# Patient Record
Sex: MALE | Race: WHITE | Employment: OTHER | URBAN - METROPOLITAN AREA
[De-identification: names, ages, dates, MRNs, and addresses within clinical notes are randomized per-mention and may not be internally consistent; named-entity substitution may affect disease eponyms.]

---

## 2017-04-03 ENCOUNTER — OFFICE VISIT (OUTPATIENT)
Dept: DERMATOLOGY | Age: 77
End: 2017-04-03

## 2017-04-03 DIAGNOSIS — D48.5 NEOPLASM OF UNCERTAIN BEHAVIOR OF SKIN: Primary | ICD-10-CM

## 2017-04-03 DIAGNOSIS — L57.0 ACTINIC KERATOSES: ICD-10-CM

## 2017-04-03 DIAGNOSIS — Z85.828 HISTORY OF NONMELANOMA SKIN CANCER: ICD-10-CM

## 2017-04-03 DIAGNOSIS — Z12.83 SCREENING EXAM FOR SKIN CANCER: ICD-10-CM

## 2017-04-03 PROCEDURE — 11100 PR BIOPSY OF SKIN LESION: CPT | Performed by: DERMATOLOGY

## 2017-04-03 PROCEDURE — 17000 DESTRUCT PREMALG LESION: CPT | Performed by: DERMATOLOGY

## 2017-04-03 PROCEDURE — 99214 OFFICE O/P EST MOD 30 MIN: CPT | Performed by: DERMATOLOGY

## 2017-04-03 PROCEDURE — 17003 DESTRUCT PREMALG LES 2-14: CPT | Performed by: DERMATOLOGY

## 2017-04-03 RX ORDER — CLOBETASOL PROPIONATE 0.5 MG/G
AEROSOL, FOAM TOPICAL
Qty: 1 CAN | Refills: 3 | Status: SHIPPED | OUTPATIENT
Start: 2017-04-03 | End: 2017-04-13

## 2017-04-07 ENCOUNTER — TELEPHONE (OUTPATIENT)
Dept: DERMATOLOGY | Age: 77
End: 2017-04-07

## 2017-04-13 ENCOUNTER — OFFICE VISIT (OUTPATIENT)
Dept: FAMILY MEDICINE CLINIC | Age: 77
End: 2017-04-13

## 2017-04-13 VITALS
HEART RATE: 72 BPM | BODY MASS INDEX: 36.34 KG/M2 | WEIGHT: 239 LBS | SYSTOLIC BLOOD PRESSURE: 126 MMHG | DIASTOLIC BLOOD PRESSURE: 72 MMHG

## 2017-04-13 DIAGNOSIS — E66.09 NON MORBID OBESITY DUE TO EXCESS CALORIES: ICD-10-CM

## 2017-04-13 DIAGNOSIS — E11.9 CONTROLLED TYPE 2 DIABETES MELLITUS WITHOUT COMPLICATION, WITHOUT LONG-TERM CURRENT USE OF INSULIN (HCC): Primary | ICD-10-CM

## 2017-04-13 DIAGNOSIS — E78.2 MIXED HYPERLIPIDEMIA: ICD-10-CM

## 2017-04-13 DIAGNOSIS — K21.9 GASTROESOPHAGEAL REFLUX DISEASE WITHOUT ESOPHAGITIS: ICD-10-CM

## 2017-04-13 DIAGNOSIS — I10 ESSENTIAL HYPERTENSION, BENIGN: ICD-10-CM

## 2017-04-13 LAB
ANION GAP SERPL CALCULATED.3IONS-SCNC: 14 MMOL/L (ref 3–16)
BUN BLDV-MCNC: 16 MG/DL (ref 7–20)
CALCIUM SERPL-MCNC: 8.8 MG/DL (ref 8.3–10.6)
CHLORIDE BLD-SCNC: 101 MMOL/L (ref 99–110)
CHOLESTEROL, TOTAL: 148 MG/DL (ref 0–199)
CO2: 28 MMOL/L (ref 21–32)
CREAT SERPL-MCNC: 1 MG/DL (ref 0.8–1.3)
GFR AFRICAN AMERICAN: >60
GFR NON-AFRICAN AMERICAN: >60
GLUCOSE BLD-MCNC: 119 MG/DL (ref 70–99)
HBA1C MFR BLD: 6.8 %
HDLC SERPL-MCNC: 40 MG/DL (ref 40–60)
LDL CHOLESTEROL CALCULATED: 82 MG/DL
POTASSIUM SERPL-SCNC: 4.4 MMOL/L (ref 3.5–5.1)
SODIUM BLD-SCNC: 143 MMOL/L (ref 136–145)
TRIGL SERPL-MCNC: 130 MG/DL (ref 0–150)
VLDLC SERPL CALC-MCNC: 26 MG/DL

## 2017-04-13 PROCEDURE — 99214 OFFICE O/P EST MOD 30 MIN: CPT | Performed by: FAMILY MEDICINE

## 2017-04-13 PROCEDURE — 36415 COLL VENOUS BLD VENIPUNCTURE: CPT | Performed by: FAMILY MEDICINE

## 2017-04-13 PROCEDURE — 83036 HEMOGLOBIN GLYCOSYLATED A1C: CPT | Performed by: FAMILY MEDICINE

## 2017-04-13 RX ORDER — METOPROLOL SUCCINATE 50 MG/1
50 TABLET, EXTENDED RELEASE ORAL DAILY
Qty: 90 TABLET | Refills: 1 | Status: SHIPPED | OUTPATIENT
Start: 2017-04-13 | End: 2017-10-13 | Stop reason: SDUPTHER

## 2017-04-13 RX ORDER — ATORVASTATIN CALCIUM 10 MG/1
10 TABLET, FILM COATED ORAL DAILY
COMMUNITY
End: 2017-04-13 | Stop reason: SDUPTHER

## 2017-04-13 RX ORDER — ATORVASTATIN CALCIUM 10 MG/1
10 TABLET, FILM COATED ORAL EVERY EVENING
Qty: 90 TABLET | Refills: 1 | Status: SHIPPED | OUTPATIENT
Start: 2017-04-13 | End: 2017-10-10 | Stop reason: SDUPTHER

## 2017-04-13 RX ORDER — LOSARTAN POTASSIUM AND HYDROCHLOROTHIAZIDE 25; 100 MG/1; MG/1
1 TABLET ORAL DAILY
Qty: 90 TABLET | Refills: 1 | Status: SHIPPED | OUTPATIENT
Start: 2017-04-13 | End: 2017-10-13 | Stop reason: SDUPTHER

## 2017-04-13 RX ORDER — PANTOPRAZOLE SODIUM 40 MG/1
40 TABLET, DELAYED RELEASE ORAL DAILY
Qty: 90 TABLET | Refills: 1 | Status: SHIPPED | OUTPATIENT
Start: 2017-04-13 | End: 2017-08-23

## 2017-04-13 RX ORDER — METFORMIN HYDROCHLORIDE 500 MG/1
TABLET, EXTENDED RELEASE ORAL
Qty: 360 TABLET | Refills: 1 | Status: SHIPPED | OUTPATIENT
Start: 2017-04-13 | End: 2017-10-13 | Stop reason: SDUPTHER

## 2017-04-15 PROBLEM — E66.09 NON MORBID OBESITY DUE TO EXCESS CALORIES: Status: ACTIVE | Noted: 2017-04-15

## 2017-04-15 ASSESSMENT — ENCOUNTER SYMPTOMS
COUGH: 0
EYE PAIN: 0
CHEST TIGHTNESS: 0
CONSTIPATION: 0
DIARRHEA: 0
SHORTNESS OF BREATH: 0
ABDOMINAL PAIN: 0

## 2017-05-15 ENCOUNTER — OFFICE VISIT (OUTPATIENT)
Dept: DERMATOLOGY | Age: 77
End: 2017-05-15

## 2017-05-15 DIAGNOSIS — C44.41 BASAL CELL CARCINOMA OF NECK: Primary | ICD-10-CM

## 2017-05-15 DIAGNOSIS — D48.5 NEOPLASM OF UNCERTAIN BEHAVIOR OF SKIN: ICD-10-CM

## 2017-05-15 DIAGNOSIS — L57.0 ACTINIC KERATOSES: ICD-10-CM

## 2017-05-15 PROCEDURE — 17003 DESTRUCT PREMALG LES 2-14: CPT | Performed by: DERMATOLOGY

## 2017-05-15 PROCEDURE — 11100 PR BIOPSY OF SKIN LESION: CPT | Performed by: DERMATOLOGY

## 2017-05-15 PROCEDURE — 17273 DSTR MAL LES S/N/H/F/G 2.1-3: CPT | Performed by: DERMATOLOGY

## 2017-05-15 PROCEDURE — 17000 DESTRUCT PREMALG LESION: CPT | Performed by: DERMATOLOGY

## 2017-05-18 ENCOUNTER — TELEPHONE (OUTPATIENT)
Dept: DERMATOLOGY | Age: 77
End: 2017-05-18

## 2017-07-21 ENCOUNTER — HOSPITAL ENCOUNTER (OUTPATIENT)
Dept: SURGERY | Age: 77
Discharge: HOME | End: 2017-07-21
Attending: INTERNAL MEDICINE | Admitting: INTERNAL MEDICINE

## 2017-07-21 VITALS
BODY MASS INDEX: 34.86 KG/M2 | SYSTOLIC BLOOD PRESSURE: 135 MMHG | HEART RATE: 67 BPM | DIASTOLIC BLOOD PRESSURE: 84 MMHG | RESPIRATION RATE: 16 BRPM | HEIGHT: 68 IN | TEMPERATURE: 98 F | OXYGEN SATURATION: 95 % | WEIGHT: 230 LBS

## 2017-07-21 RX ORDER — GLUCOSAMINE/METHYLSULFONYLMETH 500-400 MG
CAPSULE ORAL
COMMUNITY
End: 2019-09-03

## 2017-07-21 RX ORDER — SODIUM CHLORIDE, SODIUM LACTATE, POTASSIUM CHLORIDE, CALCIUM CHLORIDE 600; 310; 30; 20 MG/100ML; MG/100ML; MG/100ML; MG/100ML
1000 INJECTION, SOLUTION INTRAVENOUS ONCE
Status: COMPLETED | OUTPATIENT
Start: 2017-07-21 | End: 2017-07-21

## 2017-07-21 RX ORDER — LIDOCAINE HYDROCHLORIDE 10 MG/ML
1 INJECTION, SOLUTION EPIDURAL; INFILTRATION; INTRACAUDAL; PERINEURAL ONCE
Status: DISCONTINUED | OUTPATIENT
Start: 2017-07-21 | End: 2017-07-22 | Stop reason: HOSPADM

## 2017-07-21 RX ADMIN — SODIUM CHLORIDE, SODIUM LACTATE, POTASSIUM CHLORIDE, CALCIUM CHLORIDE 1000 ML: 600; 310; 30; 20 INJECTION, SOLUTION INTRAVENOUS at 14:07

## 2017-07-21 ASSESSMENT — PAIN SCALES - GENERAL
PAINLEVEL_OUTOF10: 0

## 2017-07-21 ASSESSMENT — PAIN - FUNCTIONAL ASSESSMENT: PAIN_FUNCTIONAL_ASSESSMENT: 0-10

## 2017-08-17 LAB — DIABETIC RETINOPATHY: NEGATIVE

## 2017-08-23 ENCOUNTER — HOSPITAL ENCOUNTER (OUTPATIENT)
Dept: SURGERY | Age: 77
Discharge: HOME | End: 2017-08-23
Attending: INTERNAL MEDICINE | Admitting: INTERNAL MEDICINE

## 2017-08-23 VITALS
WEIGHT: 230 LBS | RESPIRATION RATE: 18 BRPM | BODY MASS INDEX: 34.86 KG/M2 | HEART RATE: 77 BPM | HEIGHT: 68 IN | OXYGEN SATURATION: 98 % | TEMPERATURE: 98 F | DIASTOLIC BLOOD PRESSURE: 80 MMHG | SYSTOLIC BLOOD PRESSURE: 155 MMHG

## 2017-08-23 DIAGNOSIS — K21.9 GASTROESOPHAGEAL REFLUX DISEASE WITHOUT ESOPHAGITIS: ICD-10-CM

## 2017-08-23 DIAGNOSIS — K25.9 GASTRIC ULCER WITHOUT HEMORRHAGE OR PERFORATION: ICD-10-CM

## 2017-08-23 LAB
GLUCOSE BLD-MCNC: 106 MG/DL (ref 70–99)
PERFORMED ON: ABNORMAL

## 2017-08-23 RX ORDER — SODIUM CHLORIDE, SODIUM LACTATE, POTASSIUM CHLORIDE, CALCIUM CHLORIDE 600; 310; 30; 20 MG/100ML; MG/100ML; MG/100ML; MG/100ML
INJECTION, SOLUTION INTRAVENOUS CONTINUOUS
Status: DISCONTINUED | OUTPATIENT
Start: 2017-08-23 | End: 2017-08-24 | Stop reason: HOSPADM

## 2017-08-23 RX ORDER — PANTOPRAZOLE SODIUM 40 MG/1
40 TABLET, DELAYED RELEASE ORAL DAILY
Qty: 30 TABLET | Refills: 3 | Status: SHIPPED | OUTPATIENT
Start: 2017-08-23 | End: 2017-12-12 | Stop reason: SDUPTHER

## 2017-08-23 RX ADMIN — SODIUM CHLORIDE, SODIUM LACTATE, POTASSIUM CHLORIDE, CALCIUM CHLORIDE: 600; 310; 30; 20 INJECTION, SOLUTION INTRAVENOUS at 13:23

## 2017-08-23 ASSESSMENT — PAIN SCALES - GENERAL
PAINLEVEL_OUTOF10: 0

## 2017-08-23 ASSESSMENT — PAIN - FUNCTIONAL ASSESSMENT: PAIN_FUNCTIONAL_ASSESSMENT: 0-10

## 2017-10-10 DIAGNOSIS — E78.2 MIXED HYPERLIPIDEMIA: ICD-10-CM

## 2017-10-10 RX ORDER — ATORVASTATIN CALCIUM 10 MG/1
TABLET, FILM COATED ORAL
Qty: 90 TABLET | Refills: 1 | Status: SHIPPED | OUTPATIENT
Start: 2017-10-10 | End: 2018-04-13 | Stop reason: SDUPTHER

## 2017-10-13 ENCOUNTER — OFFICE VISIT (OUTPATIENT)
Dept: FAMILY MEDICINE CLINIC | Age: 77
End: 2017-10-13

## 2017-10-13 VITALS
SYSTOLIC BLOOD PRESSURE: 136 MMHG | WEIGHT: 242.4 LBS | HEART RATE: 76 BPM | DIASTOLIC BLOOD PRESSURE: 88 MMHG | BODY MASS INDEX: 36.86 KG/M2

## 2017-10-13 DIAGNOSIS — E11.9 CONTROLLED TYPE 2 DIABETES MELLITUS WITHOUT COMPLICATION, WITHOUT LONG-TERM CURRENT USE OF INSULIN (HCC): Primary | ICD-10-CM

## 2017-10-13 DIAGNOSIS — Z12.5 PROSTATE CANCER SCREENING: ICD-10-CM

## 2017-10-13 DIAGNOSIS — E78.2 MIXED HYPERLIPIDEMIA: ICD-10-CM

## 2017-10-13 DIAGNOSIS — K21.9 GASTROESOPHAGEAL REFLUX DISEASE, ESOPHAGITIS PRESENCE NOT SPECIFIED: ICD-10-CM

## 2017-10-13 DIAGNOSIS — I10 ESSENTIAL HYPERTENSION, BENIGN: ICD-10-CM

## 2017-10-13 DIAGNOSIS — M25.512 LEFT SHOULDER PAIN, UNSPECIFIED CHRONICITY: ICD-10-CM

## 2017-10-13 DIAGNOSIS — Z23 NEEDS FLU SHOT: ICD-10-CM

## 2017-10-13 LAB
ANION GAP SERPL CALCULATED.3IONS-SCNC: 13 MMOL/L (ref 3–16)
BUN BLDV-MCNC: 13 MG/DL (ref 7–20)
CALCIUM SERPL-MCNC: 9.6 MG/DL (ref 8.3–10.6)
CHLORIDE BLD-SCNC: 99 MMOL/L (ref 99–110)
CHOLESTEROL, TOTAL: 166 MG/DL (ref 0–199)
CO2: 29 MMOL/L (ref 21–32)
CREAT SERPL-MCNC: 1.1 MG/DL (ref 0.8–1.3)
CREATININE URINE: 35.9 MG/DL (ref 39–259)
GFR AFRICAN AMERICAN: >60
GFR NON-AFRICAN AMERICAN: >60
GLUCOSE BLD-MCNC: 123 MG/DL (ref 70–99)
HBA1C MFR BLD: 6.9 %
HDLC SERPL-MCNC: 43 MG/DL (ref 40–60)
LDL CHOLESTEROL CALCULATED: 86 MG/DL
MICROALBUMIN UR-MCNC: <1.2 MG/DL
MICROALBUMIN/CREAT UR-RTO: ABNORMAL MG/G (ref 0–30)
POTASSIUM SERPL-SCNC: 4.7 MMOL/L (ref 3.5–5.1)
PROSTATE SPECIFIC ANTIGEN: 2.37 NG/ML (ref 0–4)
SODIUM BLD-SCNC: 141 MMOL/L (ref 136–145)
TRIGL SERPL-MCNC: 186 MG/DL (ref 0–150)
VLDLC SERPL CALC-MCNC: 37 MG/DL

## 2017-10-13 PROCEDURE — 83036 HEMOGLOBIN GLYCOSYLATED A1C: CPT | Performed by: FAMILY MEDICINE

## 2017-10-13 PROCEDURE — 99214 OFFICE O/P EST MOD 30 MIN: CPT | Performed by: FAMILY MEDICINE

## 2017-10-13 PROCEDURE — 90662 IIV NO PRSV INCREASED AG IM: CPT | Performed by: FAMILY MEDICINE

## 2017-10-13 PROCEDURE — G0008 ADMIN INFLUENZA VIRUS VAC: HCPCS | Performed by: FAMILY MEDICINE

## 2017-10-13 PROCEDURE — 36415 COLL VENOUS BLD VENIPUNCTURE: CPT | Performed by: FAMILY MEDICINE

## 2017-10-13 RX ORDER — METFORMIN HYDROCHLORIDE 1000 MG/1
TABLET, FILM COATED, EXTENDED RELEASE ORAL
Qty: 180 TABLET | Refills: 1 | Status: SHIPPED | OUTPATIENT
Start: 2017-10-13 | End: 2018-04-13 | Stop reason: DRUGHIGH

## 2017-10-13 RX ORDER — METOPROLOL SUCCINATE 50 MG/1
50 TABLET, EXTENDED RELEASE ORAL DAILY
Qty: 90 TABLET | Refills: 1 | Status: SHIPPED | OUTPATIENT
Start: 2017-10-13 | End: 2018-04-13 | Stop reason: SDUPTHER

## 2017-10-13 RX ORDER — LOSARTAN POTASSIUM AND HYDROCHLOROTHIAZIDE 25; 100 MG/1; MG/1
1 TABLET ORAL DAILY
Qty: 90 TABLET | Refills: 1 | Status: SHIPPED | OUTPATIENT
Start: 2017-10-13 | End: 2018-04-13 | Stop reason: SDUPTHER

## 2017-10-13 ASSESSMENT — PATIENT HEALTH QUESTIONNAIRE - PHQ9
2. FEELING DOWN, DEPRESSED OR HOPELESS: 0
1. LITTLE INTEREST OR PLEASURE IN DOING THINGS: 0
SUM OF ALL RESPONSES TO PHQ9 QUESTIONS 1 & 2: 0
SUM OF ALL RESPONSES TO PHQ QUESTIONS 1-9: 0

## 2017-10-13 NOTE — PROGRESS NOTES
BP Readings from Last 2 Encounters:   08/23/17 (!) 155/80   07/21/17 135/84     Hemoglobin A1C (%)   Date Value   04/13/2017 6.8     MICROALBUMIN, RANDOM URINE (mg/dL)   Date Value   10/13/2016 <1.20     LDL Calculated (mg/dL)   Date Value   04/13/2017 82              Tobacco use:  Patient  reports that he quit smoking about 14 years ago. He has a 30.00 pack-year smoking history. He has never used smokeless tobacco.    If Smoker - Cessation materials given? NA    Is Daily aspirin on medication list?:  Yes    Diabetic retinal exam done? Yes   If yes, document in Health Maintenance. Monofilament placed on counter? Yes and No    Shoes and socks removed? No    BP taken with correct size cuff? Yes   Repeated if > 140/90 NA     Is patient taking any medications for diabetes    Yes   If yes, see medication list    Is the patient reporting any side effects of diabetic medications? No    Microalbumin performed if applicable?   Yes  If needed    Is patient taking any over the counter medications    Yes   If yes, see medication list
Vaccine Information Sheet, \"Influenza - Inactivated\"  given to Pao Ennis, or parent/legal guardian of  Pao Ennis and verbalized understanding. Patient responses:    Have you ever had a reaction to a flu vaccine? no  Are you able to eat eggs without adverse effects? Yes  Do you have any current illness? No  Have you ever had [place] Syndrome? No    Flu vaccine given per order. Please see immunization tab.
shoulder - will likely discuss with his one son, who is an orthopedic physician assistant, regarding basic exercises to help strengthen shoulder and minimize pain, but if symptoms persist or worsen, then follow-up with orthopedic surgeon. If labs stable, fingersticks stable, and overall feeling well, then repeat fasting office visit in 6 months, sooner as needed.

## 2017-10-15 ASSESSMENT — ENCOUNTER SYMPTOMS
CONSTIPATION: 0
ABDOMINAL PAIN: 0
DIARRHEA: 0
COUGH: 0
EYE PAIN: 0
SHORTNESS OF BREATH: 0

## 2017-10-15 NOTE — PATIENT INSTRUCTIONS
Abuse and hypertension both stable, continue present medications. A1c was just within goal at 6.9 - advise better low-fat and low sweets diet, also increase regular activity/exercise as able. Likely impingement syndrome involving the left shoulder - consider follow-up with orthopedic surgeon or trial of physical therapy - if you prefer, you can discuss basic exercises with your son who is a orthopedic physician assistant. If your symptoms persist, then definitely advise follow-up with orthopedic surgeon. If labs stable, fingersticks stable, and overall feeling well, then repeat fasting office visit in 6 months, sooner as needed.

## 2017-10-30 ENCOUNTER — TELEPHONE (OUTPATIENT)
Dept: FAMILY MEDICINE CLINIC | Age: 77
End: 2017-10-30

## 2017-11-13 ENCOUNTER — OFFICE VISIT (OUTPATIENT)
Dept: DERMATOLOGY | Age: 77
End: 2017-11-13

## 2017-11-13 DIAGNOSIS — Z12.83 SCREENING EXAM FOR SKIN CANCER: ICD-10-CM

## 2017-11-13 DIAGNOSIS — L57.0 ACTINIC KERATOSES: Primary | ICD-10-CM

## 2017-11-13 DIAGNOSIS — Z85.828 HISTORY OF NONMELANOMA SKIN CANCER: ICD-10-CM

## 2017-11-13 PROCEDURE — 17000 DESTRUCT PREMALG LESION: CPT | Performed by: DERMATOLOGY

## 2017-11-13 PROCEDURE — 17003 DESTRUCT PREMALG LES 2-14: CPT | Performed by: DERMATOLOGY

## 2017-11-13 PROCEDURE — 99213 OFFICE O/P EST LOW 20 MIN: CPT | Performed by: DERMATOLOGY

## 2017-11-13 RX ORDER — KETOCONAZOLE 20 MG/G
CREAM TOPICAL
Qty: 60 G | Refills: 2 | Status: SHIPPED | OUTPATIENT
Start: 2017-11-13 | End: 2020-01-30

## 2017-11-13 RX ORDER — KETOCONAZOLE 20 MG/ML
SHAMPOO TOPICAL
Qty: 120 ML | Refills: 10 | Status: SHIPPED | OUTPATIENT
Start: 2017-11-13 | End: 2019-01-04 | Stop reason: SDUPTHER

## 2017-11-13 NOTE — PROGRESS NOTES
[facility]) Dermatology  Diony Hua MD  [phone]      Gabby Crabtree  1940    69 y.o. male     Date of Visit: 11/13/2017    Last Visit:6mo    Chief Complaint: Skin check    History of Present Illness:  1. Here for skin check. Hx NMSC - unknown type central chest s/p Mohs 2010, BCC L nasal bridge s/p Mohs 4/2015, sBCC L neck base s/p curettage 5/2017.   -Wears hat and SPF 30 sunscreen regularly when golfing    2. Hx AKs s/p cryotherapy. Unsure of new lesions. 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 Granby's - TAC 0.1% oint. Review of Systems:  Constitutional: Reports general sense of well-being. Skin: No interval severe sunburns. Allergies: Reviewed and updated. Past Medical History, Surgical History, Medications and Allergies reviewed. 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    Hyperlipidemia     Hypertension     Primary malignant neoplasm of skin of trunk 4/21/2009     Past Surgical History:   Procedure Laterality Date    COLONOSCOPY  5/22/12    polyps    COLONOSCOPY  7/21/15    polyps    INGUINAL HERNIA REPAIR Bilateral 6/22/15    laparoscopic    LUNG REMOVAL, PARTIAL  2007    middle lobe R lung/histoplasmosis    TURP  2002    UPPER GASTROINTESTINAL ENDOSCOPY  1/26/2015    UPPER GASTROINTESTINAL ENDOSCOPY  2/15    dilated    UPPER GASTROINTESTINAL ENDOSCOPY  07/21/2017    dilatation, bx    UPPER GASTROINTESTINAL ENDOSCOPY  08/23/2017    dilated; Bx gastric.     VASECTOMY         No Known Allergies  Outpatient Prescriptions Marked as Taking for the 11/13/17 encounter (Office Visit) with Diony Hua MD   Medication Sig Dispense Refill    losartan-hydrochlorothiazide (HYZAAR) 100-25 MG per tablet Take 1 tablet by

## 2017-12-12 DIAGNOSIS — K21.9 GASTROESOPHAGEAL REFLUX DISEASE WITHOUT ESOPHAGITIS: ICD-10-CM

## 2017-12-14 RX ORDER — PANTOPRAZOLE SODIUM 40 MG/1
TABLET, DELAYED RELEASE ORAL
Qty: 90 TABLET | Refills: 1 | Status: SHIPPED | OUTPATIENT
Start: 2017-12-14 | End: 2018-05-29 | Stop reason: SDUPTHER

## 2018-01-08 ENCOUNTER — TELEPHONE (OUTPATIENT)
Dept: FAMILY MEDICINE CLINIC | Age: 78
End: 2018-01-08

## 2018-01-08 DIAGNOSIS — E11.9 CONTROLLED TYPE 2 DIABETES MELLITUS WITHOUT COMPLICATION, WITHOUT LONG-TERM CURRENT USE OF INSULIN (HCC): Primary | ICD-10-CM

## 2018-01-08 RX ORDER — METFORMIN HYDROCHLORIDE 500 MG/1
1000 TABLET, EXTENDED RELEASE ORAL 2 TIMES DAILY WITH MEALS
Qty: 360 TABLET | Refills: 1 | Status: SHIPPED | OUTPATIENT
Start: 2018-01-08 | End: 2018-07-12 | Stop reason: SDUPTHER

## 2018-01-08 NOTE — TELEPHONE ENCOUNTER
[address] [facility] is calling because the patient's metformin ER 1000 mg tablets have gone up in price with his insurance. They would like to know if its okay to switch to regular Metformin 1000mg instead. Please let them know if its okay to switch the medication.

## 2018-04-09 ENCOUNTER — OFFICE VISIT (OUTPATIENT)
Dept: DERMATOLOGY | Age: 78
End: 2018-04-09

## 2018-04-09 DIAGNOSIS — Z85.828 HISTORY OF NONMELANOMA SKIN CANCER: ICD-10-CM

## 2018-04-09 DIAGNOSIS — L57.0 ACTINIC KERATOSES: Primary | ICD-10-CM

## 2018-04-09 DIAGNOSIS — Z12.83 SCREENING EXAM FOR SKIN CANCER: ICD-10-CM

## 2018-04-09 PROCEDURE — 17000 DESTRUCT PREMALG LESION: CPT | Performed by: DERMATOLOGY

## 2018-04-09 PROCEDURE — 17003 DESTRUCT PREMALG LES 2-14: CPT | Performed by: DERMATOLOGY

## 2018-04-09 PROCEDURE — 99213 OFFICE O/P EST LOW 20 MIN: CPT | Performed by: DERMATOLOGY

## 2018-04-12 DIAGNOSIS — E78.2 MIXED HYPERLIPIDEMIA: ICD-10-CM

## 2018-04-12 RX ORDER — ATORVASTATIN CALCIUM 10 MG/1
TABLET, FILM COATED ORAL
Qty: 90 TABLET | Refills: 0 | Status: CANCELLED | OUTPATIENT
Start: 2018-04-12

## 2018-04-13 ENCOUNTER — OFFICE VISIT (OUTPATIENT)
Dept: FAMILY MEDICINE CLINIC | Age: 78
End: 2018-04-13

## 2018-04-13 VITALS
BODY MASS INDEX: 36.98 KG/M2 | WEIGHT: 244 LBS | OXYGEN SATURATION: 95 % | SYSTOLIC BLOOD PRESSURE: 142 MMHG | DIASTOLIC BLOOD PRESSURE: 84 MMHG | HEART RATE: 67 BPM | HEIGHT: 68 IN

## 2018-04-13 DIAGNOSIS — E78.2 MIXED HYPERLIPIDEMIA: ICD-10-CM

## 2018-04-13 DIAGNOSIS — R06.09 DOE (DYSPNEA ON EXERTION): ICD-10-CM

## 2018-04-13 DIAGNOSIS — Z86.19 HISTORY OF HISTOPLASMOSIS: ICD-10-CM

## 2018-04-13 DIAGNOSIS — I10 ESSENTIAL HYPERTENSION, BENIGN: ICD-10-CM

## 2018-04-13 DIAGNOSIS — E11.9 CONTROLLED TYPE 2 DIABETES MELLITUS WITHOUT COMPLICATION, WITHOUT LONG-TERM CURRENT USE OF INSULIN (HCC): Primary | ICD-10-CM

## 2018-04-13 DIAGNOSIS — R06.02 SOB (SHORTNESS OF BREATH): ICD-10-CM

## 2018-04-13 LAB
A/G RATIO: 1.6 (ref 1.1–2.2)
ALBUMIN SERPL-MCNC: 4 G/DL (ref 3.4–5)
ALP BLD-CCNC: 65 U/L (ref 40–129)
ALT SERPL-CCNC: 22 U/L (ref 10–40)
ANION GAP SERPL CALCULATED.3IONS-SCNC: 13 MMOL/L (ref 3–16)
AST SERPL-CCNC: 16 U/L (ref 15–37)
BASOPHILS ABSOLUTE: 0.1 K/UL (ref 0–0.2)
BASOPHILS RELATIVE PERCENT: 0.7 %
BILIRUB SERPL-MCNC: 0.5 MG/DL (ref 0–1)
BUN BLDV-MCNC: 15 MG/DL (ref 7–20)
CALCIUM SERPL-MCNC: 9.5 MG/DL (ref 8.3–10.6)
CHLORIDE BLD-SCNC: 101 MMOL/L (ref 99–110)
CHOLESTEROL, TOTAL: 165 MG/DL (ref 0–199)
CO2: 29 MMOL/L (ref 21–32)
CREAT SERPL-MCNC: 1.1 MG/DL (ref 0.8–1.3)
EOSINOPHILS ABSOLUTE: 0.4 K/UL (ref 0–0.6)
EOSINOPHILS RELATIVE PERCENT: 4.5 %
GFR AFRICAN AMERICAN: >60
GFR NON-AFRICAN AMERICAN: >60
GLOBULIN: 2.5 G/DL
GLUCOSE BLD-MCNC: 120 MG/DL (ref 70–99)
HBA1C MFR BLD: 6.9 %
HCT VFR BLD CALC: 45.1 % (ref 40.5–52.5)
HDLC SERPL-MCNC: 43 MG/DL (ref 40–60)
HEMOGLOBIN: 14.7 G/DL (ref 13.5–17.5)
LDL CHOLESTEROL CALCULATED: 93 MG/DL
LYMPHOCYTES ABSOLUTE: 2.9 K/UL (ref 1–5.1)
LYMPHOCYTES RELATIVE PERCENT: 31.6 %
MCH RBC QN AUTO: 30 PG (ref 26–34)
MCHC RBC AUTO-ENTMCNC: 32.5 G/DL (ref 31–36)
MCV RBC AUTO: 92.3 FL (ref 80–100)
MONOCYTES ABSOLUTE: 1 K/UL (ref 0–1.3)
MONOCYTES RELATIVE PERCENT: 10.8 %
NEUTROPHILS ABSOLUTE: 4.8 K/UL (ref 1.7–7.7)
NEUTROPHILS RELATIVE PERCENT: 52.4 %
PDW BLD-RTO: 14.3 % (ref 12.4–15.4)
PLATELET # BLD: 402 K/UL (ref 135–450)
PMV BLD AUTO: 7.9 FL (ref 5–10.5)
POTASSIUM SERPL-SCNC: 4.9 MMOL/L (ref 3.5–5.1)
RBC # BLD: 4.89 M/UL (ref 4.2–5.9)
SODIUM BLD-SCNC: 143 MMOL/L (ref 136–145)
TOTAL PROTEIN: 6.5 G/DL (ref 6.4–8.2)
TRIGL SERPL-MCNC: 146 MG/DL (ref 0–150)
TSH SERPL DL<=0.05 MIU/L-ACNC: 1.51 UIU/ML (ref 0.27–4.2)
VLDLC SERPL CALC-MCNC: 29 MG/DL
WBC # BLD: 9.3 K/UL (ref 4–11)

## 2018-04-13 PROCEDURE — 99214 OFFICE O/P EST MOD 30 MIN: CPT | Performed by: FAMILY MEDICINE

## 2018-04-13 PROCEDURE — 83036 HEMOGLOBIN GLYCOSYLATED A1C: CPT | Performed by: FAMILY MEDICINE

## 2018-04-13 PROCEDURE — 36415 COLL VENOUS BLD VENIPUNCTURE: CPT | Performed by: FAMILY MEDICINE

## 2018-04-13 RX ORDER — METOPROLOL SUCCINATE 50 MG/1
50 TABLET, EXTENDED RELEASE ORAL DAILY
Qty: 90 TABLET | Refills: 1 | Status: SHIPPED | OUTPATIENT
Start: 2018-04-13 | End: 2018-04-28 | Stop reason: SDUPTHER

## 2018-04-13 RX ORDER — LOSARTAN POTASSIUM AND HYDROCHLOROTHIAZIDE 25; 100 MG/1; MG/1
1 TABLET ORAL DAILY
Qty: 90 TABLET | Refills: 1 | Status: SHIPPED | OUTPATIENT
Start: 2018-04-13 | End: 2018-05-29 | Stop reason: SDUPTHER

## 2018-04-13 RX ORDER — ATORVASTATIN CALCIUM 10 MG/1
TABLET, FILM COATED ORAL
Qty: 90 TABLET | Refills: 1 | Status: SHIPPED | OUTPATIENT
Start: 2018-04-13 | End: 2018-10-09 | Stop reason: SDUPTHER

## 2018-04-16 ENCOUNTER — HOSPITAL ENCOUNTER (OUTPATIENT)
Dept: OTHER | Age: 78
Discharge: HOME | End: 2018-04-17
Attending: FAMILY MEDICINE | Admitting: FAMILY MEDICINE

## 2018-04-16 ENCOUNTER — HOSPITAL ENCOUNTER (OUTPATIENT)
Dept: GENERAL RADIOLOGY | Age: 78
Discharge: HOME | End: 2018-04-16

## 2018-04-16 DIAGNOSIS — R06.09 DOE (DYSPNEA ON EXERTION): ICD-10-CM

## 2018-04-16 ASSESSMENT — ENCOUNTER SYMPTOMS
NAUSEA: 0
EYE PAIN: 0
ABDOMINAL PAIN: 0
COUGH: 0
CONSTIPATION: 0
SHORTNESS OF BREATH: 0

## 2018-04-28 DIAGNOSIS — I10 ESSENTIAL HYPERTENSION, BENIGN: ICD-10-CM

## 2018-05-01 ENCOUNTER — OFFICE VISIT (OUTPATIENT)
Dept: CARDIOLOGY CLINIC | Age: 78
End: 2018-05-01

## 2018-05-01 VITALS
SYSTOLIC BLOOD PRESSURE: 152 MMHG | HEART RATE: 103 BPM | BODY MASS INDEX: 37.28 KG/M2 | HEIGHT: 68 IN | WEIGHT: 246 LBS | OXYGEN SATURATION: 91 % | DIASTOLIC BLOOD PRESSURE: 78 MMHG

## 2018-05-01 DIAGNOSIS — I10 ESSENTIAL HYPERTENSION: Primary | ICD-10-CM

## 2018-05-01 DIAGNOSIS — R06.02 SHORTNESS OF BREATH: ICD-10-CM

## 2018-05-01 DIAGNOSIS — E78.2 MIXED HYPERLIPIDEMIA: ICD-10-CM

## 2018-05-01 PROCEDURE — 93000 ELECTROCARDIOGRAM COMPLETE: CPT | Performed by: INTERNAL MEDICINE

## 2018-05-01 PROCEDURE — 99204 OFFICE O/P NEW MOD 45 MIN: CPT | Performed by: INTERNAL MEDICINE

## 2018-05-01 RX ORDER — METOPROLOL SUCCINATE 50 MG/1
TABLET, EXTENDED RELEASE ORAL
Qty: 90 TABLET | Refills: 3 | Status: SHIPPED | OUTPATIENT
Start: 2018-05-01 | End: 2018-10-09 | Stop reason: SDUPTHER

## 2018-05-29 DIAGNOSIS — K21.9 GASTROESOPHAGEAL REFLUX DISEASE WITHOUT ESOPHAGITIS: ICD-10-CM

## 2018-05-29 DIAGNOSIS — I10 ESSENTIAL HYPERTENSION, BENIGN: ICD-10-CM

## 2018-05-30 RX ORDER — PANTOPRAZOLE SODIUM 40 MG/1
TABLET, DELAYED RELEASE ORAL
Qty: 90 TABLET | Refills: 1 | Status: SHIPPED | OUTPATIENT
Start: 2018-05-30 | End: 2018-10-09 | Stop reason: SDUPTHER

## 2018-05-30 RX ORDER — LOSARTAN POTASSIUM AND HYDROCHLOROTHIAZIDE 25; 100 MG/1; MG/1
TABLET ORAL
Qty: 90 TABLET | Refills: 1 | Status: SHIPPED | OUTPATIENT
Start: 2018-05-30 | End: 2018-10-09 | Stop reason: SDUPTHER

## 2018-06-01 ENCOUNTER — HOSPITAL ENCOUNTER (OUTPATIENT)
Dept: CARDIOLOGY | Facility: CLINIC | Age: 78
Discharge: HOME | End: 2018-06-01
Attending: INTERNAL MEDICINE | Admitting: INTERNAL MEDICINE

## 2018-06-01 ENCOUNTER — TELEPHONE (OUTPATIENT)
Dept: CARDIOLOGY CLINIC | Age: 78
End: 2018-06-01

## 2018-06-01 DIAGNOSIS — I10 ESSENTIAL (PRIMARY) HYPERTENSION: ICD-10-CM

## 2018-06-01 LAB
LV EF: 58 %
LV EF: 63 %
LVEF MODALITY: NORMAL
LVEF MODALITY: NORMAL

## 2018-06-04 ENCOUNTER — TELEPHONE (OUTPATIENT)
Dept: CARDIOLOGY CLINIC | Age: 78
End: 2018-06-04

## 2018-06-12 ENCOUNTER — OFFICE VISIT (OUTPATIENT)
Dept: CARDIOLOGY CLINIC | Age: 78
End: 2018-06-12

## 2018-06-12 VITALS
HEIGHT: 68 IN | BODY MASS INDEX: 36.56 KG/M2 | DIASTOLIC BLOOD PRESSURE: 71 MMHG | HEART RATE: 67 BPM | SYSTOLIC BLOOD PRESSURE: 136 MMHG | OXYGEN SATURATION: 97 % | WEIGHT: 241.2 LBS

## 2018-06-12 DIAGNOSIS — I10 ESSENTIAL HYPERTENSION: ICD-10-CM

## 2018-06-12 DIAGNOSIS — E66.09 NON MORBID OBESITY DUE TO EXCESS CALORIES: ICD-10-CM

## 2018-06-12 DIAGNOSIS — R06.02 SHORTNESS OF BREATH: Primary | ICD-10-CM

## 2018-06-12 PROCEDURE — 99214 OFFICE O/P EST MOD 30 MIN: CPT | Performed by: INTERNAL MEDICINE

## 2018-06-21 ENCOUNTER — OFFICE VISIT (OUTPATIENT)
Dept: PULMONOLOGY | Age: 78
End: 2018-06-21

## 2018-06-21 VITALS
BODY MASS INDEX: 37.89 KG/M2 | TEMPERATURE: 97.8 F | DIASTOLIC BLOOD PRESSURE: 78 MMHG | WEIGHT: 250 LBS | SYSTOLIC BLOOD PRESSURE: 134 MMHG | OXYGEN SATURATION: 96 % | RESPIRATION RATE: 16 BRPM | HEIGHT: 68 IN | HEART RATE: 69 BPM

## 2018-06-21 DIAGNOSIS — I51.89 DIASTOLIC DYSFUNCTION: ICD-10-CM

## 2018-06-21 DIAGNOSIS — Z87.19 HISTORY OF ESOPHAGEAL STRICTURE: ICD-10-CM

## 2018-06-21 DIAGNOSIS — R06.02 SHORTNESS OF BREATH: Primary | ICD-10-CM

## 2018-06-21 DIAGNOSIS — J90 PLEURAL EFFUSION: ICD-10-CM

## 2018-06-21 DIAGNOSIS — E66.9 OBESITY (BMI 35.0-39.9 WITHOUT COMORBIDITY): ICD-10-CM

## 2018-06-21 DIAGNOSIS — R29.818 SUSPECTED SLEEP APNEA: ICD-10-CM

## 2018-06-21 DIAGNOSIS — Z86.19 H/O HISTOPLASMOSIS: ICD-10-CM

## 2018-06-21 PROCEDURE — 90732 PPSV23 VACC 2 YRS+ SUBQ/IM: CPT | Performed by: INTERNAL MEDICINE

## 2018-06-21 PROCEDURE — 99215 OFFICE O/P EST HI 40 MIN: CPT | Performed by: INTERNAL MEDICINE

## 2018-06-21 PROCEDURE — G0009 ADMIN PNEUMOCOCCAL VACCINE: HCPCS | Performed by: INTERNAL MEDICINE

## 2018-06-21 RX ORDER — ALBUTEROL SULFATE 90 UG/1
2 AEROSOL, METERED RESPIRATORY (INHALATION) EVERY 6 HOURS PRN
Qty: 1 INHALER | Refills: 3 | Status: SHIPPED | OUTPATIENT
Start: 2018-06-21 | End: 2019-01-30 | Stop reason: SDUPTHER

## 2018-06-21 ASSESSMENT — SLEEP AND FATIGUE QUESTIONNAIRES
HOW LIKELY ARE YOU TO NOD OFF OR FALL ASLEEP WHILE SITTING QUIETLY AFTER LUNCH WITHOUT ALCOHOL: 3
ESS TOTAL SCORE: 15
HOW LIKELY ARE YOU TO NOD OFF OR FALL ASLEEP WHILE SITTING AND TALKING TO SOMEONE: 0
HOW LIKELY ARE YOU TO NOD OFF OR FALL ASLEEP IN A CAR, WHILE STOPPED FOR A FEW MINUTES IN TRAFFIC: 0
NECK CIRCUMFERENCE (INCHES): 15.5
HOW LIKELY ARE YOU TO NOD OFF OR FALL ASLEEP WHEN YOU ARE A PASSENGER IN A CAR FOR AN HOUR WITHOUT A BREAK: 2
HOW LIKELY ARE YOU TO NOD OFF OR FALL ASLEEP WHILE LYING DOWN TO REST IN THE AFTERNOON WHEN CIRCUMSTANCES PERMIT: 3
HOW LIKELY ARE YOU TO NOD OFF OR FALL ASLEEP WHILE SITTING AND READING: 3
HOW LIKELY ARE YOU TO NOD OFF OR FALL ASLEEP WHILE WATCHING TV: 3
HOW LIKELY ARE YOU TO NOD OFF OR FALL ASLEEP WHILE SITTING INACTIVE IN A PUBLIC PLACE: 1

## 2018-06-22 PROBLEM — Z86.19 H/O HISTOPLASMOSIS: Status: ACTIVE | Noted: 2018-06-22

## 2018-06-22 PROBLEM — Z87.19 HISTORY OF ESOPHAGEAL STRICTURE: Status: ACTIVE | Noted: 2018-06-22

## 2018-06-22 PROBLEM — J90 PLEURAL EFFUSION: Status: ACTIVE | Noted: 2018-06-22

## 2018-06-22 PROBLEM — E66.9 OBESITY (BMI 35.0-39.9 WITHOUT COMORBIDITY): Status: ACTIVE | Noted: 2018-06-22

## 2018-07-12 DIAGNOSIS — E11.9 CONTROLLED TYPE 2 DIABETES MELLITUS WITHOUT COMPLICATION, WITHOUT LONG-TERM CURRENT USE OF INSULIN (HCC): ICD-10-CM

## 2018-07-12 NOTE — TELEPHONE ENCOUNTER
[facility] is requesting refill(s)   Last OV 4/13/18 (pertaining to medication)  LR 1/8/18 (per medication requested)  Next office visit scheduled or attempted Yes   If no, reason:  10/9/18

## 2018-07-16 RX ORDER — METFORMIN HYDROCHLORIDE 500 MG/1
TABLET, EXTENDED RELEASE ORAL
Qty: 360 TABLET | Refills: 1 | Status: SHIPPED | OUTPATIENT
Start: 2018-07-16 | End: 2018-10-09 | Stop reason: SDUPTHER

## 2018-07-23 ENCOUNTER — HOSPITAL ENCOUNTER (OUTPATIENT)
Dept: PULMONOLOGY | Age: 78
Discharge: HOME | End: 2018-07-23
Payer: MEDICARE

## 2018-07-23 DIAGNOSIS — R06.02 SHORTNESS OF BREATH: ICD-10-CM

## 2018-07-23 PROCEDURE — 94664 DEMO&/EVAL PT USE INHALER: CPT

## 2018-07-23 PROCEDURE — 94726 PLETHYSMOGRAPHY LUNG VOLUMES: CPT

## 2018-07-23 PROCEDURE — 94060 EVALUATION OF WHEEZING: CPT

## 2018-07-23 PROCEDURE — 6370000000 HC RX 637 (ALT 250 FOR IP): Performed by: INTERNAL MEDICINE

## 2018-07-23 PROCEDURE — 94729 DIFFUSING CAPACITY: CPT

## 2018-07-23 PROCEDURE — 94150 VITAL CAPACITY TEST: CPT

## 2018-07-23 RX ORDER — ALBUTEROL SULFATE 90 UG/1
4 AEROSOL, METERED RESPIRATORY (INHALATION) ONCE
Status: COMPLETED | OUTPATIENT
Start: 2018-07-23 | End: 2018-07-23

## 2018-07-23 RX ADMIN — Medication 4 PUFF: at 12:38

## 2018-07-24 NOTE — PROCEDURES
[address]                                PULMONARY FUNCTION    PATIENT NAME: Cielo Garza                    :        1940  MED REC NO:   [id number]                          ROOM:  ACCOUNT NO:   [de-identified]                           ADMIT DATE: 2018  PROVIDER:     Milagro Stanton MD    DATE OF PROCEDURE:  2018    This is a 78-year-old male. TEST PERFORMED:  1. Spirometry with flow-volume loops obtained before and after  bronchodilation. 2.  Lung volumes by plethysmography. 3.  Diffusion capacity of carbon monoxide. Test meets ATS criteria and the quality of flow-volume loops is sufficient  for interpretation. Good patient effort. The FEV1 is 1.9 L or 69% predicted. The FEV1 to FVC ratio is 70. Postbronchodilator the FEV1 changed to 1.99 L or 73% predicted. Total lung  capacity is 84% predicted. Diffusion is 66% predicted. INTERPRETATION:  1. Moderate obstruction without significant postbronchodilator  improvement. 2.  Normal lung volumes. 3.  Mildly impaired diffusion capacity that corrects to normal when  adjusted for hypoventilation. 4.  Clinical correlation recommendations.         Kayla Degroot MD    D: 2018 10:34:03       T: 2018 10:35:02     [id number]  Job#: [id number]     Doc#: [id number]    CC:

## 2018-07-27 ENCOUNTER — OFFICE VISIT (OUTPATIENT)
Dept: PULMONOLOGY | Age: 78
End: 2018-07-27

## 2018-07-27 VITALS
WEIGHT: 245 LBS | OXYGEN SATURATION: 96 % | TEMPERATURE: 97.9 F | HEIGHT: 68 IN | HEART RATE: 63 BPM | DIASTOLIC BLOOD PRESSURE: 82 MMHG | BODY MASS INDEX: 37.13 KG/M2 | SYSTOLIC BLOOD PRESSURE: 159 MMHG | RESPIRATION RATE: 16 BRPM

## 2018-07-27 DIAGNOSIS — J41.0 SIMPLE CHRONIC BRONCHITIS (HCC): ICD-10-CM

## 2018-07-27 DIAGNOSIS — I51.89 DIASTOLIC DYSFUNCTION: ICD-10-CM

## 2018-07-27 DIAGNOSIS — E66.9 OBESITY (BMI 35.0-39.9 WITHOUT COMORBIDITY): ICD-10-CM

## 2018-07-27 DIAGNOSIS — R29.818 SUSPECTED SLEEP APNEA: Primary | ICD-10-CM

## 2018-07-27 DIAGNOSIS — Z86.19 H/O HISTOPLASMOSIS: ICD-10-CM

## 2018-07-27 PROCEDURE — 99214 OFFICE O/P EST MOD 30 MIN: CPT | Performed by: INTERNAL MEDICINE

## 2018-07-27 RX ORDER — PREDNISONE 20 MG/1
20 TABLET ORAL DAILY
Qty: 7 TABLET | Refills: 0 | Status: SHIPPED | OUTPATIENT
Start: 2018-07-27 | End: 2018-08-03

## 2018-07-27 NOTE — PROGRESS NOTES
not have any profound or rhinorrhea or nasal congestion or sinus congestion, patient does not have any significant sore throat, no difficulty in swallowing per se but patient says that his food gets stuck in the mid chest at times and patient has history of esophageal strictures and has had a ballooning of the strictures multiple times in the last one was last fall, patient does not have any pleuritic chest pain, no fever no chills, patient's appetite is maintained, patient does not have any abdominal pain and nausea or vomiting, patient is takes medications for reflux symptoms, patient does not have any altered bowel habits, patient does not have any epistaxis, gum bleeding or hemoptysis, patient does not have any significant hematochezia or melena, patient does not have any dysuria or hematuria, patient does not have any increasing leg edema, patient does have history suggestive of some sleep fragmentation t, but patient says that he has gained about 5-10 pounds in last 1 year time, patient does not have any history of any significant exposures, patient was an educator, patient says that 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 and he underwent a right middle lobe lobectomy at [facility] to that time, patient does feel tired, patient says he is a former smoker he quit about 14 years back, patient does not take any inhalers or nebulizer at this time, patient does not have any change in the ambient environment at this time, patient does not have any significant history of any recent travels or change of medications, patient says that he went to [facility] recently for a vacation and tomorrow he is going to [place], patient does not have any confusion or lethargy, no other pertinent review of system of concern    Past Medical History:   Diagnosis Date    [address] (chronic obstructive pulmonary disease) [address] [facility]     Decreased hearing of both ears     Diabetes mellitus [address]     GERD filing pressure.     PFT done in 2016 had shown patient to have mild obstructive airway disease along with that patient had changes in the PFT parameters because of body habitus    Assessment:    1. Shortness of breath        2. Suspected sleep apnea      3. Diastolic dysfunction      4. History of esophageal stricture      5. H/O histoplasmosis      6. Obesity (BMI 35.0-39.9 without comorbidity)      7.  Pleural effusion          Plan:   · Patient was told about the clinical findings and auscultation along with implications  · Patient was told about the PFT results with interpretation and implications   · Clinically it appears that patient has chronic bronchitis along with that patient appears to have obesity/suspected sleep apnea/diastolic dysfunction with history of histoplasmosis with right middle lobe resection/esophageal stricture  · Patient was told the various processes causing symptoms in COPD  · Patient was told about the pathophysiology of the disease process and its modifying factors  · Patient was also introduced the concept of RIZWANA along with sequelae  · Patient would benefit from a sleep study-wants to defer   · Patient was told about the long acting inhalers and how they work along with the fact that he will not find any difference immediately with these inhalers that does not make that they are not working   · Patient has some component of airway hyperactivity on auscultation today  · Patient to be tried on Trelegy ellipta and was shown how to take it properly and was told to take one puff daily and patient was told to make sure that he rinses his mouth with water after use otherwise he can have hoarseness of voice or oral thrush  · Patient was also given albuterol inhaler 2 puffs every 6 on whenever necessary basis and patient was taught how to take it properly and patient exhibits understanding  · Patient does not need any antibiotics  at this time  · Patient was given prednisone 20 mg once a day to be taken the morning for 7 days  · Patient was told that his blood sugars can go up with prednisone and he needs to be careful in terms of his diet and medications for the same  · Patient was told to use some saline nasal spray over-the-counter which may help him down the line  · Patient was told about dietary and lifestyle modifications  · Patient needs to lose weight  · Management as per patient's blood sugaras per PCP  · Patient was told that if he loses weight and he will have benefits in terms of his shortness of breath also any blood pressure and diabetes control  · Patient is up-to-date on the Pneumonia vaccines  · Patient was told to call after 2 weeks to let us know if the new inhaler is helping him or not and if it is helping then we can find out what similar inhaler is covered by his insurance and can use same  · Patient does not qualify for any low-dose CT scan of the chest  · Patient was also told to think about polysomnography down the line but right now he is not inclined to do so  · Patient to follow-up in 3 months time if he is clinically better on whenever basis

## 2018-08-10 ENCOUNTER — TELEPHONE (OUTPATIENT)
Dept: PULMONOLOGY | Age: 78
End: 2018-08-10

## 2018-08-10 DIAGNOSIS — J41.0 SIMPLE CHRONIC BRONCHITIS (HCC): Primary | ICD-10-CM

## 2018-08-10 NOTE — TELEPHONE ENCOUNTER
JESSICA Pt called to let us know that the inhaler trelegy is working great for him. Pt needs refills on it and he is on his last two week inhaler and we dont have any more samples.

## 2018-08-14 RX ORDER — BUDESONIDE AND FORMOTEROL FUMARATE DIHYDRATE 160; 4.5 UG/1; UG/1
2 AEROSOL RESPIRATORY (INHALATION) 2 TIMES DAILY
Qty: 1 INHALER | Refills: 3 | Status: SHIPPED | OUTPATIENT
Start: 2018-08-14 | End: 2018-10-30 | Stop reason: ALTCHOICE

## 2018-08-27 ENCOUNTER — TELEPHONE (OUTPATIENT)
Dept: DERMATOLOGY | Age: 78
End: 2018-08-27

## 2018-09-06 NOTE — TELEPHONE ENCOUNTER
Left message with wife Stacey Barraza, informed her of refill and complete message from . She will relay the message to Brown Rhodes and have him call with questions or to schedule appointment.

## 2018-09-06 NOTE — TELEPHONE ENCOUNTER
[phone]  Patient prescribed triamcinolone 3-. Has been using for a rash under his armpits. Advised that  may not be able to refill without seeing patient for this. I had spoke with him 8-27-18 when he called about this, and let him know he would most likely need an appointment. Last visit 4-9-18.

## 2018-09-06 NOTE — TELEPHONE ENCOUNTER
Okay to refill. Please remind pt of PRN use of topical steroid. If eruption does not improve, should be evaluated in office.

## 2018-09-06 NOTE — TELEPHONE ENCOUNTER
Voicemail message received 9/5/18 at 3:41pm    Patient states he would like a refill on his ointment medication - triamcinoline?     [facility]

## 2018-10-02 ENCOUNTER — TELEPHONE (OUTPATIENT)
Dept: DERMATOLOGY | Age: 78
End: 2018-10-02

## 2018-10-09 ENCOUNTER — OFFICE VISIT (OUTPATIENT)
Dept: FAMILY MEDICINE CLINIC | Age: 78
End: 2018-10-09
Payer: MEDICARE

## 2018-10-09 VITALS
HEIGHT: 68 IN | OXYGEN SATURATION: 98 % | HEART RATE: 61 BPM | BODY MASS INDEX: 37.13 KG/M2 | WEIGHT: 245 LBS | DIASTOLIC BLOOD PRESSURE: 82 MMHG | SYSTOLIC BLOOD PRESSURE: 140 MMHG

## 2018-10-09 DIAGNOSIS — I10 ESSENTIAL HYPERTENSION, BENIGN: ICD-10-CM

## 2018-10-09 DIAGNOSIS — E11.65 UNCONTROLLED TYPE 2 DIABETES MELLITUS WITH HYPERGLYCEMIA (HCC): Primary | ICD-10-CM

## 2018-10-09 DIAGNOSIS — N40.1 BENIGN PROSTATIC HYPERPLASIA WITH URINARY FREQUENCY: ICD-10-CM

## 2018-10-09 DIAGNOSIS — Z23 NEED FOR INFLUENZA VACCINATION: ICD-10-CM

## 2018-10-09 DIAGNOSIS — E78.2 MIXED HYPERLIPIDEMIA: ICD-10-CM

## 2018-10-09 DIAGNOSIS — K21.9 GASTROESOPHAGEAL REFLUX DISEASE WITHOUT ESOPHAGITIS: ICD-10-CM

## 2018-10-09 DIAGNOSIS — R35.0 BENIGN PROSTATIC HYPERPLASIA WITH URINARY FREQUENCY: ICD-10-CM

## 2018-10-09 LAB
ANION GAP SERPL CALCULATED.3IONS-SCNC: 18 MMOL/L (ref 3–16)
BUN BLDV-MCNC: 12 MG/DL (ref 7–20)
CALCIUM SERPL-MCNC: 9.5 MG/DL (ref 8.3–10.6)
CHLORIDE BLD-SCNC: 99 MMOL/L (ref 99–110)
CHOLESTEROL, TOTAL: 157 MG/DL (ref 0–199)
CO2: 25 MMOL/L (ref 21–32)
CREAT SERPL-MCNC: 1.1 MG/DL (ref 0.8–1.3)
CREATININE URINE: 37.7 MG/DL (ref 39–259)
GFR AFRICAN AMERICAN: >60
GFR NON-AFRICAN AMERICAN: >60
GLUCOSE BLD-MCNC: 110 MG/DL (ref 70–99)
HBA1C MFR BLD: 7 %
HDLC SERPL-MCNC: 45 MG/DL (ref 40–60)
LDL CHOLESTEROL CALCULATED: 82 MG/DL
MICROALBUMIN UR-MCNC: <1.2 MG/DL
MICROALBUMIN/CREAT UR-RTO: ABNORMAL MG/G (ref 0–30)
POTASSIUM SERPL-SCNC: 4.8 MMOL/L (ref 3.5–5.1)
PROSTATE SPECIFIC ANTIGEN: 2.71 NG/ML (ref 0–4)
SODIUM BLD-SCNC: 142 MMOL/L (ref 136–145)
TRIGL SERPL-MCNC: 151 MG/DL (ref 0–150)
VLDLC SERPL CALC-MCNC: 30 MG/DL

## 2018-10-09 PROCEDURE — 83036 HEMOGLOBIN GLYCOSYLATED A1C: CPT | Performed by: FAMILY MEDICINE

## 2018-10-09 PROCEDURE — 36415 COLL VENOUS BLD VENIPUNCTURE: CPT | Performed by: FAMILY MEDICINE

## 2018-10-09 PROCEDURE — G0008 ADMIN INFLUENZA VIRUS VAC: HCPCS | Performed by: FAMILY MEDICINE

## 2018-10-09 PROCEDURE — 99214 OFFICE O/P EST MOD 30 MIN: CPT | Performed by: FAMILY MEDICINE

## 2018-10-09 PROCEDURE — 90662 IIV NO PRSV INCREASED AG IM: CPT | Performed by: FAMILY MEDICINE

## 2018-10-09 RX ORDER — PANTOPRAZOLE SODIUM 40 MG/1
TABLET, DELAYED RELEASE ORAL
Qty: 90 TABLET | Refills: 1 | Status: SHIPPED | OUTPATIENT
Start: 2018-10-09 | End: 2019-04-12 | Stop reason: SDUPTHER

## 2018-10-09 RX ORDER — ATORVASTATIN CALCIUM 10 MG/1
TABLET, FILM COATED ORAL
Qty: 90 TABLET | Refills: 1 | Status: SHIPPED | OUTPATIENT
Start: 2018-10-09 | End: 2019-04-05 | Stop reason: SDUPTHER

## 2018-10-09 RX ORDER — METOPROLOL SUCCINATE 50 MG/1
TABLET, EXTENDED RELEASE ORAL
Qty: 90 TABLET | Refills: 3 | Status: SHIPPED | OUTPATIENT
Start: 2018-10-09 | End: 2019-04-12 | Stop reason: SDUPTHER

## 2018-10-09 RX ORDER — LOSARTAN POTASSIUM AND HYDROCHLOROTHIAZIDE 25; 100 MG/1; MG/1
TABLET ORAL
Qty: 90 TABLET | Refills: 1 | Status: SHIPPED | OUTPATIENT
Start: 2018-10-09 | End: 2019-04-12 | Stop reason: SDUPTHER

## 2018-10-09 RX ORDER — METFORMIN HYDROCHLORIDE 500 MG/1
TABLET, EXTENDED RELEASE ORAL
Qty: 360 TABLET | Refills: 1 | Status: SHIPPED | OUTPATIENT
Start: 2018-10-09 | End: 2019-04-12 | Stop reason: SDUPTHER

## 2018-10-09 ASSESSMENT — PATIENT HEALTH QUESTIONNAIRE - PHQ9
2. FEELING DOWN, DEPRESSED OR HOPELESS: 0
SUM OF ALL RESPONSES TO PHQ9 QUESTIONS 1 & 2: 0
1. LITTLE INTEREST OR PLEASURE IN DOING THINGS: 0
SUM OF ALL RESPONSES TO PHQ QUESTIONS 1-9: 0
SUM OF ALL RESPONSES TO PHQ QUESTIONS 1-9: 0

## 2018-10-09 NOTE — PROGRESS NOTES
Neurological: Positive for numbness (mild, see HPI). Negative for dizziness and weakness. Psychiatric/Behavioral: Negative for dysphoric mood. The patient is not nervous/anxious. Objective:   Physical Exam   Constitutional: He is oriented to person, place, and time. He appears well-developed and well-nourished. No distress. HENT:   Head: Normocephalic and atraumatic. Eyes: Conjunctivae and EOM are normal. No scleral icterus. Neck: Neck supple. Cardiovascular: Normal rate, regular rhythm, normal heart sounds and intact distal pulses. Pulmonary/Chest: Effort normal and breath sounds normal. No respiratory distress. Abdominal: Soft. There is no tenderness. Lymphadenopathy:     He has no cervical adenopathy. Neurological: He is alert and oriented to person, place, and time. Normal monofilament testing both feet at toetips and balls, and no foot sores. Skin: Skin is warm and dry. Psychiatric: He has a normal mood and affect. Nursing note and vitals reviewed. Assessment:      Encounter Diagnoses   Name Primary?  Uncontrolled type 2 diabetes mellitus with hyperglycemia (HCC) Yes    Essential hypertension, benign     Mixed hyperlipidemia     Gastroesophageal reflux disease without esophagitis     Benign prostatic hyperplasia with urinary frequency     Need for influenza vaccination            Plan:      Per orders - await results. Diabetes not quite under control, as A1c slightly high at 7.0. Advise good low-fat and low sweets diet, also increase regular activity/exercise as able, and continue present medications. Hypertension also not ideal, but get additional blood pressures, and call if average top number stays 140 or higher, or if average bottom number stays 90 or higher - reviewed new blood pressure goals. If labs stable, and overall doing well, then repeat fasting office visit in 3-4 months, sooner as needed.           Caridad Chan MD

## 2018-10-22 ASSESSMENT — ENCOUNTER SYMPTOMS
EYE PAIN: 0
COUGH: 0
ABDOMINAL PAIN: 0
SHORTNESS OF BREATH: 1
DIARRHEA: 0
CONSTIPATION: 0

## 2018-10-30 ENCOUNTER — OFFICE VISIT (OUTPATIENT)
Dept: PULMONOLOGY | Age: 78
End: 2018-10-30
Payer: MEDICARE

## 2018-10-30 VITALS
BODY MASS INDEX: 37.13 KG/M2 | RESPIRATION RATE: 16 BRPM | SYSTOLIC BLOOD PRESSURE: 144 MMHG | HEIGHT: 68 IN | WEIGHT: 245 LBS | OXYGEN SATURATION: 97 % | DIASTOLIC BLOOD PRESSURE: 78 MMHG | HEART RATE: 63 BPM | TEMPERATURE: 98 F

## 2018-10-30 DIAGNOSIS — R29.818 SUSPECTED SLEEP APNEA: ICD-10-CM

## 2018-10-30 DIAGNOSIS — I51.89 DIASTOLIC DYSFUNCTION: ICD-10-CM

## 2018-10-30 DIAGNOSIS — R06.02 SHORTNESS OF BREATH: ICD-10-CM

## 2018-10-30 DIAGNOSIS — J90 PLEURAL EFFUSION: Primary | ICD-10-CM

## 2018-10-30 DIAGNOSIS — J41.0 SIMPLE CHRONIC BRONCHITIS (HCC): ICD-10-CM

## 2018-10-30 PROCEDURE — 99213 OFFICE O/P EST LOW 20 MIN: CPT | Performed by: INTERNAL MEDICINE

## 2018-10-31 NOTE — PROGRESS NOTES
Chief Complaint-Pulmonary follow up and to discuss test results     Patient has come to the office for pulmonary evaluation, patient states that [facility] is helping him and he does have to pay a copayment of $95 per month and he got pricing on the other combinations of 2 different inhalers and they were coming wir copayment of $80 so he preferred once a day single inhlaer;patient;s requirement for rescue inhaler were limited   patient does not have any significant expectoration, patient does not have any chest pain, patient does not have any increasing nasal congestion, patient does not have any epistaxis, bleeding or hemoptysis, patient does not have any fever or chills, patient does not have any significant abdominal discomfort and nausea vomiting, patient does not complain of any increasing leg edema, patient may still has some sleep fragmentation, patient does not have any confusion or lethargy, no other pertinent review of system of concern       Previous HPI: Patient is a 78-year-old male who was upper to the office for pulmonary consult because of increasing shortness of breath  Patient says that he has been having increasing shortness of breath.   For at least the last 1 year time and patient had a cardiac workup done which was negative for any cardiac etiology for the same; patient says that on a flat surface at his own pace he can walk about 3-4 blocks without any shortness of breath but if he has to go on little better inclined order has to climb stairs he has profound shortness of breath, along with the shortness of breath he has some cough without any phlegm, patient also has occasional wheezing, patient does not have any significant chest pain along with that no palpitations or diaphoresis, no fever no chills, patient denies any increasing headaches or blurring of vision, patient does not have any otalgia or ear discharge, patient does not have any tinnitus, patient on questioning further states mass.  Neck diameter is increased  Cardiovascular: Normal rate, regular rhythm, normal heart sounds. No right ventricular heave. (+/-) lower extremity edema. Pulmonary/Chest: No wheezes. No rales. Chest wall is not dull to percussion. No accessory muscle usage or stridor. Prolonged expiration with decreased breath (he  Abdominal: Soft. Bowel sounds present. No distension or hernia. No tenderness. Musculoskeletal: No cyanosis. No clubbing. No obvious joint deformity. Lymphadenopathy: No cervical or supraclavicular adenopathy. Skin: Skin is warm and dry. No rash or nodules on the exposed extremities. Psychiatric: Normal mood and affect. Behavior is normal.  No anxiety. Neurologic: Alert, awake and oriented. PERRL. Speech fluent        Data:     PFT shows patient to have mild-to-moderate obstructive airway disease with some reactive disease in the small airways along with that patient has hyperinflation and also changes in the PFT parameters because of body habitus      ECHO- Summary   Technically difficult examination.   Normal systolic function with an estimated ejection fraction of 55-60%.  Mild concentric left ventricular hypertrophy.   No regional wall motion abnormalities are seen.   Grade I diastolic dysfunction with normal filing pressure.     PFT done in 2016 had shown patient to have mild obstructive airway disease along with that patient had changes in the PFT parameters because of body habitus    Assessment:    1. Shortness of breath        2. Suspected sleep apnea      3. Diastolic dysfunction      4. History of esophageal stricture      5. H/O histoplasmosis      6. Obesity (BMI 35.0-39.9 without comorbidity)      7.  Pleural effusion          Plan:   · Patient was told about the clinical findings and auscultation along with implications  · Clinically it appears that patient has chronic bronchitis along with that patient appears to have obesity/suspected sleep apnea/diastolic dysfunction

## 2019-01-05 RX ORDER — KETOCONAZOLE 20 MG/ML
SHAMPOO TOPICAL
Qty: 240 ML | Refills: 11 | Status: SHIPPED | OUTPATIENT
Start: 2019-01-05 | End: 2020-01-30

## 2019-01-30 ENCOUNTER — OFFICE VISIT (OUTPATIENT)
Dept: PULMONOLOGY | Age: 79
End: 2019-01-30
Payer: MEDICARE

## 2019-01-30 VITALS
BODY MASS INDEX: 37.28 KG/M2 | OXYGEN SATURATION: 98 % | HEIGHT: 68 IN | DIASTOLIC BLOOD PRESSURE: 82 MMHG | RESPIRATION RATE: 16 BRPM | HEART RATE: 85 BPM | SYSTOLIC BLOOD PRESSURE: 130 MMHG | TEMPERATURE: 97.5 F | WEIGHT: 246 LBS

## 2019-01-30 DIAGNOSIS — R29.818 SUSPECTED SLEEP APNEA: ICD-10-CM

## 2019-01-30 DIAGNOSIS — I51.89 DIASTOLIC DYSFUNCTION: ICD-10-CM

## 2019-01-30 DIAGNOSIS — R06.02 SHORTNESS OF BREATH: ICD-10-CM

## 2019-01-30 DIAGNOSIS — E66.9 OBESITY (BMI 35.0-39.9 WITHOUT COMORBIDITY): ICD-10-CM

## 2019-01-30 DIAGNOSIS — J41.0 SIMPLE CHRONIC BRONCHITIS (HCC): Primary | ICD-10-CM

## 2019-01-30 DIAGNOSIS — Z86.19 H/O HISTOPLASMOSIS: ICD-10-CM

## 2019-01-30 PROCEDURE — 99214 OFFICE O/P EST MOD 30 MIN: CPT | Performed by: INTERNAL MEDICINE

## 2019-01-30 RX ORDER — PREDNISONE 20 MG/1
20 TABLET ORAL DAILY
Qty: 10 TABLET | Refills: 0 | Status: SHIPPED | OUTPATIENT
Start: 2019-01-30 | End: 2019-02-09

## 2019-01-30 RX ORDER — ALBUTEROL SULFATE 90 UG/1
2 AEROSOL, METERED RESPIRATORY (INHALATION) EVERY 6 HOURS PRN
Qty: 1 INHALER | Refills: 5 | Status: SHIPPED | OUTPATIENT
Start: 2019-01-30 | End: 2019-02-01 | Stop reason: SDUPTHER

## 2019-02-01 DIAGNOSIS — J41.0 SIMPLE CHRONIC BRONCHITIS (HCC): ICD-10-CM

## 2019-02-01 DIAGNOSIS — R06.02 SHORTNESS OF BREATH: ICD-10-CM

## 2019-02-01 RX ORDER — ALBUTEROL SULFATE 90 UG/1
2 AEROSOL, METERED RESPIRATORY (INHALATION) EVERY 6 HOURS PRN
Qty: 1 INHALER | Refills: 5 | Status: SHIPPED | OUTPATIENT
Start: 2019-02-01 | End: 2020-08-06 | Stop reason: SDUPTHER

## 2019-02-07 RX ORDER — ALBUTEROL SULFATE 90 UG/1
2 AEROSOL, METERED RESPIRATORY (INHALATION) 4 TIMES DAILY PRN
Qty: 2 INHALER | Refills: 5 | Status: SHIPPED | OUTPATIENT
Start: 2019-02-07 | End: 2020-08-06 | Stop reason: SDUPTHER

## 2019-02-12 ENCOUNTER — TELEPHONE (OUTPATIENT)
Dept: FAMILY MEDICINE CLINIC | Age: 79
End: 2019-02-12

## 2019-04-05 DIAGNOSIS — E78.2 MIXED HYPERLIPIDEMIA: ICD-10-CM

## 2019-04-05 RX ORDER — ATORVASTATIN CALCIUM 10 MG/1
TABLET, FILM COATED ORAL
Qty: 90 TABLET | Refills: 1 | Status: SHIPPED | OUTPATIENT
Start: 2019-04-05 | End: 2019-10-06 | Stop reason: SDUPTHER

## 2019-04-12 ENCOUNTER — OFFICE VISIT (OUTPATIENT)
Dept: FAMILY MEDICINE CLINIC | Age: 79
End: 2019-04-12
Payer: MEDICARE

## 2019-04-12 VITALS
SYSTOLIC BLOOD PRESSURE: 132 MMHG | WEIGHT: 251.6 LBS | BODY MASS INDEX: 38.13 KG/M2 | HEART RATE: 70 BPM | HEIGHT: 68 IN | DIASTOLIC BLOOD PRESSURE: 88 MMHG | OXYGEN SATURATION: 96 %

## 2019-04-12 DIAGNOSIS — E11.65 UNCONTROLLED TYPE 2 DIABETES MELLITUS WITH HYPERGLYCEMIA (HCC): ICD-10-CM

## 2019-04-12 DIAGNOSIS — E78.2 MIXED HYPERLIPIDEMIA: ICD-10-CM

## 2019-04-12 DIAGNOSIS — K21.9 GASTROESOPHAGEAL REFLUX DISEASE WITHOUT ESOPHAGITIS: ICD-10-CM

## 2019-04-12 DIAGNOSIS — E11.9 CONTROLLED TYPE 2 DIABETES MELLITUS WITHOUT COMPLICATION, WITHOUT LONG-TERM CURRENT USE OF INSULIN (HCC): Primary | ICD-10-CM

## 2019-04-12 DIAGNOSIS — I10 ESSENTIAL HYPERTENSION, BENIGN: ICD-10-CM

## 2019-04-12 LAB
A/G RATIO: 1.4 (ref 1.1–2.2)
ALBUMIN SERPL-MCNC: 3.8 G/DL (ref 3.4–5)
ALP BLD-CCNC: 68 U/L (ref 40–129)
ALT SERPL-CCNC: 21 U/L (ref 10–40)
ANION GAP SERPL CALCULATED.3IONS-SCNC: 11 MMOL/L (ref 3–16)
AST SERPL-CCNC: 25 U/L (ref 15–37)
BILIRUB SERPL-MCNC: 0.5 MG/DL (ref 0–1)
BUN BLDV-MCNC: 15 MG/DL (ref 7–20)
CALCIUM SERPL-MCNC: 9 MG/DL (ref 8.3–10.6)
CHLORIDE BLD-SCNC: 101 MMOL/L (ref 99–110)
CHOLESTEROL, TOTAL: 156 MG/DL (ref 0–199)
CO2: 27 MMOL/L (ref 21–32)
CREAT SERPL-MCNC: 1.2 MG/DL (ref 0.8–1.3)
GFR AFRICAN AMERICAN: >60
GFR NON-AFRICAN AMERICAN: 59
GLOBULIN: 2.7 G/DL
GLUCOSE BLD-MCNC: 144 MG/DL (ref 70–99)
HBA1C MFR BLD: 7.2 %
HDLC SERPL-MCNC: 39 MG/DL (ref 40–60)
LDL CHOLESTEROL CALCULATED: 86 MG/DL
POTASSIUM SERPL-SCNC: 4.6 MMOL/L (ref 3.5–5.1)
SODIUM BLD-SCNC: 139 MMOL/L (ref 136–145)
TOTAL PROTEIN: 6.5 G/DL (ref 6.4–8.2)
TRIGL SERPL-MCNC: 155 MG/DL (ref 0–150)
VLDLC SERPL CALC-MCNC: 31 MG/DL

## 2019-04-12 PROCEDURE — 83036 HEMOGLOBIN GLYCOSYLATED A1C: CPT | Performed by: PHYSICIAN ASSISTANT

## 2019-04-12 PROCEDURE — 99213 OFFICE O/P EST LOW 20 MIN: CPT | Performed by: PHYSICIAN ASSISTANT

## 2019-04-12 PROCEDURE — 36415 COLL VENOUS BLD VENIPUNCTURE: CPT | Performed by: PHYSICIAN ASSISTANT

## 2019-04-12 RX ORDER — METOPROLOL SUCCINATE 50 MG/1
TABLET, EXTENDED RELEASE ORAL
Qty: 90 TABLET | Refills: 2 | Status: SHIPPED | OUTPATIENT
Start: 2019-04-12 | End: 2020-01-20

## 2019-04-12 RX ORDER — LOSARTAN POTASSIUM AND HYDROCHLOROTHIAZIDE 25; 100 MG/1; MG/1
TABLET ORAL
Qty: 90 TABLET | Refills: 1 | Status: SHIPPED | OUTPATIENT
Start: 2019-04-12 | End: 2019-05-29 | Stop reason: SDUPTHER

## 2019-04-12 RX ORDER — PANTOPRAZOLE SODIUM 40 MG/1
TABLET, DELAYED RELEASE ORAL
Qty: 90 TABLET | Refills: 1 | Status: SHIPPED | OUTPATIENT
Start: 2019-04-12 | End: 2019-05-29 | Stop reason: SDUPTHER

## 2019-04-12 RX ORDER — METFORMIN HYDROCHLORIDE 500 MG/1
TABLET, EXTENDED RELEASE ORAL
Qty: 360 TABLET | Refills: 1 | Status: SHIPPED | OUTPATIENT
Start: 2019-04-12 | End: 2020-11-17 | Stop reason: SDUPTHER

## 2019-04-12 ASSESSMENT — PATIENT HEALTH QUESTIONNAIRE - PHQ9
SUM OF ALL RESPONSES TO PHQ QUESTIONS 1-9: 0
1. LITTLE INTEREST OR PLEASURE IN DOING THINGS: 0
SUM OF ALL RESPONSES TO PHQ9 QUESTIONS 1 & 2: 0
SUM OF ALL RESPONSES TO PHQ QUESTIONS 1-9: 0
2. FEELING DOWN, DEPRESSED OR HOPELESS: 0

## 2019-04-12 NOTE — PROGRESS NOTES
SUBJECTIVE:  Suzi Barraza is a 67 y.o. male who presents for evaluation of hypertension, Diabetes Mellitus and hyperlipidemia. He indicates that he is feeling well and denies any symptoms referable to his elevated blood pressure, diabetes or hyperlipidemia. Specifically denies chest pain, , dyspnea, orthopnea, PND,peripheral edema , or neuro sx. No anorexia, arthralgia, or leg cramps noted. No episodes of hypoglycemia. Current medication regimen is as listed below. He denies any side effects of medication, and has been taking it regularly. Blood Pressure:   Blood pressures are not being checked at home. Diabetes: The last A1C: 7.0. Diabetic complications are: none . The Blood sugars range at home have been: does not check at home. Last Eye Exam : last summer. The last microalbumin:    Lab Results   Component Value Date    LABMICR <1.20 10/09/2018         he is taking aspirin.       Social History     Tobacco Use   Smoking Status Former Smoker    Packs/day: 1.00    Years: 30.00    Pack years: 30.00    Last attempt to quit: 2003    Years since quittin.2   Smokeless Tobacco Never Used       Current Outpatient Medications   Medication Sig Dispense Refill    atorvastatin (LIPITOR) 10 MG tablet TAKE ONE TABLET BY MOUTH EVERY EVENING 90 tablet 1    Fluticasone-Umeclidin-Vilant (TRELEGY ELLIPTA) 100-62.5-25 MCG/INH AEPB Inhale 1 puff into the lungs daily 3 each 3    albuterol sulfate  (90 Base) MCG/ACT inhaler Inhale 2 puffs into the lungs 4 times daily as needed for Wheezing 2 Inhaler 5    albuterol sulfate HFA (PROAIR HFA) 108 (90 Base) MCG/ACT inhaler Inhale 2 puffs into the lungs every 6 hours as needed for Wheezing 1 Inhaler 5    Fluticasone-Umeclidin-Vilant (TRELEGY ELLIPTA) 100-62.5-25 MCG/INH AEPB Inhale 1 puff into the lungs daily 1 each 5    Fluticasone-Umeclidin-Vilant (TRELEGY ELLIPTA) 100-62.5-25 MCG/INH AEPB Inhale 1 puff into the lungs daily 2 each 0    ketoconazole (NIZORAL) 2 % shampoo WASH SCALP THREE TIMES A WEEK 240 mL 11    triamcinolone (KENALOG) 0.1 % ointment APPLY SPARINGLY TO AFFECTED AREAS TWO TIMES A DAY UNTIL CLEAR (DO NOT USE MORE THAN 2 WEEKS STRAIGHT) 80 g 0    Fluticasone-Umeclidin-Vilant (TRELEGY ELLIPTA) 100-62.5-25 MCG/INH AEPB Inhale 1 puff into the lungs daily 1 each 5    losartan-hydrochlorothiazide (HYZAAR) 100-25 MG per tablet TAKE ONE TABLET BY MOUTH DAILY 90 tablet 1    metFORMIN (GLUCOPHAGE-XR) 500 MG extended release tablet TAKE TWO TABLETS BY MOUTH TWICE A DAY WITH MEALS 360 tablet 1    metoprolol succinate (TOPROL XL) 50 MG extended release tablet TAKE ONE TABLET BY MOUTH DAILY 90 tablet 3    pantoprazole (PROTONIX) 40 MG tablet TAKE ONE TABLET BY MOUTH DAILY 90 tablet 1    ketoconazole (NIZORAL) 2 % cream APPLY TWICE DAILY TO  AFFECTED AREAS OF FACE (AROUND NOSE) 60 g 2    Echinacea 450 MG CAPS Take by mouth      therapeutic multivitamin-minerals (THERAGRAN-M) tablet Take 1 tablet by mouth daily.  aspirin 81 MG EC tablet Take 81 mg by mouth daily.  glucose blood VI test strips (ASCENSIA AUTODISC VI;ONE TOUCH ULTRA TEST VI) strip As needed. 100 strip 3    Lancets MISC  100 each 3     No current facility-administered medications for this visit. OBJECTIVE:  There were no vitals taken for this visit. General: NAD, non-toxic  Neck: no JVD or bruits  S1 and S2 normal, no murmurs, clicks, gallops or rubs. Regular rate and rhythm. Chest is clear; no wheezes or rales. Pos 1+ pitting edema  Vascular : DP 1-2+=  Neuro: alert, no CN or motor deficits, monofilament normal BL  Psych: normal mood, thought and judgement        ASSESSMENT:   Diabetes Mellitus, today's A1C is 7.2     Diagnosis Orders   1. Controlled type 2 diabetes mellitus without complication, without long-term current use of insulin ([address]     2. Mixed hyperlipidemia     3.  Essential hypertension, benign  losartan-hydrochlorothiazide (HYZAAR) 100-25 MG per tablet    metoprolol succinate (TOPROL XL) 50 MG extended release tablet   4. Uncontrolled type 2 diabetes mellitus with hyperglycemia (HCC)  metFORMIN (GLUCOPHAGE-XR) 500 MG extended release tablet   5. Gastroesophageal reflux disease without esophagitis  pantoprazole (PROTONIX) 40 MG tablet             Plan:    Continue to work on diet and exercise as able. Encouraged to monitor blood sugars at home. Handicap placard given today due to SOB/chronic respiratory conditions.    Follow up in 6 months

## 2019-04-23 ENCOUNTER — TELEPHONE (OUTPATIENT)
Dept: FAMILY MEDICINE CLINIC | Age: 79
End: 2019-04-23

## 2019-05-02 ENCOUNTER — OFFICE VISIT (OUTPATIENT)
Dept: PULMONOLOGY | Age: 79
End: 2019-05-02
Payer: MEDICARE

## 2019-05-02 VITALS
WEIGHT: 248 LBS | BODY MASS INDEX: 37.59 KG/M2 | SYSTOLIC BLOOD PRESSURE: 138 MMHG | OXYGEN SATURATION: 95 % | TEMPERATURE: 97.6 F | DIASTOLIC BLOOD PRESSURE: 81 MMHG | HEIGHT: 68 IN | HEART RATE: 73 BPM | RESPIRATION RATE: 16 BRPM

## 2019-05-02 DIAGNOSIS — R29.818 SUSPECTED SLEEP APNEA: ICD-10-CM

## 2019-05-02 DIAGNOSIS — R06.02 SHORTNESS OF BREATH: ICD-10-CM

## 2019-05-02 DIAGNOSIS — E66.9 OBESITY (BMI 35.0-39.9 WITHOUT COMORBIDITY): ICD-10-CM

## 2019-05-02 DIAGNOSIS — Z86.19 H/O HISTOPLASMOSIS: ICD-10-CM

## 2019-05-02 DIAGNOSIS — J41.0 SIMPLE CHRONIC BRONCHITIS (HCC): Primary | ICD-10-CM

## 2019-05-02 PROCEDURE — 99214 OFFICE O/P EST MOD 30 MIN: CPT | Performed by: INTERNAL MEDICINE

## 2019-05-02 NOTE — PROGRESS NOTES
Chief Complaint-Pulmonary follow up    Patient has come to the office for pulmonary follow-up, patient says that his major concern is his hoarseness of voice and sometimes the voice does not come out, patient was wondering if albuterol inhaler is the culprit, patient says that he has not taken the albuterol for 3 days and he feels that his voice quality has improved,patient also takes Trelegy ellipta and patient is saying that he rinses his mouth with water after that, patient does not have any significant odynophagia, patient does not have any dysphagia, patient does not have a sore throat, patient does not have any otalgia or ear discharge, patient says that he does not have any increasing cough or expectoration, patient still has to take a rest inhaler nearly every day on an average, patient does not have any pleuritic chest pain, no fever or chills, patient does not have any epistaxis or hemoptysis, patient does not have any significant dysuria or hematuria, patient does have some leg edema, patient has history of diabetes mellitus and he says that his underwear controlled and he was aiming for hemoglobin A1c of 7, patient does not have any confusion or lethargy patient has a history suggestive of some sleep fragmentation, no other pertinent review of system of concern    Previous HPI: Patient has come for pulmonary evaluation;patient states that he is more less doing OK with Trelegy but before his requirements for the rescue inhaler was minimal  but lately for last few weeks time he has noticed that he was having more chest congestion along with that patient was having increased use of albuterol inhaler which is still continuing but it is better than last week, patient has cough with mucoid expectoration without any significant purulence, patient does not have any significant fever or chills, no pleuritic chest pain patient does not have any chest pain, patient does not have any increasing nasal congestion, patient does not have any epistaxis, bleeding or hemoptysis, patient does not have any fever or chills, patient does not have any significant abdominal discomfort and nausea vomiting, patient does not complain of any increasing leg edema, patient may still has some sleep fragmentation, patient does not have any confusion or lethargy, no other pertinent review of system of concern       Patient is a 67-year-old male who was upper to the office for pulmonary consult because of increasing shortness of breath  Patient says that he has been having increasing shortness of breath.   For at least the last 1 year time and patient had a cardiac workup done which was negative for any cardiac etiology for the same; patient says that on a flat surface at his own pace he can walk about 3-4 blocks without any shortness of breath but if he has to go on little better inclined order has to climb stairs he has profound shortness of breath, along with the shortness of breath he has some cough without any phlegm, patient also has occasional wheezing, patient does not have any significant chest pain along with that no palpitations or diaphoresis, no fever no chills, patient denies any increasing headaches or blurring of vision, patient does not have any otalgia or ear discharge, patient does not have any tinnitus, patient on questioning further states that he does not have any profound or rhinorrhea or nasal congestion or sinus congestion, patient does not have any significant sore throat, no difficulty in swallowing per se but patient says that his food gets stuck in the mid chest at times and patient has history of esophageal strictures and has had a ballooning of the strictures multiple times in the last one was last fall, patient does not have any pleuritic chest pain, no fever no chills, patient's appetite is maintained, patient does not have any abdominal pain and nausea or vomiting, patient is takes medications for reflux symptoms, patient does not have any altered bowel habits, patient does not have any epistaxis, gum bleeding or hemoptysis, patient does not have any significant hematochezia or melena, patient does not have any dysuria or hematuria, patient does not have any increasing leg edema, patient does have history suggestive of some sleep fragmentation t, but patient says that he has gained about 5-10 pounds in last 1 year time, patient does not have any history of any significant exposures, patient was an educator, patient says that 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 and he underwent a right middle lobe lobectomy at [facility] to that time, patient does feel tired, patient says he is a former smoker he quit about 14 years back, patient does not take any inhalers or nebulizer at this time, patient does not have any change in the ambient environment at this time, patient does not have any significant history of any recent travels or change of medications, patient says that he went to [facility] recently for a vacation and tomorrow he is going to [place], patient does not have any confusion or lethargy, no other pertinent review of system of concern    Past Medical History:   Diagnosis Date    COPD (chronic obstructive pulmonary disease) ([address]     Decreased hearing of both ears     [facility] mellitus [facility]     GERD (gastroesophageal reflux disease)     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    Hyperlipidemia     Hypertension     Primary malignant neoplasm of skin of trunk 4/21/2009       Past Surgical History:   Procedure Laterality Date    COLONOSCOPY  5/22/12    polyps    COLONOSCOPY  7/21/15    polyps    INGUINAL HERNIA REPAIR Bilateral 6/22/15    laparoscopic    LUNG REMOVAL, PARTIAL  2007    middle lobe R lung/histoplasmosis    TURP  2002    UPPER GASTROINTESTINAL ENDOSCOPY  1/26/2015    UPPER GASTROINTESTINAL ENDOSCOPY  2/15    dilated    UPPER GASTROINTESTINAL ENDOSCOPY 2017    dilatation, bx    UPPER GASTROINTESTINAL ENDOSCOPY  2017    dilated; Bx gastric.  VASECTOMY         No Known Allergies    Medication list was reviewed and updated as [facility] in [facility]    Social History     Socioeconomic History    Marital status:      Spouse name: Not on file    Number of children: Not on file    Years of education: Not on file    Highest education level: Not on file   Occupational History    Not on file   Social Needs    Financial resource strain: Not on file    Food insecurity:     Worry: Not on file     Inability: Not on file    Transportation needs:     Medical: Not on file     Non-medical: Not on file   Tobacco Use    Smoking status: Former Smoker     Packs/day: 1.00     Years: 30.00     Pack years: 30.00     Last attempt to quit: 2003     Years since quittin.3    Smokeless tobacco: Never Used   Substance and Sexual Activity    Alcohol use:  Yes     Alcohol/week: 0.0 oz     Comment: 1/ week    Drug use: No    Sexual activity: Not on file   Lifestyle    Physical activity:     Days per week: Not on file     Minutes per session: Not on file    Stress: Not on file   Relationships    Social connections:     Talks on phone: Not on file     Gets together: Not on file     Attends Taoism service: Not on file     Active member of club or organization: Not on file     Attends meetings of clubs or organizations: Not on file     Relationship status: Not on file    Intimate partner violence:     Fear of current or ex partner: Not on file     Emotionally abused: Not on file     Physically abused: Not on file     Forced sexual activity: Not on file   Other Topics Concern    Not on file   Social History Narrative    Not on file       Family History   Problem Relation Age of Onset    Cancer Brother         colon CA    Heart Disease Mother             ROS same as above     Physical Exam:  Blood pressure 138/81, pulse 73, temperature 97.6 °F (36.4 °C), temperature source Oral, resp. rate 16, height 5' 8\" (1.727 m), weight 248 lb (112.5 kg), SpO2 95 %.'  Constitutional:  No acute distress. HENT:  Oropharynx is clear and moist. No thyromegaly. Mild nasal mucosal hyperemia; No thrush   Eyes:  Conjunctivae are normal. Pupils equal, round, and reactive to light. No scleral icterus. Neck: . No tracheal deviation present. No obvious thyroid mass. Neck diameter is increased  Cardiovascular: Normal rate, regular rhythm, normal heart sounds. No right ventricular heave. (+) lower extremity edema. Pulmonary/Chest: No wheezes. No rales. Chest wall is not dull to percussion. No accessory muscle usage or stridor. breath sound intensity  Abdominal: Soft. Bowel sounds present. No distension or hernia. No tenderness. Musculoskeletal: No cyanosis. No clubbing. No obvious joint deformity. Lymphadenopathy: No cervical or supraclavicular adenopathy. Skin: Skin is warm and dry. No rash or nodules on the exposed extremities. Psychiatric: Normal mood and affect. Behavior is normal.  No anxiety. Neurologic: Alert, awake and oriented. PERRL. Speech fluent        Data:     PFT shows patient to have mild-to-moderate obstructive airway disease with some reactive disease in the small airways along with that patient has hyperinflation and also changes in the PFT parameters because of body habitus      ECHO- Summary   Technically difficult examination.   Normal systolic function with an estimated ejection fraction of 55-60%.  Mild concentric left ventricular hypertrophy.   No regional wall motion abnormalities are seen.   Grade I diastolic dysfunction with normal filing pressure.     PFT done in 2016 had shown patient to have mild obstructive airway disease along with that patient had changes in the PFT parameters because of body habitus    Assessment:    1. Shortness of breath        2. Suspected sleep apnea      3. Diastolic dysfunction      4. Copd       5.  H/O histoplasmosis      6.  Obesity (BMI 35.0-39.9 without comorbidity)          Plan:   · Patient was told about the clinical findings and auscultation along with implications  · Clinically it appears that patient has chronic bronchitis along with that patient appears to have obesity/suspected sleep apnea/diastolic dysfunction with history of histoplasmosis with right middle lobe resection/esophageal stricture  · Patient was told about the pathophysiology of the disease process and its modifying factors  · Patient was also introduced the concept of RIZWANA along with sequelae  · Patient would benefit from a sleep study-wants to defer   · Patient to continue with  Trelegy ellipta and was shown how to take it properly and was told to take one puff daily and patient was told to make sure that he rinses his mouth with water after use otherwise he can have hoarseness of voice or oral thrush  · Patient was told to take saline nasal rinse which was given from the office   · Patient was told to drink some tea/coffee after rinsing the mouth with water   · Patient was told that he does not have any thrush   · Patient was told that the hoarseness may be due to Trelegy and not due to Albuterol  · Patient was also told to try some salt and water gargles  · Patient to take a sugar free candy/lozenges  · Patient was   · Patient was also given albuterol inhaler 2 puffs every 6 on whenever necessary basis   · Patient does not need any steroids and antibiotics  · Patient was told about dietary and lifestyle modifications  · Patient needs to lose weight  · Management as per patient's blood sugars per I-QCD1L is 7.2 -implications discussed  · Salt and fluid restriction discussed   · Patient needs to keep himself warm for the perez  · A humidifier in the bedroom will help  · Patient is up-to-date on the Pneumonia vaccines and flu shot   · Patient does not qualify for any low-dose CT scan of the chest  · Patient to avoid extremes of temperatures and sick contacts   · Patient was told that if he has more symptoms to call otherwise he can follow up in 6 months time

## 2019-05-25 DIAGNOSIS — I10 ESSENTIAL HYPERTENSION, BENIGN: ICD-10-CM

## 2019-05-25 DIAGNOSIS — K21.9 GASTROESOPHAGEAL REFLUX DISEASE WITHOUT ESOPHAGITIS: ICD-10-CM

## 2019-05-29 RX ORDER — PANTOPRAZOLE SODIUM 40 MG/1
TABLET, DELAYED RELEASE ORAL
Qty: 90 TABLET | Refills: 1 | Status: SHIPPED | OUTPATIENT
Start: 2019-05-29 | End: 2020-05-26 | Stop reason: SDUPTHER

## 2019-05-29 RX ORDER — LOSARTAN POTASSIUM AND HYDROCHLOROTHIAZIDE 25; 100 MG/1; MG/1
TABLET ORAL
Qty: 90 TABLET | Refills: 1 | Status: SHIPPED | OUTPATIENT
Start: 2019-05-29 | End: 2020-03-03

## 2019-08-20 LAB — DIABETIC RETINOPATHY: NEGATIVE

## 2019-08-26 ENCOUNTER — OFFICE VISIT (OUTPATIENT)
Dept: FAMILY MEDICINE CLINIC | Age: 79
End: 2019-08-26
Payer: MEDICARE

## 2019-08-26 VITALS
WEIGHT: 246.8 LBS | SYSTOLIC BLOOD PRESSURE: 158 MMHG | BODY MASS INDEX: 37.41 KG/M2 | HEIGHT: 68 IN | TEMPERATURE: 97.9 F | HEART RATE: 74 BPM | DIASTOLIC BLOOD PRESSURE: 90 MMHG

## 2019-08-26 DIAGNOSIS — J40 BRONCHITIS: Primary | ICD-10-CM

## 2019-08-26 PROCEDURE — 99213 OFFICE O/P EST LOW 20 MIN: CPT | Performed by: PHYSICIAN ASSISTANT

## 2019-08-26 RX ORDER — PROMETHAZINE HYDROCHLORIDE AND CODEINE PHOSPHATE 6.25; 1 MG/5ML; MG/5ML
5 SYRUP ORAL EVERY 4 HOURS PRN
Qty: 100 ML | Refills: 0 | Status: SHIPPED | OUTPATIENT
Start: 2019-08-26 | End: 2019-08-28 | Stop reason: SDUPTHER

## 2019-08-26 RX ORDER — AZITHROMYCIN 250 MG/1
TABLET, FILM COATED ORAL
Qty: 1 PACKET | Refills: 0 | Status: SHIPPED | OUTPATIENT
Start: 2019-08-26 | End: 2019-09-05

## 2019-09-03 ENCOUNTER — OFFICE VISIT (OUTPATIENT)
Dept: DERMATOLOGY | Age: 79
End: 2019-09-03
Payer: MEDICARE

## 2019-09-03 DIAGNOSIS — Z85.828 HISTORY OF NONMELANOMA SKIN CANCER: ICD-10-CM

## 2019-09-03 DIAGNOSIS — L30.4 INTERTRIGO: ICD-10-CM

## 2019-09-03 DIAGNOSIS — Z12.83 SCREENING EXAM FOR SKIN CANCER: ICD-10-CM

## 2019-09-03 DIAGNOSIS — D48.5 NEOPLASM OF UNCERTAIN BEHAVIOR OF SKIN: Primary | ICD-10-CM

## 2019-09-03 DIAGNOSIS — L57.0 ACTINIC KERATOSES: ICD-10-CM

## 2019-09-03 PROCEDURE — 17004 DESTROY PREMAL LESIONS 15/>: CPT | Performed by: DERMATOLOGY

## 2019-09-03 PROCEDURE — 11102 TANGNTL BX SKIN SINGLE LES: CPT | Performed by: DERMATOLOGY

## 2019-09-03 PROCEDURE — 99214 OFFICE O/P EST MOD 30 MIN: CPT | Performed by: DERMATOLOGY

## 2019-09-03 NOTE — PROGRESS NOTES
[facility]) Dermatology  Todd Serna MD  [phone]      Brien Temple  1940    67 y.o. male     Date of Visit: 9/3/2019    Last Visit: 1yr    Chief Complaint: Skin check    History of Present Illness:  1. Here for skin check. Hx NMSC - unknown type central chest s/p Mohs 2010, BCC L nasal bridge s/p Mohs 4/2015, sBCC L neck base s/p curettage 5/2017.   -Wears hat and SPF 30 sunscreen regularly when golfing  -Went to [facility] for [facility] this year.   -Complains of a tender scabbed lesion on R upper arm     2. Hx AKs s/p cryotherapy. Reports several rough lesions on face. 3. New issue. Complains of a recurrent mildly pruritic eruption of underarms, lower abdomen and groin. Uses TAC 0.1% oint for 2 weeks w/ clearance, but inevitably recurs. 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 Review of Systems:  Constitutional: Reports general sense of well-being. Skin: No interval severe sunburns. Allergies: Reviewed and updated. Past Medical History, Surgical History, Medications and Allergies reviewed.      Past Medical History:   Diagnosis Date    COPD (chronic obstructive pulmonary disease) ([address]     Decreased hearing of both ears     Diabetes mellitus ([address]     GERD (gastroesophageal reflux disease)     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    Hyperlipidemia     Hypertension     Primary malignant neoplasm of skin of trunk 4/21/2009     Past Surgical History:   Procedure Laterality Date    COLONOSCOPY  5/22/12    polyps    COLONOSCOPY  7/21/15    polyps    INGUINAL HERNIA REPAIR Bilateral 6/22/15    laparoscopic    LUNG REMOVAL, PARTIAL  2007    middle lobe R lung/histoplasmosis    TURP  2002    UPPER GASTROINTESTINAL ENDOSCOPY  1/26/2015    UPPER GASTROINTESTINAL ENDOSCOPY  2/15    dilated    UPPER GASTROINTESTINAL ENDOSCOPY  07/21/2017    dilatation, bx    UPPER GASTROINTESTINAL ENDOSCOPY

## 2019-09-05 ENCOUNTER — TELEPHONE (OUTPATIENT)
Dept: DERMATOLOGY | Age: 79
End: 2019-09-05

## 2019-09-05 LAB — DERMATOLOGY PATHOLOGY REPORT: ABNORMAL

## 2019-09-05 NOTE — LETTER
[facility] Dermatology  [address] [place] Benite[place]   [phone]    Dear Patient: Radha Salcedo    You have recently been scheduled for a procedure with Dr. Elisa Perdue on Thursday 9-26-19 arriving at 10:00 am at our [facility] office. If you are being transported from an assisted living facility and have any medical conditions that may hinder your ability to understand and sign a consent form, please bring a family member or caregiver with durable power of . Please see the pre-operative instructions below:    ? You should take your regular medications as prescribed by your other physicians unless otherwise directed. ? Certain medications may increase your propensity to bleed during and after your surgery. Please stop taking NSAIDS (Aleve, naprosyn, Ibuprofen, Motrin) 3 days preoperatively and 1 day postoperatively if possible. Vitamin E supplements and herbal preparations should be stopped at least 1 week prior to surgery. ? You may have normal meals the day of your procedure. ? A bath or shower the morning of or evening prior is recommended. You will need to keep your surgical site dry for 48 hours after the procedure. · To decrease the risk of infection, please use an antibacterial soap for the 3 days prior to surgery. ? Do not apply any makeup, creams, lotions, after-shave, or perfumes to the surgery site the day of the procedure. ? The surgery will be performed with local anesthesia only so there are no restrictions for driving home. ? Numbness from the local anesthesia wears off within a few hours after surgery. Most patients do not experience significant pain after surgery and acetaminophen (Tylenol) is usually sufficient to control discomfort. ? Bruising and swelling can be common if surgery is being performed on the face and typically resolves within a week. ? A bulky pressure dressing will need to be kept in place and dry for 48 hours. Wound care instructions will be provided and reviewed on the day of surgery. ? Sutures are removed between 6 and 14 days following surgery. ? If your surgical site is located on the scalp, please do not have your hair professionally styled or colored until after the sutures are removed. Please do not drink alcoholic beverages one day before and for one day after surgery. ? Please stop smoking if possible as it is disruptive to the healing process. ? Vigorous physical activity or exercise is not recommended for the 1 to 2 weeks following the surgery due to risk of bleeding, wound dehiscence, and poor scar appearance.       Thank you,    The Dermatology Staff of [facility])

## 2019-09-20 DIAGNOSIS — J41.0 SIMPLE CHRONIC BRONCHITIS (HCC): Primary | ICD-10-CM

## 2019-09-24 NOTE — PROGRESS NOTES
fluticasone-umeclidin-vilant (TRELEGY ELLIPTA) 100-62.5-25 MCG/INH AEPB Inhale 1 puff into the lungs daily 2 each 0    fluticasone-umeclidin-vilant (TRELEGY ELLIPTA) 100-62.5-25 MCG/INH AEPB Inhale 1 puff into the lungs daily 1 each 11    PROAIR  (90 Base) MCG/ACT inhaler       triamcinolone (KENALOG) 0.1 % ointment APPLY TO AFFECTED AREA(S) SPARINGLY TWO TIMES A DAY UNTIL CLEAR DO NOT USE MORE THAN 2 WEEKS STRAIGHT 80 g 0    pantoprazole (PROTONIX) 40 MG tablet TAKE ONE TABLET BY MOUTH DAILY 90 tablet 1    losartan-hydrochlorothiazide (HYZAAR) 100-25 MG per tablet TAKE ONE TABLET BY MOUTH DAILY 90 tablet 1    metFORMIN (GLUCOPHAGE-XR) 500 MG extended release tablet TAKE TWO TABLETS BY MOUTH TWICE A DAY WITH MEALS 360 tablet 1    metoprolol succinate (TOPROL XL) 50 MG extended release tablet TAKE ONE TABLET BY MOUTH DAILY 90 tablet 2    Handicap Placard MISC by Does not apply route 1 each 0    atorvastatin (LIPITOR) 10 MG tablet TAKE ONE TABLET BY MOUTH EVERY EVENING 90 tablet 1    Fluticasone-Umeclidin-Vilant (TRELEGY ELLIPTA) 100-62.5-25 MCG/INH AEPB Inhale 1 puff into the lungs daily 1 each 5    ketoconazole (NIZORAL) 2 % cream APPLY TWICE DAILY TO  AFFECTED AREAS OF FACE (AROUND NOSE) 60 g 2    glucose blood VI test strips (ASCENSIA AUTODISC VI;ONE TOUCH ULTRA TEST VI) strip As needed. 100 strip 3    Lancets MISC  100 each 3    therapeutic multivitamin-minerals (THERAGRAN-M) tablet Take 1 tablet by mouth daily.  aspirin 81 MG EC tablet Take 81 mg by mouth daily. Physical Examination     Vitals:  BP (!) 173/90   Wt 244 lb (110.7 kg)   BMI 37.10 kg/m²    Repeat pre-op /82    -General: Well-appearing, NAD  1. R mid lateral upper arm - 1.2cm eroded pink plaque  2. Dorsum R hand 1, L cheek 1 - ill-defined irregularly-shaped roughly-scaling thin pink macule(s)/papule(s)   3. Midline upper forehead - 7mm mildly scaly bright pink papule       Assessment and Plan     1.  Nodular BCC, R mid lateral upper arm   -Informed written consent obtained after risks (bleeding, infection, scar, discomfort) and benefits explained. -Area prepped/draped in sterile fashion. Local anesthesia achieved with 1% lidocaine w/ epinephrine/sodium bicarbonate. Elliptical excision to superficial subcutaneous fat performed. Specimen sent to pathology. Edges undermined and hemostasis achieved w/ pinpoint electrodessication. Layered closure with 4-0 deep vicryl sutures and 4-0 running nylon sutures. Pressure dressing applied.   -Width of lesion w/ 3-4 mm margins - 2 cm; length of repair 6 cm.   -Edu re: wound care, suture removal   -Post-procedure /82. Pt left office in stable condition. F/u 6 mo for FSE, sooner prn.     2. Actinic keratosis(es)  -Edu re: relationship with chronic cumulative sun exposure, low premalignant potential.   -2 lesion(s) treated w/ liquid nitrogen x 2 cycles - Dorsum R hand 1, L cheek 1. Edu re: risk of blister formation, discomfort, scar, hypopigmentation. Discussed wound care. 3. Neoplasm of uncertain behavior of skin - R/o superficial BCC, midline upper forehead   -Discussed possible diagnosis. Patient agreeable to biopsy. Verbal consent obtained after risks (infection, bleeding, scar), benefits and alternatives explained. -Area(s) to be biopsied were marked with a surgical pen. Site(s) were cleansed with alcohol. Local anesthesia achieved with 1% lidocaine with epinephrine/sodium bicarbonate. Shave biopsy performed with a razor blade. Hemostasis was achieved with aluminum chloride. The wound(s) were dressed with petrolatum and covered with a bandage. Specimen(s) sent to pathology. Pt educated re: risk of bleeding, infection, scar and wound care instructions.

## 2019-09-25 ENCOUNTER — TELEPHONE (OUTPATIENT)
Dept: PULMONOLOGY | Age: 79
End: 2019-09-25

## 2019-09-26 ENCOUNTER — PROCEDURE VISIT (OUTPATIENT)
Dept: DERMATOLOGY | Age: 79
End: 2019-09-26
Payer: MEDICARE

## 2019-09-26 VITALS — BODY MASS INDEX: 37.1 KG/M2 | SYSTOLIC BLOOD PRESSURE: 140 MMHG | WEIGHT: 244 LBS | DIASTOLIC BLOOD PRESSURE: 82 MMHG

## 2019-09-26 DIAGNOSIS — L57.0 ACTINIC KERATOSES: ICD-10-CM

## 2019-09-26 DIAGNOSIS — C44.612 BASAL CELL CARCINOMA (BCC) OF RIGHT UPPER ARM: Primary | ICD-10-CM

## 2019-09-26 DIAGNOSIS — D48.5 NEOPLASM OF UNCERTAIN BEHAVIOR OF SKIN: ICD-10-CM

## 2019-09-26 PROCEDURE — 11102 TANGNTL BX SKIN SINGLE LES: CPT | Performed by: DERMATOLOGY

## 2019-09-26 PROCEDURE — 11602 EXC TR-EXT MAL+MARG 1.1-2 CM: CPT | Performed by: DERMATOLOGY

## 2019-09-26 PROCEDURE — 12032 INTMD RPR S/A/T/EXT 2.6-7.5: CPT | Performed by: DERMATOLOGY

## 2019-09-26 PROCEDURE — 17000 DESTRUCT PREMALG LESION: CPT | Performed by: DERMATOLOGY

## 2019-09-26 PROCEDURE — 17003 DESTRUCT PREMALG LES 2-14: CPT | Performed by: DERMATOLOGY

## 2019-09-30 ENCOUNTER — TELEPHONE (OUTPATIENT)
Dept: PULMONOLOGY | Age: 79
End: 2019-09-30

## 2019-10-01 LAB — DERMATOLOGY PATHOLOGY REPORT: NORMAL

## 2019-10-06 DIAGNOSIS — E78.2 MIXED HYPERLIPIDEMIA: ICD-10-CM

## 2019-10-07 ENCOUNTER — NURSE ONLY (OUTPATIENT)
Dept: FAMILY MEDICINE CLINIC | Age: 79
End: 2019-10-07
Payer: MEDICARE

## 2019-10-07 PROCEDURE — G0008 ADMIN INFLUENZA VIRUS VAC: HCPCS | Performed by: FAMILY MEDICINE

## 2019-10-07 PROCEDURE — 90653 IIV ADJUVANT VACCINE IM: CPT | Performed by: FAMILY MEDICINE

## 2019-10-08 RX ORDER — ATORVASTATIN CALCIUM 10 MG/1
TABLET, FILM COATED ORAL
Qty: 90 TABLET | Refills: 3 | Status: SHIPPED | OUTPATIENT
Start: 2019-10-08 | End: 2020-10-01 | Stop reason: SDUPTHER

## 2019-10-10 ENCOUNTER — NURSE ONLY (OUTPATIENT)
Dept: DERMATOLOGY | Age: 79
End: 2019-10-10

## 2019-10-10 DIAGNOSIS — C44.622 SQUAMOUS CELL CARCINOMA OF SKIN OF RIGHT UPPER ARM: Primary | ICD-10-CM

## 2019-10-10 PROCEDURE — 99024 POSTOP FOLLOW-UP VISIT: CPT | Performed by: DERMATOLOGY

## 2019-10-11 DIAGNOSIS — J41.0 SIMPLE CHRONIC BRONCHITIS (HCC): ICD-10-CM

## 2019-10-18 DIAGNOSIS — E11.65 UNCONTROLLED TYPE 2 DIABETES MELLITUS WITH HYPERGLYCEMIA (HCC): ICD-10-CM

## 2019-10-23 ENCOUNTER — TELEPHONE (OUTPATIENT)
Dept: FAMILY MEDICINE CLINIC | Age: 79
End: 2019-10-23

## 2019-10-23 DIAGNOSIS — R31.0 GROSS HEMATURIA: Primary | ICD-10-CM

## 2019-10-23 RX ORDER — METFORMIN HYDROCHLORIDE 500 MG/1
TABLET, EXTENDED RELEASE ORAL
Qty: 360 TABLET | Refills: 3 | Status: SHIPPED | OUTPATIENT
Start: 2019-10-23 | End: 2020-06-22 | Stop reason: SDUPTHER

## 2019-10-24 ENCOUNTER — HOSPITAL ENCOUNTER (INPATIENT)
Age: 79
LOS: 1 days | Discharge: HOME | DRG: 726 | End: 2019-10-25
Attending: EMERGENCY MEDICINE | Admitting: INTERNAL MEDICINE
Payer: MEDICARE

## 2019-10-24 ENCOUNTER — ANESTHESIA (OUTPATIENT)
Dept: OPERATING ROOM | Age: 79
DRG: 726 | End: 2019-10-24
Payer: MEDICARE

## 2019-10-24 ENCOUNTER — ANESTHESIA EVENT (OUTPATIENT)
Dept: OPERATING ROOM | Age: 79
DRG: 726 | End: 2019-10-24
Payer: MEDICARE

## 2019-10-24 ENCOUNTER — APPOINTMENT (OUTPATIENT)
Dept: CT IMAGING | Age: 79
DRG: 726 | End: 2019-10-24
Payer: MEDICARE

## 2019-10-24 VITALS
DIASTOLIC BLOOD PRESSURE: 66 MMHG | SYSTOLIC BLOOD PRESSURE: 120 MMHG | RESPIRATION RATE: 14 BRPM | OXYGEN SATURATION: 100 %

## 2019-10-24 DIAGNOSIS — R31.9 HEMATURIA, UNSPECIFIED TYPE: Primary | ICD-10-CM

## 2019-10-24 DIAGNOSIS — N39.0 ACUTE UTI: ICD-10-CM

## 2019-10-24 PROBLEM — R33.8 ACUTE URINARY RETENTION: Status: ACTIVE | Noted: 2019-10-24

## 2019-10-24 PROBLEM — I71.40 AAA (ABDOMINAL AORTIC ANEURYSM): Status: ACTIVE | Noted: 2019-10-24

## 2019-10-24 LAB
A/G RATIO: 1.3 (ref 1.1–2.2)
ALBUMIN SERPL-MCNC: 4 G/DL (ref 3.4–5)
ALP BLD-CCNC: 70 U/L (ref 40–129)
ALT SERPL-CCNC: 17 U/L (ref 10–40)
ANION GAP SERPL CALCULATED.3IONS-SCNC: 14 MMOL/L (ref 3–16)
AST SERPL-CCNC: 16 U/L (ref 15–37)
BASOPHILS ABSOLUTE: 0.1 K/UL (ref 0–0.2)
BASOPHILS RELATIVE PERCENT: 0.9 %
BILIRUB SERPL-MCNC: 0.5 MG/DL (ref 0–1)
BILIRUBIN URINE: NEGATIVE
BLOOD, URINE: ABNORMAL
BUN BLDV-MCNC: 14 MG/DL (ref 7–20)
CALCIUM SERPL-MCNC: 9 MG/DL (ref 8.3–10.6)
CHLORIDE BLD-SCNC: 100 MMOL/L (ref 99–110)
CLARITY: ABNORMAL
CO2: 25 MMOL/L (ref 21–32)
COLOR: ABNORMAL
COMMENT UA: ABNORMAL
CREAT SERPL-MCNC: 1.1 MG/DL (ref 0.8–1.3)
EOSINOPHILS ABSOLUTE: 0.8 K/UL (ref 0–0.6)
EOSINOPHILS RELATIVE PERCENT: 8 %
GFR AFRICAN AMERICAN: >60
GFR NON-AFRICAN AMERICAN: >60
GLOBULIN: 3 G/DL
GLUCOSE BLD-MCNC: 126 MG/DL (ref 70–99)
GLUCOSE URINE: NEGATIVE MG/DL
HCT VFR BLD CALC: 42.3 % (ref 40.5–52.5)
HEMOGLOBIN: 14 G/DL (ref 13.5–17.5)
KETONES, URINE: ABNORMAL MG/DL
LEUKOCYTE ESTERASE, URINE: NEGATIVE
LYMPHOCYTES ABSOLUTE: 3.5 K/UL (ref 1–5.1)
LYMPHOCYTES RELATIVE PERCENT: 33.9 %
MCH RBC QN AUTO: 29.2 PG (ref 26–34)
MCHC RBC AUTO-ENTMCNC: 33 G/DL (ref 31–36)
MCV RBC AUTO: 88.3 FL (ref 80–100)
MICROSCOPIC EXAMINATION: YES
MONOCYTES ABSOLUTE: 0.9 K/UL (ref 0–1.3)
MONOCYTES RELATIVE PERCENT: 8.7 %
NEUTROPHILS ABSOLUTE: 5 K/UL (ref 1.7–7.7)
NEUTROPHILS RELATIVE PERCENT: 48.5 %
NITRITE, URINE: POSITIVE
PDW BLD-RTO: 15.2 % (ref 12.4–15.4)
PH UA: 6.5 (ref 5–8)
PLATELET # BLD: 402 K/UL (ref 135–450)
PMV BLD AUTO: 7.3 FL (ref 5–10.5)
POTASSIUM REFLEX MAGNESIUM: 3.8 MMOL/L (ref 3.5–5.1)
PROTEIN UA: >=300 MG/DL
RBC # BLD: 4.79 M/UL (ref 4.2–5.9)
RBC UA: >100 /HPF (ref 0–2)
SODIUM BLD-SCNC: 139 MMOL/L (ref 136–145)
SPECIFIC GRAVITY UA: 1.01 (ref 1–1.03)
SPECIMEN STATUS: NORMAL
TOTAL PROTEIN: 7 G/DL (ref 6.4–8.2)
URINE TYPE: ABNORMAL
UROBILINOGEN, URINE: 0.2 E.U./DL
WBC # BLD: 10.3 K/UL (ref 4–11)
WBC UA: ABNORMAL /HPF (ref 0–5)

## 2019-10-24 PROCEDURE — 85025 COMPLETE CBC W/AUTO DIFF WBC: CPT

## 2019-10-24 PROCEDURE — 7100000000 HC PACU RECOVERY - FIRST 15 MIN: Performed by: UROLOGY

## 2019-10-24 PROCEDURE — G0378 HOSPITAL OBSERVATION PER HR: HCPCS

## 2019-10-24 PROCEDURE — 6370000000 HC RX 637 (ALT 250 FOR IP): Performed by: INTERNAL MEDICINE

## 2019-10-24 PROCEDURE — 2500000003 HC RX 250 WO HCPCS: Performed by: NURSE ANESTHETIST, CERTIFIED REGISTERED

## 2019-10-24 PROCEDURE — 6360000002 HC RX W HCPCS: Performed by: NURSE ANESTHETIST, CERTIFIED REGISTERED

## 2019-10-24 PROCEDURE — 6370000000 HC RX 637 (ALT 250 FOR IP): Performed by: UROLOGY

## 2019-10-24 PROCEDURE — 6360000002 HC RX W HCPCS: Performed by: UROLOGY

## 2019-10-24 PROCEDURE — 2580000003 HC RX 258: Performed by: UROLOGY

## 2019-10-24 PROCEDURE — 3600000014 HC SURGERY LEVEL 4 ADDTL 15MIN: Performed by: UROLOGY

## 2019-10-24 PROCEDURE — 51702 INSERT TEMP BLADDER CATH: CPT

## 2019-10-24 PROCEDURE — 3600000004 HC SURGERY LEVEL 4 BASE: Performed by: UROLOGY

## 2019-10-24 PROCEDURE — 7100000001 HC PACU RECOVERY - ADDTL 15 MIN: Performed by: UROLOGY

## 2019-10-24 PROCEDURE — 0TCB8ZZ EXTIRPATION OF MATTER FROM BLADDER, VIA NATURAL OR ARTIFICIAL OPENING ENDOSCOPIC: ICD-10-PCS | Performed by: UROLOGY

## 2019-10-24 PROCEDURE — 3700000000 HC ANESTHESIA ATTENDED CARE: Performed by: UROLOGY

## 2019-10-24 PROCEDURE — 81001 URINALYSIS AUTO W/SCOPE: CPT

## 2019-10-24 PROCEDURE — 2709999900 HC NON-CHARGEABLE SUPPLY: Performed by: UROLOGY

## 2019-10-24 PROCEDURE — 80053 COMPREHEN METABOLIC PANEL: CPT

## 2019-10-24 PROCEDURE — 2580000003 HC RX 258: Performed by: NURSE ANESTHETIST, CERTIFIED REGISTERED

## 2019-10-24 PROCEDURE — 1200000000 HC SEMI PRIVATE

## 2019-10-24 PROCEDURE — 99284 EMERGENCY DEPT VISIT MOD MDM: CPT

## 2019-10-24 PROCEDURE — 2720000010 HC SURG SUPPLY STERILE: Performed by: UROLOGY

## 2019-10-24 PROCEDURE — 3700000001 HC ADD 15 MINUTES (ANESTHESIA): Performed by: UROLOGY

## 2019-10-24 PROCEDURE — 74177 CT ABD & PELVIS W/CONTRAST: CPT

## 2019-10-24 PROCEDURE — 6360000004 HC RX CONTRAST MEDICATION: Performed by: EMERGENCY MEDICINE

## 2019-10-24 PROCEDURE — 2580000003 HC RX 258: Performed by: INTERNAL MEDICINE

## 2019-10-24 RX ORDER — OXYCODONE HYDROCHLORIDE AND ACETAMINOPHEN 5; 325 MG/1; MG/1
1 TABLET ORAL PRN
Status: DISCONTINUED | OUTPATIENT
Start: 2019-10-24 | End: 2019-10-24 | Stop reason: HOSPADM

## 2019-10-24 RX ORDER — MEPERIDINE HYDROCHLORIDE 50 MG/ML
12.5 INJECTION INTRAMUSCULAR; INTRAVENOUS; SUBCUTANEOUS EVERY 5 MIN PRN
Status: DISCONTINUED | OUTPATIENT
Start: 2019-10-24 | End: 2019-10-24 | Stop reason: HOSPADM

## 2019-10-24 RX ORDER — SODIUM CHLORIDE 0.9 % (FLUSH) 0.9 %
10 SYRINGE (ML) INJECTION EVERY 12 HOURS SCHEDULED
Status: DISCONTINUED | OUTPATIENT
Start: 2019-10-24 | End: 2019-10-25 | Stop reason: HOSPADM

## 2019-10-24 RX ORDER — METOPROLOL SUCCINATE 50 MG/1
50 TABLET, EXTENDED RELEASE ORAL DAILY
Status: DISCONTINUED | OUTPATIENT
Start: 2019-10-25 | End: 2019-10-24

## 2019-10-24 RX ORDER — HYDROMORPHONE HCL 110MG/55ML
0.25 PATIENT CONTROLLED ANALGESIA SYRINGE INTRAVENOUS EVERY 5 MIN PRN
Status: DISCONTINUED | OUTPATIENT
Start: 2019-10-24 | End: 2019-10-24 | Stop reason: HOSPADM

## 2019-10-24 RX ORDER — MAGNESIUM HYDROXIDE 1200 MG/15ML
LIQUID ORAL CONTINUOUS PRN
Status: COMPLETED | OUTPATIENT
Start: 2019-10-24 | End: 2019-10-24

## 2019-10-24 RX ORDER — SODIUM CHLORIDE 0.9 % (FLUSH) 0.9 %
10 SYRINGE (ML) INJECTION EVERY 12 HOURS SCHEDULED
Status: CANCELLED | OUTPATIENT
Start: 2019-10-24

## 2019-10-24 RX ORDER — MORPHINE SULFATE 2 MG/ML
1 INJECTION, SOLUTION INTRAMUSCULAR; INTRAVENOUS EVERY 5 MIN PRN
Status: DISCONTINUED | OUTPATIENT
Start: 2019-10-24 | End: 2019-10-24 | Stop reason: HOSPADM

## 2019-10-24 RX ORDER — FENTANYL CITRATE 50 UG/ML
INJECTION, SOLUTION INTRAMUSCULAR; INTRAVENOUS PRN
Status: DISCONTINUED | OUTPATIENT
Start: 2019-10-24 | End: 2019-10-24 | Stop reason: SDUPTHER

## 2019-10-24 RX ORDER — SODIUM CHLORIDE, SODIUM LACTATE, POTASSIUM CHLORIDE, CALCIUM CHLORIDE 600; 310; 30; 20 MG/100ML; MG/100ML; MG/100ML; MG/100ML
INJECTION, SOLUTION INTRAVENOUS CONTINUOUS PRN
Status: DISCONTINUED | OUTPATIENT
Start: 2019-10-24 | End: 2019-10-24 | Stop reason: SDUPTHER

## 2019-10-24 RX ORDER — ONDANSETRON 2 MG/ML
4 INJECTION INTRAMUSCULAR; INTRAVENOUS
Status: DISCONTINUED | OUTPATIENT
Start: 2019-10-24 | End: 2019-10-24 | Stop reason: HOSPADM

## 2019-10-24 RX ORDER — PROPOFOL 10 MG/ML
INJECTION, EMULSION INTRAVENOUS PRN
Status: DISCONTINUED | OUTPATIENT
Start: 2019-10-24 | End: 2019-10-24 | Stop reason: SDUPTHER

## 2019-10-24 RX ORDER — LIDOCAINE HYDROCHLORIDE 20 MG/ML
INJECTION, SOLUTION INFILTRATION; PERINEURAL PRN
Status: DISCONTINUED | OUTPATIENT
Start: 2019-10-24 | End: 2019-10-24 | Stop reason: SDUPTHER

## 2019-10-24 RX ORDER — MORPHINE SULFATE 2 MG/ML
2 INJECTION, SOLUTION INTRAMUSCULAR; INTRAVENOUS EVERY 5 MIN PRN
Status: DISCONTINUED | OUTPATIENT
Start: 2019-10-24 | End: 2019-10-24 | Stop reason: HOSPADM

## 2019-10-24 RX ORDER — OXYCODONE HYDROCHLORIDE AND ACETAMINOPHEN 5; 325 MG/1; MG/1
2 TABLET ORAL PRN
Status: DISCONTINUED | OUTPATIENT
Start: 2019-10-24 | End: 2019-10-24 | Stop reason: HOSPADM

## 2019-10-24 RX ORDER — ONDANSETRON 2 MG/ML
INJECTION INTRAMUSCULAR; INTRAVENOUS PRN
Status: DISCONTINUED | OUTPATIENT
Start: 2019-10-24 | End: 2019-10-24 | Stop reason: SDUPTHER

## 2019-10-24 RX ORDER — SODIUM CHLORIDE, SODIUM LACTATE, POTASSIUM CHLORIDE, CALCIUM CHLORIDE 600; 310; 30; 20 MG/100ML; MG/100ML; MG/100ML; MG/100ML
INJECTION, SOLUTION INTRAVENOUS CONTINUOUS
Status: CANCELLED | OUTPATIENT
Start: 2019-10-24

## 2019-10-24 RX ORDER — ALBUTEROL SULFATE 90 UG/1
2 AEROSOL, METERED RESPIRATORY (INHALATION) EVERY 6 HOURS PRN
Status: DISCONTINUED | OUTPATIENT
Start: 2019-10-24 | End: 2019-10-25 | Stop reason: HOSPADM

## 2019-10-24 RX ORDER — ATORVASTATIN CALCIUM 10 MG/1
10 TABLET, FILM COATED ORAL EVERY EVENING
Status: DISCONTINUED | OUTPATIENT
Start: 2019-10-24 | End: 2019-10-25 | Stop reason: HOSPADM

## 2019-10-24 RX ORDER — SODIUM CHLORIDE 0.9 % (FLUSH) 0.9 %
10 SYRINGE (ML) INJECTION PRN
Status: DISCONTINUED | OUTPATIENT
Start: 2019-10-24 | End: 2019-10-25 | Stop reason: HOSPADM

## 2019-10-24 RX ORDER — HYDROMORPHONE HCL 110MG/55ML
0.5 PATIENT CONTROLLED ANALGESIA SYRINGE INTRAVENOUS EVERY 5 MIN PRN
Status: DISCONTINUED | OUTPATIENT
Start: 2019-10-24 | End: 2019-10-24 | Stop reason: HOSPADM

## 2019-10-24 RX ORDER — SODIUM CHLORIDE 0.9 % (FLUSH) 0.9 %
10 SYRINGE (ML) INJECTION PRN
Status: CANCELLED | OUTPATIENT
Start: 2019-10-24

## 2019-10-24 RX ORDER — TAMSULOSIN HYDROCHLORIDE 0.4 MG/1
0.4 CAPSULE ORAL DAILY
Status: DISCONTINUED | OUTPATIENT
Start: 2019-10-24 | End: 2019-10-25 | Stop reason: HOSPADM

## 2019-10-24 RX ORDER — ACETAMINOPHEN 325 MG/1
650 TABLET ORAL EVERY 4 HOURS PRN
Status: DISCONTINUED | OUTPATIENT
Start: 2019-10-24 | End: 2019-10-25 | Stop reason: HOSPADM

## 2019-10-24 RX ORDER — METOPROLOL SUCCINATE 50 MG/1
50 TABLET, EXTENDED RELEASE ORAL DAILY
Status: DISCONTINUED | OUTPATIENT
Start: 2019-10-24 | End: 2019-10-25 | Stop reason: HOSPADM

## 2019-10-24 RX ADMIN — FENTANYL CITRATE 12.5 MCG: 50 INJECTION INTRAMUSCULAR; INTRAVENOUS at 14:24

## 2019-10-24 RX ADMIN — IOPAMIDOL 75 ML: 755 INJECTION, SOLUTION INTRAVENOUS at 06:01

## 2019-10-24 RX ADMIN — Medication 10 ML: at 22:08

## 2019-10-24 RX ADMIN — PROPOFOL 100 MG: 10 INJECTION, EMULSION INTRAVENOUS at 14:09

## 2019-10-24 RX ADMIN — FENTANYL CITRATE 12.5 MCG: 50 INJECTION INTRAMUSCULAR; INTRAVENOUS at 14:19

## 2019-10-24 RX ADMIN — SODIUM CHLORIDE, POTASSIUM CHLORIDE, SODIUM LACTATE AND CALCIUM CHLORIDE: 600; 310; 30; 20 INJECTION, SOLUTION INTRAVENOUS at 14:03

## 2019-10-24 RX ADMIN — ONDANSETRON 4 MG: 2 INJECTION INTRAMUSCULAR; INTRAVENOUS at 14:14

## 2019-10-24 RX ADMIN — PROPOFOL 80 MG: 10 INJECTION, EMULSION INTRAVENOUS at 14:10

## 2019-10-24 RX ADMIN — FENTANYL CITRATE 50 MCG: 50 INJECTION INTRAMUSCULAR; INTRAVENOUS at 14:09

## 2019-10-24 RX ADMIN — METOPROLOL SUCCINATE 50 MG: 50 TABLET, EXTENDED RELEASE ORAL at 13:20

## 2019-10-24 RX ADMIN — LIDOCAINE HYDROCHLORIDE 60 MG: 20 INJECTION, SOLUTION INFILTRATION; PERINEURAL at 14:09

## 2019-10-24 RX ADMIN — TAMSULOSIN HYDROCHLORIDE 0.4 MG: 0.4 CAPSULE ORAL at 17:00

## 2019-10-24 RX ADMIN — Medication 10 ML: at 13:20

## 2019-10-24 RX ADMIN — FENTANYL CITRATE 25 MCG: 50 INJECTION INTRAMUSCULAR; INTRAVENOUS at 14:13

## 2019-10-24 RX ADMIN — Medication 2 G: at 14:15

## 2019-10-24 RX ADMIN — ATORVASTATIN CALCIUM 10 MG: 10 TABLET, FILM COATED ORAL at 17:01

## 2019-10-24 ASSESSMENT — PULMONARY FUNCTION TESTS
PIF_VALUE: 9
PIF_VALUE: 7
PIF_VALUE: 9
PIF_VALUE: 24
PIF_VALUE: 10
PIF_VALUE: 1
PIF_VALUE: 9
PIF_VALUE: 0
PIF_VALUE: 11
PIF_VALUE: 3
PIF_VALUE: 9
PIF_VALUE: 0
PIF_VALUE: 11
PIF_VALUE: 1
PIF_VALUE: 8
PIF_VALUE: 9
PIF_VALUE: 9
PIF_VALUE: 8
PIF_VALUE: 2
PIF_VALUE: 9
PIF_VALUE: 1
PIF_VALUE: 8
PIF_VALUE: 8
PIF_VALUE: 3
PIF_VALUE: 1
PIF_VALUE: 12
PIF_VALUE: 11
PIF_VALUE: 9
PIF_VALUE: 11
PIF_VALUE: 8
PIF_VALUE: 1
PIF_VALUE: 4
PIF_VALUE: 9
PIF_VALUE: 9
PIF_VALUE: 1
PIF_VALUE: 4

## 2019-10-24 ASSESSMENT — PAIN SCALES - GENERAL
PAINLEVEL_OUTOF10: 0
PAINLEVEL_OUTOF10: 3

## 2019-10-24 ASSESSMENT — LIFESTYLE VARIABLES: SMOKING_STATUS: 0

## 2019-10-24 ASSESSMENT — ENCOUNTER SYMPTOMS: SHORTNESS OF BREATH: 1

## 2019-10-25 VITALS
BODY MASS INDEX: 36.18 KG/M2 | WEIGHT: 238.7 LBS | HEART RATE: 72 BPM | HEIGHT: 68 IN | RESPIRATION RATE: 16 BRPM | OXYGEN SATURATION: 92 % | DIASTOLIC BLOOD PRESSURE: 82 MMHG | SYSTOLIC BLOOD PRESSURE: 156 MMHG | TEMPERATURE: 98.1 F

## 2019-10-25 LAB
ANION GAP SERPL CALCULATED.3IONS-SCNC: 11 MMOL/L (ref 3–16)
BUN BLDV-MCNC: 13 MG/DL (ref 7–20)
CALCIUM SERPL-MCNC: 8.6 MG/DL (ref 8.3–10.6)
CHLORIDE BLD-SCNC: 100 MMOL/L (ref 99–110)
CO2: 28 MMOL/L (ref 21–32)
CREAT SERPL-MCNC: 1.2 MG/DL (ref 0.8–1.3)
GFR AFRICAN AMERICAN: >60
GFR NON-AFRICAN AMERICAN: 58
GLUCOSE BLD-MCNC: 163 MG/DL (ref 70–99)
HCT VFR BLD CALC: 37.5 % (ref 40.5–52.5)
HEMOGLOBIN: 12.3 G/DL (ref 13.5–17.5)
MCH RBC QN AUTO: 29.1 PG (ref 26–34)
MCHC RBC AUTO-ENTMCNC: 32.9 G/DL (ref 31–36)
MCV RBC AUTO: 88.7 FL (ref 80–100)
PDW BLD-RTO: 15.3 % (ref 12.4–15.4)
PLATELET # BLD: 363 K/UL (ref 135–450)
PMV BLD AUTO: 7.1 FL (ref 5–10.5)
POTASSIUM REFLEX MAGNESIUM: 4.2 MMOL/L (ref 3.5–5.1)
RBC # BLD: 4.23 M/UL (ref 4.2–5.9)
SODIUM BLD-SCNC: 139 MMOL/L (ref 136–145)
WBC # BLD: 10.3 K/UL (ref 4–11)

## 2019-10-25 PROCEDURE — G0378 HOSPITAL OBSERVATION PER HR: HCPCS

## 2019-10-25 PROCEDURE — 6370000000 HC RX 637 (ALT 250 FOR IP): Performed by: INTERNAL MEDICINE

## 2019-10-25 PROCEDURE — 2580000003 HC RX 258: Performed by: INTERNAL MEDICINE

## 2019-10-25 PROCEDURE — 51798 US URINE CAPACITY MEASURE: CPT

## 2019-10-25 PROCEDURE — 36415 COLL VENOUS BLD VENIPUNCTURE: CPT

## 2019-10-25 PROCEDURE — 85027 COMPLETE CBC AUTOMATED: CPT

## 2019-10-25 PROCEDURE — 80048 BASIC METABOLIC PNL TOTAL CA: CPT

## 2019-10-25 PROCEDURE — 6370000000 HC RX 637 (ALT 250 FOR IP): Performed by: UROLOGY

## 2019-10-25 RX ORDER — TAMSULOSIN HYDROCHLORIDE 0.4 MG/1
0.4 CAPSULE ORAL DAILY
Qty: 30 CAPSULE | Refills: 0 | Status: SHIPPED | OUTPATIENT
Start: 2019-10-26 | End: 2020-06-22 | Stop reason: ALTCHOICE

## 2019-10-25 RX ADMIN — Medication 10 ML: at 10:57

## 2019-10-25 RX ADMIN — METOPROLOL SUCCINATE 50 MG: 50 TABLET, EXTENDED RELEASE ORAL at 09:35

## 2019-10-25 RX ADMIN — TAMSULOSIN HYDROCHLORIDE 0.4 MG: 0.4 CAPSULE ORAL at 09:35

## 2019-10-25 ASSESSMENT — PAIN SCALES - GENERAL: PAINLEVEL_OUTOF10: 0

## 2019-10-28 ENCOUNTER — TELEPHONE (OUTPATIENT)
Dept: FAMILY MEDICINE CLINIC | Age: 79
End: 2019-10-28

## 2019-11-11 ENCOUNTER — OFFICE VISIT (OUTPATIENT)
Dept: FAMILY MEDICINE CLINIC | Age: 79
End: 2019-11-11
Payer: MEDICARE

## 2019-11-11 VITALS
WEIGHT: 245 LBS | OXYGEN SATURATION: 96 % | HEART RATE: 64 BPM | DIASTOLIC BLOOD PRESSURE: 78 MMHG | SYSTOLIC BLOOD PRESSURE: 150 MMHG | BODY MASS INDEX: 37.25 KG/M2

## 2019-11-11 DIAGNOSIS — R35.0 BENIGN PROSTATIC HYPERPLASIA WITH URINARY FREQUENCY: ICD-10-CM

## 2019-11-11 DIAGNOSIS — I10 ESSENTIAL HYPERTENSION, BENIGN: ICD-10-CM

## 2019-11-11 DIAGNOSIS — K21.9 GASTROESOPHAGEAL REFLUX DISEASE WITHOUT ESOPHAGITIS: ICD-10-CM

## 2019-11-11 DIAGNOSIS — E78.2 MIXED HYPERLIPIDEMIA: ICD-10-CM

## 2019-11-11 DIAGNOSIS — E11.9 CONTROLLED TYPE 2 DIABETES MELLITUS WITHOUT COMPLICATION, WITHOUT LONG-TERM CURRENT USE OF INSULIN (HCC): Primary | ICD-10-CM

## 2019-11-11 DIAGNOSIS — I71.40 ABDOMINAL AORTIC ANEURYSM (AAA) WITHOUT RUPTURE: ICD-10-CM

## 2019-11-11 DIAGNOSIS — N40.1 BENIGN PROSTATIC HYPERPLASIA WITH URINARY FREQUENCY: ICD-10-CM

## 2019-11-11 LAB
CHOLESTEROL, TOTAL: 147 MG/DL (ref 0–199)
CREATININE URINE: 87.9 MG/DL (ref 39–259)
HBA1C MFR BLD: 7 %
HDLC SERPL-MCNC: 43 MG/DL (ref 40–60)
LDL CHOLESTEROL CALCULATED: 78 MG/DL
MICROALBUMIN UR-MCNC: <1.2 MG/DL
MICROALBUMIN/CREAT UR-RTO: NORMAL MG/G (ref 0–30)
PROSTATE SPECIFIC ANTIGEN: 2.63 NG/ML (ref 0–4)
TRIGL SERPL-MCNC: 131 MG/DL (ref 0–150)
VLDLC SERPL CALC-MCNC: 26 MG/DL

## 2019-11-11 PROCEDURE — 83036 HEMOGLOBIN GLYCOSYLATED A1C: CPT | Performed by: FAMILY MEDICINE

## 2019-11-11 PROCEDURE — 36415 COLL VENOUS BLD VENIPUNCTURE: CPT | Performed by: FAMILY MEDICINE

## 2019-11-11 PROCEDURE — 99214 OFFICE O/P EST MOD 30 MIN: CPT | Performed by: FAMILY MEDICINE

## 2019-11-11 ASSESSMENT — ENCOUNTER SYMPTOMS
ABDOMINAL PAIN: 0
CONSTIPATION: 0
COUGH: 0
SHORTNESS OF BREATH: 0
DIARRHEA: 0
EYE PAIN: 0

## 2019-11-12 ENCOUNTER — OFFICE VISIT (OUTPATIENT)
Dept: PULMONOLOGY | Age: 79
End: 2019-11-12
Payer: MEDICARE

## 2019-11-12 VITALS
OXYGEN SATURATION: 94 % | TEMPERATURE: 97.5 F | HEIGHT: 68 IN | SYSTOLIC BLOOD PRESSURE: 159 MMHG | RESPIRATION RATE: 20 BRPM | HEART RATE: 75 BPM | BODY MASS INDEX: 36.07 KG/M2 | DIASTOLIC BLOOD PRESSURE: 83 MMHG | WEIGHT: 238 LBS

## 2019-11-12 DIAGNOSIS — J41.0 SIMPLE CHRONIC BRONCHITIS (HCC): Primary | ICD-10-CM

## 2019-11-12 DIAGNOSIS — E66.9 OBESITY (BMI 35.0-39.9 WITHOUT COMORBIDITY): ICD-10-CM

## 2019-11-12 DIAGNOSIS — R29.818 SUSPECTED SLEEP APNEA: ICD-10-CM

## 2019-11-12 PROCEDURE — 99214 OFFICE O/P EST MOD 30 MIN: CPT | Performed by: INTERNAL MEDICINE

## 2019-11-12 RX ORDER — PREDNISONE 20 MG/1
20 TABLET ORAL DAILY
Qty: 10 TABLET | Refills: 0 | Status: SHIPPED | OUTPATIENT
Start: 2019-11-12 | End: 2019-11-22

## 2019-12-11 ENCOUNTER — OFFICE VISIT (OUTPATIENT)
Dept: VASCULAR SURGERY | Age: 79
End: 2019-12-11
Payer: MEDICARE

## 2019-12-11 VITALS
BODY MASS INDEX: 37.31 KG/M2 | WEIGHT: 246.2 LBS | SYSTOLIC BLOOD PRESSURE: 130 MMHG | HEIGHT: 68 IN | DIASTOLIC BLOOD PRESSURE: 70 MMHG

## 2019-12-11 DIAGNOSIS — I71.40 ABDOMINAL AORTIC ANEURYSM (AAA) WITHOUT RUPTURE: Primary | ICD-10-CM

## 2019-12-11 PROCEDURE — 99202 OFFICE O/P NEW SF 15 MIN: CPT | Performed by: SURGERY

## 2020-01-20 NOTE — TELEPHONE ENCOUNTER
Marie Garcia [phone] (home) [phone] (work)   is requesting refill(s) of medication Metoprolol Succinate to preferred pharmacy [facility] 11/11/19 (pertaining to medication)   Last refill 4/12/19 (per medication requested)  Next office visit scheduled or attempted Yes  Date 5/11/20  If No, reason

## 2020-01-22 RX ORDER — METOPROLOL SUCCINATE 50 MG/1
TABLET, EXTENDED RELEASE ORAL
Qty: 90 TABLET | Refills: 1 | Status: SHIPPED | OUTPATIENT
Start: 2020-01-22 | End: 2020-07-28 | Stop reason: SDUPTHER

## 2020-03-03 RX ORDER — LOSARTAN POTASSIUM AND HYDROCHLOROTHIAZIDE 25; 100 MG/1; MG/1
TABLET ORAL
Qty: 90 TABLET | Refills: 0 | Status: SHIPPED | OUTPATIENT
Start: 2020-03-03 | End: 2020-06-02

## 2020-03-03 NOTE — TELEPHONE ENCOUNTER
Sudeep Tejeda is requesting refill(s) losartan/hctz  Last OV 11/11/19 (pertaining to medication)  LR 5/29/19 (per medication requested)  Next office visit scheduled or attempted Yes   If no, reason:  5/5/20, NTP appt with Dr. Jocelyn Cleveland

## 2020-04-30 ENCOUNTER — TELEPHONE (OUTPATIENT)
Dept: PULMONOLOGY | Age: 80
End: 2020-04-30

## 2020-05-26 RX ORDER — PANTOPRAZOLE SODIUM 40 MG/1
TABLET, DELAYED RELEASE ORAL
Qty: 90 TABLET | Refills: 0 | Status: SHIPPED | OUTPATIENT
Start: 2020-05-26 | End: 2020-08-17

## 2020-06-02 RX ORDER — LOSARTAN POTASSIUM AND HYDROCHLOROTHIAZIDE 25; 100 MG/1; MG/1
TABLET ORAL
Qty: 90 TABLET | Refills: 0 | Status: SHIPPED | OUTPATIENT
Start: 2020-06-02 | End: 2020-09-01

## 2020-06-22 ENCOUNTER — OFFICE VISIT (OUTPATIENT)
Dept: FAMILY MEDICINE CLINIC | Age: 80
End: 2020-06-22
Payer: MEDICARE

## 2020-06-22 VITALS
SYSTOLIC BLOOD PRESSURE: 122 MMHG | BODY MASS INDEX: 35.77 KG/M2 | OXYGEN SATURATION: 99 % | HEART RATE: 63 BPM | DIASTOLIC BLOOD PRESSURE: 74 MMHG | HEIGHT: 68 IN | RESPIRATION RATE: 16 BRPM | TEMPERATURE: 98.5 F | WEIGHT: 236 LBS

## 2020-06-22 PROCEDURE — 36415 COLL VENOUS BLD VENIPUNCTURE: CPT | Performed by: INTERNAL MEDICINE

## 2020-06-22 PROCEDURE — G8510 SCR DEP NEG, NO PLAN REQD: HCPCS | Performed by: INTERNAL MEDICINE

## 2020-06-22 PROCEDURE — 3288F FALL RISK ASSESSMENT DOCD: CPT | Performed by: INTERNAL MEDICINE

## 2020-06-22 PROCEDURE — 99214 OFFICE O/P EST MOD 30 MIN: CPT | Performed by: INTERNAL MEDICINE

## 2020-06-22 ASSESSMENT — PATIENT HEALTH QUESTIONNAIRE - PHQ9
SUM OF ALL RESPONSES TO PHQ QUESTIONS 1-9: 0
SUM OF ALL RESPONSES TO PHQ QUESTIONS 1-9: 0
SUM OF ALL RESPONSES TO PHQ9 QUESTIONS 1 & 2: 0
2. FEELING DOWN, DEPRESSED OR HOPELESS: 0
1. LITTLE INTEREST OR PLEASURE IN DOING THINGS: 0

## 2020-06-22 ASSESSMENT — ENCOUNTER SYMPTOMS
RESPIRATORY NEGATIVE: 1
ALLERGIC/IMMUNOLOGIC NEGATIVE: 1
GASTROINTESTINAL NEGATIVE: 1

## 2020-06-22 NOTE — ASSESSMENT & PLAN NOTE
Sugars are does not check, diet is improved, weight is down 11 lbs, no reported neuropathy, no change in vision, no claudication, no foot ulcers, no new skin lesions. No change in medications. No c/o with meds. Last eye exam 8/2019.

## 2020-06-22 NOTE — PROGRESS NOTES
Subjective:      Patient ID: Dav Avila is a 79 y.o. male. HPI  Controlled type 2 diabetes mellitus without complication, without long-term current use of insulin (HCC)  Sugars are does not check, diet is improved, weight is down 11 lbs, no reported neuropathy, no change in vision, no claudication, no foot ulcers, no new skin lesions. No change in medications. No c/o with meds. Last eye exam 8/2019. Essential hypertension  No change in meds, no c/o with meds, no chest pain, SOB, palpatations, or syncope. Home bp was 130-140s/80s. Obesity (BMI 35.0-39.9 without comorbidity)  Lost 11 lbs. Suspected sleep apnea  Has not done sleep study. Abdominal aortic aneurysm (AAA) without rupture (HCC)  4.5 cm AAA 10/2019 on CT scan. Due for US 4/2020 was cancelled due to COVID-19. Now due. Hyperlipidemia  Lost 11 lbs. 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  DO NOT USE FOR MORE THAN TWO WEEKS STRAIGHT 80 g 0    pantoprazole (PROTONIX) 40 MG tablet TAKE ONE TABLET BY MOUTH DAILY 90 tablet 0    ketoconazole (NIZORAL) 2 % shampoo WASH SCALP THREE TIMES A WEEK 240 mL 10    ketoconazole (NIZORAL) 2 % cream APPLY TO AFFECTED AREA(S) OF FACE (AROUND NOSE) TWO TIMES A DAY 60 g 2    metoprolol succinate (TOPROL XL) 50 MG extended release tablet TAKE ONE TABLET BY MOUTH DAILY 90 tablet 1    atorvastatin (LIPITOR) 10 MG tablet TAKE ONE TABLET BY MOUTH EVERY EVENING 90 tablet 3    PROAIR  (90 Base) MCG/ACT inhaler       metFORMIN (GLUCOPHAGE-XR) 500 MG extended release tablet TAKE TWO TABLETS BY MOUTH TWICE A DAY WITH MEALS 360 tablet 1    Fluticasone-Umeclidin-Vilant (TRELEGY ELLIPTA) 100-62.5-25 MCG/INH AEPB Inhale 1 puff into the lungs daily 1 each 5    aspirin 81 MG EC tablet Take 81 mg by mouth daily.  Handicap Placard MISC by Does not apply route 1 each 0    albuterol sulfate  (90 Base) MCG/ACT inhaler Inhale 2 puffs into the lungs 4 times daily as needed for Wheezing 2 Inhaler 5    albuterol sulfate HFA (PROAIR HFA) 108 (90 Base) MCG/ACT inhaler Inhale 2 puffs into the lungs every 6 hours as needed for Wheezing 1 Inhaler 5    glucose blood VI test strips (ASCENSIA AUTODISC VI;ONE TOUCH ULTRA TEST VI) strip As needed. 100 strip 3    Lancets MISC  100 each 3    therapeutic multivitamin-minerals (THERAGRAN-M) tablet Take 1 tablet by mouth daily. No current facility-administered medications for this visit. No Known Allergies  Social History     Tobacco Use    Smoking status: Former Smoker     Packs/day: 1.00     Years: 30.00     Pack years: 30.00     Last attempt to quit: 2003     Years since quittin.4    Smokeless tobacco: Never Used   Substance Use Topics    Alcohol use: Yes     Alcohol/week: 0.0 standard drinks     Comment: 1/ week     Family History   Problem Relation Age of Onset    Cancer Brother         colon CA    Heart Disease Mother        Review of Systems   Constitutional: Positive for fatigue. Respiratory: Negative. Cardiovascular: Negative. Gastrointestinal: Negative. Endocrine: Negative. Genitourinary: Negative. Musculoskeletal: Negative. Skin: Negative. Allergic/Immunologic: Negative. Neurological: Negative. Hematological: Negative. Psychiatric/Behavioral: Negative.         Objective:   Physical Exam  Constitutional:       General: He is not in acute distress. Appearance: Normal appearance. He is well-developed. He is obese. He is not diaphoretic. HENT:      Head: Normocephalic and atraumatic. Eyes:      Extraocular Movements: Extraocular movements intact. Conjunctiva/sclera: Conjunctivae normal.      Pupils: Pupils are equal, round, and reactive to light. Neck:      Musculoskeletal: Full passive range of motion without pain, normal range of motion and neck supple. No neck rigidity or muscular tenderness. Thyroid: No thyroid mass or thyromegaly. Vascular: Normal carotid pulses. No carotid bruit, hepatojugular reflux or JVD. Trachea: Trachea and phonation normal.   Cardiovascular:      Rate and Rhythm: Normal rate and regular rhythm. No extrasystoles are present. Chest Wall: PMI is not displaced. Pulses: Normal pulses. No decreased pulses. Carotid pulses are 2+ on the right side and 2+ on the left side. Radial pulses are 2+ on the right side and 2+ on the left side. Femoral pulses are 2+ on the right side and 2+ on the left side. Popliteal pulses are 2+ on the right side and 2+ on the left side. Dorsalis pedis pulses are 2+ on the right side and 2+ on the left side. Posterior tibial pulses are 2+ on the right side and 2+ on the left side. Heart sounds: Normal heart sounds. No murmur. No friction rub. No gallop. Pulmonary:      Effort: Pulmonary effort is normal. No tachypnea, bradypnea, accessory muscle usage or respiratory distress. Breath sounds: Normal breath sounds. No decreased breath sounds, wheezing, rhonchi or rales. Abdominal:      Hernia: No hernia is present. There is no hernia in the ventral area. Musculoskeletal:         General: Tenderness (oa knees, hands, low back.) present. Lymphadenopathy:      Cervical: No cervical adenopathy. Skin:     General: Skin is warm and dry. Capillary Refill: Capillary refill takes less than 2 seconds. Coloration: Skin is not jaundiced or pale. Findings: No abrasion, bruising, erythema, lesion or rash. Nails: There is no clubbing. Neurological:      General: No focal deficit present. Mental Status: He is alert and oriented to person, place, and time. Mental status is at baseline. He is not disoriented. Cranial Nerves: No cranial nerve deficit. Sensory: No sensory deficit. Motor: No weakness, tremor, atrophy or abnormal muscle tone. Coordination: Coordination normal.      Gait: Gait normal.      Comments: Diabetic foot exam was abnormal with intact light touch but deminished vibratory senses. Psychiatric:         Mood and Affect: Mood normal.         Speech: Speech normal.         Behavior: Behavior normal.         Thought Content: Thought content normal.         Judgment: Judgment normal.         Assessment:      Problem   Abdominal Aortic Aneurysm (Aaa) Without Rupture (Hcc). Present w/o new c/o. Obesity (Bmi 35.0-39.9 Without Comorbidity). Lost 11 lbs. Suspected Sleep Apnea. No test but lost 11 lbs. Essential Hypertension. Too high   Controlled Type 2 Diabetes Mellitus Without Complication, Without Long-Term Current Use of Insulin (Hcc). Improved diet and good wt loss. No home sugars. Hyperlipidemia. Lost wt. Plan:      All above problems reviewed and the found to be unchanged except for the following :     Abdominal Aortic Aneurysm (Aaa) Without Rupture (Hcc). Will do US Aorta. May need further testing or referral.     Obesity (Bmi 35.0-39.9 Without Comorbidity). Continue wt loss. Suspected Sleep Apnea. To call if new c/o w/ fatigue or day time sleepiness. Essential Hypertension. Home BP readings daily for 2 weeks. Call [facility] w/ results. May need to adjust meds. To continue to improve diet and lose weight. Controlled Type 2 Diabetes Mellitus Without Complication, Without Long-Term Current Use of Insulin (Hcc). Will do labs and adjust meds after results are evaluated. To continue to improve diet and lose weight. To follow Diabetic diet. If needs further help w/ Diabetic diet to call and will refer back to dietician. Home sugar checks. To call if sudden change up or down in sugars. To do regular foot checks. Call if new problems. Hyperlipidemia. Will continue med. To lose weight. Will check labs next visit.              Suleiman Heath MD

## 2020-06-23 LAB
ANION GAP SERPL CALCULATED.3IONS-SCNC: 14 MMOL/L (ref 3–16)
BASOPHILS ABSOLUTE: 0.1 K/UL (ref 0–0.2)
BASOPHILS RELATIVE PERCENT: 1.1 %
BUN BLDV-MCNC: 13 MG/DL (ref 7–20)
CALCIUM SERPL-MCNC: 9 MG/DL (ref 8.3–10.6)
CHLORIDE BLD-SCNC: 99 MMOL/L (ref 99–110)
CO2: 27 MMOL/L (ref 21–32)
CREAT SERPL-MCNC: 1.2 MG/DL (ref 0.8–1.3)
EOSINOPHILS ABSOLUTE: 0.4 K/UL (ref 0–0.6)
EOSINOPHILS RELATIVE PERCENT: 4.6 %
ESTIMATED AVERAGE GLUCOSE: 151.3 MG/DL
GFR AFRICAN AMERICAN: >60
GFR NON-AFRICAN AMERICAN: 58
GLUCOSE BLD-MCNC: 106 MG/DL (ref 70–99)
HBA1C MFR BLD: 6.9 %
HCT VFR BLD CALC: 41.4 % (ref 40.5–52.5)
HEMOGLOBIN: 13.3 G/DL (ref 13.5–17.5)
LYMPHOCYTES ABSOLUTE: 2.8 K/UL (ref 1–5.1)
LYMPHOCYTES RELATIVE PERCENT: 30.3 %
MCH RBC QN AUTO: 28.1 PG (ref 26–34)
MCHC RBC AUTO-ENTMCNC: 32.1 G/DL (ref 31–36)
MCV RBC AUTO: 87.5 FL (ref 80–100)
MONOCYTES ABSOLUTE: 0.9 K/UL (ref 0–1.3)
MONOCYTES RELATIVE PERCENT: 10.2 %
NEUTROPHILS ABSOLUTE: 5 K/UL (ref 1.7–7.7)
NEUTROPHILS RELATIVE PERCENT: 53.8 %
PDW BLD-RTO: 15.9 % (ref 12.4–15.4)
PLATELET # BLD: 363 K/UL (ref 135–450)
PMV BLD AUTO: 7.9 FL (ref 5–10.5)
POTASSIUM SERPL-SCNC: 4.6 MMOL/L (ref 3.5–5.1)
RBC # BLD: 4.74 M/UL (ref 4.2–5.9)
SODIUM BLD-SCNC: 140 MMOL/L (ref 136–145)
WBC # BLD: 9.4 K/UL (ref 4–11)

## 2020-07-01 ENCOUNTER — HOSPITAL ENCOUNTER (OUTPATIENT)
Dept: ULTRASOUND IMAGING | Age: 80
Discharge: HOME | End: 2020-07-01
Payer: MEDICARE

## 2020-07-01 PROCEDURE — 76775 US EXAM ABDO BACK WALL LIM: CPT

## 2020-07-02 ENCOUNTER — TELEPHONE (OUTPATIENT)
Dept: FAMILY MEDICINE CLINIC | Age: 80
End: 2020-07-02

## 2020-07-13 ENCOUNTER — TELEPHONE (OUTPATIENT)
Dept: FAMILY MEDICINE CLINIC | Age: 80
End: 2020-07-13

## 2020-07-13 RX ORDER — AMLODIPINE BESYLATE 5 MG/1
5 TABLET ORAL DAILY
Qty: 30 TABLET | Refills: 5 | Status: SHIPPED | OUTPATIENT
Start: 2020-07-13 | End: 2021-01-06

## 2020-07-13 NOTE — TELEPHONE ENCOUNTER
BP is high on Losartan /25. Will add Amlodipine 5 mg at bed time. Home BP checks call if>14/90 after 2 weeks. Call if new c/o w/ meds.
Discussed w pt see rx
FYI
Patient is reporting his BP readings:  6-23  153/85  P 64,  6-24 158/90 P 62,  6-25 183/88  P 64,  6-26 138/75  P 69,  6-27  162/93  P 62,  6-28 153/81  P 67,  6-29 154/85  P 67,  7-5 153/86  P68,   7-6 165/86  P 64,  7-7 146/93  P68,  7-8 146/89  P 58,  7-9 154/85  P 63,  7-10 139/72  P 59,  7-11 149/83  P 65,  Averages 153.8/ 85.1, P 64.3
No

## 2020-07-17 ENCOUNTER — TELEPHONE (OUTPATIENT)
Dept: PULMONOLOGY | Age: 80
End: 2020-07-17

## 2020-07-17 NOTE — TELEPHONE ENCOUNTER
Within this Telehealth Consent, the terms you and yours refer to the person using the Telehealth Service (Service), or in the case of a use of the Service by or on behalf of a minor, you and yours refer to and include (i) the parent or legal guardian who provides consent to the use of the Service by such minor or uses the Service on behalf of such minor, and (ii) the minor for whom consent is being provided or on whose behalf the Service is being utilized. When using Service, you will be consulting with your health care providers via the use of Telehealth.   Telehealth involves the delivery of healthcare services using electronic communications, information technology or other means between a healthcare provider and a patient who are not in the same physical location. Telehealth may be used for diagnosis, treatment, follow-up and/or patient education, and may include, but is not limited to, one or more of the following:    Electronic transmission of medical records, photo images, personal health information or other data between a patient and a healthcare provider    Interactions between a patient and healthcare provider via audio, video and/or data communications    Use of output data from medical devices, sound and video files    Anticipated Benefits   The use of Telehealth by your Provider(s) through the Service may have the following possible benefits:    Making it easier and more efficient for you to access medical care and treatment for the conditions treated by such Provider(s) utilizing the Service    Allowing you to obtain medical care and treatment by Provider(s) at times that are convenient for you    Enabling you to interact with Provider(s) without the necessity of an in-office appointment     Possible Risks   While the use of Telehealth can provide potential benefits for you, there are also potential risks associated with the use of Telehealth.  These risks include, but may not be limited to the following:    Your Provider(s) may not able to provide medical treatment for your particular condition and you may be required to seek alternative healthcare or emergency care services.  The electronic systems or other security protocols or safeguards used in the Service could fail, causing a breach of privacy of your medical or other information.  Given regulatory requirements in certain jurisdictions, your Provider(s) diagnosis and/or treatment options, especially pertaining to certain prescriptions, may be limited. Acceptance   1. You understand that Services will be provided via Telehealth. This process involves the use of HIPAA compliant and secure, real-time audio-visual interfacing with a qualified and appropriately trained provider located at [facility]. 2. You understand that, under no circumstances, will this session be recorded. 3. You understand that the Provider(s) at [facility] and other clinical participants will be party to the information obtained during the Telehealth session in accordance with best medical practices. 4. You understand that the information obtained during the Telehealth session will be used to help determine the most appropriate treatment options. 5. You understand that You have the right to revoke this consent at any point in time. 6. You understand that Telehealth is voluntary, and that continued treatment is not dependent upon consent. 7. You understand that, in the event of non-consent to Telehealth services and/or technical difficulties, you will obtain services as typically provided in the absence of Telehealth technology. 8. You understand that this consent will be kept in Your medical record. 9. No potential benefits from the use of Telehealth or specific results can be guaranteed. Your condition may not be cured or improved and, in some cases, may get worse.    10. There are limitations in the provision of medical care and treatment via Telehealth and the Service and you may not be able to receive diagnosis and/or treatment through the Service for every condition for which you seek diagnosis and/or treatment. 11. There are potential risks to the use of Telehealth, including but not limited to the risks described in this Telehealth Consent. 12. Your Provider(s) have discussed the use of Telehealth and the Service with you, including the benefits and risks of such and you have provided oral consent to your Provider(s) for the use of Telehealth and the Service. 15. You understand that it is your duty to provide your Provider(s) truthful, accurate and complete information, including all relevant information regarding care that you may have received or may be receiving from other healthcare providers outside of the Service. 14. You understand that each of your Provider(s) may determine in his or sole discretion that your condition is not suitable for diagnosis and/or treatment using the Service, and that you may need to seek medical care and treatment a specialist or other healthcare provider, outside of the Service. 15. You understand that you are fully responsible for payment for all services provided by Provider(s) or through use of the Service and that you may not be able to use third-party insurance. 16. You represent that (a) you have read this Telehealth Consent carefully, (b) you understand the risks and benefits of the Service and the use of Telehealth in the medical care and treatment provided to you by Provider(s) using the Service, and (c) you have the legal capacity and authority to provide this consent for yourself and/or the minor for which you are consenting under applicable federal and state laws, including laws relating to the age of [de-identified] and/or parental/guardian consent.    17. You give your informed consent to the use of Telehealth by Provider(s) using the Service under

## 2020-07-28 RX ORDER — METOPROLOL SUCCINATE 50 MG/1
TABLET, EXTENDED RELEASE ORAL
Qty: 90 TABLET | Refills: 1 | Status: SHIPPED | OUTPATIENT
Start: 2020-07-28 | End: 2021-01-25

## 2020-08-06 ENCOUNTER — OFFICE VISIT (OUTPATIENT)
Dept: FAMILY MEDICINE CLINIC | Age: 80
End: 2020-08-06
Payer: MEDICARE

## 2020-08-06 VITALS
SYSTOLIC BLOOD PRESSURE: 138 MMHG | WEIGHT: 231.8 LBS | HEART RATE: 57 BPM | OXYGEN SATURATION: 98 % | TEMPERATURE: 97.6 F | HEIGHT: 68 IN | DIASTOLIC BLOOD PRESSURE: 84 MMHG | RESPIRATION RATE: 16 BRPM | BODY MASS INDEX: 35.13 KG/M2

## 2020-08-06 PROBLEM — Z00.00 MEDICARE ANNUAL WELLNESS VISIT, SUBSEQUENT: Status: ACTIVE | Noted: 2020-08-06

## 2020-08-06 LAB
ALBUMIN SERPL-MCNC: 4.1 G/DL (ref 3.4–5)
ALP BLD-CCNC: 76 U/L (ref 40–129)
ALT SERPL-CCNC: 14 U/L (ref 10–40)
ANION GAP SERPL CALCULATED.3IONS-SCNC: 14 MMOL/L (ref 3–16)
AST SERPL-CCNC: 17 U/L (ref 15–37)
BILIRUB SERPL-MCNC: 0.7 MG/DL (ref 0–1)
BILIRUBIN DIRECT: <0.2 MG/DL (ref 0–0.3)
BILIRUBIN, INDIRECT: NORMAL MG/DL (ref 0–1)
BUN BLDV-MCNC: 11 MG/DL (ref 7–20)
CALCIUM SERPL-MCNC: 9.6 MG/DL (ref 8.3–10.6)
CHLORIDE BLD-SCNC: 102 MMOL/L (ref 99–110)
CHOLESTEROL, TOTAL: 168 MG/DL (ref 0–199)
CO2: 26 MMOL/L (ref 21–32)
CREAT SERPL-MCNC: 1.3 MG/DL (ref 0.8–1.3)
GFR AFRICAN AMERICAN: >60
GFR NON-AFRICAN AMERICAN: 53
GLUCOSE BLD-MCNC: 119 MG/DL (ref 70–99)
HCT VFR BLD CALC: 44.1 % (ref 40.5–52.5)
HDLC SERPL-MCNC: 41 MG/DL (ref 40–60)
HEMOGLOBIN: 14.3 G/DL (ref 13.5–17.5)
LDL CHOLESTEROL CALCULATED: 96 MG/DL
MCH RBC QN AUTO: 28.4 PG (ref 26–34)
MCHC RBC AUTO-ENTMCNC: 32.4 G/DL (ref 31–36)
MCV RBC AUTO: 87.7 FL (ref 80–100)
PDW BLD-RTO: 16.4 % (ref 12.4–15.4)
PHOSPHORUS: 3.8 MG/DL (ref 2.5–4.9)
PLATELET # BLD: 396 K/UL (ref 135–450)
PMV BLD AUTO: 7.8 FL (ref 5–10.5)
POTASSIUM SERPL-SCNC: 4.5 MMOL/L (ref 3.5–5.1)
PROSTATE SPECIFIC ANTIGEN: 4.18 NG/ML (ref 0–4)
RBC # BLD: 5.03 M/UL (ref 4.2–5.9)
SODIUM BLD-SCNC: 142 MMOL/L (ref 136–145)
TOTAL PROTEIN: 6.8 G/DL (ref 6.4–8.2)
TRIGL SERPL-MCNC: 157 MG/DL (ref 0–150)
TSH SERPL DL<=0.05 MIU/L-ACNC: 1.36 UIU/ML (ref 0.27–4.2)
VLDLC SERPL CALC-MCNC: 31 MG/DL
WBC # BLD: 9.7 K/UL (ref 4–11)

## 2020-08-06 PROCEDURE — G0438 PPPS, INITIAL VISIT: HCPCS | Performed by: INTERNAL MEDICINE

## 2020-08-06 PROCEDURE — 36415 COLL VENOUS BLD VENIPUNCTURE: CPT | Performed by: INTERNAL MEDICINE

## 2020-08-06 ASSESSMENT — PATIENT HEALTH QUESTIONNAIRE - PHQ9
SUM OF ALL RESPONSES TO PHQ QUESTIONS 1-9: 1
SUM OF ALL RESPONSES TO PHQ QUESTIONS 1-9: 1

## 2020-08-06 ASSESSMENT — LIFESTYLE VARIABLES
AUDIT-C TOTAL SCORE: 2
HOW OFTEN DO YOU HAVE SIX OR MORE DRINKS ON ONE OCCASION: 0
HOW MANY STANDARD DRINKS CONTAINING ALCOHOL DO YOU HAVE ON A TYPICAL DAY: 0
HOW OFTEN DO YOU HAVE A DRINK CONTAINING ALCOHOL: 2

## 2020-08-06 ASSESSMENT — ENCOUNTER SYMPTOMS
RESPIRATORY NEGATIVE: 1
GASTROINTESTINAL NEGATIVE: 1
ALLERGIC/IMMUNOLOGIC NEGATIVE: 1
EYES NEGATIVE: 1

## 2020-08-06 NOTE — ASSESSMENT & PLAN NOTE
Sugars are does not check, diet is improved, weight is down 8 more lbs, no reported neuropathy, no change in vision, no claudication, no foot ulcers, no new skin lesions. No change in medications. No c/o with meds. Last eye exam 8/2019. (appt in 10/2020).

## 2020-08-06 NOTE — PATIENT INSTRUCTIONS
assessments performed today your provider may have ordered immunizations, labs, imaging, and/or referrals for you. A list of these orders (if applicable) as well as your Preventive Care list are included within your After Visit Summary for your review. Other Preventive Recommendations:    · A preventive eye exam performed by an eye specialist is recommended every 1-2 years to screen for glaucoma; cataracts, macular degeneration, and other eye disorders. · A preventive dental visit is recommended every 6 months. · Try to get at least 150 minutes of exercise per week or 10,000 steps per day on a pedometer . · Order or download the FREE \"Exercise & Physical Activity: Your Everyday Guide\" from The [employer]. Call 1-620.297.5215 or search The [employer] online. · You need 6947-5364 mg of calcium and 8714-5092 IU of vitamin D per day. It is possible to meet your calcium requirement with diet alone, but a vitamin D supplement is usually necessary to meet this goal.  · When exposed to the sun, use a sunscreen that protects against both UVA and UVB radiation with an SPF of 30 or greater. Reapply every 2 to 3 hours or after sweating, drying off with a towel, or swimming. · Always wear a seat belt when traveling in a car. Always wear a helmet when riding a bicycle or motorcycle.

## 2020-08-06 NOTE — ASSESSMENT & PLAN NOTE
Prostate: today  Colonoscopy: 7/21/2015. Polyps. rechx 5 yrs. Eye: 8/2019. appt 10/2020. Hearing: HAs in place. Get up and Go. Passed  Tob: Quit 2003. 30 pk yrs. Caff: moderate am  ETOH: 2-3 drinks/week  LW/MPA: yes.   Cardiac risk:>76 yo

## 2020-08-06 NOTE — PROGRESS NOTES
2020    Arias Orozco (:  1940) is a 79 y.o. male, here for evaluation of the following medical concerns:    HPI     Medicare annual wellness visit, subsequent  Prostate: today  Colonoscopy: 2015. Polyps. rechx 5 yrs. Eye: 2019. appt 10/2020. Hearing: HAs in place. Get up and Go. Passed  Tob: Quit . 30 pk yrs. Caff: moderate am  ETOH: 2-3 drinks/week  LW/MPA: yes. Cardiac risk:>74 yo    Controlled type 2 diabetes mellitus without complication, without long-term current use of insulin (HCC)  Sugars are does not check, diet is improved, weight is down 8 more lbs, no reported neuropathy, no change in vision, no claudication, no foot ulcers, no new skin lesions. No change in medications. No c/o with meds. Last eye exam 2019. (appt in 10/2020). Essential hypertension  No change in meds, no c/o with meds, no chest pain, SOB, palpatations, or syncope. Home bp was 130-140s/80s. Mixed hyperlipidemia  Lost 8 lbs. No c/o w/ meds. Suspected sleep apnea  Has not done sleep study. Obesity (BMI 35.0-39.9 without comorbidity)  Lost 8 lbs and doing better w/ activity. Abdominal aortic aneurysm (AAA) without rupture (HCC)  4.5 cm AAA  US 2020. Due to see DR Ross Lowry soon for f/u. No real change on US. Review of Systems   Constitutional: Positive for fatigue. HENT: Negative. Eyes: Negative. Respiratory: Negative. Cardiovascular: Negative. Gastrointestinal: Negative. Endocrine: Negative. Genitourinary: Negative. Musculoskeletal: Negative. Skin: Negative. Allergic/Immunologic: Negative. Neurological: Negative. Hematological: Negative. Psychiatric/Behavioral: Negative. Prior to Visit Medications    Medication Sig Taking?  Authorizing Provider   metoprolol succinate (TOPROL XL) 50 MG extended release tablet TAKE ONE TABLET BY MOUTH DAILYTAKE ONE TABLET BY MOUTH DAILY Yes Maricruz Rojas MD   amLODIPine (NORVASC) 5 MG tablet Take 1 tablet by mouth daily Yes Micki Ferris MD   losartan-hydrochlorothiazide (HYZAAR) 100-25 MG per tablet TAKE ONE TABLET BY MOUTH DAILY Yes Bunny Blackwood,    pantoprazole (PROTONIX) 40 MG tablet TAKE ONE TABLET BY MOUTH DAILY Yes MOOSE Dupree   atorvastatin (LIPITOR) 10 MG tablet TAKE ONE TABLET BY MOUTH EVERY EVENING Yes Tomasa Holland MD   PROAIR  (03 Base) MCG/ACT inhaler  Yes Historical Provider, MD   metFORMIN (GLUCOPHAGE-XR) 500 MG extended release tablet TAKE TWO TABLETS BY MOUTH TWICE A DAY WITH MEALS Yes MOOSE Dupree   Fluticasone-Umeclidin-Vilant (TRELEGY ELLIPTA) 100-62.5-25 MCG/INH AEPB Inhale 1 puff into the lungs daily Yes Chyna Mei MD   aspirin 81 MG EC tablet Take 81 mg by mouth daily. Yes Historical Provider, MD   triamcinolone (KENALOG) 0.1 % ointment APPLY TO AFFECTED AREA(S) SPARINGLY TWO TIMES A DAY UNTIL CLEAR. DO NOT USE FOR MORE THAN TWO WEEKS STRAIGHT  Antony Robles MD   ketoconazole (NIZORAL) 2 % shampoo WASH SCALP THREE TIMES A WEEK  Antony Robles MD   ketoconazole (NIZORAL) 2 % cream APPLY TO AFFECTED AREA(S) OF FACE (AROUND NOSE) TWO TIMES A DAY  Antony Robles MD   Handicap Placard MISC by Does not apply route  MOOSE Dupree   glucose blood VI test strips (ASCENSIA AUTODISC VI;ONE TOUCH ULTRA TEST VI) strip As needed. Reynaldo Mendoza MD   Lancets MISC   Tomasa Holland MD   therapeutic multivitamin-minerals [facility]) tablet Take 1 tablet by mouth daily.   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 Bilateral 6/22/15    laparoscopic    LUNG REMOVAL, PARTIAL  2007    middle lobe R lung/histoplasmosis    TURP  2002    UPPER GASTROINTESTINAL ENDOSCOPY  2015    UPPER GASTROINTESTINAL ENDOSCOPY  2/15    dilated    UPPER GASTROINTESTINAL ENDOSCOPY  2017    dilatation, bx    UPPER GASTROINTESTINAL ENDOSCOPY  2017    dilated; Bx gastric.  VASECTOMY         Social History     Socioeconomic History    Marital status:      Spouse name: Not on file    Number of children: Not on file    Years of education: Not on file    Highest education level: Not on file   Occupational History    Not on file   Social Needs    Financial resource strain: Not on file    Food insecurity     Worry: Not on file     Inability: Not on file    Transportation needs     Medical: Not on file     Non-medical: Not on file   Tobacco Use    Smoking status: Former Smoker     Packs/day: 1.00     Years: 30.00     Pack years: 30.00     Last attempt to quit: 2003     Years since quittin.6    Smokeless tobacco: Never Used   Substance and Sexual Activity    Alcohol use:  Yes     Alcohol/week: 0.0 standard drinks     Comment: 1/ week    Drug use: No    Sexual activity: Not on file   Lifestyle    Physical activity     Days per week: Not on file     Minutes per session: Not on file    Stress: Not on file   Relationships    Social connections     Talks on phone: Not on file     Gets together: Not on file     Attends Hoahaoism service: Not on file     Active member of club or organization: Not on file     Attends meetings of clubs or organizations: Not on file     Relationship status: Not on file    Intimate partner violence     Fear of current or ex partner: Not on file     Emotionally abused: Not on file     Physically abused: Not on file     Forced sexual activity: Not on file   Other Topics Concern    Not on file   Social History Narrative    Not on file        Family History   Problem Relation Age of Onset    Cancer Brother         colon CA    Heart Disease Mother        Vitals:    08/06/20 0909   BP: 128/80   Pulse: 57   Resp: 16   Temp: 97.6 °F (36.4 °C)   SpO2: 98%   Weight: 231 lb 12.8 oz (105.1 kg)   Height: 5' 8\" (1.727 m)     Estimated body mass index is 35.25 kg/m² as calculated from the following:    Height as of this encounter: 5' 8\" (1.727 m). Weight as of this encounter: 231 lb 12.8 oz (105.1 kg). Physical Exam  Constitutional:       General: He is not in acute distress. Appearance: Normal appearance. He is well-developed. He is obese. He is not ill-appearing, toxic-appearing or diaphoretic. HENT:      Head: Normocephalic and atraumatic. Comments: Bilateral HAs in place. Right Ear: Tympanic membrane, ear canal and external ear normal. Decreased hearing noted. There is no impacted cerumen. Left Ear: Tympanic membrane, ear canal and external ear normal. Decreased hearing noted. There is no impacted cerumen. Nose: Nose normal. No congestion or rhinorrhea. Right Sinus: No maxillary sinus tenderness or frontal sinus tenderness. Left Sinus: No maxillary sinus tenderness or frontal sinus tenderness. Mouth/Throat:      Mouth: Mucous membranes are moist.      Pharynx: Oropharynx is clear. Uvula midline. No oropharyngeal exudate or posterior oropharyngeal erythema. Eyes:      General: Lids are normal. No scleral icterus. Right eye: No discharge. Left eye: No discharge. Extraocular Movements: Extraocular movements intact. Conjunctiva/sclera: Conjunctivae normal.      Pupils: Pupils are equal, round, and reactive to light. Funduscopic exam:     Right eye: No hemorrhage, exudate, AV nicking, arteriolar narrowing or papilledema.          Left eye: No hemorrhage, exudate, AV nicking, arteriolar narrowing or papilledema. Neck:      Musculoskeletal: Full passive range of motion without pain. Neck rigidity present. No muscular tenderness. Thyroid: No thyroid mass or thyromegaly. Vascular: Normal carotid pulses. No carotid bruit, hepatojugular reflux or JVD. Trachea: Trachea normal. No tracheal deviation. Cardiovascular:      Rate and Rhythm: Normal rate and regular rhythm. No extrasystoles are present. Chest Wall: PMI is not displaced. Pulses: Normal pulses. No decreased pulses. Carotid pulses are 2+ on the right side and 2+ on the left side. Radial pulses are 2+ on the right side and 2+ on the left side. Femoral pulses are 2+ on the right side and 2+ on the left side. Popliteal pulses are 2+ on the right side and 2+ on the left side. Dorsalis pedis pulses are 2+ on the right side and 2+ on the left side. Posterior tibial pulses are 2+ on the right side and 2+ on the left side. Heart sounds: Normal heart sounds. No murmur. No friction rub. No gallop. Pulmonary:      Effort: Pulmonary effort is normal. No tachypnea, bradypnea, accessory muscle usage or respiratory distress. Breath sounds: Normal breath sounds. No stridor. No decreased breath sounds, wheezing, rhonchi or rales. Chest:      Chest wall: No tenderness. Abdominal:      General: Bowel sounds are normal. There is no distension or abdominal bruit. Palpations: Abdomen is soft. There is no shifting dullness, fluid wave, mass or pulsatile mass. Tenderness: There is no abdominal tenderness. There is no right CVA tenderness, left CVA tenderness, guarding or rebound. Hernia: No hernia is present. There is no hernia in the ventral area or left inguinal area. Genitourinary:     Penis: Normal. No tenderness. Scrotum/Testes: Normal. Cremasteric reflex is present. Rectum: Normal. Guaiac result negative.       Comments: BPH w/ several small nodules in midline. Musculoskeletal: Normal range of motion. General: Tenderness (OA knees, hips, neck, lower back, and hands.) present. No swelling, deformity or signs of injury. Right lower leg: No edema. Left lower leg: No edema. Lymphadenopathy:      Head:      Right side of head: No submental, submandibular, tonsillar, preauricular, posterior auricular or occipital adenopathy. Left side of head: No submental, submandibular, tonsillar, preauricular, posterior auricular or occipital adenopathy. Cervical: No cervical adenopathy. Upper Body:      Right upper body: No supraclavicular or epitrochlear adenopathy. Left upper body: No supraclavicular or epitrochlear adenopathy. Skin:     General: Skin is warm and dry. Capillary Refill: Capillary refill takes less than 2 seconds. Coloration: Skin is not jaundiced or pale. Findings: No abrasion, bruising, erythema, lesion or rash. Nails: There is no clubbing. Comments: Severe sun damage arms and back. Neurological:      General: No focal deficit present. Mental Status: He is alert and oriented to person, place, and time. Mental status is at baseline. He is not disoriented. Cranial Nerves: No cranial nerve deficit. Sensory: No sensory deficit. Motor: No weakness, tremor, atrophy, abnormal muscle tone or seizure activity. Coordination: Coordination normal.      Gait: Gait normal.      Deep Tendon Reflexes: Reflexes are normal and symmetric. Reflexes normal. Babinski sign absent on the right side. Babinski sign absent on the left side. Reflex Scores:       Tricep reflexes are 2+ on the right side and 2+ on the left side. Bicep reflexes are 2+ on the right side and 2+ on the left side. Brachioradialis reflexes are 2+ on the right side and 2+ on the left side. Patellar reflexes are 2+ on the right side and 2+ on the left side.        Achilles reflexes are 2+ on the right side and 2+ on the left side. Comments: Diabetic foot exam was abnormal with intact light touch but deminished vibratory senses in R toes only. Psychiatric:         Mood and Affect: Mood normal.         Speech: Speech normal.         Behavior: Behavior normal.         Thought Content: Thought content normal.         Judgment: Judgment normal.         ASSESSMENT/PLAN:  1. Medicare annual wellness visit, subsequent  Eye exam in Oct. Flu shot in Oct/Nov. Shingrix vaccine Rx given. 2. Controlled type 2 diabetes mellitus without complication, without long-term current use of insulin ([address]  Will do labs and adjust meds after results are evaluated. To continue to improve diet and lose weight. To follow Diabetic diet. If needs further help w/ Diabetic diet to call and will refer back to dietician. Home sugar checks. To call if sudden change up or down in sugars. To do regular foot checks. Call if new problems. 3. Essential hypertension  . Continue present meds. Home BP checks. Call if>140/90. Improve diet. Avoid caffeine and salt. Call if new c/o w/ meds. 4. Mixed hyperlipidemia  To continue to improve diet and lose weight. Goal<200 lbs agreed upon. contiue meds. Will do labs and adjust med if needed. 5. Suspected sleep apnea  Discussed. Referral made. 6. Obesity (BMI 35.0-39.9 without comorbidity)  Continue to improve diuet and activity as tolerated. 7. Abdominal aortic aneurysm (AAA) without rupture [facility])  To f/u w/ Dr Phoenix Billy soon. Looks like test was ordered. Check w/ his office. Next appt in 6 months if labs ok. An  electronic signature was used to authenticate this note. --Modesta Baker MD on 2020 at 9:44 AM  Medicare Annual Wellness Visit  Name: Kiko Mena Date: 2020   MRN: <[id number]> Sex: Male   Age: 79 y.o.  Ethnicity: Non-/Non    : 1940 Race: Carl Vu is here for [facility]    Screenings for behavioral, psychosocial and functional/safety risks, and cognitive dysfunction are all negative except as indicated below. These results, as well as other patient data from the [address] form, are documented in Flowsheets linked to this Encounter. No Known Allergies    Prior to Visit Medications    Medication Sig Taking? Authorizing Provider   metoprolol succinate (TOPROL XL) 50 MG extended release tablet TAKE ONE TABLET BY MOUTH DAILYTAKE ONE TABLET BY MOUTH DAILY Yes MT Katz MD   amLODIPine (NORVASC) 5 MG tablet Take 1 tablet by mouth daily Yes Maricruz Rojas MD   losartan-hydrochlorothiazide (HYZAAR) 100-25 MG per tablet TAKE ONE TABLET BY MOUTH DAILY Yes Nakul Goodman. Hilda Steele.,    pantoprazole (PROTONIX) 40 MG tablet TAKE ONE TABLET BY MOUTH DAILY Yes MOOSE Duran   atorvastatin (LIPITOR) 10 MG tablet TAKE ONE TABLET BY MOUTH EVERY EVENING Yes Radha Holland MD   PROAIR  (24 Base) MCG/ACT inhaler  Yes Historical Provider, MD   metFORMIN (GLUCOPHAGE-XR) 500 MG extended release tablet TAKE TWO TABLETS BY MOUTH TWICE A DAY WITH MEALS Yes MOOSE Duran   Fluticasone-Umeclidin-Vilant (TRELEGY ELLIPTA) 100-62.5-25 MCG/INH AEPB Inhale 1 puff into the lungs daily Yes Rocky Ramirez MD   aspirin 81 MG EC tablet Take 81 mg by mouth daily. Yes Historical Provider, MD   triamcinolone (KENALOG) 0.1 % ointment APPLY TO AFFECTED AREA(S) SPARINGLY TWO TIMES A DAY UNTIL CLEAR. DO NOT USE FOR MORE THAN TWO WEEKS STRAIGHT  Madiha Mliler MD   ketoconazole (NIZORAL) 2 % shampoo WASH SCALP THREE TIMES A WEEK  Madiha Miller MD   ketoconazole (NIZORAL) 2 % cream APPLY TO AFFECTED AREA(S) OF FACE (AROUND NOSE) TWO TIMES A DAY  Madiha Miller MD   Handicap Placard MISC by Does not apply route  MOOSE Duran   glucose blood VI test strips (ASCENSIA AUTODISC VI;ONE TOUCH ULTRA TEST VI) strip As needed.   Kali Carter MD   Lancets [facility]   Radha Holland MD   therapeutic multivitamin-minerals [facility]) tablet Take 1 tablet by mouth daily. 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 Bilateral 6/22/15    laparoscopic    LUNG REMOVAL, PARTIAL  2007    middle lobe R lung/histoplasmosis    TURP  2002    UPPER GASTROINTESTINAL ENDOSCOPY  1/26/2015    UPPER GASTROINTESTINAL ENDOSCOPY  2/15    dilated    UPPER GASTROINTESTINAL ENDOSCOPY  07/21/2017    dilatation, bx    UPPER GASTROINTESTINAL ENDOSCOPY  08/23/2017    dilated; Bx gastric.  VASECTOMY         Family History   Problem Relation Age of Onset    Cancer Brother         colon CA    Heart Disease Mother        CareTeam (Including outside providers/suppliers regularly involved in providing care):   Patient Care Team:  Stephanie Gonzalez MD as PCP - General (Internal Medicine)  Stephanie Gonzalez MD as PCP - [facility] Empaneled Provider  Germán Jack MD as Surgeon (General Surgery)    Wt Readings from Last 3 Encounters:   08/06/20 231 lb 12.8 oz (105.1 kg)   06/22/20 236 lb (107 kg)   12/11/19 246 lb 3.2 oz (111.7 kg)     Vitals:    08/06/20 0909 08/06/20 0949   BP: 128/80 138/84   Pulse: 57    Resp: 16    Temp: 97.6 °F (36.4 °C)    SpO2: 98%    Weight: 231 lb 12.8 oz (105.1 kg)    Height: 5' 8\" (1.727 m)      Body mass index is 35.25 kg/m². Based upon direct observation of the patient, evaluation of cognition reveals recent and remote memory intact.     See H&P above. Patient's complete Health Risk Assessment and screening values have been reviewed and are found in Flowsheets. The following problems were reviewed today and where indicated follow up appointments were made and/or referrals ordered. Positive Risk Factor Screenings with Interventions:     Health Habits/Nutrition:  Health Habits/Nutrition  Do you exercise for at least 20 minutes 2-3 times per week?: Yes  Have you lost any weight without trying in the past 3 months?: No  Do you eat fewer than 2 meals per day?: No  Have you seen a dentist within the past year?: Yes  Body mass index is 35.25 kg/m². Health Habits/Nutrition Interventions:  · up to date. Hearing/Vision:  No exam data present  Hearing/Vision  Do you or your family notice any trouble with your hearing?: (!) Yes  Do you have difficulty driving, watching TV, or doing any of your daily activities because of your eyesight?: No  Have you had an eye exam within the past year?: Yes  Hearing/Vision Interventions:  · Hearing concerns:  f/u w/ [facility] as scheduled. Safety:  Safety  Do you have working smoke detectors?: Yes  Have all throw rugs been removed or fastened?: Yes  Do you have non-slip mats or surfaces in all bathtubs/showers?: (!) No  Do all of your stairways have a railing or banister?: Yes  Are your doorways, halls and stairs free of clutter?: Yes  Do you always fasten your seatbelt when you are in a car?: Yes  Safety Interventions:  · discussed balance exercises.     Personalized Preventive Plan   Current Health Maintenance Status  Immunization History   Administered Date(s) Administered    Influenza Whole 12/01/2007    Influenza, High Dose (Fluzone 65 yrs and older) 09/09/2011, 09/10/2012, 09/23/2014, 10/12/2015, 10/13/2016, 10/13/2017, 10/09/2018    Influenza, Triv, inactivated, subunit, adjuvanted, IM (Fluad 65 yrs and older) 10/07/2019    Pneumococcal Conjugate 13-valent ([id number]) 04/12/2016    Pneumococcal

## 2020-08-07 LAB
ESTIMATED AVERAGE GLUCOSE: 157.1 MG/DL
HBA1C MFR BLD: 7.1 %

## 2020-08-12 ENCOUNTER — VIRTUAL VISIT (OUTPATIENT)
Dept: PULMONOLOGY | Age: 80
End: 2020-08-12
Payer: MEDICARE

## 2020-08-12 PROCEDURE — 99213 OFFICE O/P EST LOW 20 MIN: CPT | Performed by: INTERNAL MEDICINE

## 2020-08-12 RX ORDER — FLUTICASONE FUROATE, UMECLIDINIUM BROMIDE AND VILANTEROL TRIFENATATE 100; 62.5; 25 UG/1; UG/1; UG/1
1 POWDER RESPIRATORY (INHALATION) DAILY
Qty: 1 EACH | Refills: 5 | Status: SHIPPED | OUTPATIENT
Start: 2020-08-12 | End: 2021-05-06

## 2020-08-12 NOTE — PROGRESS NOTES
MA Communication:   The following orders are received by verbal communication from Nicole Thompson MD    Orders include:  1 year f/u [id number] @ 10:00

## 2020-08-12 NOTE — PATIENT INSTRUCTIONS
Remember to bring all pulmonary medications to your next appointment with the office. Please keep all of your future appointments scheduled by [facility]. Out of respect for other patients and providers, you may be asked to reschedule your appointment if you arrive later than your scheduled appointment time. Appointments cancelled less than 24hrs in advance will be considered a no show. Patients with three missed appointments within 1 year or four missed appointments within 2 years can be dismissed from the practice. You may receive a survey regarding the care you received during your visit. Your input is valuable to us. We encourage you to complete and return your survey. We hope you will choose us in the future for your healthcare needs.

## 2020-08-12 NOTE — PROGRESS NOTES
Chief Complaint-Pulmonary follow up    A virtual pulmonary visit was done for the patient for his clinical status, patient states that he is clinically stable at this time patient states that he is doing very well with the Trelegy Ellipta, patient states that he takes pro-air at nighttime just to decrease any congestion at nighttime as a habit, patient does not have any significant rhinorrhea or nasal congestion or any epistaxis, patient does not have any significant otalgia or ear discharge, patient states that once  twice a day he has a deep cough without any expectoration but if patient is not concerned about that, patient states that he does not have any significant increasing shortness of breath or wheezing, patient does not have any pleuritic chest pain, no fever no chills, no palpitation or diaphoresis, patient does not have any significant abdominal pain nausea vomiting, patient does not have any increasing reflux symptoms but patient takes medication for that patient does not have any significant epistaxis or hemoptysis, patient states that he has some soft stools but not diarrhea, patient is not concerned about on the consistency of the stools, patient states that in the last few months time he has lost about 11 to 12 pounds, patient does not have any increasing sleep fragmentation, patient does not have any confusion lethargy, patient does not have any change in the ambient environment, patient does not have any new medications of concern, patient's blood sugars are under control, patient does not have any other pertinent review of system of concern    Previous HPI:Patient has come to the office for pulmonary follow-up, patient says that from pulmonary standpoint of view he is stable, patient states that since that time he has started taking Trelegy Ellipta he does not have any significant cough or expectoration or shortness of breath, patient states that once in a while he takes albuterol inhaler at nighttime to decrease some congestion in the daytime, patient takes his Trelegy Ellipta in the daytime at this time, patient does not have any increasing nasal congestion, patient on questioning further states that his hoarseness of voice is not significant, patient does not have any chest pain or palpitations, patient states that recently he had come to the ER because of urine retention and hematuria and was subjected to cystoscopy and however large blood clot was removed but patient was told that he does not have any apparent pathology of concern, mom patient does not have any abdominal pain nausea vomiting, patient does not have any significant increasing leg edema, patient states that he had gone to his PCPs office and patient was told to have a PSA and the results to be sent to his urologist, patient does not have any other pertinent review of system of concern     Patient has come for pulmonary evaluation;patient states that he is more less doing OK with Trelegy but before his requirements for the rescue inhaler was minimal  but lately for last few weeks time he has noticed that he was having more chest congestion along with that patient was having increased use of albuterol inhaler which is still continuing but it is better than last week, patient has cough with mucoid expectoration without any significant purulence, patient does not have any significant fever or chills, no pleuritic chest pain patient does not have any chest pain, patient does not have any increasing nasal congestion, patient does not have any epistaxis, bleeding or hemoptysis, patient does not have any fever or chills, patient does not have any significant abdominal discomfort and nausea vomiting, patient does not complain of any increasing leg edema, patient may still has some sleep fragmentation, patient does not have any confusion or lethargy, no other pertinent review of system of concern       Patient is a 78-year-old male who was upper to the office for pulmonary consult because of increasing shortness of breath  Patient says that he has been having increasing shortness of breath.   For at least the last 1 year time and patient had a cardiac workup done which was negative for any cardiac etiology for the same; patient says that on a flat surface at his own pace he can walk about 3-4 blocks without any shortness of breath but if he has to go on little better inclined order has to climb stairs he has profound shortness of breath, along with the shortness of breath he has some cough without any phlegm, patient also has occasional wheezing, patient does not have any significant chest pain along with that no palpitations or diaphoresis, no fever no chills, patient denies any increasing headaches or blurring of vision, patient does not have any otalgia or ear discharge, patient does not have any tinnitus, patient on questioning further states that he does not have any profound or rhinorrhea or nasal congestion or sinus congestion, patient does not have any significant sore throat, no difficulty in swallowing per se but patient says that his food gets stuck in the mid chest at times and patient has history of esophageal strictures and has had a ballooning of the strictures multiple times in the last one was last fall, patient does not have any pleuritic chest pain, no fever no chills, patient's appetite is maintained, patient does not have any abdominal pain and nausea or vomiting, patient is takes medications for reflux symptoms, patient does not have any altered bowel habits, patient does not have any epistaxis, gum bleeding or hemoptysis, patient does not have any significant hematochezia or melena, patient does not have any dysuria or hematuria, patient does not have any increasing leg edema, patient does have history suggestive of some sleep fragmentation t, but patient says that he has gained about 5-10 pounds in last 1 year time, patient does children: Not on file    Years of education: Not on file    Highest education level: Not on file   Occupational History    Not on file   Social Needs    Financial resource strain: Not on file    Food insecurity     Worry: Not on file     Inability: Not on file    Transportation needs     Medical: Not on file     Non-medical: Not on file   Tobacco Use    Smoking status: Former Smoker     Packs/day: 1.00     Years: 30.00     Pack years: 30.00     Last attempt to quit: 2003     Years since quittin.6    Smokeless tobacco: Never Used   Substance and Sexual Activity    Alcohol use: Yes     Alcohol/week: 0.0 standard drinks     Comment: 1/ week    Drug use: No    Sexual activity: Not on file   Lifestyle    Physical activity     Days per week: Not on file     Minutes per session: Not on file    Stress: Not on file   Relationships    Social connections     Talks on phone: Not on file     Gets together: Not on file     Attends [employer] service: Not on file     Active member of club or organization: Not on file     Attends meetings of clubs or organizations: Not on file     Relationship status: Not on file    Intimate partner violence     Fear of current or ex partner: Not on file     Emotionally abused: Not on file     Physically abused: Not on file     Forced sexual activity: Not on file   Other Topics Concern    Not on file   Social History Narrative    Not on file       Family History   Problem Relation Age of Onset    Cancer Brother         colon CA    Heart Disease Mother             ROS same as above     Physical Exam:  There were no vitals taken for this visit.'  Constitutional:  No acute distress. HENT:  Oropharynx is clear and moist. No thyromegaly. Mild nasal mucosal hyperemia; No thrush   Eyes:  Conjunctivae are normal. Pupils equal, round, and reactive to light. No scleral icterus. Neck: . No tracheal deviation present. No obvious thyroid mass.   Neck diameter is increased  Cardiovascular: Normal rate, regular rhythm, normal heart sounds. No right ventricular heave. (+) lower extremity edema. Pulmonary/Chest: Right infrascapular wheezes. No rales. Chest wall is not dull to percussion. No accessory muscle usage or stridor. Slightly increased prolonged expiration with breath sound intensity  Abdominal: Soft. Bowel sounds present. No distension or hernia. No tenderness. Musculoskeletal: No cyanosis. No clubbing. No obvious joint deformity. Lymphadenopathy: No cervical or supraclavicular adenopathy. Skin: Skin is warm and dry. No rash or nodules on the exposed extremities. Psychiatric: Normal mood and affect. Behavior is normal.  No anxiety. Neurologic: Alert, awake and oriented. PERRL. Speech fluent        Data:     PFT shows patient to have mild-to-moderate obstructive airway disease with some reactive disease in the small airways along with that patient has hyperinflation and also changes in the PFT parameters because of body habitus      ECHO- Summary   Technically difficult examination.   Normal systolic function with an estimated ejection fraction of 55-60%.  Mild concentric left ventricular hypertrophy.   No regional wall motion abnormalities are seen.   Grade I diastolic dysfunction with normal filing pressure.     PFT done in 2016 had shown patient to have mild obstructive airway disease along with that patient had changes in the PFT parameters because of body habitus    Assessment:    1. Shortness of breath        2. Suspected sleep apnea      3. Diastolic dysfunction      4. Copd       5. H/O histoplasmosis      6.  Obesity (BMI 35.0-39.9 without comorbidity)          Plan:   · Patient's clinical status and review of systems discussed  · Clinically it appears that patient has chronic bronchitis along with that patient appears to have obesity/suspected sleep apnea/diastolic dysfunction with history of histoplasmosis with right middle lobe was present when appropriate. Due to this being a TeleHealth encounter (During [address] public health emergency), evaluation of the following organ systems was limited: Vitals/Constitutional/EENT/Resp/CV/GI//MS/Neuro/Skin/Heme-Lymph-Imm. Pursuant to the emergency declaration under the [address] [place] [address], [address] [place] [address] and the [employer] Act, this Virtual Visit was conducted with patient's (and/or legal guardian's) consent, to reduce the patient's risk of exposure to COVID-19 and provide necessary medical care. The patient (and/or legal guardian) has also been advised to contact this office for worsening conditions or problems, and seek emergency medical treatment and/or call 911 if deemed necessary. Patient identification was verified at the start of the visit: Yes    Total time spent for this encounter: Not billed by time    Services were provided through a video synchronous discussion virtually to substitute for in-person clinic visit. Patient and provider were located at their individual homes. --Mir Avery MD on 8/12/2020 at 10:31 AM    An electronic signature was used to authenticate this note.

## 2020-08-17 RX ORDER — PANTOPRAZOLE SODIUM 40 MG/1
TABLET, DELAYED RELEASE ORAL
Qty: 90 TABLET | Refills: 0 | Status: SHIPPED | OUTPATIENT
Start: 2020-08-17 | End: 2020-08-21

## 2020-08-21 RX ORDER — PANTOPRAZOLE SODIUM 40 MG/1
TABLET, DELAYED RELEASE ORAL
Qty: 90 TABLET | Refills: 1 | Status: SHIPPED | OUTPATIENT
Start: 2020-08-21 | End: 2020-11-17

## 2020-09-01 RX ORDER — LOSARTAN POTASSIUM AND HYDROCHLOROTHIAZIDE 25; 100 MG/1; MG/1
TABLET ORAL
Qty: 90 TABLET | Refills: 0 | Status: SHIPPED | OUTPATIENT
Start: 2020-09-01 | End: 2020-12-01

## 2020-09-01 NOTE — TELEPHONE ENCOUNTER
Refill Request LOSARTAN-HCTZ 100-25 MG TAB     Last Seen: 8/6/2020    Last Written: 6/2/20 90 tablets with 0 refills    Next Appointment:   Future Appointments   Date Time Provider Rommel Leahy   9/14/2020 11:00 AM Keshawn Darling MD [facility]   12/18/2020  8:40 AM MT Kraus MD [facility]   8/12/2021 10:00 AM Nyasia Sewell MD [facility]           Requested Prescriptions     Pending Prescriptions Disp Refills    losartan-hydroCHLOROthiazide (HYZAAR) 100-25 MG per tablet [Pharmacy Med Name: LOSARTAN-HCTZ 100-25 MG TAB] 90 tablet 0     Sig: TAKE ONE TABLET BY MOUTH DAILY

## 2020-09-05 PROBLEM — Z00.00 MEDICARE ANNUAL WELLNESS VISIT, SUBSEQUENT: Status: RESOLVED | Noted: 2020-08-06 | Resolved: 2020-09-05

## 2020-09-12 NOTE — PROGRESS NOTES
[facility]) Dermatology  Mandi Colmenares MD  [phone]      Vivek Berg  1940    79 y.o. male     Date of Visit: 9/14/2020    Last Visit: 1yr    Chief Complaint: Skin check    History of Present Illness:  1. Here for skin check. Hx NMSC - unknown type central chest s/p Mohs 2010, BCC L nasal bridge s/p Mohs 4/2015, sBCC L neck base s/p curettage 5/2017, nBCC R mid lateral upper arm s/p excision 9/2019.   -Wears hat and SPF 30 sunscreen regularly when golfing    2. Hx AKs s/p cryotherapy.   -Needs treatment of biopsy-confirmed 9/2019 AK of midline upper forehead     3. New issue. Few week hx moderately pruritic eruption of trunk and extremities. 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  -Intertrigo of axillae, abdomen, groin - TAC 0.1% oint     Review of Systems:  Constitutional: Reports general sense of well-being. Skin: No interval severe sunburns. Allergies: Reviewed and updated. Past Medical History, Surgical History, Medications and Allergies reviewed.      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    Hyperlipidemia     Hypertension     Primary malignant neoplasm of skin of trunk 4/21/2009     Past Surgical History:   Procedure Laterality Date    COLONOSCOPY  5/22/12    polyps    COLONOSCOPY  7/21/15    polyps    CYSTOSCOPY N/A 10/24/2019    CYSTOSCOPY AND CLOT EVACUATION performed by Stevan Sweeney MD at [address] Bilateral 6/22/15    laparoscopic    LUNG REMOVAL, PARTIAL  2007    middle lobe R lung/histoplasmosis    TURP  2002    UPPER GASTROINTESTINAL ENDOSCOPY  1/26/2015    UPPER GASTROINTESTINAL ENDOSCOPY  2/15    dilated    UPPER GASTROINTESTINAL ENDOSCOPY 07/21/2017    dilatation, bx    UPPER GASTROINTESTINAL ENDOSCOPY  08/23/2017    dilated; Bx gastric.  VASECTOMY         No Known Allergies  Outpatient Medications Marked as Taking for the 9/14/20 encounter (Office Visit) with Olga Lidia Sanchez MD   Medication Sig Dispense Refill    losartan-hydroCHLOROthiazide (HYZAAR) 100-25 MG per tablet TAKE ONE TABLET BY MOUTH DAILY 90 tablet 0    PROAIR  (90 Base) MCG/ACT inhaler Inhale 2 puffs into the lungs every 6 hours as needed for Wheezing 1 Inhaler 5    pantoprazole (PROTONIX) 40 MG tablet TAKE ONE TABLET BY MOUTH DAILY 90 tablet 1    fluticasone-umeclidin-vilant (TRELEGY ELLIPTA) 100-62.5-25 MCG/INH AEPB Inhale 1 puff into the lungs daily 1 each 5    metoprolol succinate (TOPROL XL) 50 MG extended release tablet TAKE ONE TABLET BY MOUTH DAILYTAKE ONE TABLET BY MOUTH DAILY 90 tablet 1    amLODIPine (NORVASC) 5 MG tablet Take 1 tablet by mouth daily 30 tablet 5    triamcinolone (KENALOG) 0.1 % ointment APPLY TO AFFECTED AREA(S) SPARINGLY TWO TIMES A DAY UNTIL CLEAR. DO NOT USE FOR MORE THAN TWO WEEKS STRAIGHT 80 g 0    ketoconazole (NIZORAL) 2 % shampoo WASH SCALP THREE TIMES A WEEK 240 mL 10    ketoconazole (NIZORAL) 2 % cream APPLY TO AFFECTED AREA(S) OF FACE (AROUND NOSE) TWO TIMES A DAY 60 g 2    atorvastatin (LIPITOR) 10 MG tablet TAKE ONE TABLET BY MOUTH EVERY EVENING 90 tablet 3    metFORMIN (GLUCOPHAGE-XR) 500 MG extended release tablet TAKE TWO TABLETS BY MOUTH TWICE A DAY WITH MEALS 360 tablet 1    Handicap Placard MISC by Does not apply route 1 each 0    glucose blood VI test strips (ASCENSIA AUTODISC VI;ONE TOUCH ULTRA TEST VI) strip As needed. 100 strip 3    Lancets MISC  100 each 3    therapeutic multivitamin-minerals (THERAGRAN-M) tablet Take 1 tablet by mouth daily.  aspirin 81 MG EC tablet Take 81 mg by mouth daily.          Physical Examination     The following were examined and determined to be normal: Psych/Neuro, Scalp/hair,

## 2020-09-14 ENCOUNTER — OFFICE VISIT (OUTPATIENT)
Dept: DERMATOLOGY | Age: 80
End: 2020-09-14
Payer: MEDICARE

## 2020-09-14 VITALS — TEMPERATURE: 97.9 F

## 2020-09-14 PROCEDURE — 99214 OFFICE O/P EST MOD 30 MIN: CPT | Performed by: DERMATOLOGY

## 2020-09-14 PROCEDURE — 17003 DESTRUCT PREMALG LES 2-14: CPT | Performed by: DERMATOLOGY

## 2020-09-14 PROCEDURE — 17000 DESTRUCT PREMALG LESION: CPT | Performed by: DERMATOLOGY

## 2020-10-01 RX ORDER — ATORVASTATIN CALCIUM 10 MG/1
10 TABLET, FILM COATED ORAL DAILY
Qty: 90 TABLET | Refills: 1 | Status: SHIPPED | OUTPATIENT
Start: 2020-10-01 | End: 2021-03-29

## 2020-10-12 ENCOUNTER — OFFICE VISIT (OUTPATIENT)
Dept: FAMILY MEDICINE CLINIC | Age: 80
End: 2020-10-12
Payer: MEDICARE

## 2020-10-12 VITALS
HEIGHT: 68 IN | WEIGHT: 231.6 LBS | OXYGEN SATURATION: 97 % | TEMPERATURE: 98.2 F | HEART RATE: 62 BPM | BODY MASS INDEX: 35.1 KG/M2 | RESPIRATION RATE: 17 BRPM | SYSTOLIC BLOOD PRESSURE: 132 MMHG | DIASTOLIC BLOOD PRESSURE: 76 MMHG

## 2020-10-12 PROCEDURE — G8510 SCR DEP NEG, NO PLAN REQD: HCPCS | Performed by: PHYSICIAN ASSISTANT

## 2020-10-12 PROCEDURE — 69209 REMOVE IMPACTED EAR WAX UNI: CPT | Performed by: PHYSICIAN ASSISTANT

## 2020-10-12 PROCEDURE — 3288F FALL RISK ASSESSMENT DOCD: CPT | Performed by: PHYSICIAN ASSISTANT

## 2020-10-12 PROCEDURE — 99213 OFFICE O/P EST LOW 20 MIN: CPT | Performed by: PHYSICIAN ASSISTANT

## 2020-10-12 NOTE — PROGRESS NOTES
[address] COMPLAINT  Chief Complaint   Patient presents with    Other     ears clogged,  saw hearing aid doctor and was told very waxy           HISTORY OF PRESENT  ILLNESS  Alley Corrales 79 y.o.  with hearing loss and otalgia steadily worsening for months  on the right  Has history of cerumen impaction. Wears hearing aids. Denies URI symptoms. No fever. Also thinks he needs another esop stretching because having trouble swallowing again. Has to get a dilitation about every 3 years. ROS:  Remaining are unremarkable for  other constitutional, EENT, cardiac, pulmonary, GI, , neurologic, musculoskeletal, or integumentary complaints. Past History/Medications/Allergies- reviewed and updated      PHYSICAL EXAM:     Vitals:    10/12/20 1549   BP: 132/76   Site: Left Upper Arm   Position: Sitting   Cuff Size: Large Adult   Pulse: 62   Resp: 17   Temp: 98.2 °F (36.8 °C)   TempSrc: Temporal   SpO2: 97%   Weight: 231 lb 9.6 oz (105.1 kg)   Height: 5' 8\" (1.727 m)   Body mass index is 35.21 kg/m². APPEARANCE: Pleasant, friendly, well-nourished. No acute distress. Normocephalic. EOMI, PERRLA. Oral mucosa moist. Throat clear. Ears: Negative pinna pull. Cerumen impaction right   After irrigation, the TM's are intact and hearing improved. No cervical lymphadenopathy  HEART: Rate and rhythm no murmurs rubs or gallops. LUNGS: Clear to auscultation no wheezes rales or rhonchi. NEUROLOGIC: Grossly nonfocal.  SKIN: Warm and dry, capillary refill <2 seconds. No rashes, petechiae, skin turgor      ASSESSMENT/PLAN:     Hearing loss and otalgia due to cerumen impaction  - symptoms improved after irrigation. No other acute findings. Health Maintenance   Flu vax today. Dysphagia, h/o esop stricture:   Has history of esop stenosis, will call dr Matt Lazo to discuss dilatation again. Last one 3 years ago.

## 2020-10-13 LAB — DIABETIC RETINOPATHY: POSITIVE

## 2020-10-14 ENCOUNTER — IMMUNIZATION (OUTPATIENT)
Dept: FAMILY MEDICINE CLINIC | Age: 80
End: 2020-10-14
Payer: MEDICARE

## 2020-10-14 VITALS — TEMPERATURE: 97.4 F

## 2020-10-14 PROCEDURE — G0008 ADMIN INFLUENZA VIRUS VAC: HCPCS | Performed by: INTERNAL MEDICINE

## 2020-10-14 PROCEDURE — 90694 VACC AIIV4 NO PRSRV 0.5ML IM: CPT | Performed by: INTERNAL MEDICINE

## 2020-10-14 NOTE — PROGRESS NOTES
Vaccine Information Sheet, \"Influenza - Inactivated\"  given to Johnny Dawn, or parent/legal guardian of  Johnny Dawn and verbalized understanding. Patient responses:    Have you ever had a reaction to a flu vaccine? No  Do you have any current illness? No  Have you ever had Guillian Woodhull Syndrome? No  Do you have a serious allergy to any of the follow: Neomycin, Polymyxin, Thimerosal, eggs or egg products? No    Flu vaccine given per order. Please see immunization tab. Risks and benefits explained. Current VIS given.       Immunizations Administered     Name Date Dose Route    Influenza, Quadv, adjuvanted, 65 yrs +, IM, PF (Fluad) 10/14/2020 0.5 mL Intramuscular    Site: Deltoid- Left    Lot: [id number]    NDC: 93655-190-32

## 2020-10-15 RX ORDER — KETOCONAZOLE 20 MG/ML
SHAMPOO TOPICAL
Qty: 240 ML | Refills: 10 | Status: SHIPPED | OUTPATIENT
Start: 2020-10-15 | End: 2021-11-10 | Stop reason: SDUPTHER

## 2020-11-17 RX ORDER — PANTOPRAZOLE SODIUM 40 MG/1
TABLET, DELAYED RELEASE ORAL
Qty: 90 TABLET | Refills: 1 | Status: SHIPPED | OUTPATIENT
Start: 2020-11-17 | End: 2021-11-04

## 2020-11-17 RX ORDER — METFORMIN HYDROCHLORIDE 500 MG/1
TABLET, EXTENDED RELEASE ORAL
Qty: 360 TABLET | Refills: 1 | Status: SHIPPED | OUTPATIENT
Start: 2020-11-17 | End: 2021-05-24

## 2020-12-01 RX ORDER — LOSARTAN POTASSIUM AND HYDROCHLOROTHIAZIDE 25; 100 MG/1; MG/1
TABLET ORAL
Qty: 90 TABLET | Refills: 0 | Status: SHIPPED | OUTPATIENT
Start: 2020-12-01 | End: 2021-03-04

## 2021-01-06 RX ORDER — AMLODIPINE BESYLATE 5 MG/1
TABLET ORAL
Qty: 30 TABLET | Refills: 4 | Status: SHIPPED | OUTPATIENT
Start: 2021-01-06 | End: 2021-05-07

## 2021-01-06 NOTE — TELEPHONE ENCOUNTER
Refill Request amLODIPine BESYLATE 5 MG TAB    Last Seen: 10/12/2020    Last Written: 7/13/20 30 tablets with 5 refills    Next Appointment:   Future Appointments   Date Time Provider Rommel Leahy   1/11/2021  7:40 AM MD Whitney Winters   8/12/2021 10:00 AM Carey Ulrich MD [facility]             Requested Prescriptions     Pending Prescriptions Disp Refills    amLODIPine (NORVASC) 5 MG tablet [Pharmacy Med Name: amLODIPine BESYLATE 5 MG TAB] 30 tablet 4     Sig: TAKE ONE TABLET BY MOUTH DAILY

## 2021-01-11 ENCOUNTER — OFFICE VISIT (OUTPATIENT)
Dept: FAMILY MEDICINE CLINIC | Age: 81
End: 2021-01-11
Payer: MEDICARE

## 2021-01-11 VITALS
HEART RATE: 68 BPM | HEIGHT: 68 IN | OXYGEN SATURATION: 98 % | BODY MASS INDEX: 35.49 KG/M2 | DIASTOLIC BLOOD PRESSURE: 82 MMHG | SYSTOLIC BLOOD PRESSURE: 136 MMHG | TEMPERATURE: 97.3 F | WEIGHT: 234.2 LBS

## 2021-01-11 DIAGNOSIS — I10 ESSENTIAL HYPERTENSION, BENIGN: Primary | ICD-10-CM

## 2021-01-11 DIAGNOSIS — R07.89 ATYPICAL CHEST PAIN: ICD-10-CM

## 2021-01-11 DIAGNOSIS — R97.20 ELEVATED PSA: ICD-10-CM

## 2021-01-11 DIAGNOSIS — E78.2 MIXED HYPERLIPIDEMIA: ICD-10-CM

## 2021-01-11 DIAGNOSIS — E11.65 UNCONTROLLED TYPE 2 DIABETES MELLITUS WITH HYPERGLYCEMIA (HCC): ICD-10-CM

## 2021-01-11 PROCEDURE — 36415 COLL VENOUS BLD VENIPUNCTURE: CPT | Performed by: PHYSICIAN ASSISTANT

## 2021-01-11 PROCEDURE — 99214 OFFICE O/P EST MOD 30 MIN: CPT | Performed by: PHYSICIAN ASSISTANT

## 2021-01-11 ASSESSMENT — PATIENT HEALTH QUESTIONNAIRE - PHQ9
SUM OF ALL RESPONSES TO PHQ QUESTIONS 1-9: 0
1. LITTLE INTEREST OR PLEASURE IN DOING THINGS: 0
SUM OF ALL RESPONSES TO PHQ QUESTIONS 1-9: 0

## 2021-01-11 ASSESSMENT — ENCOUNTER SYMPTOMS
SHORTNESS OF BREATH: 0
WHEEZING: 0
GASTROINTESTINAL NEGATIVE: 1

## 2021-01-11 NOTE — PROGRESS NOTES
Subjective:      Patient ID: Abdirizak Watson is a 79 y.o. male. HPI     Controlled type 2 diabetes mellitus without complication, without long-term current use of insulin ([facility])  Sugars are does not check, diet is improved, weight up a few pounds, no reported neuropathy, no change in vision, no claudication, no foot ulcers, no new skin lesions. No change in medications. No c/o with meds.     Essential hypertension  No change in meds, no c/o with meds SOB, palpatations, or syncope. Home bp was 130-140s/80s. Has noted intermittent chest pressure, no radiations, can occur with activity or rest. Will last only a few minutes.      Mixed hyperlipidemia  Stable on current medications    Review of Systems   Constitutional: Negative. Respiratory: Negative for shortness of breath and wheezing. Cardiovascular: Positive for chest pain. Gastrointestinal: Negative. Genitourinary: Negative. Objective:   Physical Exam  Constitutional:       Appearance: Normal appearance. He is obese. Cardiovascular:      Rate and Rhythm: Normal rate and regular rhythm. Pulses: Normal pulses. Heart sounds: Normal heart sounds. Pulmonary:      Effort: Pulmonary effort is normal.      Breath sounds: Normal breath sounds. Neurological:      General: No focal deficit present. Mental Status: He is alert and oriented to person, place, and time. Psychiatric:         Mood and Affect: Mood normal.         Assessment / Plan:         1. Controlled type 2 diabetes mellitus without complication, without long-term current use of insulin ([facility]  Will do labs and adjust meds after results are evaluated. To continue to improve diet and lose weight. To follow Diabetic diet. If needs further help w/ Diabetic diet to call and will refer back to dietician. Home sugar checks. To call if sudden change up or down in sugars. To do regular foot checks.  Call if new problems.      2. Essential hypertension .Continue present meds. Home BP checks. Call if>140/90. Improve diet. Avoid caffeine and salt. Call if new c/o w/ meds.     3. Mixed hyperlipidemia  To continue to improve diet and lose weight. Goal<200 lbs agreed upon. contiue meds. Will do labs and adjust med if needed.     4. Elevated psa  Will recheck PSA today    5.atypical chest pain  Will refer to cardiology for eval, normal stress test 2018, to ER if symptoms worsen

## 2021-01-12 LAB
A/G RATIO: 1.4 (ref 1.1–2.2)
ALBUMIN SERPL-MCNC: 4 G/DL (ref 3.4–5)
ALP BLD-CCNC: 79 U/L (ref 40–129)
ALT SERPL-CCNC: 14 U/L (ref 10–40)
ANION GAP SERPL CALCULATED.3IONS-SCNC: 13 MMOL/L (ref 3–16)
AST SERPL-CCNC: 14 U/L (ref 15–37)
BILIRUB SERPL-MCNC: 0.4 MG/DL (ref 0–1)
BUN BLDV-MCNC: 15 MG/DL (ref 7–20)
CALCIUM SERPL-MCNC: 9.7 MG/DL (ref 8.3–10.6)
CHLORIDE BLD-SCNC: 100 MMOL/L (ref 99–110)
CHOLESTEROL, TOTAL: 169 MG/DL (ref 0–199)
CO2: 28 MMOL/L (ref 21–32)
CREAT SERPL-MCNC: 1.2 MG/DL (ref 0.8–1.3)
ESTIMATED AVERAGE GLUCOSE: 154.2 MG/DL
GFR AFRICAN AMERICAN: >60
GFR NON-AFRICAN AMERICAN: 58
GLOBULIN: 2.9 G/DL
GLUCOSE BLD-MCNC: 108 MG/DL (ref 70–99)
HBA1C MFR BLD: 7 %
HDLC SERPL-MCNC: 45 MG/DL (ref 40–60)
LDL CHOLESTEROL CALCULATED: 94 MG/DL
POTASSIUM SERPL-SCNC: 4.5 MMOL/L (ref 3.5–5.1)
PROSTATE SPECIFIC ANTIGEN: 2.97 NG/ML (ref 0–4)
SODIUM BLD-SCNC: 141 MMOL/L (ref 136–145)
TOTAL PROTEIN: 6.9 G/DL (ref 6.4–8.2)
TRIGL SERPL-MCNC: 151 MG/DL (ref 0–150)
VLDLC SERPL CALC-MCNC: 30 MG/DL

## 2021-01-18 ENCOUNTER — OFFICE VISIT (OUTPATIENT)
Dept: CARDIOLOGY CLINIC | Age: 81
End: 2021-01-18
Payer: MEDICARE

## 2021-01-18 VITALS
BODY MASS INDEX: 36.22 KG/M2 | HEART RATE: 71 BPM | SYSTOLIC BLOOD PRESSURE: 110 MMHG | DIASTOLIC BLOOD PRESSURE: 58 MMHG | HEIGHT: 68 IN | WEIGHT: 239 LBS | OXYGEN SATURATION: 97 %

## 2021-01-18 DIAGNOSIS — R07.89 OTHER CHEST PAIN: Primary | ICD-10-CM

## 2021-01-18 DIAGNOSIS — R06.02 SHORTNESS OF BREATH: Primary | ICD-10-CM

## 2021-01-18 DIAGNOSIS — Z87.891 HISTORY OF TOBACCO ABUSE: ICD-10-CM

## 2021-01-18 DIAGNOSIS — R07.89 OTHER CHEST PAIN: ICD-10-CM

## 2021-01-18 PROCEDURE — 99214 OFFICE O/P EST MOD 30 MIN: CPT | Performed by: INTERNAL MEDICINE

## 2021-01-18 PROCEDURE — 93000 ELECTROCARDIOGRAM COMPLETE: CPT | Performed by: INTERNAL MEDICINE

## 2021-01-18 NOTE — LETTER
[facility]   Cardiac Consultation    Referring Provider:  Omar Blood MD     Chief Complaint   Patient presents with    New Patient    Chest Pain     left upper side of chest sometimes    Shortness of Breath     on exertion, and laying down in the am    Dizziness     sometimes    Edema     bilateral feet         History of Present Illness:  Lisa Kayser is a 72yo male who presents today for evaluation of chest pain. He has PMH of HLD, HTN, GERD, COPD and DM. He was seen in 2018 by my partner Dr. Baldo Zamora for chest pain. Most recent lexiscan myoview stress test 6/1/18  showed no evidence of myocardial ischemia or scar. Most recent ECHO from 6/2018 showed an EF of 55-60%. Most recent EKG 5/1/18 demonstrated ST, , low voltage. Today he reports c/o left sided chest pain occurring intermittently x 1 year. He is a somewhat vague historian and cannot characterize the pain. Occurs with exertion pushing objects (ie. Pushing  door). He states the pain goes away as soon as he stops putting pressure on or pushing objects. He also c/o SOB with going up stairs, but states this is not new and is baseline for him. He states he has an occasional cough, but nothing bothersome to him. He is taking his medications as prescribed. Past Medical History:   has a past medical history of COPD (chronic obstructive pulmonary disease) ([address] Decreased hearing of both ears, Diabetes mellitus ([address] GERD (gastroesophageal reflux disease), Histoplasmosis, Hyperlipidemia, Hypertension, and Primary malignant neoplasm of skin of trunk.     Surgical History: has a past surgical history that includes Lung removal, partial (2007); Vasectomy; TURP (2002); Upper gastrointestinal endoscopy (1/26/2015); Upper gastrointestinal endoscopy (2/15); Inguinal hernia repair (Bilateral, 6/22/15); Colonoscopy (5/22/12); Colonoscopy (7/21/15); Upper gastrointestinal endoscopy (07/21/2017); Upper gastrointestinal endoscopy (08/23/2017); and Cystoscopy (N/A, 10/24/2019). Social History:   reports that he quit smoking about 18 years ago. He has a 30.00 pack-year smoking history. He has never used smokeless tobacco. He reports current alcohol use. He reports that he does not use drugs. Family History:  family history includes Cancer in his brother; Heart Disease in his mother. Home Medications:  Prior to Admission medications    Medication Sig Start Date End Date Taking?  Authorizing Provider   amLODIPine (NORVASC) 5 MG tablet TAKE ONE TABLET BY MOUTH DAILY 1/6/21  Yes Kerline Reid MD   losartan-hydroCHLOROthiazide (HYZAAR) 100-25 MG per tablet TAKE ONE TABLET BY MOUTH DAILY 12/1/20  Yes Thalia Vasquez MD   metFORMIN (GLUCOPHAGE-XR) 500 MG extended release tablet TAKE TWO TABLETS BY MOUTH TWICE A DAY WITH MEALS 11/17/20  Yes MT Vargas MD   pantoprazole (PROTONIX) 40 MG tablet TAKE ONE TABLET BY MOUTH DAILY 11/17/20  Yes MT Vargas MD   ketoconazole (NIZORAL) 2 % shampoo WASH SCALP THREE TIMES A WEEK 10/15/20  Yes Alicia London MD   atorvastatin (LIPITOR) 10 MG tablet Take 1 tablet by mouth daily 10/1/20  Yes Thalia Vasquez MD   PROAIR  (13 Base) MCG/ACT inhaler Inhale 2 puffs into the lungs every 6 hours as needed for Wheezing 8/28/20  Yes Costa Mccoy MD   fluticasone-umeclidin-vilant (TRELEGY ELLIPTA) 100-62.5-25 MCG/INH AEPB Inhale 1 puff into the lungs daily 8/12/20  Yes Costa Mccoy MD   metoprolol succinate (TOPROL XL) 50 MG extended release tablet TAKE ONE TABLET BY MOUTH DAILYTAKE ONE TABLET BY MOUTH DAILY 7/28/20  Yes Thalia Vasquez MD triamcinolone (KENALOG) 0.1 % ointment APPLY TO AFFECTED AREA(S) SPARINGLY TWO TIMES A DAY UNTIL CLEAR. DO NOT USE FOR MORE THAN TWO WEEKS STRAIGHT 5/26/20  Yes Sheldon Overton MD   Handicap Placard MISC by Does not apply route 4/12/19  Yes MOOSE Nunez   therapeutic multivitamin-minerals [facility]) tablet Take 1 tablet by mouth daily. Yes Historical Provider, MD   aspirin 81 MG EC tablet Take 81 mg by mouth daily. Yes Historical Provider, MD   ketoconazole (NIZORAL) 2 % cream APPLY TO AFFECTED AREA(S) OF FACE (AROUND NOSE) TWO TIMES A DAY 1/30/20   Sheldon Overton MD   glucose blood VI test strips (ASCENSIA AUTODISC VI;ONE TOUCH ULTRA TEST VI) strip As needed. Patient not taking: Reported on 1/11/2021 10/18/11   Dalton Rangel MD   Lancets MISC  10/18/11   Diaz Holland MD        Allergies:  Patient has no known allergies. Review of Systems:   · Constitutional: there has been no unanticipated weight loss. There's been no change in energy level, sleep pattern, or activity level. · Eyes: No visual changes or diplopia. No scleral icterus. · ENT: No Headaches, hearing loss or vertigo. No mouth sores or sore throat. · Cardiovascular: Reviewed in HPI  · Respiratory: No cough or wheezing, no sputum production. No hematemesis. · Gastrointestinal: No abdominal pain, appetite loss, blood in stools. No change in bowel or bladder habits. · Genitourinary: No dysuria, trouble voiding, or hematuria. · Musculoskeletal:  No gait disturbance, weakness or joint complaints. · Integumentary: No rash or pruritis. · Neurological: No headache, diplopia, change in muscle strength, numbness or tingling. No change in gait, balance, coordination, mood, affect, memory, mentation, behavior. · Psychiatric: No anxiety, no depression. · Endocrine: No malaise, fatigue or temperature intolerance. No excessive thirst, fluid intake, or urination. No tremor. · Hematologic/Lymphatic: No abnormal bruising or bleeding, blood clots or swollen lymph nodes. · Allergic/Immunologic: No nasal congestion or hives. Physical Examination:    Vitals:    01/18/21 0840   BP: (!) 110/58   Pulse: 71   SpO2: 97%        Constitutional and General Appearance: NAD   Respiratory:  · Normal excursion and expansion without use of accessory muscles  · Resp Auscultation: Normal breath sounds without dullness  Cardiovascular:  · The apical impulses not displaced  · Heart tones are crisp and normal  · Cervical veins are not engorged  · The carotid upstroke is normal in amplitude and contour without delay or bruit  · Normal S1S2, No S3, No Murmur  · Peripheral pulses are symmetrical and full  · There is no clubbing, cyanosis of the extremities. Trace BLE edema  · Femoral Arteries: 2+ and equal  · Pedal Pulses: 2+ and equal   Abdomen:  · No masses or tenderness  · Liver/Spleen: No Abnormalities Noted  Neurological/Psychiatric:  · Alert and oriented in all spheres  · Moves all extremities well  · Exhibits normal gait balance and coordination  · No abnormalities of mood, affect, memory, mentation, or behavior are noted  Skin:  · Skin: warm and dry. Stress test 6/2018  Summary Normal LV size and systolic function. Left ventricular ejection fraction of  63%. Normal wall motion. There is normal isotope uptake at stress and rest.  There is no evidence of myocardial ischemia or scar. Stress Protocols     Resting ECG  Normal sinus rhythm. Early repolarization. Echo 6/2018 Summary   Technically difficult examination. Normal systolic function with an estimated ejection fraction of 55-60%. Mild concentric left ventricular hypertrophy. No regional wall motion abnormalities are seen. Grade I diastolic dysfunction with normal filing pressure.       Assessment: 1. Shortness of breath: See #2  Below. ? Anginal equivalent. Baseline symptoms for him. No evidence for volume overload on exam.     2. Other chest pain: Unspecified pain in older male with CAD risk factors including age/gender, DM, HTN, HLD, and prior tobacco abuse. Most recent lexiscan myoview stress test 6/1/18  showed no evidence of myocardial ischemia or scar. Most recent ECHO from 6/2018 showed an EF of 55-60%. He merits non-invasive cardiac evaluation to reassess myocardial perfusion to see if ischemia may be causing symptoms. Plan:  1. EKG today shows NSR 62bpm (no change from 5/18 except HR decreased). 2.  Lexiscan Myoview Stress test to risk stratify    We will call you with the results of the test   3. Follow up dependent upon test results. If no issues with perfusion or LV function on nuc stress study I will see him back only PRN. Thank you for allowing me to participate in the care of this individual.  Cost of prescription medications and patient compliance have been reviewed with patient. All questions answered. This note was scribed in the presence of Dr.Meyers WOLFE by Christy Caballero RN.     I, Dr. Suzan Church, personally performed the services described in this documentation, as scribed by the above signed scribe in my presence. It is both accurate and complete to my knowledge. I agree with the details independently gathered by the clinical support staff, while the remaining scribed note accurately describes my personal service to the patient. Remus Jeans.  Eric Gao M.D., [facility]

## 2021-01-18 NOTE — PROGRESS NOTES
[facility]   Cardiac Consultation    Referring Provider:  Cynthia Wood MD     No chief complaint on file. History of Present Illness:  Ollie Farah presents today for evaluation of chest pain. He has PMH of HLD, HTNm GERD, COPD and DM. He was seen in 2018 by my partner Dr. Sj Brown for chest pain. His stress test at that time showed no evidence of myocardial ischemia or scar. His Echo from 6/2018 showed an EF of 55-60%. Past Medical History:   has a past medical history of COPD (chronic obstructive pulmonary disease) ([facility] [address] Decreased hearing of both ears, Diabetes mellitus ([facility] [address] GERD (gastroesophageal reflux disease), Histoplasmosis, Hyperlipidemia, Hypertension, and Primary malignant neoplasm of skin of trunk. Surgical History:   has a past surgical history that includes Lung removal, partial (2007); Vasectomy; TURP (2002); Upper gastrointestinal endoscopy (1/26/2015); Upper gastrointestinal endoscopy (2/15); Inguinal hernia repair (Bilateral, 6/22/15); Colonoscopy (5/22/12); Colonoscopy (7/21/15); Upper gastrointestinal endoscopy (07/21/2017); Upper gastrointestinal endoscopy (08/23/2017); and Cystoscopy (N/A, 10/24/2019). Social History:   reports that he quit smoking about 18 years ago. He has a 30.00 pack-year smoking history. He has never used smokeless tobacco. He reports current alcohol use. He reports that he does not use drugs. Family History:  family history includes Cancer in his brother; Heart Disease in his mother. Home Medications:  Prior to Admission medications    Medication Sig Start Date End Date Taking?  Authorizing Provider   amLODIPine (NORVASC) 5 MG tablet TAKE ONE TABLET BY MOUTH DAILY 1/6/21   Jayda Tony MD   losartan-hydroCHLOROthiazide [facility]) 100-25 MG per tablet TAKE ONE TABLET BY MOUTH DAILY 12/1/20   MT Simmons MD metFORMIN (GLUCOPHAGE-XR) 500 MG extended release tablet TAKE TWO TABLETS BY MOUTH TWICE A DAY WITH MEALS 11/17/20   MT Vargas MD   pantoprazole (PROTONIX) 40 MG tablet TAKE ONE TABLET BY MOUTH DAILY 11/17/20   MT Vargas MD   ketoconazole (NIZORAL) 2 % shampoo [address] SCALP THREE TIMES A WEEK 10/15/20   Alicia London MD   atorvastatin (LIPITOR) 10 MG tablet Take 1 tablet by mouth daily 10/1/20   MT Vargas MD   PROAIR  (88 Base) MCG/ACT inhaler Inhale 2 puffs into the lungs every 6 hours as needed for Wheezing 8/28/20   Costa Mccoy MD   fluticasone-umeclidin-vilant (TRELEGY ELLIPTA) 491-52.7-42 MCG/INH AEPB Inhale 1 puff into the lungs daily 8/12/20   Costa Mccoy MD   metoprolol succinate (TOPROL XL) 50 MG extended release tablet TAKE ONE TABLET BY MOUTH DAILYTAKE ONE TABLET BY MOUTH DAILY 7/28/20   MT Vargas MD   triamcinolone (KENALOG) 0.1 % ointment APPLY TO AFFECTED AREA(S) SPARINGLY TWO TIMES A DAY UNTIL CLEAR. DO NOT USE FOR MORE THAN TWO WEEKS STRAIGHT 5/26/20   Alicia London MD   ketoconazole (NIZORAL) 2 % cream APPLY TO AFFECTED AREA(S) OF FACE (AROUND NOSE) TWO TIMES A DAY 1/30/20   Alicia London MD   Handicap Placard MISC by Does not apply route 4/12/19   MOOSE Michaels   glucose blood VI test strips (ASCENSIA AUTODISC VI;ONE TOUCH ULTRA TEST VI) strip As needed. Patient not taking: Reported on 1/11/2021 10/18/11   Tayla Navarro MD   Lancets MISC  10/18/11   Sabi Holland MD   therapeutic multivitamin-minerals [facility]) tablet Take 1 tablet by mouth daily. Historical Provider, MD   aspirin 81 MG EC tablet Take 81 mg by mouth daily. Historical Provider, MD        Allergies:  Patient has no known allergies. Review of Systems:   · Constitutional: there has been no unanticipated weight loss. There's been no change in energy level, sleep pattern, or activity level. · Eyes: No visual changes or diplopia. No scleral icterus. · ENT: No Headaches, hearing loss or vertigo. No mouth sores or sore throat. · Cardiovascular: Reviewed in HPI  · Respiratory: No cough or wheezing, no sputum production. No hematemesis. · Gastrointestinal: No abdominal pain, appetite loss, blood in stools. No change in bowel or bladder habits. · Genitourinary: No dysuria, trouble voiding, or hematuria. · Musculoskeletal:  No gait disturbance, weakness or joint complaints. · Integumentary: No rash or pruritis. · Neurological: No headache, diplopia, change in muscle strength, numbness or tingling. No change in gait, balance, coordination, mood, affect, memory, mentation, behavior. · Psychiatric: No anxiety, no depression. · Endocrine: No malaise, fatigue or temperature intolerance. No excessive thirst, fluid intake, or urination. No tremor. · Hematologic/Lymphatic: No abnormal bruising or bleeding, blood clots or swollen lymph nodes. · Allergic/Immunologic: No nasal congestion or hives. Physical Examination:    There were no vitals filed for this visit. Constitutional and General Appearance: NAD   Respiratory:  · Normal excursion and expansion without use of accessory muscles  · Resp Auscultation: Normal breath sounds without dullness  Cardiovascular:  · The apical impulses not displaced  · Heart tones are crisp and normal  · Cervical veins are not engorged  · The carotid upstroke is normal in amplitude and contour without delay or bruit  · Normal S1S2, No S3, No Murmur  · Peripheral pulses are symmetrical and full  · There is no clubbing, cyanosis of the extremities.   · No edema  · Femoral Arteries: 2+ and equal  · Pedal Pulses: 2+ and equal   Abdomen:  · No masses or tenderness  · Liver/Spleen: No Abnormalities Noted  Neurological/Psychiatric:  · Alert and oriented in all spheres  · Moves all extremities well  · Exhibits normal gait balance and coordination  · No abnormalities of mood, affect, memory, mentation, or behavior are noted  Skin: · Skin: warm and dry. Stress test 6/2018  Summary Normal LV size and systolic function. Left ventricular ejection fraction of  63%. Normal wall motion. There is normal isotope uptake at stress and rest.  There is no evidence of myocardial ischemia or scar. Stress Protocols     Resting ECG  Normal sinus rhythm. Early repolarization. Echo 6/2018   Summary   Technically difficult examination. Normal systolic function with an estimated ejection fraction of 55-60%. Mild concentric left ventricular hypertrophy. No regional wall motion abnormalities are seen. Grade I diastolic dysfunction with normal filing pressure. Assessment:     No diagnosis found. Plan:  ***    Thank you for allowing me to participate in the care of this individual.      Nyasia Quiñones.  Thalia Evans M.D., [facility]

## 2021-01-18 NOTE — PROGRESS NOTES
[address]   Cardiac Consultation    Referring Provider:  Spring Franco MD     Chief Complaint   Patient presents with    New Patient    Chest Pain     left upper side of chest sometimes    Shortness of Breath     on exertion, and laying down in the am    Dizziness     sometimes    Edema     bilateral feet         History of Present Illness:  Dyana Estrada is a 72yo male who presents today for evaluation of chest pain. He has PMH of HLD, HTN, GERD, COPD and DM. He was seen in 2018 by my partner Dr. Cori Boateng for chest pain. Most recent lexiscan myoview stress test 6/1/18  showed no evidence of myocardial ischemia or scar. Most recent ECHO from 6/2018 showed an EF of 55-60%. Most recent EKG 5/1/18 demonstrated ST, , low voltage. Today he reports c/o left sided chest pain occurring intermittently x 1 year. He is a somewhat vague historian and cannot characterize the pain. Occurs with exertion pushing objects (ie. Pushing  door). He states the pain goes away as soon as he stops putting pressure on or pushing objects. He also c/o SOB with going up stairs, but states this is not new and is baseline for him. He states he has an occasional cough, but nothing bothersome to him. He is taking his medications as prescribed. Past Medical History:   has a past medical history of COPD (chronic obstructive pulmonary disease) ([address] Decreased hearing of both ears, Diabetes mellitus ([address] GERD (gastroesophageal reflux disease), Histoplasmosis, Hyperlipidemia, Hypertension, and Primary malignant neoplasm of skin of trunk.     Surgical History: has a past surgical history that includes Lung removal, partial (2007); Vasectomy; TURP (2002); Upper gastrointestinal endoscopy (1/26/2015); Upper gastrointestinal endoscopy (2/15); Inguinal hernia repair (Bilateral, 6/22/15); Colonoscopy (5/22/12); Colonoscopy (7/21/15); Upper gastrointestinal endoscopy (07/21/2017); Upper gastrointestinal endoscopy (08/23/2017); and Cystoscopy (N/A, 10/24/2019). Social History:   reports that he quit smoking about 18 years ago. He has a 30.00 pack-year smoking history. He has never used smokeless tobacco. He reports current alcohol use. He reports that he does not use drugs. Family History:  family history includes Cancer in his brother; Heart Disease in his mother. Home Medications:  Prior to Admission medications    Medication Sig Start Date End Date Taking?  Authorizing Provider   amLODIPine (NORVASC) 5 MG tablet TAKE ONE TABLET BY MOUTH DAILY 1/6/21  Yes Vickie Baird MD   losartan-hydroCHLOROthiazide (HYZAAR) 100-25 MG per tablet TAKE ONE TABLET BY MOUTH DAILY 12/1/20  Yes Carl Nava MD   metFORMIN (GLUCOPHAGE-XR) 500 MG extended release tablet TAKE TWO TABLETS BY MOUTH TWICE A DAY WITH MEALS 11/17/20  Yes MT Guajardo MD   pantoprazole (PROTONIX) 40 MG tablet TAKE ONE TABLET BY MOUTH DAILY 11/17/20  Yes MT Guajardo MD   ketoconazole (NIZORAL) 2 % shampoo WASH SCALP THREE TIMES A WEEK 10/15/20  Yes Fracisco Tamez MD   atorvastatin (LIPITOR) 10 MG tablet Take 1 tablet by mouth daily 10/1/20  Yes Carl Nava MD   PROAIR  (31 Base) MCG/ACT inhaler Inhale 2 puffs into the lungs every 6 hours as needed for Wheezing 8/28/20  Yes Aleida Batista MD   fluticasone-umeclidin-vilant (TRELEGY ELLIPTA) 100-62.5-25 MCG/INH AEPB Inhale 1 puff into the lungs daily 8/12/20  Yes Aleida Batista MD   metoprolol succinate (TOPROL XL) 50 MG extended release tablet TAKE ONE TABLET BY MOUTH DAILYTAKE ONE TABLET BY MOUTH DAILY 7/28/20  Yes Carl Nava MD triamcinolone (KENALOG) 0.1 % ointment APPLY TO AFFECTED AREA(S) SPARINGLY TWO TIMES A DAY UNTIL CLEAR. DO NOT USE FOR MORE THAN TWO WEEKS STRAIGHT 5/26/20  Yes Kaitlin Aguirre MD   Handicap Placard MISC by Does not apply route 4/12/19  Yes MOOSE Griggs   therapeutic multivitamin-minerals [facility]) tablet Take 1 tablet by mouth daily. Yes Historical Provider, MD   aspirin 81 MG EC tablet Take 81 mg by mouth daily. Yes Historical Provider, MD   ketoconazole (NIZORAL) 2 % cream APPLY TO AFFECTED AREA(S) OF FACE (AROUND NOSE) TWO TIMES A DAY 1/30/20   Kaitlin Aguirre MD   glucose blood VI test strips (ASCENSIA AUTODISC VI;ONE TOUCH ULTRA TEST VI) strip As needed. Patient not taking: Reported on 1/11/2021 10/18/11   Daria Ramirez MD   Lancets MISC  10/18/11   Yudith Holland MD        Allergies:  Patient has no known allergies. Review of Systems:   · Constitutional: there has been no unanticipated weight loss. There's been no change in energy level, sleep pattern, or activity level. · Eyes: No visual changes or diplopia. No scleral icterus. · ENT: No Headaches, hearing loss or vertigo. No mouth sores or sore throat. · Cardiovascular: Reviewed in HPI  · Respiratory: No cough or wheezing, no sputum production. No hematemesis. · Gastrointestinal: No abdominal pain, appetite loss, blood in stools. No change in bowel or bladder habits. · Genitourinary: No dysuria, trouble voiding, or hematuria. · Musculoskeletal:  No gait disturbance, weakness or joint complaints. · Integumentary: No rash or pruritis. · Neurological: No headache, diplopia, change in muscle strength, numbness or tingling. No change in gait, balance, coordination, mood, affect, memory, mentation, behavior. · Psychiatric: No anxiety, no depression. · Endocrine: No malaise, fatigue or temperature intolerance. No excessive thirst, fluid intake, or urination. No tremor. · Hematologic/Lymphatic: No abnormal bruising or bleeding, blood clots or swollen lymph nodes. · Allergic/Immunologic: No nasal congestion or hives. Physical Examination:    Vitals:    01/18/21 0840   BP: (!) 110/58   Pulse: 71   SpO2: 97%        Constitutional and General Appearance: NAD   Respiratory:  · Normal excursion and expansion without use of accessory muscles  · Resp Auscultation: Normal breath sounds without dullness  Cardiovascular:  · The apical impulses not displaced  · Heart tones are crisp and normal  · Cervical veins are not engorged  · The carotid upstroke is normal in amplitude and contour without delay or bruit  · Normal S1S2, No S3, No Murmur  · Peripheral pulses are symmetrical and full  · There is no clubbing, cyanosis of the extremities. Trace BLE edema  · Femoral Arteries: 2+ and equal  · Pedal Pulses: 2+ and equal   Abdomen:  · No masses or tenderness  · Liver/Spleen: No Abnormalities Noted  Neurological/Psychiatric:  · Alert and oriented in all spheres  · Moves all extremities well  · Exhibits normal gait balance and coordination  · No abnormalities of mood, affect, memory, mentation, or behavior are noted  Skin:  · Skin: warm and dry. Stress test 6/2018  Summary Normal LV size and systolic function. Left ventricular ejection fraction of  63%. Normal wall motion. There is normal isotope uptake at stress and rest.  There is no evidence of myocardial ischemia or scar. Stress Protocols     Resting ECG  Normal sinus rhythm. Early repolarization. Echo 6/2018 Summary   Technically difficult examination. Normal systolic function with an estimated ejection fraction of 55-60%. Mild concentric left ventricular hypertrophy. No regional wall motion abnormalities are seen. Grade I diastolic dysfunction with normal filing pressure.       Assessment: 1. Shortness of breath: See #2  Below. ? Anginal equivalent. Baseline symptoms for him. No evidence for volume overload on exam.     2. Other chest pain: Unspecified pain in older male with CAD risk factors including age/gender, DM, HTN, HLD, and prior tobacco abuse. Most recent lexiscan myoview stress test 6/1/18  showed no evidence of myocardial ischemia or scar. Most recent ECHO from 6/2018 showed an EF of 55-60%. He merits non-invasive cardiac evaluation to reassess myocardial perfusion to see if ischemia may be causing symptoms. Plan:  1. EKG today shows NSR 62bpm (no change from 5/18 except HR decreased). 2.  Lexiscan Myoview Stress test to risk stratify    We will call you with the results of the test   3. Follow up dependent upon test results. If no issues with perfusion or LV function on nuc stress study I will see him back only PRN. Thank you for allowing me to participate in the care of this individual.  Cost of prescription medications and patient compliance have been reviewed with patient. All questions answered. This note was scribed in the presence of Dr.Meyers WOLFE by Sherrill Mane RN.     I, Dr. Callum Wan, personally performed the services described in this documentation, as scribed by the above signed scribe in my presence. It is both accurate and complete to my knowledge. I agree with the details independently gathered by the clinical support staff, while the remaining scribed note accurately describes my personal service to the patient. Real Velazquez.  Lazaro Maynard M.D., [facility] [place]

## 2021-01-18 NOTE — PATIENT INSTRUCTIONS
Plan:  1. EKG today   2. Lexiscan Myoview Stress test to risk stratify    We will call you with the results of the test   3. Follow up dependent upon test results     Your provider has ordered testing for further evaluation. An order/prescription has been included in your paper work. ? To schedule outpatient testing, contact Central Scheduling by calling [address] [place] [address] ([phone]).

## 2021-01-22 ENCOUNTER — HOSPITAL ENCOUNTER (OUTPATIENT)
Dept: CARDIOLOGY | Age: 81
Discharge: HOME | End: 2021-01-22
Payer: MEDICARE

## 2021-01-22 DIAGNOSIS — R07.89 OTHER CHEST PAIN: ICD-10-CM

## 2021-01-22 DIAGNOSIS — R06.02 SHORTNESS OF BREATH: ICD-10-CM

## 2021-01-22 LAB
LV EF: 60 %
LVEF MODALITY: NORMAL

## 2021-01-22 PROCEDURE — 93017 CV STRESS TEST TRACING ONLY: CPT

## 2021-01-22 PROCEDURE — 6360000002 HC RX W HCPCS: Performed by: INTERNAL MEDICINE

## 2021-01-22 PROCEDURE — 3430000000 HC RX DIAGNOSTIC RADIOPHARMACEUTICAL: Performed by: INTERNAL MEDICINE

## 2021-01-22 PROCEDURE — A9502 TC99M TETROFOSMIN: HCPCS | Performed by: INTERNAL MEDICINE

## 2021-01-22 PROCEDURE — 78452 HT MUSCLE IMAGE SPECT MULT: CPT

## 2021-01-22 RX ADMIN — TETROFOSMIN 11.2 MILLICURIE: 1.38 INJECTION, POWDER, LYOPHILIZED, FOR SOLUTION INTRAVENOUS at 07:55

## 2021-01-22 RX ADMIN — REGADENOSON 0.4 MG: 0.08 INJECTION, SOLUTION INTRAVENOUS at 09:10

## 2021-01-22 RX ADMIN — TETROFOSMIN 34.1 MILLICURIE: 1.38 INJECTION, POWDER, LYOPHILIZED, FOR SOLUTION INTRAVENOUS at 09:10

## 2021-01-25 ENCOUNTER — TELEPHONE (OUTPATIENT)
Dept: CARDIOLOGY CLINIC | Age: 81
End: 2021-01-25

## 2021-01-25 DIAGNOSIS — I20.0 UNSTABLE ANGINA (HCC): ICD-10-CM

## 2021-01-25 DIAGNOSIS — R07.89 OTHER CHEST PAIN: Primary | ICD-10-CM

## 2021-01-25 DIAGNOSIS — I10 ESSENTIAL HYPERTENSION, BENIGN: ICD-10-CM

## 2021-01-25 DIAGNOSIS — R94.39 ABNORMAL NUCLEAR STRESS TEST: ICD-10-CM

## 2021-01-25 RX ORDER — METOPROLOL SUCCINATE 50 MG/1
TABLET, EXTENDED RELEASE ORAL
Qty: 90 TABLET | Refills: 1 | Status: SHIPPED | OUTPATIENT
Start: 2021-01-25 | End: 2021-07-27

## 2021-01-25 NOTE — TELEPHONE ENCOUNTER
Spoke with pt procedure and meds reviewed advised to hold Metformin for 48 hrs prior to cath and 48hrs post procedure.  Pt V/U of all things discussed as well as Deysi's instructions Boo Gallegos RN

## 2021-01-25 NOTE — TELEPHONE ENCOUNTER
----- Message from Fern Contreras RN sent at 1/25/2021  9:58 AM EST -----  Deysi Dr Johnnie Fiore would like you to schedule the cath with Dr Pete Mark. Patient request thanks.   I will place cath order in epic

## 2021-01-29 ENCOUNTER — OFFICE VISIT (OUTPATIENT)
Dept: PRIMARY CARE CLINIC | Age: 81
End: 2021-01-29
Payer: MEDICARE

## 2021-01-29 DIAGNOSIS — Z01.818 PREOP TESTING: Primary | ICD-10-CM

## 2021-01-29 PROCEDURE — 99211 OFF/OP EST MAY X REQ PHY/QHP: CPT | Performed by: NURSE PRACTITIONER

## 2021-01-29 NOTE — PROGRESS NOTES
Jorge A Mendez received a viral test for COVID-19. They were educated on isolation and quarantine as appropriate. For any symptoms, they were directed to seek care from their PCP, given contact information to establish with a doctor, directed to an urgent care or the emergency room.

## 2021-01-29 NOTE — PATIENT INSTRUCTIONS

## 2021-01-30 LAB — SARS-COV-2, NAA: NOT DETECTED

## 2021-02-01 ENCOUNTER — IMMUNIZATION (OUTPATIENT)
Dept: PRIMARY CARE CLINIC | Age: 81
End: 2021-02-01
Payer: MEDICARE

## 2021-02-01 PROCEDURE — 0011A COVID-19, MODERNA VACCINE 100MCG/0.5ML DOSE: CPT | Performed by: FAMILY MEDICINE

## 2021-02-01 PROCEDURE — 91301 COVID-19, MODERNA VACCINE 100MCG/0.5ML DOSE: CPT | Performed by: FAMILY MEDICINE

## 2021-02-04 ENCOUNTER — HOSPITAL ENCOUNTER (OUTPATIENT)
Dept: CARDIAC CATH/INVASIVE PROCEDURES | Age: 81
Discharge: HOME | End: 2021-02-04
Attending: INTERNAL MEDICINE | Admitting: INTERNAL MEDICINE
Payer: MEDICARE

## 2021-02-04 VITALS — WEIGHT: 239.5 LBS | HEIGHT: 68 IN | BODY MASS INDEX: 36.3 KG/M2

## 2021-02-04 PROBLEM — R94.39 ABNORMAL CARDIOVASCULAR STRESS TEST: Status: ACTIVE | Noted: 2021-02-04

## 2021-02-04 LAB
A/G RATIO: 1.4 (ref 1.1–2.2)
ALBUMIN SERPL-MCNC: 4.1 G/DL (ref 3.4–5)
ALP BLD-CCNC: 73 U/L (ref 40–129)
ALT SERPL-CCNC: 15 U/L (ref 10–40)
ANION GAP SERPL CALCULATED.3IONS-SCNC: 12 MMOL/L (ref 3–16)
AST SERPL-CCNC: 16 U/L (ref 15–37)
BILIRUB SERPL-MCNC: 0.5 MG/DL (ref 0–1)
BUN BLDV-MCNC: 15 MG/DL (ref 7–20)
CALCIUM SERPL-MCNC: 9.3 MG/DL (ref 8.3–10.6)
CHLORIDE BLD-SCNC: 101 MMOL/L (ref 99–110)
CHOLESTEROL, TOTAL: 151 MG/DL (ref 0–199)
CO2: 26 MMOL/L (ref 21–32)
CREAT SERPL-MCNC: 1.1 MG/DL (ref 0.8–1.3)
EKG ATRIAL RATE: 61 BPM
EKG DIAGNOSIS: NORMAL
EKG P AXIS: 31 DEGREES
EKG P-R INTERVAL: 192 MS
EKG Q-T INTERVAL: 408 MS
EKG QRS DURATION: 100 MS
EKG QTC CALCULATION (BAZETT): 410 MS
EKG R AXIS: 32 DEGREES
EKG T AXIS: 38 DEGREES
EKG VENTRICULAR RATE: 61 BPM
GFR AFRICAN AMERICAN: >60
GFR NON-AFRICAN AMERICAN: >60
GLOBULIN: 2.9 G/DL
GLUCOSE BLD-MCNC: 135 MG/DL (ref 70–99)
HCT VFR BLD CALC: 40.6 % (ref 40.5–52.5)
HDLC SERPL-MCNC: 44 MG/DL (ref 40–60)
HEMOGLOBIN: 13.5 G/DL (ref 13.5–17.5)
INR BLD: 1 (ref 0.86–1.14)
LDL CHOLESTEROL CALCULATED: 84 MG/DL
LV EF: 58 %
LVEF MODALITY: NORMAL
MCH RBC QN AUTO: 28.7 PG (ref 26–34)
MCHC RBC AUTO-ENTMCNC: 33.2 G/DL (ref 31–36)
MCV RBC AUTO: 86.4 FL (ref 80–100)
PDW BLD-RTO: 15 % (ref 12.4–15.4)
PLATELET # BLD: 359 K/UL (ref 135–450)
PMV BLD AUTO: 7.1 FL (ref 5–10.5)
POTASSIUM SERPL-SCNC: 4.2 MMOL/L (ref 3.5–5.1)
PROTHROMBIN TIME: 11.6 SEC (ref 10–13.2)
RBC # BLD: 4.7 M/UL (ref 4.2–5.9)
SODIUM BLD-SCNC: 139 MMOL/L (ref 136–145)
TOTAL PROTEIN: 7 G/DL (ref 6.4–8.2)
TRIGL SERPL-MCNC: 115 MG/DL (ref 0–150)
VLDLC SERPL CALC-MCNC: 23 MG/DL
WBC # BLD: 8.9 K/UL (ref 4–11)

## 2021-02-04 PROCEDURE — 2500000003 HC RX 250 WO HCPCS

## 2021-02-04 PROCEDURE — C1769 GUIDE WIRE: HCPCS

## 2021-02-04 PROCEDURE — 80061 LIPID PANEL: CPT

## 2021-02-04 PROCEDURE — 99152 MOD SED SAME PHYS/QHP 5/>YRS: CPT

## 2021-02-04 PROCEDURE — 93458 L HRT ARTERY/VENTRICLE ANGIO: CPT | Performed by: INTERNAL MEDICINE

## 2021-02-04 PROCEDURE — 93010 ELECTROCARDIOGRAM REPORT: CPT | Performed by: INTERNAL MEDICINE

## 2021-02-04 PROCEDURE — 99153 MOD SED SAME PHYS/QHP EA: CPT

## 2021-02-04 PROCEDURE — 85610 PROTHROMBIN TIME: CPT

## 2021-02-04 PROCEDURE — 2709999900 HC NON-CHARGEABLE SUPPLY

## 2021-02-04 PROCEDURE — 93458 L HRT ARTERY/VENTRICLE ANGIO: CPT

## 2021-02-04 PROCEDURE — 6360000002 HC RX W HCPCS

## 2021-02-04 PROCEDURE — 93005 ELECTROCARDIOGRAM TRACING: CPT | Performed by: INTERNAL MEDICINE

## 2021-02-04 PROCEDURE — 80053 COMPREHEN METABOLIC PANEL: CPT

## 2021-02-04 PROCEDURE — C1887 CATHETER, GUIDING: HCPCS

## 2021-02-04 PROCEDURE — 6370000000 HC RX 637 (ALT 250 FOR IP)

## 2021-02-04 PROCEDURE — C1894 INTRO/SHEATH, NON-LASER: HCPCS

## 2021-02-04 PROCEDURE — 85027 COMPLETE CBC AUTOMATED: CPT

## 2021-02-04 RX ORDER — MIDAZOLAM HYDROCHLORIDE 5 MG/ML
INJECTION INTRAMUSCULAR; INTRAVENOUS
Status: COMPLETED | OUTPATIENT
Start: 2021-02-04 | End: 2021-02-04

## 2021-02-04 RX ORDER — FENTANYL CITRATE 50 UG/ML
INJECTION, SOLUTION INTRAMUSCULAR; INTRAVENOUS
Status: COMPLETED | OUTPATIENT
Start: 2021-02-04 | End: 2021-02-04

## 2021-02-04 RX ORDER — ASPIRIN 81 MG/1
324 TABLET, CHEWABLE ORAL ONCE
Status: COMPLETED | OUTPATIENT
Start: 2021-02-04 | End: 2021-02-04

## 2021-02-04 RX ORDER — HYDRALAZINE HYDROCHLORIDE 20 MG/ML
10 INJECTION INTRAMUSCULAR; INTRAVENOUS EVERY 10 MIN PRN
Status: CANCELLED | OUTPATIENT
Start: 2021-02-04

## 2021-02-04 RX ORDER — SODIUM CHLORIDE 9 MG/ML
INJECTION, SOLUTION INTRAVENOUS ONCE
Status: DISCONTINUED | OUTPATIENT
Start: 2021-02-04 | End: 2021-02-04 | Stop reason: HOSPADM

## 2021-02-04 RX ORDER — SODIUM CHLORIDE 9 MG/ML
INJECTION, SOLUTION INTRAVENOUS CONTINUOUS
Status: CANCELLED | OUTPATIENT
Start: 2021-02-04 | End: 2021-02-04

## 2021-02-04 RX ADMIN — MIDAZOLAM HYDROCHLORIDE 1 MG: 5 INJECTION INTRAMUSCULAR; INTRAVENOUS at 08:39

## 2021-02-04 RX ADMIN — FENTANYL CITRATE 25 MCG: 50 INJECTION, SOLUTION INTRAMUSCULAR; INTRAVENOUS at 08:39

## 2021-02-04 RX ADMIN — FENTANYL CITRATE 50 MCG: 50 INJECTION, SOLUTION INTRAMUSCULAR; INTRAVENOUS at 08:28

## 2021-02-04 RX ADMIN — ASPIRIN 324 MG: 81 TABLET, CHEWABLE ORAL at 07:36

## 2021-02-04 RX ADMIN — MIDAZOLAM HYDROCHLORIDE 1 MG: 5 INJECTION INTRAMUSCULAR; INTRAVENOUS at 08:31

## 2021-02-04 RX ADMIN — FENTANYL CITRATE 25 MCG: 50 INJECTION, SOLUTION INTRAMUSCULAR; INTRAVENOUS at 08:31

## 2021-02-04 RX ADMIN — MIDAZOLAM HYDROCHLORIDE 2 MG: 5 INJECTION INTRAMUSCULAR; INTRAVENOUS at 08:28

## 2021-02-04 NOTE — PRE SEDATION
Brief Pre-Op Note/Sedation Assessment      Yohannes Pereira  1940  Cath Pool Rm/NONE      [id number]  8:24 AM    Planned Procedure: Cardiac Catheterization Procedure    Post Procedure Plan: Return to same level of care    Consent: I have discussed with the patient and/or the patient representative the indication, alternatives, and the possible risks and/or complications of the planned procedure and the anesthesia methods. The patient and/or patient representative appear to understand and agree to proceed. Chief Complaint: Chest Pain/Pressure      Indications for Cath Procedure:  Suspected CAD  Anginal Classification within 2 weeks:  CCS III - Symptoms with everyday living activities, i.e., moderate limitation  NYHA Heart Failure Class within 2 weeks: Class III - Symptoms of HF on less-than-ordinary exertion, Newly Diagnosed?  No, Heart Failure Type: Diastolic  Is Cath Lab Visit Valve-related?: No  Surgical Risk: Intermediate  Functional Type: < 4 METS    Anti- Anginal Meds within 2 weeks:   Yes: Aspirin    Stress or Imaging Studies Performed (within 6 months):  Stress Test with SPECT Result: Positive:  anterior and apical Risk/Extent of Ischemia:  Intermediate     Vital Signs:  Ht 5' 8\" (1.727 m)   Wt 239 lb 8 oz (108.6 kg)   BMI 36.42 kg/m²     Allergies:  No Known Allergies    Past Medical History:  Past Medical History:   Diagnosis Date    COPD (chronic obstructive pulmonary disease) (HCC)     Decreased hearing of both ears     Diabetes mellitus (HCC)     GERD (gastroesophageal reflux disease)     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    Hyperlipidemia     Hypertension     Primary malignant neoplasm of skin of trunk 4/21/2009         Surgical History:  Past Surgical History:   Procedure Laterality Date    COLONOSCOPY  5/22/12    polyps    COLONOSCOPY  7/21/15    polyps    CYSTOSCOPY N/A 10/24/2019    CYSTOSCOPY AND CLOT EVACUATION performed by Manju Puckett MD at [place] [address]  INGUINAL HERNIA REPAIR Bilateral 6/22/15    laparoscopic    LUNG REMOVAL, PARTIAL  2007    middle lobe R lung/histoplasmosis    TURP  2002    UPPER GASTROINTESTINAL ENDOSCOPY  1/26/2015    UPPER GASTROINTESTINAL ENDOSCOPY  2/15    dilated    UPPER GASTROINTESTINAL ENDOSCOPY  07/21/2017    dilatation, bx    UPPER GASTROINTESTINAL ENDOSCOPY  08/23/2017    dilated; Bx gastric.  VASECTOMY           Medications:  Current Facility-Administered Medications   Medication Dose Route Frequency Provider Last Rate Last Admin    0.9 % sodium chloride infusion   Intravenous Once Louis Mcneil MD               Pre-Sedation:    Pre-Sedation Documentation and Exam:  I have personally completed a history, physical exam & review of systems for this patient (see notes). Prior History of Anesthesia Complications:   none    Modified Mallampati:  II (soft palate, uvula, fauces visible)    ASA Classification:  Class 3 - A patient with severe systemic disease that limits activity but is not incapacitating      Orestes Scale: Activity:  2 - Able to move 4 extremities voluntarily on command  Respiration:  2 - Able to breathe deeply and cough freely  Circulation:  2 - BP+/- 20mmHg of normal  Consciousness:  2 - Fully awake  Oxygen Saturation (color):  2 - Able to maintain oxygen saturation >92% on room air    Sedation/Anesthesia Plan:  Guard the patient's safety and welfare. Minimize physical discomfort and pain. Minimize negative psychological responses to treatment by providing sedation and analgesia and maximize the potential amnesia. Patient to meet pre-procedure discharge plan.     Medication Planned:  midazolam intravenously and fentanyl intravenously    Patient is an appropriate candidate for plan of sedation: yes      Electronically signed by Pardeep Goncalves MD on 2/4/2021 at 8:24 AM

## 2021-02-04 NOTE — H&P
H&P Update    PATIENT: Terrence Cifuentes  DATE: 2021  MRN: [id number]  CSN: [id number]  : 1940    I have reviewed the history and physical and examined the patient and find no relevant changes. I have reviewed with the patient and/or family the risks, benefits, and alternatives to the procedure. History of present illness:    79 y.o. patient who presents to the hospital for invasive cardiac testing. The patient underwent clinical evaluation, and it was determined that the patient needed to undergo cardiac catheterization for further diagnostic evaluation. Pre-sedation Assessment    Patient:  Terrence Cifuentes   :   1940  Intended Procedures may include:   1. Left heart Catheterization with possible angioplasty/stent with associated supportive testing. 2. Right heart catheterization   3. Peripheral angiogram    [place] nurses notes reviewed and agreed.   Medications reviewed  Allergies: No Known Allergies    Primary Care Doctor: Kavon Payton MD    Patient Active Problem List   Diagnosis    GERD (gastroesophageal reflux disease)    Controlled type 2 diabetes mellitus without complication, without long-term current use of insulin (HCC)    Intractable migraine with aura    Chronic tension headache    Essential hypertension    Brain benign neoplasm ([address]    Empyema of pleura ([address]    Mixed hyperlipidemia    Primary malignant neoplasm of skin of trunk    Dermatophytosis of nail    Basal cell carcinoma of left side of nose    Non morbid obesity due to excess calories    Shortness of breath    Suspected sleep apnea    Diastolic dysfunction    History of esophageal stricture    H/O histoplasmosis    Obesity (BMI 35.0-39.9 without comorbidity)    Pleural effusion    Simple chronic bronchitis (HCC)    Abdominal aortic aneurysm (AAA) without rupture (HCC)    Hematuria    Acute urinary retention    Other chest pain    History of tobacco abuse  Abnormal cardiovascular stress test         Cardiac Testing: I have reviewed the findings below. EKG:  ECHO:   STRESS TEST:  CATH:  BYPASS:  VASCULAR:    Past Medical History:   has a past medical history of COPD (chronic obstructive pulmonary disease) (HCC), Decreased hearing of both ears, Diabetes mellitus ([address] GERD (gastroesophageal reflux disease), Histoplasmosis, Hyperlipidemia, Hypertension, and Primary malignant neoplasm of skin of trunk. Surgical History:   has a past surgical history that includes Lung removal, partial (2007); Vasectomy; TURP (2002); Upper gastrointestinal endoscopy (1/26/2015); Upper gastrointestinal endoscopy (2/15); Inguinal hernia repair (Bilateral, 6/22/15); Colonoscopy (5/22/12); Colonoscopy (7/21/15); Upper gastrointestinal endoscopy (07/21/2017); Upper gastrointestinal endoscopy (08/23/2017); and Cystoscopy (N/A, 10/24/2019). Social History:   reports that he quit smoking about 18 years ago. He has a 30.00 pack-year smoking history. He has never used smokeless tobacco. He reports current alcohol use. He reports that he does not use drugs. Family History:  No evidence for sudden cardiac death or premature CAD    Home Medications:  Reviewed and are listed in nursing record. and/or listed below  Current Facility-Administered Medications   Medication Dose Route Frequency Provider Last Rate Last Admin    0.9 % sodium chloride infusion   Intravenous Once [address], MD            Allergies:  Patient has no known allergies. Review of Systems:   All 14 point review of symptoms completed. Pertinent positives identified in the HPI, all other review of symptoms negative as below. Physical Examination:    There were no vitals filed for this visit. Weight: 239 lb 8 oz (108.6 kg)     Wt Readings from Last 3 Encounters:   02/04/21 239 lb 8 oz (108.6 kg)   01/18/21 239 lb (108.4 kg)   01/11/21 234 lb 3.2 oz (106.2 kg)     No intake/output data recorded. No intake/output data recorded.     General Appearance:  Alert, cooperative, no distress, appears stated age   Head:  Normocephalic, without obvious abnormality, atraumatic   Eyes:  PERRL, conjunctiva/corneas clear       Nose: Nares normal, no drainage or sinus tenderness   Throat: Lips, mucosa, and tongue normal   Neck: Supple, symmetrical, trachea midline, no adenopathy, thyroid: not enlarged, symmetric, no tenderness/mass/nodules, no carotid bruit or JVD       Lungs:   Clear to auscultation bilaterally, respirations unlabored   Chest Wall:  No tenderness or deformity   Heart:  Regular rhythm and normal rate; S1, S2 are normal; no murmur noted; no rub or gallop   Abdomen:   Soft, non-tender, bowel sounds active all four quadrants,  no masses, no organomegaly           Extremities: Extremities normal, atraumatic, no cyanosis or edema   Pulses: 2+ and symmetric   Skin: Skin color, texture, turgor normal, no rashes or lesions   Pysch: Normal mood and affect   Neurologic: Normal gross motor and sensory exam.         Labs  CBC:   Lab Results   Component Value Date    WBC 8.9 02/04/2021    RBC 4.70 02/04/2021    HGB 13.5 02/04/2021    HCT 40.6 02/04/2021    MCV 86.4 02/04/2021    RDW 15.0 02/04/2021     02/04/2021     CMP:    Lab Results   Component Value Date     02/04/2021    K 4.2 02/04/2021    K 4.2 10/25/2019     02/04/2021    CO2 26 02/04/2021    BUN 15 02/04/2021    CREATININE 1.1 02/04/2021    GFRAA >60 02/04/2021    GFRAA >60 03/12/2013    AGRATIO 1.4 02/04/2021    LABGLOM >60 02/04/2021    GLUCOSE 135 02/04/2021    PROT 7.0 02/04/2021    PROT 6.6 09/10/2012    CALCIUM 9.3 02/04/2021    BILITOT 0.5 02/04/2021    ALKPHOS 73 02/04/2021    AST 16 02/04/2021    ALT 15 02/04/2021     PT/INR:  No results found for: PTINR  No results found for: CKTOTAL, CKMB, CKMBINDEX, TROPONINI  No components found for: CHLPL  Lab Results   Component Value Date    TRIG 151 (H) 01/11/2021 TRIG 157 (H) 08/06/2020    TRIG 131 11/11/2019     Lab Results   Component Value Date    HDL 45 01/11/2021    HDL 41 08/06/2020    HDL 43 11/11/2019     Lab Results   Component Value Date    LDLCALC 94 01/11/2021    LDLCALC 96 08/06/2020    LDLCALC 78 11/11/2019     Lab Results   Component Value Date    LABVLDL 30 01/11/2021    LABVLDL 31 08/06/2020    LABVLDL 26 11/11/2019     Lab Results   Component Value Date    ALT 15 02/04/2021    AST 16 02/04/2021    ALKPHOS 73 02/04/2021    BILITOT 0.5 02/04/2021       Assessment:  79 y.o. patient with:  1. Pre-procedure assessment for cardiac procedure. Principal Problem:    Abnormal cardiovascular stress test  Active Problems:    Essential hypertension  Resolved Problems:    * No resolved hospital problems. *      Plan:  1. Proceed with planned procedure for further assessment. ~I had the opportunity to review the Laurie Cabral clinical presentation and the available pertinent data. With the patient's clinical risk factors and symptoms, there is a high pretest likelihood for CAD. I have asked Laurie Cabral to undergo cardiac angiography for further diagnostic testing. The procedure was explained in depth. All questions and alternate treatment strategies were discussed. The patient agrees to proceed. They understand all the risks associated with the procedure, including myocardial infarction, stroke, death, vascular complications, and the possible need for emergent surgery. All questions and concerns were addressed to the patient/family. Alternatives to my treatment were discussed. The note was completed using EMR. Every effort was made to ensure accuracy; however, inadvertent computerized transcription errors may be present.     Maris Daniels MD, [address], [facility]  [phone] [place] office  [phone] Main central  2/4/2021  8:23 AM

## 2021-02-04 NOTE — POST SEDATION
Patient:  Abdirahman Butts   :   1940    A pre-sedation re-evaluation was performed immediately at the end of the procedure. Procedure:  Cardiac cath  Medications: Procedural sedation with minimal conscious sedation  Complications: None  Estimated Blood Loss: none  Specimens: Were not obtained        [place] Medication and Procedural Reconciliation:  I agree that the documented medications and procedures performed are true. The medications were given under my order. The procedures were performed under my direct supervision.

## 2021-02-04 NOTE — PROCEDURES
[address]       Cardiac Catheterization Lab Report    PATIENT: Astrid Cobos  DATE: 2021  MRN: [id number]  CSN: [id number]  : 1940      Performing Physician: Berkley Booth MD, WILL[place], [facility]  Primary Care Physician: Norma Hudson MD  Admitting/Referring provider: Richard West MD     Procedures Performed:   1. Left heart catheterization  2. Selective left and right coronary angiogram  3. Left ventriculography     Procedure Findings:  1. Moderate coronary artery disease. 2. Normal left ventricular function with EF estimated at 55-60%  3. Normal left heart hemodynamics    Indications:   Patient Active Problem List   Diagnosis    GERD (gastroesophageal reflux disease)    Controlled type 2 diabetes mellitus without complication, without long-term current use of insulin (HCC)    Intractable migraine with aura    Chronic tension headache    Essential hypertension    Brain benign neoplasm ([address]    Empyema of pleura ([address]    Mixed hyperlipidemia    Primary malignant neoplasm of skin of trunk    Dermatophytosis of nail    Basal cell carcinoma of left side of nose    Non morbid obesity due to excess calories    Shortness of breath    Suspected sleep apnea    Diastolic dysfunction    History of esophageal stricture    H/O histoplasmosis    Obesity (BMI 35.0-39.9 without comorbidity)    Pleural effusion    Simple chronic bronchitis (HCC)    Abdominal aortic aneurysm (AAA) without rupture (HCC)    Hematuria    Acute urinary retention    Other chest pain    History of tobacco abuse    Abnormal cardiovascular stress test       Details:   Astrid Cobos was brought to the cardiac catheterization lab in a fasting state after informed consent was obtained. If the patient was able to provide written consent, it was obtained. The patient's vitals were monitored through out the procedure. The patient was sedated using the appropriate levels of sedation and ASA guidelines. The appropriate access site area was prepped and drapped in a sterile fashion. The area was anesthetized with 2% lidocaine. Using the modified Seldinger technique, an arterial sheath was introduced into the arterial access site using a single anterior wall puncture. The sheath was flushed and prepped in usual fashion. Catheters used during the procedure included 5 [place] TIG 4.0 catheter. The catheters were advanced and removed over a .035\" wire, into the appropriate positions. Multiple angiographic views were obtained. An LV gram was obtained. Findings:    1. Left main coronary artery was normal. It gave off the left anterior descending artery and left circumflex. 2. Left anterior descending artery has mild to moderate atherosclerotic disease. It was moderate in size. It gave off septal perforators and a moderate sized diagonal branch. The LAD covered the entire apex of the left ventricle. 3. Left circumflex has mild to moderate atherosclerotic disease. It was moderate in size. There was a moderate sized obtuse marginal branch. 4. Right coronary artery has mild to moderate atherosclerotic disease. It was moderate in size and was the dominant artery. 5. Left ventriculogram showed normal LVEF at 55-60%. Wall motion was normal . There was no significant mitral valve or aortic valve disease noted. LVEDP was normal. There was no gradient noted across the aortic valve during pullback of the catheter. Ht 5' 8\" (1.727 m)   Wt 239 lb 8 oz (108.6 kg)   BMI 36.42 kg/m²     The access site was controlled with manual pressure and/or appropriate closure device.     Moderate Conscious Sedation Details: An independent trained observer pushed medications at my direction. We monitoring the patient's level of consciousness and vital signs/physiologic status throughout the procedure. CPT codes 77264 and 80316    Start time: 0815  Stop time: 5371  ASA class: 3    Sedation totals:  Versad - 4mg  Fentanyl - 100mcg    EBL - minimal <5 cc blood loss    The patient was monitored continuously with the ECG, pulse oximetry, blood pressure, and direct observation. CONCLUSIONS:    1. Mild to moderate coronary artery disease with preserved LVEF    ASSESSMENT/RECOMMENDATIONS:    1. Risk Factor control  2.  Consider anti-anginal therapy with long acting nitrates      Franco Ruano MD, [place], [place] [address] Cardiology  [facility]  [phone] Main central office  [phone] [facility] office  2/4/2021  9:09 AM

## 2021-02-10 PROBLEM — I25.10 MILD CAD: Status: ACTIVE | Noted: 2021-02-10

## 2021-02-10 NOTE — PROGRESS NOTES
triamcinolone (KENALOG) 0.1 % ointment APPLY TO AFFECTED AREA(S) SPARINGLY TWO TIMES A DAY UNTIL CLEAR. DO NOT USE FOR MORE THAN TWO WEEKS STRAIGHT 5/26/20  Yes Jordana Fortune MD   Handicap Placard MISC by Does not apply route 4/12/19  Yes MOOSE Del Rosario   therapeutic multivitamin-minerals [facility]) tablet Take 1 tablet by mouth daily. Yes Historical Provider, MD   aspirin 81 MG EC tablet Take 81 mg by mouth daily. Yes Historical Provider, MD   ketoconazole (NIZORAL) 2 % cream APPLY TO AFFECTED AREA(S) OF FACE (AROUND NOSE) TWO TIMES A DAY 1/30/20   Jordana Fortune MD   glucose blood VI test strips (ASCENSIA AUTODISC VI;ONE TOUCH ULTRA TEST VI) strip As needed. Patient not taking: Reported on 1/11/2021 10/18/11   Keara Partida MD   Lancets MISC  10/18/11   Sherin Phoenix Tegtmeier, MD        Allergies:  Patient has no known allergies. Review of Systems:   · Constitutional: there has been no unanticipated weight loss. There's been no change in energy level, sleep pattern, or activity level. · Eyes: No visual changes or diplopia. No scleral icterus. · ENT: No Headaches, hearing loss or vertigo. No mouth sores or sore throat. · Cardiovascular: Reviewed in HPI  · Respiratory: No cough or wheezing, no sputum production. No hematemesis. · Gastrointestinal: No abdominal pain, appetite loss, blood in stools. No change in bowel or bladder habits. · Genitourinary: No dysuria, trouble voiding, or hematuria. · Musculoskeletal:  No gait disturbance, weakness or joint complaints. · Integumentary: No rash or pruritis. · Neurological: No headache, diplopia, change in muscle strength, numbness or tingling. No change in gait, balance, coordination, mood, affect, memory, mentation, behavior. · Psychiatric: No anxiety, no depression. · Endocrine: No malaise, fatigue or temperature intolerance. No excessive thirst, fluid intake, or urination. No tremor.   · Hematologic/Lymphatic: No abnormal bruising or bleeding, blood clots or swollen lymph nodes. · Allergic/Immunologic: No nasal congestion or hives. Physical Examination:    Vitals:    02/15/21 0922   BP: 130/70   Pulse: 75   SpO2: 96%        Constitutional and General Appearance: NAD   Respiratory:  · Normal excursion and expansion without use of accessory muscles  · Resp Auscultation: Normal breath sounds without dullness  Cardiovascular:  · The apical impulses not displaced  · Heart tones are crisp and normal  · Cervical veins are not engorged  · The carotid upstroke is normal in amplitude and contour without delay or bruit  · Normal S1S2, No S3, No Murmur  · Peripheral pulses are symmetrical and full  · There is no clubbing, cyanosis of the extremities. Trace BLE edema  · Femoral Arteries: 2+ and equal  · Pedal Pulses: 2+ and equal   Abdomen:  · No masses or tenderness  · Liver/Spleen: No Abnormalities Noted  Neurological/Psychiatric:  · Alert and oriented in all spheres  · Moves all extremities well  · Exhibits normal gait balance and coordination  · No abnormalities of mood, affect, memory, mentation, or behavior are noted  Skin:  · Skin: warm and dry. Stress test 6/2018  Summary Normal LV size and systolic function. Left ventricular ejection fraction of  63%. Normal wall motion. There is normal isotope uptake at stress and rest.  There is no evidence of myocardial ischemia or scar. Stress Protocols     Resting ECG  Normal sinus rhythm. Early repolarization. Echo 6/2018 Summary   Technically difficult examination. Normal systolic function with an estimated ejection fraction of 55-60%. Mild concentric left ventricular hypertrophy. No regional wall motion abnormalities are seen. Grade I diastolic dysfunction with normal filing pressure.     Stress test: 01/22/21  Summary Normal LVEF >60% Grossly normal wall motion  Apical/septal ischemia   Overall, this would be considered an abnormal, intermediate risk, study     [facility] 2/04/21 Findings: 1. Left main coronary artery was normal. It gave off the left anterior descending artery and left circumflex. 2. Left anterior descending artery has mild to moderate atherosclerotic disease. It was moderate in size. It gave off septal perforators and a moderate sized diagonal branch. The LAD covered the entire apex of the left ventricle. 3. Left circumflex has mild to moderate atherosclerotic disease. It was moderate in size. There was a moderate sized obtuse marginal branch. 4. Right coronary artery has mild to moderate atherosclerotic disease. It was moderate in size and was the dominant artery. 5. Left ventriculogram showed normal LVEF at 55-60%. Wall motion was normal . There was no significant mitral valve or aortic valve disease noted. LVEDP was normal. There was no gradient noted across the aortic valve during pullback of the catheter. CONCLUSIONS:     1. Mild to moderate coronary artery disease with preserved LVEF     ASSESSMENT/RECOMMENDATIONS:     1. Risk Factor control  2. Consider anti-anginal therapy with long acting nitrates     Lab Results   Component Value Date    CHOL 151 02/04/2021    CHOL 169 01/11/2021    CHOL 168 08/06/2020     Lab Results   Component Value Date    TRIG 115 02/04/2021    TRIG 151 (H) 01/11/2021    TRIG 157 (H) 08/06/2020     Lab Results   Component Value Date    HDL 44 02/04/2021    HDL 45 01/11/2021    HDL 41 08/06/2020     Lab Results   Component Value Date    LDLCALC 84 02/04/2021    LDLCALC 94 01/11/2021    LDLCALC 96 08/06/2020     Lab Results   Component Value Date    LABVLDL 23 02/04/2021    LABVLDL 30 01/11/2021    LABVLDL 31 08/06/2020     No results found for: CHOLHDLRATIO    Assessment:     1. HTN: Well controlled and will continue current medical regimen of hyzaar, norvasc, and Toprol XL. 2. CAD:  Mild-moderate NOCAD. Most recent lexiscan myoview stress test from 01/22/21 which showed apical/septal ischemia.  Then underwent most recent cardiac cath from 02/04/21 with Dr. Cori Boateng showed mild to moderate CAD without need for PCI. Clinically, he feels CP improved but still present at times. I offered long-acting nitrate (imdur) but he declined not believing CP bad enough and wanting to avoid further meds. He is agreeable to SL NTG PRN. 3.      HLD: I personally reviewed most recent lipids from 2/4/21 in Epic and d/w Mr. Angella Bojorquez (see above). Well controlled and will continue current medical regimen. 4.       DM: per PCP    Plan:  1. Discussed adding imdur if chest pain recurs  2. Take nitro under the tongue as needed for chest pain. Be sure to sit down prior to taking. If pain still present go to the ER. 3. Follow up in 6 months    Thank you for allowing me to participate in the care of this individual.    Cost of prescription medications and patient compliance have been reviewed with patient. All questions answered. Scribe's attestation: This note was scribed in the presence of Dr Stacey Thomas by Suleiman Bailey RN     I, Dr. Stacey Thomas, personally performed the services described in this documentation, as scribed by the above signed scribe in my presence. It is both accurate and complete to my knowledge. I agree with the details independently gathered by the clinical support staff, while the remaining scribed note accurately describes my personal service to the patient. Harley Siddiqi.  Dwain Perez M.D., [facility] [place]

## 2021-02-15 ENCOUNTER — OFFICE VISIT (OUTPATIENT)
Dept: CARDIOLOGY CLINIC | Age: 81
End: 2021-02-15
Payer: MEDICARE

## 2021-02-15 VITALS
BODY MASS INDEX: 33.34 KG/M2 | DIASTOLIC BLOOD PRESSURE: 70 MMHG | SYSTOLIC BLOOD PRESSURE: 130 MMHG | HEART RATE: 75 BPM | OXYGEN SATURATION: 96 % | WEIGHT: 220 LBS | HEIGHT: 68 IN

## 2021-02-15 DIAGNOSIS — E78.2 MIXED HYPERLIPIDEMIA: ICD-10-CM

## 2021-02-15 DIAGNOSIS — I10 ESSENTIAL HYPERTENSION: ICD-10-CM

## 2021-02-15 DIAGNOSIS — I25.10 MILD CAD: ICD-10-CM

## 2021-02-15 DIAGNOSIS — Z87.891 HISTORY OF TOBACCO ABUSE: ICD-10-CM

## 2021-02-15 DIAGNOSIS — I71.40 ABDOMINAL AORTIC ANEURYSM (AAA) WITHOUT RUPTURE: Primary | ICD-10-CM

## 2021-02-15 PROCEDURE — 99214 OFFICE O/P EST MOD 30 MIN: CPT | Performed by: INTERNAL MEDICINE

## 2021-02-15 RX ORDER — NITROGLYCERIN 0.4 MG/1
0.4 TABLET SUBLINGUAL EVERY 5 MIN PRN
Qty: 25 TABLET | Refills: 3 | Status: SHIPPED | OUTPATIENT
Start: 2021-02-15 | End: 2021-05-11

## 2021-02-15 NOTE — LETTER
[facility], [place], operated by [facility]   Cardiac Consultation    Referring Provider:  Farrah Oliveira MD     Chief Complaint   Patient presents with    Follow-Up from Hospital    Dizziness     in the am when getting up    Fatigue    Shortness of Breath     on exertion and laying down        History of Present Illness:  Laurie Cabral is a 79 y.o. male who presents today for f/u recent [facility] on 2/4/21. He has PMH of moderate NOCAD (med mgt without PCI), HLD, HTN, GERD, COPD and DM. He was seen in 2018 by my partner Dr. Kari Stacy for chest pain. Most recent lexiscan myoview stress test 6/1/18  showed no evidence of myocardial ischemia or scar. Most recent ECHO from 6/2018 EF of 55-60%. On 01/18/21 he reports c/o left sided chest pain off/on x 1 year. He could not characterize the pain. Occurred with exertion pushing objects (ie. Pushing  door). Pain resolved stopped putting pushing objects. He also c/o SOB with stairs this is not new and is baseline for him. He reported occasional cough but nothing bothersome to him. I ordered most recent lexiscan myoview stress test from 01/22/21 which showed apical/septal ischemia. Then underwent most recent cardiac cath from 02/04/21 with Dr. Kari Stacy showed mild to moderate CAD without need for PCI. Most recent EKG from 02/04/21 showed SR HR 61 (no sig change from 5/18). Today he reports mild dizziness getting out of bed. He has mild SOB with laying down which resolves shortly after head elevation and he sleeps fine. He reports the CP has improved but still present. He does not want new medications and overall gets around fine. Patient with no complaints of palpitations, edema, or orthopnea/PND.        Past Medical History: has a past medical history of COPD (chronic obstructive pulmonary disease) ([facility] [address] Decreased hearing of both ears, Diabetes mellitus ([facility] [address] GERD (gastroesophageal reflux disease), Histoplasmosis, Hyperlipidemia, Hypertension, and Primary malignant neoplasm of skin of trunk. Surgical History:   has a past surgical history that includes Lung removal, partial (2007); Vasectomy; TURP (2002); Upper gastrointestinal endoscopy (1/26/2015); Upper gastrointestinal endoscopy (2/15); Inguinal hernia repair (Bilateral, 6/22/15); Colonoscopy (5/22/12); Colonoscopy (7/21/15); Upper gastrointestinal endoscopy (07/21/2017); Upper gastrointestinal endoscopy (08/23/2017); and Cystoscopy (N/A, 10/24/2019). Social History:   reports that he quit smoking about 18 years ago. He has a 30.00 pack-year smoking history. He has never used smokeless tobacco. He reports current alcohol use. He reports that he does not use drugs. Family History:  family history includes Cancer in his brother; Heart Disease in his mother. Home Medications:  Prior to Admission medications    Medication Sig Start Date End Date Taking?  Authorizing Provider   metoprolol succinate (TOPROL XL) 50 MG extended release tablet TAKE ONE TABLET BY MOUTH DAILY 1/25/21  Yes MT Delgado MD   amLODIPine (NORVASC) 5 MG tablet TAKE ONE TABLET BY MOUTH DAILY 1/6/21  Yes Shivam Oneal MD   losartan-hydroCHLOROthiazide (HYZAAR) 100-25 MG per tablet TAKE ONE TABLET BY MOUTH DAILY 12/1/20  Yes Benjamin Tijerina MD   metFORMIN (GLUCOPHAGE-XR) 500 MG extended release tablet TAKE TWO TABLETS BY MOUTH TWICE A DAY WITH MEALS 11/17/20  Yes Benjamin Tijerina MD   pantoprazole (PROTONIX) 40 MG tablet TAKE ONE TABLET BY MOUTH DAILY 11/17/20  Yes Benjamin Tijerina MD   ketoconazole (NIZORAL) 2 % shampoo [address] SCALP THREE TIMES A WEEK 10/15/20  Yes Juan Woods MD   atorvastatin (LIPITOR) 10 MG tablet Take 1 tablet by mouth daily 10/1/20  Yes Benjamin Tijerina MD PROAIR  (90 Base) MCG/ACT inhaler Inhale 2 puffs into the lungs every 6 hours as needed for Wheezing 8/28/20  Yes Billy Juan MD   fluticasone-umeclidin-vilant (TRELEGY ELLIPTA) 100-62.5-25 MCG/INH AEPB Inhale 1 puff into the lungs daily 8/12/20  Yes Billy Juan MD   triamcinolone (KENALOG) 0.1 % ointment APPLY TO AFFECTED AREA(S) SPARINGLY TWO TIMES A DAY UNTIL CLEAR. DO NOT USE FOR MORE THAN TWO WEEKS STRAIGHT 5/26/20  Yes Nat Mary MD   Handicap Placard MISC by Does not apply route 4/12/19  Yes MOOSE Ball   therapeutic multivitamin-minerals [facility]) tablet Take 1 tablet by mouth daily. Yes Historical Provider, MD   aspirin 81 MG EC tablet Take 81 mg by mouth daily. Yes Historical Provider, MD   ketoconazole (NIZORAL) 2 % cream APPLY TO AFFECTED AREA(S) OF FACE (AROUND NOSE) TWO TIMES A DAY 1/30/20   Nat Mary MD   glucose blood VI test strips (ASCENSIA AUTODISC VI;ONE TOUCH ULTRA TEST VI) strip As needed. Patient not taking: Reported on 1/11/2021 10/18/11   Erin Lu MD   Lancets MISC  10/18/11   Samir Holland MD        Allergies:  Patient has no known allergies. Review of Systems:   · Constitutional: there has been no unanticipated weight loss. There's been no change in energy level, sleep pattern, or activity level. · Eyes: No visual changes or diplopia. No scleral icterus. · ENT: No Headaches, hearing loss or vertigo. No mouth sores or sore throat. · Cardiovascular: Reviewed in HPI  · Respiratory: No cough or wheezing, no sputum production. No hematemesis. · Gastrointestinal: No abdominal pain, appetite loss, blood in stools. No change in bowel or bladder habits. · Genitourinary: No dysuria, trouble voiding, or hematuria. · Musculoskeletal:  No gait disturbance, weakness or joint complaints. · Integumentary: No rash or pruritis. · Neurological: No headache, diplopia, change in muscle strength, numbness or tingling. No change in gait, balance, coordination, mood, affect, memory, mentation, behavior. · Psychiatric: No anxiety, no depression. · Endocrine: No malaise, fatigue or temperature intolerance. No excessive thirst, fluid intake, or urination. No tremor. · Hematologic/Lymphatic: No abnormal bruising or bleeding, blood clots or swollen lymph nodes. · Allergic/Immunologic: No nasal congestion or hives. Physical Examination:    Vitals:    02/15/21 0922   BP: 130/70   Pulse: 75   SpO2: 96%        Constitutional and General Appearance: NAD   Respiratory:  · Normal excursion and expansion without use of accessory muscles  · Resp Auscultation: Normal breath sounds without dullness  Cardiovascular:  · The apical impulses not displaced  · Heart tones are crisp and normal  · Cervical veins are not engorged  · The carotid upstroke is normal in amplitude and contour without delay or bruit  · Normal S1S2, No S3, No Murmur  · Peripheral pulses are symmetrical and full  · There is no clubbing, cyanosis of the extremities. Trace BLE edema  · Femoral Arteries: 2+ and equal  · Pedal Pulses: 2+ and equal   Abdomen:  · No masses or tenderness  · Liver/Spleen: No Abnormalities Noted  Neurological/Psychiatric:  · Alert and oriented in all spheres  · Moves all extremities well  · Exhibits normal gait balance and coordination  · No abnormalities of mood, affect, memory, mentation, or behavior are noted  Skin:  · Skin: warm and dry. Stress test 6/2018  Summary Normal LV size and systolic function. Left ventricular ejection fraction of  63%. Normal wall motion. There is normal isotope uptake at stress and rest.  There is no evidence of myocardial ischemia or scar. Stress Protocols     Resting ECG  Normal sinus rhythm. Early repolarization. Echo 6/2018 Summary   Technically difficult examination. LDLCALC 96 08/06/2020     Lab Results   Component Value Date    LABVLDL 23 02/04/2021    LABVLDL 30 01/11/2021    LABVLDL 31 08/06/2020     No results found for: CHOLHDLRATIO    Assessment:     1. HTN: Well controlled and will continue current medical regimen of hyzaar, norvasc, and Toprol XL. 2. CAD:  Mild-moderate NOCAD. Most recent lexiscan myoview stress test from 01/22/21 which showed apical/septal ischemia. Then underwent most recent cardiac cath from 02/04/21 with Dr. Kit Osgood showed mild to moderate CAD without need for PCI. Clinically, he feels CP improved but still present at times. I offered long-acting nitrate (imdur) but he declined not believing CP bad enough and wanting to avoid further meds. He is agreeable to SL NTG PRN. 3.      HLD: I personally reviewed most recent lipids from 2/4/21 in Epic and d/w Mr. Latoya Sandoval (see above). Well controlled and will continue current medical regimen. 4.       DM: per PCP    Plan:  1. Discussed adding imdur if chest pain recurs  2. Take nitro under the tongue as needed for chest pain. Be sure to sit down prior to taking. If pain still present go to the ER. 3. Follow up in 6 months    Thank you for allowing me to participate in the care of this individual.    Cost of prescription medications and patient compliance have been reviewed with patient. All questions answered. Scribe's attestation: This note was scribed in the presence of Dr Chana Dallas by Eve Isbell RN     I, Dr. Chana Dallas, personally performed the services described in this documentation, as scribed by the above signed scribe in my presence. It is both accurate and complete to my knowledge. I agree with the details independently gathered by the clinical support staff, while the remaining scribed note accurately describes my personal service to the patient. Leander Goldsmith.  Digna Panda M.D., [facility] [place]

## 2021-02-15 NOTE — PATIENT INSTRUCTIONS
Plan:  1. Discussed adding imdur if chest pain recurs  2. Take nitro under the tongue as needed for chest pain. Be sure to sit down prior to taking. If pain still present go to the ER.    3. Follow up in 6 months

## 2021-02-18 RX ORDER — KETOCONAZOLE 20 MG/G
CREAM TOPICAL
Qty: 30 G | Refills: 1 | Status: ON HOLD | OUTPATIENT
Start: 2021-02-18 | End: 2022-08-07

## 2021-03-01 ENCOUNTER — IMMUNIZATION (OUTPATIENT)
Dept: PRIMARY CARE CLINIC | Age: 81
End: 2021-03-01
Payer: MEDICARE

## 2021-03-01 PROCEDURE — 0012A PR IMM ADMN SARSCOV2 100 MCG/0.5 ML 2ND DOSE: CPT | Performed by: FAMILY MEDICINE

## 2021-03-01 PROCEDURE — 91301 COVID-19, MODERNA VACCINE 100MCG/0.5ML DOSE: CPT | Performed by: FAMILY MEDICINE

## 2021-03-04 DIAGNOSIS — I10 ESSENTIAL HYPERTENSION, BENIGN: ICD-10-CM

## 2021-03-04 RX ORDER — LOSARTAN POTASSIUM AND HYDROCHLOROTHIAZIDE 25; 100 MG/1; MG/1
TABLET ORAL
Qty: 90 TABLET | Refills: 1 | Status: SHIPPED | OUTPATIENT
Start: 2021-03-04 | End: 2021-09-10 | Stop reason: SDUPTHER

## 2021-03-29 DIAGNOSIS — E78.2 MIXED HYPERLIPIDEMIA: ICD-10-CM

## 2021-03-29 RX ORDER — ATORVASTATIN CALCIUM 10 MG/1
TABLET, FILM COATED ORAL
Qty: 90 TABLET | Refills: 1 | Status: SHIPPED | OUTPATIENT
Start: 2021-03-29 | End: 2021-09-20 | Stop reason: SDUPTHER

## 2021-05-06 DIAGNOSIS — J41.0 SIMPLE CHRONIC BRONCHITIS (HCC): ICD-10-CM

## 2021-05-06 RX ORDER — FLUTICASONE FUROATE, UMECLIDINIUM BROMIDE AND VILANTEROL TRIFENATATE 100; 62.5; 25 UG/1; UG/1; UG/1
POWDER RESPIRATORY (INHALATION)
Qty: 1 EACH | Refills: 3 | Status: SHIPPED | OUTPATIENT
Start: 2021-05-06 | End: 2021-06-07

## 2021-05-07 RX ORDER — AMLODIPINE BESYLATE 5 MG/1
TABLET ORAL
Qty: 90 TABLET | Refills: 3 | Status: ON HOLD
Start: 2021-05-07 | End: 2021-10-01 | Stop reason: HOSPADM

## 2021-05-11 ENCOUNTER — OFFICE VISIT (OUTPATIENT)
Dept: FAMILY MEDICINE CLINIC | Age: 81
End: 2021-05-11
Payer: MEDICARE

## 2021-05-11 VITALS
HEIGHT: 68 IN | OXYGEN SATURATION: 95 % | DIASTOLIC BLOOD PRESSURE: 64 MMHG | BODY MASS INDEX: 36.74 KG/M2 | SYSTOLIC BLOOD PRESSURE: 128 MMHG | HEART RATE: 80 BPM | WEIGHT: 242.4 LBS

## 2021-05-11 DIAGNOSIS — M79.645 THUMB PAIN, LEFT: Primary | ICD-10-CM

## 2021-05-11 PROCEDURE — 99213 OFFICE O/P EST LOW 20 MIN: CPT | Performed by: PHYSICIAN ASSISTANT

## 2021-05-11 RX ORDER — CEPHALEXIN 500 MG/1
500 CAPSULE ORAL 4 TIMES DAILY
Qty: 40 CAPSULE | Refills: 0 | Status: SHIPPED | OUTPATIENT
Start: 2021-05-11 | End: 2021-07-12 | Stop reason: ALTCHOICE

## 2021-05-11 ASSESSMENT — ENCOUNTER SYMPTOMS
ROS SKIN COMMENTS: LEFT THUMB PAIN
RESPIRATORY NEGATIVE: 1

## 2021-05-11 NOTE — PROGRESS NOTES
Subjective:      Patient ID: Tremaine Bales is a [de-identified] y.o. male. HPI     Patient presents with left thumb pain and erythema for the last 4-5 days. IT is very tender to touch. No injury, has worked in his yard but no puncture wound of any sort that he can recall. No rash anywhere else. Review of Systems   Constitutional: Negative. Respiratory: Negative. Cardiovascular: Negative. Skin:        Left thumb pain       Objective:   Physical Exam  Skin:     Comments: See photo- erythematous nodular area within the skin that are very tender to touch             Assessment / Plan:       Diagnosis Orders   1. Thumb pain, left        Unclear etiology, possible herpetic geraldine. Will start keflex, to ice the area. Patient is to call if no improvement or signs and symptoms worsen.

## 2021-05-23 DIAGNOSIS — E11.65 UNCONTROLLED TYPE 2 DIABETES MELLITUS WITH HYPERGLYCEMIA (HCC): ICD-10-CM

## 2021-05-24 RX ORDER — METFORMIN HYDROCHLORIDE 500 MG/1
TABLET, EXTENDED RELEASE ORAL
Qty: 360 TABLET | Refills: 1 | Status: SHIPPED | OUTPATIENT
Start: 2021-05-24 | End: 2021-12-21

## 2021-06-07 DIAGNOSIS — J41.0 SIMPLE CHRONIC BRONCHITIS (HCC): ICD-10-CM

## 2021-06-08 RX ORDER — FLUTICASONE FUROATE, UMECLIDINIUM BROMIDE AND VILANTEROL TRIFENATATE 100; 62.5; 25 UG/1; UG/1; UG/1
POWDER RESPIRATORY (INHALATION)
Qty: 1 EACH | Refills: 2 | Status: SHIPPED | OUTPATIENT
Start: 2021-06-08 | End: 2021-12-09 | Stop reason: SDUPTHER

## 2021-07-09 ENCOUNTER — TELEPHONE (OUTPATIENT)
Dept: FAMILY MEDICINE CLINIC | Age: 81
End: 2021-07-09

## 2021-07-09 NOTE — TELEPHONE ENCOUNTER
Pt not able to come in before 4 and [facility] has no openings advised we can look at ear on Monday if worse can try urgent care or otc ear cleaning kit

## 2021-07-12 ENCOUNTER — OFFICE VISIT (OUTPATIENT)
Dept: FAMILY MEDICINE CLINIC | Age: 81
End: 2021-07-12
Payer: MEDICARE

## 2021-07-12 VITALS
SYSTOLIC BLOOD PRESSURE: 136 MMHG | BODY MASS INDEX: 36.04 KG/M2 | RESPIRATION RATE: 16 BRPM | WEIGHT: 237.8 LBS | OXYGEN SATURATION: 98 % | DIASTOLIC BLOOD PRESSURE: 84 MMHG | HEART RATE: 68 BPM | HEIGHT: 68 IN

## 2021-07-12 DIAGNOSIS — E78.2 MIXED HYPERLIPIDEMIA: ICD-10-CM

## 2021-07-12 DIAGNOSIS — I10 ESSENTIAL HYPERTENSION: ICD-10-CM

## 2021-07-12 DIAGNOSIS — E66.9 OBESITY (BMI 35.0-39.9 WITHOUT COMORBIDITY): ICD-10-CM

## 2021-07-12 DIAGNOSIS — R29.818 SUSPECTED SLEEP APNEA: ICD-10-CM

## 2021-07-12 DIAGNOSIS — I71.40 ABDOMINAL AORTIC ANEURYSM (AAA) WITHOUT RUPTURE: ICD-10-CM

## 2021-07-12 DIAGNOSIS — E11.9 CONTROLLED TYPE 2 DIABETES MELLITUS WITHOUT COMPLICATION, WITHOUT LONG-TERM CURRENT USE OF INSULIN (HCC): ICD-10-CM

## 2021-07-12 LAB
ANION GAP SERPL CALCULATED.3IONS-SCNC: 12 MMOL/L (ref 3–16)
BASOPHILS ABSOLUTE: 0.1 K/UL (ref 0–0.2)
BASOPHILS RELATIVE PERCENT: 0.8 %
BUN BLDV-MCNC: 13 MG/DL (ref 7–20)
CALCIUM SERPL-MCNC: 9.3 MG/DL (ref 8.3–10.6)
CHLORIDE BLD-SCNC: 100 MMOL/L (ref 99–110)
CO2: 28 MMOL/L (ref 21–32)
CREAT SERPL-MCNC: 1.2 MG/DL (ref 0.8–1.3)
EOSINOPHILS ABSOLUTE: 0.4 K/UL (ref 0–0.6)
EOSINOPHILS RELATIVE PERCENT: 4 %
GFR AFRICAN AMERICAN: >60
GFR NON-AFRICAN AMERICAN: 58
GLUCOSE BLD-MCNC: 124 MG/DL (ref 70–99)
HCT VFR BLD CALC: 41.9 % (ref 40.5–52.5)
HEMOGLOBIN: 13.8 G/DL (ref 13.5–17.5)
LYMPHOCYTES ABSOLUTE: 2.4 K/UL (ref 1–5.1)
LYMPHOCYTES RELATIVE PERCENT: 25.8 %
MCH RBC QN AUTO: 29.1 PG (ref 26–34)
MCHC RBC AUTO-ENTMCNC: 32.8 G/DL (ref 31–36)
MCV RBC AUTO: 88.6 FL (ref 80–100)
MONOCYTES ABSOLUTE: 0.8 K/UL (ref 0–1.3)
MONOCYTES RELATIVE PERCENT: 9 %
NEUTROPHILS ABSOLUTE: 5.6 K/UL (ref 1.7–7.7)
NEUTROPHILS RELATIVE PERCENT: 60.4 %
PDW BLD-RTO: 16 % (ref 12.4–15.4)
PLATELET # BLD: 365 K/UL (ref 135–450)
PMV BLD AUTO: 7.8 FL (ref 5–10.5)
POTASSIUM SERPL-SCNC: 4.3 MMOL/L (ref 3.5–5.1)
RBC # BLD: 4.73 M/UL (ref 4.2–5.9)
SODIUM BLD-SCNC: 140 MMOL/L (ref 136–145)
WBC # BLD: 9.3 K/UL (ref 4–11)

## 2021-07-12 PROCEDURE — 3051F HG A1C>EQUAL 7.0%<8.0%: CPT | Performed by: INTERNAL MEDICINE

## 2021-07-12 PROCEDURE — 36415 COLL VENOUS BLD VENIPUNCTURE: CPT | Performed by: INTERNAL MEDICINE

## 2021-07-12 PROCEDURE — 99214 OFFICE O/P EST MOD 30 MIN: CPT | Performed by: INTERNAL MEDICINE

## 2021-07-12 ASSESSMENT — ENCOUNTER SYMPTOMS
GASTROINTESTINAL NEGATIVE: 1
ALLERGIC/IMMUNOLOGIC NEGATIVE: 1
RESPIRATORY NEGATIVE: 1

## 2021-07-12 NOTE — PROGRESS NOTES
There is no guarding or rebound. Hernia: No hernia is present. There is no hernia in the ventral area or left inguinal area. Genitourinary:     Penis: Normal. No tenderness. Testes: Normal. Cremasteric reflex is present. Prostate: Normal.      Rectum: Normal. Guaiac result negative. Musculoskeletal:         General: Tenderness present. No swelling, deformity or signs of injury. Normal range of motion. Cervical back: Full passive range of motion without pain, normal range of motion and neck supple. No rigidity or tenderness. Right lower leg: Edema present. Left lower leg: Edema present. Lymphadenopathy:      Head:      Right side of head: No submental, submandibular, tonsillar, preauricular, posterior auricular or occipital adenopathy. Left side of head: No submental, submandibular, tonsillar, preauricular, posterior auricular or occipital adenopathy. Cervical: No cervical adenopathy. Upper Body:      Right upper body: No supraclavicular or epitrochlear adenopathy. Left upper body: No supraclavicular or epitrochlear adenopathy. Skin:     General: Skin is warm and dry. Capillary Refill: Capillary refill takes less than 2 seconds. Coloration: Skin is not jaundiced or pale. Findings: No abrasion, bruising, erythema, lesion or rash. Nails: There is no clubbing. Neurological:      General: No focal deficit present. Mental Status: He is alert and oriented to person, place, and time. Mental status is at baseline. He is not disoriented. Cranial Nerves: No cranial nerve deficit. Sensory: No sensory deficit. Motor: No weakness, tremor, atrophy, abnormal muscle tone or seizure activity. Coordination: Coordination normal.      Gait: Gait normal.      Deep Tendon Reflexes: Reflexes are normal and symmetric. Reflexes normal. Babinski sign absent on the right side. Babinski sign absent on the left side.       Reflex Scores: Tricep reflexes are 2+ on the right side and 2+ on the left side. Bicep reflexes are 2+ on the right side and 2+ on the left side. Brachioradialis reflexes are 2+ on the right side and 2+ on the left side. Patellar reflexes are 2+ on the right side and 2+ on the left side. Achilles reflexes are 2+ on the right side and 2+ on the left side. Comments: Diabetic foot exam was abnormal with absent light touch, Temp, and vibratory senses. Psychiatric:         Mood and Affect: Mood normal.         Speech: Speech normal.         Behavior: Behavior normal.         Thought Content: Thought content normal.         Judgment: Judgment normal.         Assessment / Plan:     Abdominal Aortic Aneurysm (Aaa) Without Rupture (Hcc). Will do CTA of abdomen. Obesity (Bmi 35.0-39.9 Without Comorbidity). Discussed goal <200 lbs. Suspected Sleep Apnea. Recommend do Sleep study that put off due to [place]. Essential Hypertension. Continue present meds. Home BP checks. Call if>140/90. Improve diet. Avoid caffeine and salt. Call if new c/o w/ meds. Controlled Type 2 Diabetes Mellitus Without Complication, Without Long-Term Current Use of Insulin (Hcc). Will do labs and adjust meds after results are evaluated. To continue to improve diet and lose weight. To follow Diabetic diet. If needs further help w/ Diabetic diet to call and will refer back to dietician. Home sugar checks. To call if sudden change up or down in sugars. To do regular foot checks. Call if new problems. Mixed Hyperlipidemia. Will do labs and adjust meds. Must improve diet and lose weight as discussed. Rx for Shingrix given.

## 2021-07-12 NOTE — ASSESSMENT & PLAN NOTE
Sugars are does not check, diet is improved, weight is down 5 more lbs, no reported neuropathy, no change in vision, no claudication, no foot ulcers, no new skin lesions. No change in medications. No c/o with meds. Last eye exam 10/2020.

## 2021-07-13 LAB
ESTIMATED AVERAGE GLUCOSE: 162.8 MG/DL
HBA1C MFR BLD: 7.3 %

## 2021-07-13 NOTE — RESULT ENCOUNTER NOTE
DM control is not as good and not to goal(HbA1c up to 7.3%.) must do better job w/ diet and exercise and lose weight. If no better may require further tx.

## 2021-07-22 ENCOUNTER — TELEPHONE (OUTPATIENT)
Dept: PULMONOLOGY | Age: 81
End: 2021-07-22

## 2021-07-22 DIAGNOSIS — E66.9 OBESITY (BMI 35.0-39.9 WITHOUT COMORBIDITY): Primary | ICD-10-CM

## 2021-07-22 DIAGNOSIS — G47.30 OBSERVED SLEEP APNEA: ICD-10-CM

## 2021-07-22 NOTE — TELEPHONE ENCOUNTER
Pt. Has decided he would like to proceed with sleep study as per your note. What would you like him to be scheduled for?

## 2021-07-27 DIAGNOSIS — I10 ESSENTIAL HYPERTENSION, BENIGN: ICD-10-CM

## 2021-07-27 RX ORDER — METOPROLOL SUCCINATE 50 MG/1
TABLET, EXTENDED RELEASE ORAL
Qty: 90 TABLET | Refills: 0 | Status: SHIPPED | OUTPATIENT
Start: 2021-07-27 | End: 2021-10-28 | Stop reason: SDUPTHER

## 2021-08-09 ENCOUNTER — OFFICE VISIT (OUTPATIENT)
Dept: PULMONOLOGY | Age: 81
End: 2021-08-09
Payer: MEDICARE

## 2021-08-09 ENCOUNTER — HOSPITAL ENCOUNTER (OUTPATIENT)
Dept: GENERAL RADIOLOGY | Age: 81
Discharge: HOME | End: 2021-08-09
Payer: MEDICARE

## 2021-08-09 ENCOUNTER — HOSPITAL ENCOUNTER (OUTPATIENT)
Age: 81
Discharge: HOME | End: 2021-08-09
Payer: MEDICARE

## 2021-08-09 VITALS
HEART RATE: 80 BPM | RESPIRATION RATE: 16 BRPM | HEIGHT: 68 IN | TEMPERATURE: 98.1 F | SYSTOLIC BLOOD PRESSURE: 149 MMHG | DIASTOLIC BLOOD PRESSURE: 88 MMHG | OXYGEN SATURATION: 99 % | BODY MASS INDEX: 36.31 KG/M2 | WEIGHT: 239.6 LBS

## 2021-08-09 DIAGNOSIS — J41.0 SIMPLE CHRONIC BRONCHITIS (HCC): ICD-10-CM

## 2021-08-09 DIAGNOSIS — E66.9 OBESITY (BMI 35.0-39.9 WITHOUT COMORBIDITY): ICD-10-CM

## 2021-08-09 DIAGNOSIS — G47.30 OBSERVED SLEEP APNEA: ICD-10-CM

## 2021-08-09 DIAGNOSIS — R06.02 SHORTNESS OF BREATH: ICD-10-CM

## 2021-08-09 DIAGNOSIS — J84.9 ILD (INTERSTITIAL LUNG DISEASE) (HCC): ICD-10-CM

## 2021-08-09 DIAGNOSIS — J41.0 SIMPLE CHRONIC BRONCHITIS (HCC): Primary | ICD-10-CM

## 2021-08-09 DIAGNOSIS — Z86.19 H/O HISTOPLASMOSIS: ICD-10-CM

## 2021-08-09 PROCEDURE — 71046 X-RAY EXAM CHEST 2 VIEWS: CPT

## 2021-08-09 PROCEDURE — 99214 OFFICE O/P EST MOD 30 MIN: CPT | Performed by: INTERNAL MEDICINE

## 2021-08-09 ASSESSMENT — SLEEP AND FATIGUE QUESTIONNAIRES
HOW LIKELY ARE YOU TO NOD OFF OR FALL ASLEEP WHILE LYING DOWN TO REST IN THE AFTERNOON WHEN CIRCUMSTANCES PERMIT: 0
HOW LIKELY ARE YOU TO NOD OFF OR FALL ASLEEP WHILE SITTING QUIETLY AFTER LUNCH WITHOUT ALCOHOL: 1
HOW LIKELY ARE YOU TO NOD OFF OR FALL ASLEEP WHILE SITTING INACTIVE IN A PUBLIC PLACE: 1
HOW LIKELY ARE YOU TO NOD OFF OR FALL ASLEEP WHILE SITTING AND READING: 3
HOW LIKELY ARE YOU TO NOD OFF OR FALL ASLEEP WHILE SITTING AND TALKING TO SOMEONE: 0
HOW LIKELY ARE YOU TO NOD OFF OR FALL ASLEEP WHEN YOU ARE A PASSENGER IN A CAR FOR AN HOUR WITHOUT A BREAK: 0
ESS TOTAL SCORE: 7
HOW LIKELY ARE YOU TO NOD OFF OR FALL ASLEEP WHILE WATCHING TV: 2
HOW LIKELY ARE YOU TO NOD OFF OR FALL ASLEEP IN A CAR, WHILE STOPPED FOR A FEW MINUTES IN TRAFFIC: 0
NECK CIRCUMFERENCE (INCHES): 17

## 2021-08-09 NOTE — PROGRESS NOTES
Chief Complaint-Pulmonary follow up to discuss lung issues     Patient has come to the office for a pulmonary follow-up, patient states that his spouse is concerned about his coughing which is more than usual but patient does not think that it is more than usual, patient states that along with the coughing he does not have any significant expectoration or any increased wheezing, patient does not have any significant rhinorrhea nasal congestion sinus congestion, patient does not wear hearing aids and sometimes there is a wax buildup but does not have any ear pain or tinnitus, patient does not have any significant chest pain palpitation diaphoresis, no fever no chills, patient does not have any change in the ambient environment, patient does not have any abdominal discomfort nausea vomiting, patient does have peripheral neuropathy because of type 2 diabetes mellitus, patient states that his hemoglobin A1c is 7.3 which partly is because of his eating habits not not being optimal and also being nonactive, patient does not have any new medications, patient does not have any change in the ambient environment, patient does not have any sick contacts, patient does feel tired and sleepy in the daytime and has some sleep fragmentation, patient does not have any epistaxis hemoptysis hematochezia melena, patient states that along with Sparkle Perez he has to use his rescue inhaler on average of twice a week in which he does at nighttime with improvement, patient does not have any other pertinent review of system of concern    Previous [facility] virtual pulmonary visit was done for the patient for his clinical status, patient states that he is clinically stable at this time patient states that he is doing very well with the Trelegy Ellipta, patient states that he takes pro-air at nighttime just to decrease any congestion at nighttime as a habit, patient does not have any significant rhinorrhea or nasal congestion or any epistaxis, patient does not have any significant otalgia or ear discharge, patient states that once  twice a day he has a deep cough without any expectoration but if patient is not concerned about that, patient states that he does not have any significant increasing shortness of breath or wheezing, patient does not have any pleuritic chest pain, no fever no chills, no palpitation or diaphoresis, patient does not have any significant abdominal pain nausea vomiting, patient does not have any increasing reflux symptoms but patient takes medication for that patient does not have any significant epistaxis or hemoptysis, patient states that he has some soft stools but not diarrhea, patient is not concerned about on the consistency of the stools, patient states that in the last few months time he has lost about 11 to 12 pounds, patient does not have any increasing sleep fragmentation, patient does not have any confusion lethargy, patient does not have any change in the ambient environment, patient does not have any new medications of concern, patient's blood sugars are under control, patient does not have any other pertinent review of system of concern    :Patient has come to the office for pulmonary follow-up, patient says that from pulmonary standpoint of view he is stable, patient states that since that time he has started taking Trelegy Ellipta he does not have any significant cough or expectoration or shortness of breath, patient states that once in a while he takes albuterol inhaler at nighttime to decrease some congestion in the daytime, patient takes his Trelegy Ellipta in the daytime at this time, patient does not have any increasing nasal congestion, patient on questioning further states that his hoarseness of voice is not significant, patient does not have any chest pain or palpitations, patient states that recently he had come to the ER because of urine retention and hematuria and was subjected to cystoscopy and however to climb stairs he has profound shortness of breath, along with the shortness of breath he has some cough without any phlegm, patient also has occasional wheezing, patient does not have any significant chest pain along with that no palpitations or diaphoresis, no fever no chills, patient denies any increasing headaches or blurring of vision, patient does not have any otalgia or ear discharge, patient does not have any tinnitus, patient on questioning further states that he does not have any profound or rhinorrhea or nasal congestion or sinus congestion, patient does not have any significant sore throat, no difficulty in swallowing per se but patient says that his food gets stuck in the mid chest at times and patient has history of esophageal strictures and has had a ballooning of the strictures multiple times in the last one was last fall, patient does not have any pleuritic chest pain, no fever no chills, patient's appetite is maintained, patient does not have any abdominal pain and nausea or vomiting, patient is takes medications for reflux symptoms, patient does not have any altered bowel habits, patient does not have any epistaxis, gum bleeding or hemoptysis, patient does not have any significant hematochezia or melena, patient does not have any dysuria or hematuria, patient does not have any increasing leg edema, patient does have history suggestive of some sleep fragmentation t, but patient says that he has gained about 5-10 pounds in last 1 year time, patient does not have any history of any significant exposures, patient was an educator, patient says that 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 and he underwent a right middle lobe lobectomy at [facility] to that time, patient does feel tired, patient says he is a former smoker he quit about 14 years back, patient does not take any inhalers or nebulizer at this time, patient does not have any change in the ambient environment at this time, patient does not have any significant history of any recent travels or change of medications, patient says that he went to [facility] recently for a vacation and tomorrow he is going to [place], patient does not have any confusion or lethargy, no other pertinent review of system of concern    Past Medical History:   Diagnosis Date    [facility] [facility]     Decreased hearing of both ears     Diabetes mellitus ([facility]     GERD (gastroesophageal reflux disease)     Histoplasmosis     lung resected    Hyperlipidemia     Hypertension     Primary malignant neoplasm of skin of trunk 2009       Past Surgical History:   Procedure Laterality Date    COLONOSCOPY  12    polyps    COLONOSCOPY  7/21/15    polyps    CYSTOSCOPY N/A 10/24/2019    CYSTOSCOPY AND CLOT EVACUATION performed by Alise Arredondo MD at [address] Bilateral 6/22/15    laparoscopic    LUNG REMOVAL, PARTIAL  2007    middle lobe R lung/histoplasmosis    TURP      UPPER GASTROINTESTINAL ENDOSCOPY  2015    UPPER GASTROINTESTINAL ENDOSCOPY  2/15    dilated    UPPER GASTROINTESTINAL ENDOSCOPY  2017    dilatation, bx    UPPER GASTROINTESTINAL ENDOSCOPY  2017    dilated; Bx gastric.  VASECTOMY         No Known Allergies    Medication list was reviewed and updated as needed in Epic    Social History     Socioeconomic History    Marital status:      Spouse name: Not on file    Number of children: Not on file    Years of education: Not on file    Highest education level: Not on file   Occupational History    Not on file   Tobacco Use    Smoking status: Former Smoker     Packs/day: 1.00     Years: 30.00     Pack years: 30.00     Quit date: 2003     Years since quittin.6    Smokeless tobacco: Never Used   Vaping Use    Vaping Use: Never used   Substance and Sexual Activity    Alcohol use:  Yes     Alcohol/week: 0.0 standard drinks     Comment: 1/ week    Drug use: No    Sexual activity: Not on file   Other Topics Concern    Not on file   Social History Narrative    Not on file     Social Determinants of Health     Financial Resource Strain:     Difficulty of Paying Living Expenses:    Food Insecurity:     Worried About Running Out of Food in the Last Year:     [address] in the Last Year:    Transportation Needs:     Lack of Transportation (Medical):  Lack of Transportation (Non-Medical):    Physical Activity:     Days of Exercise per Week:     Minutes of Exercise per Session:    Stress:     Feeling of Stress :    Social Connections:     Frequency of Communication with Friends and Family:     Frequency of Social Gatherings with Friends and Family:     Attends [employer] Services:     Active Member of Clubs or Organizations:     Attends Club or Organization Meetings:     Marital Status:    Intimate Partner Violence:     Fear of Current or Ex-Partner:     Emotionally Abused:     Physically Abused:     Sexually Abused:        Family History   Problem Relation Age of Onset    Cancer Brother         colon CA    Heart Disease Mother             ROS same as above     Physical Exam:  Blood pressure (!) 149/88, pulse 80, temperature 98.1 °F (36.7 °C), temperature source Oral, resp. rate 16, height 5' 8\" (1.727 m), weight 239 lb 9.6 oz (108.7 kg), SpO2 99 %.'  Constitutional:  No acute distress. HENT:  Oropharynx is clear and moist. No thyromegaly. Mild nasal mucosal hyperemia; No oral thrush   Eyes:  Conjunctivae are normal. Pupils equal, round, and reactive to light. No scleral icterus. Neck: . No tracheal deviation present. No obvious thyroid mass. Neck diameter is increased  Cardiovascular: Normal rate, regular rhythm, normal heart sounds. No right ventricular heave. Minimal lower extremity edema. Pulmonary/Chest: No wheezes. Bibasilar crackles which do not improve with coughing.   Chest wall is not dull to percussion. No accessory muscle usage or stridor. Slightly increased prolonged expiration with breath sound intensity  Abdominal: Soft. Bowel sounds present. No distension or hernia. No tenderness. Musculoskeletal: No cyanosis. No clubbing. No obvious joint deformity. Lymphadenopathy: No cervical or supraclavicular adenopathy. Skin: Skin is warm and dry. No rash or nodules on the exposed extremities. Psychiatric: Normal mood and affect. Behavior is normal.  No anxiety. Neurologic: Alert, awake and oriented. PERRL. Speech fluent        Data:     PFT shows patient to have mild-to-moderate obstructive airway disease with some reactive disease in the small airways along with that patient has hyperinflation and also changes in the PFT parameters because of body habitus      ECHO- Summary   Technically difficult examination.   Normal systolic function with an estimated ejection fraction of 55-60%.  Mild concentric left ventricular hypertrophy.   No regional wall motion abnormalities are seen.   Grade I diastolic dysfunction with normal filing pressure.     PFT done in 2016 had shown patient to have mild obstructive airway disease along with that patient had changes in the PFT parameters because of body habitus    Assessment:    1. Shortness of breath        2.  Observed sleep apnea      3. Diastolic dysfunction      4. Copd       5. H/O histoplasmosis      6. Obesity (BMI 35.0-39.9 without comorbidity)    7.   Lung crackles to rule out any ILD/pulmonary fibrosis          Plan:   · Patient's clinical status and review of systems discussed  · Patient was told about the clinical finding including auscultation and implications  · Patient does have bibasilar crackles more so on the right side as compared to left side which does not improve with coughing and patient needs to be evaluated for any fluid overload or any ILD-like picture  · Will start with an x-ray chest on the patient PA and lateral and reassess if patient needs any HRCT  · Clinically patient has has chronic bronchitis along with that patient appears to have obesity/suspected sleep apnea/diastolic dysfunction with history of histoplasmosis with right middle lobe resection/esophageal stricture  · Patient was told about the pathophysiology of the disease process and its modifying factors  · Patient has deferred sleep study in the past  · Patient to continue with  Trelegy ellipta and was shown how to take it properly and was told to take one puff daily and patient was told to make sure that he rinses his mouth with water after use otherwise he can have hoarseness of voice or oral thrush  · Patient was told to try taking the Trelegy Ellipta at nighttime rather than the daytime to see if it makes a difference in terms of slight decrease congestion in the daytime  · Patient does not need any antibiotics or steroids from pulmonary standpoint of view  ·  Patient was told to take saline nasal rinse   · Patient was also told to try some salt and water gargles  · Patient to take a sugar free candy/lozenges  · Patient was also given albuterol inhaler 2 puffs every 6 on whenever necessary basis   · Patient was told about dietary and lifestyle modifications  · Patient needs to lose weight  · Patient needs to keep himself warm for the perez  · Patient to follow-up in 4 months time if the x-ray chest is fine but if x-ray shows any issues then will discuss the options at that time  · Management of diabetes mellitus as per patient's PCP  · Patient's further treatment depending on patient clinical status and x-ray reports

## 2021-08-09 NOTE — PATIENT INSTRUCTIONS
Remember to bring a list of pulmonary medications and any CPAP or BiPAP machines to your next appointment with the office. Please keep all of your future appointments scheduled by [address] office. Out of respect for other patients and providers, you may be asked to reschedule your appointment if you arrive later than your scheduled appointment time. Appointments cancelled less than 24hrs in advance will be considered a no show. Patients with three missed appointments within 1 year or four missed appointments within 2 years can be dismissed from the practice. Please be aware that our physicians are required to work in the Intensive Care Unit at [facility].  Your appointment may need to be rescheduled if they are designated to work during your appointment time. You may receive a survey regarding the care you received during your visit. Your input is valuable to us. We encourage you to complete and return your survey. We hope you will choose us in the future for your healthcare needs. Pt instructed of all future appointment dates & times, including radiology, labs, procedures & referrals. If procedures were scheduled preparation instructions provided. Instructions on future appointments with [facility] were given. [facility] can be reached at [phone].   Please call to schedule a follow up appointment with our office after your sleep study

## 2021-08-09 NOTE — PROGRESS NOTES
MA Communication: The following orders are received by verbal communication from Cinthya Jiang MD    Orders include:     The sleep Center will contact pt to schedule his sleep study  Patient will have chest xray done today  4 month appt: 12/09/2021 9:00 am

## 2021-08-13 NOTE — PROGRESS NOTES
[address]   Cardiac Consultation    Referring Provider:  Anirudh Beltran MD     Chief Complaint   Patient presents with    6 Month Follow-Up    Shortness of Breath    Edema        Subjective: Cruz Patel is here for follow up for CAD; no complaints today other than baseline SOB       History of Present Illness:  Cruz Patel is a [de-identified] y.o. male who presents today for routine f/u. He has PMH of moderate NOCAD (med mgt without PCI), HLD, HTN, GERD, COPD and DM. He was seen in 2018 by my partner Dr. Flor Rivera for chest pain. Most recent lexiscan myoview stress test 6/1/18  showed no evidence of myocardial ischemia or scar. Most recent ECHO from 6/2018 EF of 55-60%. On 01/18/21 he reported c/o left sided chest pain off/on x 1 year. He could not characterize the pain. Occurred with exertion pushing objects (ie. Pushing  door). He also c/o SOB with stairs which  is baseline for him. Most recent lexiscan myoview stress test from 01/22/21 which showed apical/septal ischemia. Then underwent most recent LHC from 02/04/21 with Dr. Flor Rivera showed mild to moderate CAD without need for PCI. Most recent EKG from 02/04/21 showed SR HR 61 (no sig change from 5/18). At his last visit in 2/2021 he reported having some chest pain and we discussed starting Imdur but he did not feel badly enough to start. Today he is here for routine cardiology follow up. He is tolerating his medications and taking them as prescribed. He states his Trelegy is a little pricey, but this helps him and is worth the cost. He has some SOB when he lays down at night. He feels that he sleeps wel. He has not had any recent chest pain. He will be having a home sleep study soon and f/u with Dr. Michelle Wolf. Patient currently denies any weight gain, edema, palpitations, chest pain, and syncope.       Past Medical History:   has a past medical history of COPD (chronic obstructive pulmonary disease) ([address] Decreased hearing of both ears, Diabetes mellitus ([facility] [address] GERD (gastroesophageal reflux disease), Histoplasmosis, Hyperlipidemia, Hypertension, and Primary malignant neoplasm of skin of trunk. Surgical History:   has a past surgical history that includes Lung removal, partial (2007); Vasectomy; TURP (2002); Upper gastrointestinal endoscopy (1/26/2015); Upper gastrointestinal endoscopy (2/15); Inguinal hernia repair (Bilateral, 6/22/15); Colonoscopy (5/22/12); Colonoscopy (7/21/15); Upper gastrointestinal endoscopy (07/21/2017); Upper gastrointestinal endoscopy (08/23/2017); and Cystoscopy (N/A, 10/24/2019). Social History:   reports that he quit smoking about 2002. He has a 30.00 pack-year smoking history. He has never used smokeless tobacco. He reports current alcohol use. He reports that he does not use drugs. Family History:  family history includes Cancer in his brother; Heart Disease in his mother. Home Medications:  Prior to Admission medications    Medication Sig Start Date End Date Taking?  Authorizing Provider   metoprolol succinate (TOPROL XL) 50 MG extended release tablet TAKE ONE TABLET BY MOUTH DAILY 7/27/21  Yes MT Elder MD   Marta Hew 100-62.5-25 MCG/INH AEPB INHALE ONE PUFF BY MOUTH DAILY 6/8/21  Yes Chantal Cooper MD   metFORMIN (GLUCOPHAGE-XR) 500 MG extended release tablet TAKE TWO TABLETS BY MOUTH TWICE A DAY WITH MEALS 5/24/21  Yes MT Elder MD   amLODIPine (NORVASC) 5 MG tablet TAKE ONE TABLET BY MOUTH DAILY 5/7/21  Yes MT Elder MD   atorvastatin (LIPITOR) 10 MG tablet TAKE ONE TABLET BY MOUTH DAILY 3/29/21  Yes MT Elder MD   losartan-hydroCHLOROthiazide (HYZAAR) 100-25 MG per tablet TAKE ONE TABLET BY MOUTH DAILY 3/4/21  Yes Damián Jade MD   ketoconazole (NIZORAL) 2 % cream APPLY TO AFFECTED AREAS OF FACE AROUND NOSE TWO TIMES A DAY 2/18/21  Yes Myla James MD   pantoprazole (PROTONIX) 40 MG tablet TAKE ONE TABLET BY MOUTH DAILY 11/17/20  Yes Damián Jade MD   ketoconazole or hives. Physical Examination:    Vitals:    08/16/21 1018   BP: 130/64   Pulse: 81   SpO2: 96%        Constitutional and General Appearance: NAD   Respiratory:  · Normal excursion and expansion without use of accessory muscles  · Resp Auscultation: Normal breath sounds without dullness  Cardiovascular:  · The apical impulses not displaced  · Heart tones are crisp and normal  · Cervical veins are not engorged  · The carotid upstroke is normal in amplitude and contour without delay or bruit  · Normal S1S2, No S3, No Murmur  · Peripheral pulses are symmetrical and full  · There is no clubbing, cyanosis of the extremities. Trace BLE edema  · Femoral Arteries: 2+ and equal  · Pedal Pulses: 2+ and equal   Abdomen:  · No masses or tenderness  · Liver/Spleen: No Abnormalities Noted  Neurological/Psychiatric:  · Alert and oriented in all spheres  · Moves all extremities well  · Exhibits normal gait balance and coordination  · No abnormalities of mood, affect, memory, mentation, or behavior are noted  Skin:  · Skin: warm and dry. Stress test 6/2018  Summary Normal LV size and systolic function. Left ventricular ejection fraction of  63%. Normal wall motion. There is normal isotope uptake at stress and rest.  No evidence of myocardial ischemia or scar. Stress Protocols     Resting ECG  Normal sinus rhythm. Early repolarization. Echo 6/2018 Summary   Technically difficult examination. Normal systolic function with an estimated ejection fraction of 55-60%. Mild concentric left ventricular hypertrophy. No regional wall motion abnormalities are seen. Grade I diastolic dysfunction with normal filing pressure. Stress test: 01/22/21  Summary Normal LVEF >60% Grossly normal wall motion  Apical/septal ischemia   Overall, this would be considered an abnormal, intermediate risk, study     [facility] 2/04/21 Findings:     1.  Left main coronary artery was normal. It gave off the left anterior descending artery and left circumflex. 2. Left anterior descending artery has mild to moderate atherosclerotic disease. It was moderate in size. It gave off septal perforators and a moderate sized diagonal branch. The LAD covered the entire apex of the left ventricle. 3. Left circumflex has mild to moderate atherosclerotic disease. It was moderate in size. There was a moderate sized obtuse marginal branch. 4. Right coronary artery has mild to moderate atherosclerotic disease. It was moderate in size and was the dominant artery. 5. Left ventriculogram showed normal LVEF at 55-60%. Wall motion was normal . There was no significant mitral valve or aortic valve disease noted. LVEDP was normal. There was no gradient noted across the aortic valve during pullback of the catheter. CONCLUSIONS:     1. Mild to moderate coronary artery disease with preserved LVEF     ASSESSMENT/RECOMMENDATIONS:     1. Risk Factor control  2. Consider anti-anginal therapy with long acting nitrates     Lab Results   Component Value Date    CHOL 151 02/04/2021    CHOL 169 01/11/2021    CHOL 168 08/06/2020     Lab Results   Component Value Date    TRIG 115 02/04/2021    TRIG 151 (H) 01/11/2021    TRIG 157 (H) 08/06/2020     Lab Results   Component Value Date    HDL 44 02/04/2021    HDL 45 01/11/2021    HDL 41 08/06/2020     Lab Results   Component Value Date    LDLCALC 84 02/04/2021    LDLCALC 94 01/11/2021    LDLCALC 96 08/06/2020     Lab Results   Component Value Date    LABVLDL 23 02/04/2021    LABVLDL 30 01/11/2021    LABVLDL 31 08/06/2020     No results found for: CHOLHDLRATIO     Labs- BMP 7/12/2021 NA+ 140, K+ 4.3, BUN 13, Creatinine 1.2, A1C 7.3, H/H 13.8/41.8, PLTS 365    Assessment:     1. HTN: Well controlled and will continue current medical regimen of hyzaar, norvasc, and Toprol XL. 2. CAD:  Mild-moderate NOCAD. Most recent lexiscan myoview stress test from 01/22/21 which showed apical/septal ischemia.  Then underwent most recent cardiac cath from 02/04/21 with Dr. Claudia Murdock showed mild to moderate CAD without need for PCI. There are no concerning symptoms for angina currently. I have offered long-acting nitrate (imdur) in past but he declined. He is agreeable to SL NTG PRN. 3.      HLD: I personally reviewed most recent lipids from 2/4/21 in Epic and d/w Mr. Armando Fields (see above). Well controlled and will continue current medical regimen. 4.       DM: per PCP    Plan:  1. Discused adding imdur if chest pain recurs but no angina now and will continue present regimen. 2. Take nitro under the tongue as needed for chest pain. Can repeat up to 2 more times. Be sure to sit down prior to taking. If pain still present go to the ER. Will send in new script for Nitro   3. Continue current medications   3. Follow up in 9 months    Scribe's attestation: This note was scribed in the presence of Gabbi Casillas. Ganesh Razo M.D. By David Berkowitz RN    I, Dr. Sadiq Alvarado, personally performed the services described in this documentation, as scribed by the above signed scribe in my presence. It is both accurate and complete to my knowledge. I agree with the details independently gathered by the clinical support staff, while the remaining scribed note accurately describes my personal service to the patient. Thank you for allowing me to participate in the care of this individual.    Cost of prescription medications and patient compliance have been reviewed with patient. All questions answered. Gabbi Casillas.  Ganesh Razo M.D., [facility]

## 2021-08-16 ENCOUNTER — OFFICE VISIT (OUTPATIENT)
Dept: CARDIOLOGY CLINIC | Age: 81
End: 2021-08-16
Payer: MEDICARE

## 2021-08-16 VITALS
BODY MASS INDEX: 36.37 KG/M2 | DIASTOLIC BLOOD PRESSURE: 64 MMHG | WEIGHT: 240 LBS | HEIGHT: 68 IN | OXYGEN SATURATION: 96 % | SYSTOLIC BLOOD PRESSURE: 130 MMHG | HEART RATE: 81 BPM

## 2021-08-16 DIAGNOSIS — I71.40 ABDOMINAL AORTIC ANEURYSM (AAA) WITHOUT RUPTURE: ICD-10-CM

## 2021-08-16 DIAGNOSIS — I25.10 MILD CAD: Primary | ICD-10-CM

## 2021-08-16 DIAGNOSIS — G47.30 OBSERVED SLEEP APNEA: ICD-10-CM

## 2021-08-16 DIAGNOSIS — I10 ESSENTIAL HYPERTENSION: ICD-10-CM

## 2021-08-16 DIAGNOSIS — Z87.891 HISTORY OF TOBACCO ABUSE: ICD-10-CM

## 2021-08-16 PROCEDURE — 99214 OFFICE O/P EST MOD 30 MIN: CPT | Performed by: INTERNAL MEDICINE

## 2021-08-16 RX ORDER — NITROGLYCERIN 0.4 MG/1
0.4 TABLET SUBLINGUAL EVERY 5 MIN PRN
Qty: 25 TABLET | Refills: 3 | Status: SHIPPED | OUTPATIENT
Start: 2021-08-16 | End: 2022-09-13

## 2021-08-16 NOTE — LETTER
[address] [place] [address] Cardiology - [address] [facility] [address] [facility]  Phone: [phone]  Fax: [phone]    Anshul Gipson MD    August 17, 2021     Elena Angulo MD  [facility] [address]    Patient: Darrius Luciano   MR Number: [id number]   YOB: 1940   Date of Visit: 8/16/2021       Dear Elena Angulo: Thank you for referring Scott Greene to me for evaluation/treatment. Below are the relevant portions of my assessment and plan of care. If you have questions, please do not hesitate to call me. I look forward to following Madhuri Roberto along with you.     Sincerely,      Anshul Gipson MD

## 2021-08-16 NOTE — PATIENT INSTRUCTIONS
Plan:  1. Discused adding imdur if chest pain recurs  2. Take nitro under the tongue as needed for chest pain. Can repeat up to 2 more times. Be sure to sit down prior to taking. If pain still present go to the ER. Will send in new script for Nitro   3. Continue current medications   3.  Follow up in 9 months

## 2021-08-30 ENCOUNTER — HOSPITAL ENCOUNTER (OUTPATIENT)
Dept: SLEEP CENTER | Age: 81
Discharge: HOME | End: 2021-09-01
Payer: MEDICARE

## 2021-08-30 DIAGNOSIS — G47.30 OBSERVED SLEEP APNEA: ICD-10-CM

## 2021-08-30 DIAGNOSIS — E66.9 OBESITY (BMI 35.0-39.9 WITHOUT COMORBIDITY): ICD-10-CM

## 2021-08-30 PROCEDURE — 95806 SLEEP STUDY UNATT&RESP EFFT: CPT

## 2021-08-31 PROCEDURE — 95806 SLEEP STUDY UNATT&RESP EFFT: CPT | Performed by: INTERNAL MEDICINE

## 2021-09-02 ENCOUNTER — TELEPHONE (OUTPATIENT)
Dept: PULMONOLOGY | Age: 81
End: 2021-09-02

## 2021-09-02 NOTE — TELEPHONE ENCOUNTER
Patient did not show for HST appointment  with Ivy Newsome on 9/2/21    Same Day Cancellation: No    Patient rescheduled:  No    New appointment: N/A    Patient was also no show on: N/A

## 2021-09-10 ENCOUNTER — TELEPHONE (OUTPATIENT)
Dept: FAMILY MEDICINE CLINIC | Age: 81
End: 2021-09-10

## 2021-09-10 DIAGNOSIS — I10 ESSENTIAL HYPERTENSION, BENIGN: ICD-10-CM

## 2021-09-10 RX ORDER — LOSARTAN POTASSIUM AND HYDROCHLOROTHIAZIDE 25; 100 MG/1; MG/1
TABLET ORAL
Qty: 90 TABLET | Refills: 1 | Status: SHIPPED | OUTPATIENT
Start: 2021-09-10 | End: 2022-03-19 | Stop reason: SDUPTHER

## 2021-09-10 NOTE — TELEPHONE ENCOUNTER
Patient would like to know what the results of his sleep study were. Results scanned into media on 9/2/21.

## 2021-09-20 DIAGNOSIS — E78.2 MIXED HYPERLIPIDEMIA: ICD-10-CM

## 2021-09-20 RX ORDER — ATORVASTATIN CALCIUM 10 MG/1
TABLET, FILM COATED ORAL
Qty: 90 TABLET | Refills: 1 | Status: SHIPPED | OUTPATIENT
Start: 2021-09-20 | End: 2021-10-28

## 2021-09-21 ENCOUNTER — TELEPHONE (OUTPATIENT)
Dept: FAMILY MEDICINE CLINIC | Age: 81
End: 2021-09-21

## 2021-09-21 NOTE — TELEPHONE ENCOUNTER
I spoke with patient and let him know a prescription for Atorvastatin was sent to [address] [place] [address] on 9-20-21.

## 2021-09-21 NOTE — TELEPHONE ENCOUNTER
----- Message from Miah Chow sent at 9/21/2021  1:52 PM EDT -----  Subject: Refill Request    QUESTIONS  Name of Medication? atorvastatin (LIPITOR) 10 MG tablet  Patient-reported dosage and instructions? 10MG  How many days do you have left? 0  Preferred Pharmacy? [id number]  Pharmacy phone number (if available)? [phone]  Additional Information for Provider? Patient is calling to in because a   bottle of his script is missing. 8/25 will be 90days and we needs a whole   90 day refill. The pharmacy said they were going to follow up with his pcp   but they did not get back with him. Patient needs a call back. ---------------------------------------------------------------------------  --------------  Jb MONTAÑO  What is the best way for the office to contact you? OK to leave message on   voicemail  Preferred Call Back Phone Number?  [phone]

## 2021-09-29 ENCOUNTER — APPOINTMENT (OUTPATIENT)
Dept: GENERAL RADIOLOGY | Age: 81
DRG: 310 | End: 2021-09-29
Payer: MEDICARE

## 2021-09-29 ENCOUNTER — HOSPITAL ENCOUNTER (INPATIENT)
Age: 81
LOS: 1 days | Discharge: HOME | DRG: 310 | End: 2021-10-01
Attending: EMERGENCY MEDICINE | Admitting: INTERNAL MEDICINE
Payer: MEDICARE

## 2021-09-29 DIAGNOSIS — R07.9 CHEST PAIN, UNSPECIFIED TYPE: Primary | ICD-10-CM

## 2021-09-29 DIAGNOSIS — I48.19 PERSISTENT ATRIAL FIBRILLATION (HCC): ICD-10-CM

## 2021-09-29 DIAGNOSIS — R06.02 SHORTNESS OF BREATH: ICD-10-CM

## 2021-09-29 DIAGNOSIS — I25.10 MILD CAD: ICD-10-CM

## 2021-09-29 LAB
A/G RATIO: 1.4 (ref 1.1–2.2)
ALBUMIN SERPL-MCNC: 4.4 G/DL (ref 3.4–5)
ALP BLD-CCNC: 88 U/L (ref 40–129)
ALT SERPL-CCNC: 20 U/L (ref 10–40)
ANION GAP SERPL CALCULATED.3IONS-SCNC: 10 MMOL/L (ref 3–16)
APTT: 24 SEC (ref 26.2–38.6)
AST SERPL-CCNC: 20 U/L (ref 15–37)
BASOPHILS ABSOLUTE: 0.1 K/UL (ref 0–0.2)
BASOPHILS RELATIVE PERCENT: 0.5 %
BILIRUB SERPL-MCNC: 0.3 MG/DL (ref 0–1)
BILIRUBIN URINE: NEGATIVE
BLOOD, URINE: NEGATIVE
BUN BLDV-MCNC: 13 MG/DL (ref 7–20)
CALCIUM SERPL-MCNC: 9.7 MG/DL (ref 8.3–10.6)
CHLORIDE BLD-SCNC: 99 MMOL/L (ref 99–110)
CLARITY: CLEAR
CO2: 29 MMOL/L (ref 21–32)
COLOR: YELLOW
CREAT SERPL-MCNC: 1.2 MG/DL (ref 0.8–1.3)
EKG ATRIAL RATE: 90 BPM
EKG DIAGNOSIS: NORMAL
EKG P AXIS: 45 DEGREES
EKG P-R INTERVAL: 174 MS
EKG Q-T INTERVAL: 366 MS
EKG QRS DURATION: 98 MS
EKG QTC CALCULATION (BAZETT): 447 MS
EKG R AXIS: 45 DEGREES
EKG T AXIS: 42 DEGREES
EKG VENTRICULAR RATE: 90 BPM
EOSINOPHILS ABSOLUTE: 0.6 K/UL (ref 0–0.6)
EOSINOPHILS RELATIVE PERCENT: 4.4 %
GFR AFRICAN AMERICAN: >60
GFR NON-AFRICAN AMERICAN: 58
GLOBULIN: 3.2 G/DL
GLUCOSE BLD-MCNC: 132 MG/DL (ref 70–99)
GLUCOSE BLD-MCNC: 137 MG/DL (ref 70–99)
GLUCOSE BLD-MCNC: 148 MG/DL (ref 70–99)
GLUCOSE BLD-MCNC: 153 MG/DL (ref 70–99)
GLUCOSE URINE: NEGATIVE MG/DL
HCT VFR BLD CALC: 43 % (ref 40.5–52.5)
HEMOGLOBIN: 14 G/DL (ref 13.5–17.5)
INR BLD: 0.91 (ref 0.88–1.12)
KETONES, URINE: NEGATIVE MG/DL
LACTIC ACID: 2.2 MMOL/L (ref 0.4–2)
LEUKOCYTE ESTERASE, URINE: NEGATIVE
LYMPHOCYTES ABSOLUTE: 5 K/UL (ref 1–5.1)
LYMPHOCYTES RELATIVE PERCENT: 39 %
MCH RBC QN AUTO: 28.5 PG (ref 26–34)
MCHC RBC AUTO-ENTMCNC: 32.6 G/DL (ref 31–36)
MCV RBC AUTO: 87.3 FL (ref 80–100)
MICROSCOPIC EXAMINATION: NORMAL
MONOCYTES ABSOLUTE: 1.3 K/UL (ref 0–1.3)
MONOCYTES RELATIVE PERCENT: 10.3 %
NEUTROPHILS ABSOLUTE: 5.9 K/UL (ref 1.7–7.7)
NEUTROPHILS RELATIVE PERCENT: 45.8 %
NITRITE, URINE: NEGATIVE
PDW BLD-RTO: 15 % (ref 12.4–15.4)
PERFORMED ON: ABNORMAL
PH UA: 6 (ref 5–8)
PLATELET # BLD: 452 K/UL (ref 135–450)
PMV BLD AUTO: 6.8 FL (ref 5–10.5)
POTASSIUM REFLEX MAGNESIUM: 3.9 MMOL/L (ref 3.5–5.1)
PRO-BNP: 72 PG/ML (ref 0–449)
PROCALCITONIN: 0.04 NG/ML (ref 0–0.15)
PROTEIN UA: NEGATIVE MG/DL
PROTHROMBIN TIME: 10.2 SEC (ref 9.9–12.7)
RBC # BLD: 4.92 M/UL (ref 4.2–5.9)
SARS-COV-2, NAAT: NOT DETECTED
SARS-COV-2: NOT DETECTED
SODIUM BLD-SCNC: 138 MMOL/L (ref 136–145)
SPECIFIC GRAVITY UA: 1.02 (ref 1–1.03)
TOTAL PROTEIN: 7.6 G/DL (ref 6.4–8.2)
TROPONIN: <0.01 NG/ML
URINE TYPE: NORMAL
UROBILINOGEN, URINE: 0.2 E.U./DL
WBC # BLD: 12.8 K/UL (ref 4–11)

## 2021-09-29 PROCEDURE — 87635 SARS-COV-2 COVID-19 AMP PRB: CPT

## 2021-09-29 PROCEDURE — G0378 HOSPITAL OBSERVATION PER HR: HCPCS

## 2021-09-29 PROCEDURE — 84484 ASSAY OF TROPONIN QUANT: CPT

## 2021-09-29 PROCEDURE — 85730 THROMBOPLASTIN TIME PARTIAL: CPT

## 2021-09-29 PROCEDURE — 6370000000 HC RX 637 (ALT 250 FOR IP): Performed by: INTERNAL MEDICINE

## 2021-09-29 PROCEDURE — 2580000003 HC RX 258: Performed by: INTERNAL MEDICINE

## 2021-09-29 PROCEDURE — 93010 ELECTROCARDIOGRAM REPORT: CPT | Performed by: INTERNAL MEDICINE

## 2021-09-29 PROCEDURE — 94640 AIRWAY INHALATION TREATMENT: CPT

## 2021-09-29 PROCEDURE — 6360000002 HC RX W HCPCS: Performed by: EMERGENCY MEDICINE

## 2021-09-29 PROCEDURE — 85610 PROTHROMBIN TIME: CPT

## 2021-09-29 PROCEDURE — 96361 HYDRATE IV INFUSION ADD-ON: CPT

## 2021-09-29 PROCEDURE — 2580000003 HC RX 258: Performed by: EMERGENCY MEDICINE

## 2021-09-29 PROCEDURE — U0003 INFECTIOUS AGENT DETECTION BY NUCLEIC ACID (DNA OR RNA); SEVERE ACUTE RESPIRATORY SYNDROME CORONAVIRUS 2 (SARS-COV-2) (CORONAVIRUS DISEASE [COVID-19]), AMPLIFIED PROBE TECHNIQUE, MAKING USE OF HIGH THROUGHPUT TECHNOLOGIES AS DESCRIBED BY CMS-2020-01-R: HCPCS

## 2021-09-29 PROCEDURE — 80053 COMPREHEN METABOLIC PANEL: CPT

## 2021-09-29 PROCEDURE — 83605 ASSAY OF LACTIC ACID: CPT

## 2021-09-29 PROCEDURE — 83880 ASSAY OF NATRIURETIC PEPTIDE: CPT

## 2021-09-29 PROCEDURE — 6370000000 HC RX 637 (ALT 250 FOR IP): Performed by: EMERGENCY MEDICINE

## 2021-09-29 PROCEDURE — 85025 COMPLETE CBC W/AUTO DIFF WBC: CPT

## 2021-09-29 PROCEDURE — 87040 BLOOD CULTURE FOR BACTERIA: CPT

## 2021-09-29 PROCEDURE — 81003 URINALYSIS AUTO W/O SCOPE: CPT

## 2021-09-29 PROCEDURE — 84145 PROCALCITONIN (PCT): CPT

## 2021-09-29 PROCEDURE — 96374 THER/PROPH/DIAG INJ IV PUSH: CPT

## 2021-09-29 PROCEDURE — U0005 INFEC AGEN DETEC AMPLI PROBE: HCPCS

## 2021-09-29 PROCEDURE — 93005 ELECTROCARDIOGRAM TRACING: CPT | Performed by: EMERGENCY MEDICINE

## 2021-09-29 PROCEDURE — 99214 OFFICE O/P EST MOD 30 MIN: CPT | Performed by: INTERNAL MEDICINE

## 2021-09-29 PROCEDURE — 71045 X-RAY EXAM CHEST 1 VIEW: CPT

## 2021-09-29 PROCEDURE — 99283 EMERGENCY DEPT VISIT LOW MDM: CPT

## 2021-09-29 RX ORDER — PROMETHAZINE HYDROCHLORIDE 25 MG/1
12.5 TABLET ORAL EVERY 6 HOURS PRN
Status: DISCONTINUED | OUTPATIENT
Start: 2021-09-29 | End: 2021-10-01 | Stop reason: HOSPADM

## 2021-09-29 RX ORDER — ACETAMINOPHEN 650 MG/1
650 SUPPOSITORY RECTAL EVERY 6 HOURS PRN
Status: DISCONTINUED | OUTPATIENT
Start: 2021-09-29 | End: 2021-10-01 | Stop reason: HOSPADM

## 2021-09-29 RX ORDER — BUDESONIDE AND FORMOTEROL FUMARATE DIHYDRATE 160; 4.5 UG/1; UG/1
2 AEROSOL RESPIRATORY (INHALATION) 2 TIMES DAILY
Status: DISCONTINUED | OUTPATIENT
Start: 2021-09-29 | End: 2021-10-01 | Stop reason: HOSPADM

## 2021-09-29 RX ORDER — DEXTROSE MONOHYDRATE 25 G/50ML
12.5 INJECTION, SOLUTION INTRAVENOUS PRN
Status: DISCONTINUED | OUTPATIENT
Start: 2021-09-29 | End: 2021-10-01 | Stop reason: HOSPADM

## 2021-09-29 RX ORDER — DEXTROSE MONOHYDRATE 50 MG/ML
100 INJECTION, SOLUTION INTRAVENOUS PRN
Status: DISCONTINUED | OUTPATIENT
Start: 2021-09-29 | End: 2021-10-01 | Stop reason: HOSPADM

## 2021-09-29 RX ORDER — SODIUM CHLORIDE 0.9 % (FLUSH) 0.9 %
10 SYRINGE (ML) INJECTION EVERY 12 HOURS SCHEDULED
Status: DISCONTINUED | OUTPATIENT
Start: 2021-09-29 | End: 2021-10-01 | Stop reason: HOSPADM

## 2021-09-29 RX ORDER — PANTOPRAZOLE SODIUM 40 MG/1
40 TABLET, DELAYED RELEASE ORAL DAILY
Status: DISCONTINUED | OUTPATIENT
Start: 2021-09-29 | End: 2021-10-01 | Stop reason: HOSPADM

## 2021-09-29 RX ORDER — METOPROLOL SUCCINATE 50 MG/1
50 TABLET, EXTENDED RELEASE ORAL DAILY
Status: DISCONTINUED | OUTPATIENT
Start: 2021-09-29 | End: 2021-10-01 | Stop reason: HOSPADM

## 2021-09-29 RX ORDER — MAGNESIUM SULFATE IN WATER 40 MG/ML
2000 INJECTION, SOLUTION INTRAVENOUS PRN
Status: DISCONTINUED | OUTPATIENT
Start: 2021-09-29 | End: 2021-10-01 | Stop reason: HOSPADM

## 2021-09-29 RX ORDER — SODIUM CHLORIDE 0.9 % (FLUSH) 0.9 %
10 SYRINGE (ML) INJECTION PRN
Status: DISCONTINUED | OUTPATIENT
Start: 2021-09-29 | End: 2021-10-01 | Stop reason: HOSPADM

## 2021-09-29 RX ORDER — NITROGLYCERIN 0.4 MG/1
0.4 TABLET SUBLINGUAL EVERY 5 MIN PRN
Status: DISCONTINUED | OUTPATIENT
Start: 2021-09-29 | End: 2021-10-01 | Stop reason: HOSPADM

## 2021-09-29 RX ORDER — ATORVASTATIN CALCIUM 10 MG/1
10 TABLET, FILM COATED ORAL DAILY
Status: DISCONTINUED | OUTPATIENT
Start: 2021-09-29 | End: 2021-10-01 | Stop reason: HOSPADM

## 2021-09-29 RX ORDER — 0.9 % SODIUM CHLORIDE 0.9 %
1000 INTRAVENOUS SOLUTION INTRAVENOUS ONCE
Status: COMPLETED | OUTPATIENT
Start: 2021-09-29 | End: 2021-09-29

## 2021-09-29 RX ORDER — ONDANSETRON 2 MG/ML
4 INJECTION INTRAMUSCULAR; INTRAVENOUS EVERY 6 HOURS PRN
Status: DISCONTINUED | OUTPATIENT
Start: 2021-09-29 | End: 2021-10-01 | Stop reason: HOSPADM

## 2021-09-29 RX ORDER — ALBUTEROL SULFATE 90 UG/1
2 AEROSOL, METERED RESPIRATORY (INHALATION) EVERY 6 HOURS PRN
Status: DISCONTINUED | OUTPATIENT
Start: 2021-09-29 | End: 2021-10-01 | Stop reason: HOSPADM

## 2021-09-29 RX ORDER — SODIUM CHLORIDE 9 MG/ML
25 INJECTION, SOLUTION INTRAVENOUS PRN
Status: DISCONTINUED | OUTPATIENT
Start: 2021-09-29 | End: 2021-10-01 | Stop reason: HOSPADM

## 2021-09-29 RX ORDER — AMLODIPINE BESYLATE 5 MG/1
5 TABLET ORAL DAILY
Status: DISCONTINUED | OUTPATIENT
Start: 2021-09-29 | End: 2021-10-01

## 2021-09-29 RX ORDER — MORPHINE SULFATE 2 MG/ML
4 INJECTION, SOLUTION INTRAMUSCULAR; INTRAVENOUS ONCE
Status: COMPLETED | OUTPATIENT
Start: 2021-09-29 | End: 2021-09-29

## 2021-09-29 RX ORDER — SODIUM CHLORIDE 9 MG/ML
1000 INJECTION, SOLUTION INTRAVENOUS ONCE
Status: COMPLETED | OUTPATIENT
Start: 2021-09-29 | End: 2021-09-29

## 2021-09-29 RX ORDER — ASPIRIN 81 MG/1
81 TABLET ORAL DAILY
Status: DISCONTINUED | OUTPATIENT
Start: 2021-09-29 | End: 2021-10-01 | Stop reason: HOSPADM

## 2021-09-29 RX ORDER — ASPIRIN 325 MG
325 TABLET ORAL ONCE
Status: COMPLETED | OUTPATIENT
Start: 2021-09-29 | End: 2021-09-29

## 2021-09-29 RX ORDER — POTASSIUM CHLORIDE 7.45 MG/ML
10 INJECTION INTRAVENOUS PRN
Status: DISCONTINUED | OUTPATIENT
Start: 2021-09-29 | End: 2021-10-01 | Stop reason: HOSPADM

## 2021-09-29 RX ORDER — NICOTINE POLACRILEX 4 MG
15 LOZENGE BUCCAL PRN
Status: DISCONTINUED | OUTPATIENT
Start: 2021-09-29 | End: 2021-10-01 | Stop reason: HOSPADM

## 2021-09-29 RX ORDER — ACETAMINOPHEN 325 MG/1
650 TABLET ORAL EVERY 6 HOURS PRN
Status: DISCONTINUED | OUTPATIENT
Start: 2021-09-29 | End: 2021-10-01 | Stop reason: HOSPADM

## 2021-09-29 RX ADMIN — INSULIN LISPRO 2 UNITS: 100 INJECTION, SOLUTION INTRAVENOUS; SUBCUTANEOUS at 12:05

## 2021-09-29 RX ADMIN — SODIUM CHLORIDE 1000 ML: 9 INJECTION, SOLUTION INTRAVENOUS at 06:23

## 2021-09-29 RX ADMIN — MORPHINE SULFATE 4 MG: 2 INJECTION, SOLUTION INTRAMUSCULAR; INTRAVENOUS at 05:06

## 2021-09-29 RX ADMIN — NITROGLYCERIN 0.4 MG: 0.4 TABLET, ORALLY DISINTEGRATING SUBLINGUAL at 03:59

## 2021-09-29 RX ADMIN — Medication 2 PUFF: at 21:00

## 2021-09-29 RX ADMIN — ASPIRIN 325 MG: 325 TABLET, FILM COATED ORAL at 03:58

## 2021-09-29 RX ADMIN — METOPROLOL SUCCINATE 50 MG: 50 TABLET, EXTENDED RELEASE ORAL at 12:02

## 2021-09-29 RX ADMIN — PANTOPRAZOLE SODIUM 40 MG: 40 TABLET, DELAYED RELEASE ORAL at 12:02

## 2021-09-29 RX ADMIN — ATORVASTATIN CALCIUM 10 MG: 10 TABLET, FILM COATED ORAL at 12:02

## 2021-09-29 RX ADMIN — SODIUM CHLORIDE 1000 ML: 9 INJECTION, SOLUTION INTRAVENOUS at 04:15

## 2021-09-29 RX ADMIN — TIOTROPIUM BROMIDE INHALATION SPRAY 2 PUFF: 3.12 SPRAY, METERED RESPIRATORY (INHALATION) at 16:42

## 2021-09-29 RX ADMIN — ASPIRIN 81 MG: 81 TABLET, COATED ORAL at 12:02

## 2021-09-29 RX ADMIN — AMLODIPINE BESYLATE 5 MG: 5 TABLET ORAL at 12:02

## 2021-09-29 ASSESSMENT — PAIN SCALES - GENERAL
PAINLEVEL_OUTOF10: 4
PAINLEVEL_OUTOF10: 9
PAINLEVEL_OUTOF10: 5
PAINLEVEL_OUTOF10: 9
PAINLEVEL_OUTOF10: 8
PAINLEVEL_OUTOF10: 0

## 2021-09-29 ASSESSMENT — PAIN DESCRIPTION - PAIN TYPE
TYPE: ACUTE PAIN

## 2021-09-29 ASSESSMENT — PAIN DESCRIPTION - LOCATION
LOCATION: CHEST

## 2021-09-29 NOTE — ED PROVIDER NOTES
[address] [facility]  ED  EMERGENCY DEPARTMENT ENCOUNTER      Pt Name: Margarita Caro  MRN: [id number]  [place] 1940  Date of evaluation: 9/29/2021  Provider: Shelby Tang MD    CHIEF COMPLAINT       Chief Complaint   Patient presents with    Chest Pain     woke up at 0230. no history          HISTORY OF PRESENT ILLNESS   (Location/Symptom, Timing/Onset, Context/Setting, Quality, Duration, Modifying Factors, Severity)  Note limiting factors. Margarita Caro is a [de-identified] y.o. male with past medical history of hypertension, hyperlipidemia, diabetes, moderate but nonobstructive coronary artery disease here today with chest pain. Patient states he was woken from sleep with a substernal pressure heavy type chest pain in the center of his chest associated with mild shortness of breath. No significant cough or hemoptysis. No abdominal pain, nausea or vomiting. No fevers or chills. No lower extremity swelling, redness or pain. Pain moderate. In late January of this year patient did have a stress test that was concerning for anterior and apical ischemia for which she underwent coronary angiogram in February showing mild to moderate disease    HPI    Nursing Notes were reviewed. REVIEW OF SYSTEMS    (2-9 systems for level 4, 10 or more for level 5)     Review of Systems    Please see HPI for pertinent positive and negative review of system findings. A full 10 system ROS was performed and otherwise negative.         PAST MEDICAL HISTORY     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    Hyperlipidemia     Hypertension     Primary malignant neoplasm of skin of trunk 4/21/2009         SURGICAL HISTORY       Past Surgical History:   Procedure Laterality Date    COLONOSCOPY  5/22/12    polyps    COLONOSCOPY  7/21/15    polyps    CYSTOSCOPY N/A 10/24/2019    CYSTOSCOPY AND CLOT EVACUATION performed by Thomas Mcbride MD at [address] Bilateral 6/22/15    laparoscopic    LUNG REMOVAL, PARTIAL  2007    middle lobe R lung/histoplasmosis    TURP  2002    UPPER GASTROINTESTINAL ENDOSCOPY  1/26/2015    UPPER GASTROINTESTINAL ENDOSCOPY  2/15    dilated    UPPER GASTROINTESTINAL ENDOSCOPY  07/21/2017    dilatation, bx    UPPER GASTROINTESTINAL ENDOSCOPY  08/23/2017    dilated; Bx gastric.  VASECTOMY           CURRENT MEDICATIONS       Previous Medications    AMLODIPINE (NORVASC) 5 MG TABLET    TAKE ONE TABLET BY MOUTH DAILY    ASPIRIN 81 MG EC TABLET    Take 81 mg by mouth daily. ATORVASTATIN (LIPITOR) 10 MG TABLET    TAKE ONE TABLET BY MOUTH DAILY    HANDICAP PLACARD MISC    by Does not apply route    KETOCONAZOLE (NIZORAL) 2 % CREAM    APPLY TO AFFECTED AREAS OF FACE AROUND NOSE TWO TIMES A DAY    KETOCONAZOLE (NIZORAL) 2 % SHAMPOO    WASH SCALP THREE TIMES A WEEK    LOSARTAN-HYDROCHLOROTHIAZIDE (HYZAAR) 100-25 MG PER TABLET    TAKE ONE TABLET BY MOUTH DAILY    METFORMIN (GLUCOPHAGE-XR) 500 MG EXTENDED RELEASE TABLET    TAKE TWO TABLETS BY MOUTH TWICE A DAY WITH MEALS    METOPROLOL SUCCINATE (TOPROL XL) 50 MG EXTENDED RELEASE TABLET    TAKE ONE TABLET BY MOUTH DAILY    NITROGLYCERIN (NITROSTAT) 0.4 MG SL TABLET    Place 1 tablet under the tongue every 5 minutes as needed for Chest pain    PANTOPRAZOLE (PROTONIX) 40 MG TABLET    TAKE ONE TABLET BY MOUTH DAILY    PROAIR  (90 BASE) MCG/ACT INHALER    Inhale 2 puffs into the lungs every 6 hours as needed for Wheezing    THERAPEUTIC MULTIVITAMIN-MINERALS (THERAGRAN-M) TABLET    Take 1 tablet by mouth daily. TRELEGY ELLIPTA 100-62.5-25 MCG/INH AEPB    INHALE ONE PUFF BY MOUTH DAILY    TRIAMCINOLONE (KENALOG) 0.1 % OINTMENT    APPLY TO AFFECTED AREA(S) SPARINGLY TWO TIMES A DAY UNTIL CLEAR.  DO NOT USE FOR MORE THAN TWO WEEKS STRAIGHT       ALLERGIES     Patient has no known allergies. FAMILY HISTORY       Family History   Problem Relation Age of Onset    Cancer Brother         colon CA    Heart Disease Mother           SOCIAL HISTORY       Social History     Socioeconomic History    Marital status:      Spouse name: Not on file    Number of children: Not on file    Years of education: Not on file    Highest education level: Not on file   Occupational History    Not on file   Tobacco Use    Smoking status: Former Smoker     Packs/day: 1.00     Years: 30.00     Pack years: 30.00     Quit date: 2003     Years since quittin.7    Smokeless tobacco: Never Used   Vaping Use    Vaping Use: Never used   Substance and Sexual Activity    Alcohol use: Yes     Alcohol/week: 0.0 standard drinks     Comment: 1/ week    Drug use: No    Sexual activity: Not on file   Other Topics Concern    Not on file   Social History Narrative    Not on file     Social Determinants of Health     Financial Resource Strain:     Difficulty of Paying Living Expenses:    Food Insecurity:     Worried About Running Out of Food in the Last Year:     [address] in the Last Year:    Transportation Needs:     Lack of Transportation (Medical):      Lack of Transportation (Non-Medical):    Physical Activity:     Days of Exercise per Week:     Minutes of Exercise per Session:    Stress:     Feeling of Stress :    Social Connections:     Frequency of Communication with Friends and Family:     Frequency of Social Gatherings with Friends and Family:     Attends [employer] Services:     Active Member of Clubs or Organizations:     Attends Club or Organization Meetings:     Marital Status:    Intimate Partner Violence:     Fear of Current or Ex-Partner:     Emotionally Abused:     Physically Abused:     Sexually Abused:        SCREENINGS               PHYSICAL EXAM    (up to 7 for level 4, 8 or more for level 5)     ED Triage Vitals [09/29/21 0340]   BP Temp Temp Source Pulse Resp SpO2 Height Weight   (!) 197/101 97.6 °F (36.4 °C) Oral 85 22 99 % 5' 8\" (1.727 m) 230 lb (104.3 kg)       Physical Exam    General appearance:  Cooperative. No acute distress. Skin:  Warm. Dry. Eye:  Extraocular movements intact. Ears, nose, mouth and throat:  Oral mucosa moist,  Neck:  Trachea midline. Heart:  Regular rate and rhythm  Perfusion:  intact  Respiratory:  Lungs clear to auscultation bilaterally. Respirations nonlabored. Abdominal:   Non distended. Nontender  Neurological:  Alert and oriented x 3.   Moves all extremities spontaneously  Musculoskeletal:   Normal ROM, no deformities          Psychiatric:  Normal mood      DIAGNOSTIC RESULTS       Labs Reviewed   CBC WITH AUTO DIFFERENTIAL - Abnormal; Notable for the following components:       Result Value    WBC 12.8 (*)     Platelets 963 (*)     All other components within normal limits    Narrative:     Performed at:  [address],  [place], [address] [place]   Phone [phone]   COMPREHENSIVE METABOLIC PANEL W/ REFLEX TO MG FOR LOW K - Abnormal; Notable for the following components:    Glucose 148 (*)     GFR Non- 58 (*)     All other components within normal limits    Narrative:     Performed at:  [address],  [place], [address] [place]   Phone [phone]   APTT - Abnormal; Notable for the following components:    aPTT 24.0 (*)     All other components within normal limits    Narrative:     Performed at:  [address],  [place], [address] [place]   Phone [phone], RAPID   TROPONIN    Narrative:     Performed at:  [address],  [place], [address] [place]   Phone [phone] PEPTIDE    Narrative:     Performed at:  [facility]  [address],  [place], [place]   Phone [phone]   PROTIME-INR    Narrative:     Performed at:  [facility]  [address],  [place], [place]   Phone [phone]       Interpretation per the Radiologist below, if obtained/available at the time of this note:    XR CHEST PORTABLE   Final Result   Moderate pulmonary vascular congestion. The hazy bibasilar opacities spare   the perihilar lung. Could be atypical pattern of edema or infectious. All other labs/imaging were within normal range or not returned as of this dictation. EMERGENCY DEPARTMENT COURSE and DIFFERENTIAL DIAGNOSIS/MDM:   Vitals:    Vitals:    09/29/21 0400 09/29/21 0412 09/29/21 0418 09/29/21 0430   BP: 126/81 87/61 (!) 93/58 125/74   Pulse: 81 82 76 77   Resp: 25 23 17 22   Temp:       TempSrc:       SpO2:  95% 92%    Weight:       Height:           EKG: Sinus rhythm premature supraventricular complexes rate of 90 bpm.  No ST segment changes. No ST elevation. When compared to prior from 2/4/2021 no significant changes. Patient presents emergency department today complaining of fairly typical sounding chest pain. History of moderate coronary artery disease based on fairly recent cath. Quite hypertensive on initial arrival.  EKG otherwise nondiagnostic. Given 1 sublingual nitro and the patient's pressures did drop. Troponin ultimately negative. Chest x-ray questioning edema versus infectious findings but his BNP is normal.  He has no cough is having no fevers. Overall low suspicion for pneumonia. Symptoms occurred fairly abruptly and woke him from sleep. Plan to admit to the hospital for further cardiac work-up. Low suspicion for dissection or PE at present    MDM    CONSULTS     IP CONSULT TO HOSPITALIST    Critical Care:   None    REASSESSMENT          PROCEDURE     Unless otherwise noted below, none     Procedures      FINAL IMPRESSION      1.  Chest pain, unspecified type            DISPOSITION/PLAN   DISPOSITION Decision To Admit 09/29/2021 04:37:32 AM        PATIENT REFERRED TO:  No follow-up provider specified. DISCHARGE MEDICATIONS:  New Prescriptions    No medications on file     Controlled Substances Monitoring:     No flowsheet data found.     (Please note that portions of this note were completed with a voice recognition program.  Efforts were made to edit the dictations but occasionally words are mis-transcribed.)    Sandie Feliciano MD (electronically signed)  Attending Emergency Physician            Geovany Graham MD  09/29/21 2673

## 2021-09-29 NOTE — CONSULTS
[address] [facility]  [phone]      Attending Physician: Deonte Holbrook MD  Reason for Consultation/Chief Complaint: Chest pain    Subjective   History of Present Illness:  Juana Pantoja is a [de-identified] y.o. patient who presented to the hospital with complaints of chest pain and lightheadedness that awoke him from sleep around 2 in the morning today. Patient presented to emergency room as result of the symptoms. He describes this as a heaviness in the middle of his chest and was also associated with lightheadedness as well as shortness of breath. He presented emergency room where serial troponin levels been found to be negative. He says he still feels similar chest pain as that which brought him in. He says that neither nitroglycerin helped nor did aspirin however morphine helped slightly but he continues to have discomfort. He he did not note any change in the pattern of pain with deep inspiration. Patient has a cardiac history which dates back to 2018, was seen in our office with Dr. Walter Albarran and then ultimately transition to Dr. Bryce Luong. Earlier this year in January 2021 he had a stress test which was abnormal which prompted heart catheterization with Dr. Walter Albarran which showed moderate CAD and he has been managed medically since then. He last followed up in the office on August 16, 2021 with Dr. Bryce Luong and was felt to be stable with regard to chronic medical conditions including hypertension, hypercholesterolemia, CAD. He says he sees Dr. Kathi Neville from pulmonary for COPD, he had quit smoking about 15 years ago. Patient states she chronically has lower extremity swelling and this is unchanged. Patient also chronically has a history of peripheral vascular disease with a 4.4 cm infrarenal abdominal aortic aneurysm, this is been followed by his family doctor and an upcoming follow-up CTA is planned.     Past Medical History:   has a past medical history of COPD (chronic obstructive pulmonary disease) ([facility] [address] Decreased hearing of both ears, Diabetes mellitus ([facility] [address] GERD (gastroesophageal reflux disease), Histoplasmosis, Hyperlipidemia, Hypertension, and Primary malignant neoplasm of skin of trunk. Surgical History:   has a past surgical history that includes Lung removal, partial (2007); Vasectomy; TURP (2002); Upper gastrointestinal endoscopy (1/26/2015); Upper gastrointestinal endoscopy (2/15); Inguinal hernia repair (Bilateral, 6/22/15); Colonoscopy (5/22/12); Colonoscopy (7/21/15); Upper gastrointestinal endoscopy (07/21/2017); Upper gastrointestinal endoscopy (08/23/2017); and Cystoscopy (N/A, 10/24/2019). Social History:   reports that he quit smoking about 18 years ago. He has a 30.00 pack-year smoking history. He has never used smokeless tobacco. He reports current alcohol use. He reports that he does not use drugs. Family History:  family history includes Cancer in his brother; Heart Disease in his mother. Home Medications:  Were reviewed and are listed in nursing record and/or below  Prior to Admission medications    Medication Sig Start Date End Date Taking?  Authorizing Provider   atorvastatin (LIPITOR) 10 MG tablet TAKE ONE TABLET BY MOUTH DAILY 9/20/21  Yes MT Melgoza MD   losartan-hydroCHLOROthiazide (HYZAAR) 100-25 MG per tablet TAKE ONE TABLET BY MOUTH DAILY 9/10/21  Yes MT Melgoza MD   metoprolol succinate (TOPROL XL) 50 MG extended release tablet TAKE ONE TABLET BY MOUTH DAILY 7/27/21  Yes MT Melgoza MD   Allie Deist 100-62.5-25 MCG/INH AEPB INHALE ONE PUFF BY MOUTH DAILY 6/8/21  Yes Nestor Mendez MD   metFORMIN (GLUCOPHAGE-XR) 500 MG extended release tablet TAKE TWO TABLETS BY MOUTH TWICE A DAY WITH MEALS 5/24/21  Yes MT Melgoza MD   amLODIPine (NORVASC) 5 MG tablet TAKE ONE TABLET BY MOUTH DAILY 5/7/21  Yes MT Melgoza MD   ketoconazole (NIZORAL) 2 % cream APPLY TO AFFECTED AREAS OF FACE AROUND NOSE TWO TIMES A DAY 2/18/21  Yes Kandice Clark MD   pantoprazole (PROTONIX) 40 MG tablet TAKE ONE TABLET BY MOUTH DAILY 11/17/20  Yes MT Santo MD   ketoconazole (NIZORAL) 2 % shampoo WASH SCALP THREE TIMES A WEEK 10/15/20  Yes Checo Schumacher MD   PROAIR  (17 Base) MCG/ACT inhaler Inhale 2 puffs into the lungs every 6 hours as needed for Wheezing 8/28/20  Yes Shannon Garcia MD   triamcinolone (KENALOG) 0.1 % ointment APPLY TO AFFECTED AREA(S) SPARINGLY TWO TIMES A DAY UNTIL CLEAR. DO NOT USE FOR MORE THAN TWO WEEKS STRAIGHT 5/26/20  Yes Checo Schumacher MD   therapeutic multivitamin-minerals [facility]) tablet Take 1 tablet by mouth daily. Yes Historical Provider, MD   aspirin 81 MG EC tablet Take 81 mg by mouth daily.    Yes Historical Provider, MD   nitroGLYCERIN (NITROSTAT) 0.4 MG SL tablet Place 1 tablet under the tongue every 5 minutes as needed for Chest pain 8/16/21   Kip Moreno MD   Handicap Placard MISC by Does not apply route 4/12/19   MOOSE Winters        CURRENT Medications:  nitroGLYCERIN (NITROSTAT) SL tablet 0.4 mg, Q5 Min PRN  sodium chloride flush 0.9 % injection 10 mL, 2 times per day  sodium chloride flush 0.9 % injection 10 mL, PRN  0.9 % sodium chloride infusion, PRN  potassium chloride 10 mEq/100 mL IVPB (Peripheral Line), PRN  magnesium sulfate 2000 mg in 50 mL IVPB premix, PRN  promethazine (PHENERGAN) tablet 12.5 mg, Q6H PRN   Or  ondansetron (ZOFRAN) injection 4 mg, Q6H PRN  acetaminophen (TYLENOL) tablet 650 mg, Q6H PRN   Or  acetaminophen (TYLENOL) suppository 650 mg, Q6H PRN  regadenoson (LEXISCAN) injection 0.4 mg, ONCE PRN  amLODIPine (NORVASC) tablet 5 mg, Daily  atorvastatin (LIPITOR) tablet 10 mg, Daily  metoprolol succinate (TOPROL XL) extended release tablet 50 mg, Daily  pantoprazole (PROTONIX) tablet 40 mg, Daily  albuterol sulfate  (90 Base) MCG/ACT inhaler 2 puff, Q6H PRN  fluticasone-umeclidin-vilant (TRELEGY ELLIPTA) 100-62.5-25 MCG/INH inhaler 1 puff, Daily  aspirin EC tablet 81 mg, Daily  glucose (GLUTOSE) 40 % oral gel 15 g, PRN  dextrose 50 % IV solution, PRN  glucagon (rDNA) injection 1 mg, PRN  dextrose 5 % solution, PRN  insulin lispro (HUMALOG) injection vial 0-12 Units, TID WC  insulin lispro (HUMALOG) injection vial 0-6 Units, Nightly        Allergies:  Patient has no known allergies. Review of Systems:   A 14 point review of symptoms completed. Pertinent positives identified in the HPI, all other review of symptoms negative as below.       Objective   PHYSICAL EXAM:    Vitals:    09/29/21 0903   BP: (!) 154/87   Pulse: 100   Resp: 19   Temp: 98.2 °F (36.8 °C)   SpO2: 95%    Weight: 230 lb (104.3 kg)         General Appearance:  Alert, cooperative, no distress, appears stated age   Head:  Normocephalic, without obvious abnormality, atraumatic   Eyes:  PERRL, conjunctiva/corneas clear   Nose: Nares normal, no drainage or sinus tenderness   Throat: Lips, mucosa, and tongue normal   Neck: Supple, symmetrical, trachea midline, no adenopathy, thyroid: not enlarged, symmetric, no tenderness/mass/nodules, no carotid bruit or JVD   Lungs:    Decreased bilaterally with wheezing, respirations unlabored   Chest Wall:   Positive tenderness   Heart:  Regular rate and rhythm, S1, S2 normal, no murmur, rub or gallop   Abdomen:   Soft, non-tender, bowel sounds active all four quadrants,  no masses, no organomegaly   Extremities: Extremities trace to 1+ bilateral lower extremity edema   Pulses: 2+ and symmetric   Skin: Skin color, texture, turgor normal, no rashes or lesions   Pysch: Normal mood and affect   Neurologic: Normal gross motor and sensory exam.         Labs   CBC:   Lab Results   Component Value Date    WBC 12.8 09/29/2021    RBC 4.92 09/29/2021    HGB 14.0 09/29/2021    HCT 43.0 09/29/2021    MCV 87.3 09/29/2021    RDW 15.0 09/29/2021     09/29/2021     CMP:  Lab Results   Component Value Date     09/29/2021    K 3.9 09/29/2021    CL 99 09/29/2021    CO2 29 09/29/2021 long-term current use of insulin (HCC)    Intractable migraine with aura    Chronic tension headache    Essential hypertension    Brain benign neoplasm (HCC)    Empyema of pleura (HCC)    Mixed hyperlipidemia    Primary malignant neoplasm of skin of trunk    Dermatophytosis of nail    Basal cell carcinoma of left side of nose    Non morbid obesity due to excess calories    Shortness of breath    Suspected sleep apnea    Diastolic dysfunction    History of esophageal stricture    H/O histoplasmosis    Obesity (BMI 35.0-39.9 without comorbidity)    Pleural effusion    Simple chronic bronchitis (HCC)    Abdominal aortic aneurysm (AAA) without rupture (HCC)    Hematuria    Acute urinary retention    Other chest pain    History of tobacco abuse    Abnormal cardiovascular stress test         Details:              Cb Butts was brought to the cardiac catheterization lab in a fasting state after informed consent was obtained. If the patient was able to provide written consent, it was obtained. The patient's vitals were monitored through out the procedure. The patient was sedated using the appropriate levels of sedation and ASA guidelines. The appropriate access site area was prepped and drapped in a sterile fashion. The area was anesthetized with 2% lidocaine. Using the modified Seldinger technique, an arterial sheath was introduced into the arterial access site using a single anterior wall puncture. The sheath was flushed and prepped in usual fashion. Catheters used during the procedure included 5 Albanian TIG 4.0 catheter. The catheters were advanced and removed over a .035\" wire, into the appropriate positions. Multiple angiographic views were obtained.  An LV gram was obtained.     Findings:     1. Left main coronary artery was normal. It gave off the left anterior descending artery and left circumflex.     2. Left anterior descending artery has mild to moderate atherosclerotic disease. It was moderate in size. It gave off septal perforators and a moderate sized diagonal branch. The LAD covered the entire apex of the left ventricle.      3. Left circumflex has mild to moderate atherosclerotic disease. It was moderate in size. There was a moderate sized obtuse marginal branch.     4. Right coronary artery has mild to moderate atherosclerotic disease. It was moderate in size and was the dominant artery.     5. Left ventriculogram showed normal LVEF at 55-60%. Wall motion was normal . There was no significant mitral valve or aortic valve disease noted. LVEDP was normal. There was no gradient noted across the aortic valve during pullback of the catheter.     Ht 5' 8\" (1.727 m)   Wt 239 lb 8 oz (108.6 kg)   BMI 36.42 kg/m²      The access site was controlled with manual pressure and/or appropriate closure device.     Moderate Conscious Sedation Details: An independent trained observer pushed medications at my direction. We monitoring the patient's level of consciousness and vital signs/physiologic status throughout the procedure. CPT codes 21091 and 51555     Start time: 0815  Stop time: 0199  ASA class: 3     Sedation totals:  Versad - 4mg  Fentanyl - 100mcg     EBL - minimal <5 cc blood loss     The patient was monitored continuously with the ECG, pulse oximetry, blood pressure, and direct observation.        CONCLUSIONS:     1. Mild to moderate coronary artery disease with preserved LVEF     ASSESSMENT/RECOMMENDATIONS:     1. Risk Factor control  2.  Consider anti-anginal therapy with long acting nitrates        Susie Hui MD, WILLIAMS, [facility], [address] [place] [address]  [phone] Main central office  [phone] [facility] ED office  2/4/2021  9:09 AM                   Studies:     cxr    Impression   Moderate pulmonary vascular congestion.  The hazy bibasilar opacities spare   the perihilar lung.  Could be atypical pattern of edema or infectious.             I have reviewed labs and imaging/xray/diagnostic testing in this note. Assessment and Plan        Patient Active Problem List   Diagnosis    GERD (gastroesophageal reflux disease)    Controlled type 2 diabetes mellitus without complication, without long-term current use of insulin (HCC)    Intractable migraine with aura    Chronic tension headache    Essential hypertension    Brain benign neoplasm ([address]    Empyema of pleura ([address]    Mixed hyperlipidemia    Primary malignant neoplasm of skin of trunk    Dermatophytosis of nail    Basal cell carcinoma of left side of nose    Non morbid obesity due to excess calories    Shortness of breath    Suspected sleep apnea    Diastolic dysfunction    History of esophageal stricture    H/O histoplasmosis    Obesity (BMI 35.0-39.9 without comorbidity)    Pleural effusion    Simple chronic bronchitis (HCC)    Abdominal aortic aneurysm (AAA) without rupture (HCC)    Hematuria    Acute urinary retention    Other chest pain    History of tobacco abuse    Abnormal cardiovascular stress test    Mild CAD    Observed sleep apnea    Chest pain       Chest pain, evaluate further with echocardiogram, can do a follow-up Lexiscan nuclear stress test as previous study was greater than 6 months ago. Consider cardiac catheterization if there are high risk features on noninvasive work-up. CAD/ASHD, continue aspirin daily    Hypercholesterolemia, continue statin    Hypertension, continue losartan/HCTZ and metoprolol as well as amlodipine    PVD/AAA, management with medications as above, consider outpatient vascular surgery referral    Abnl cxr, being assess for covid vs pulm edema    COPD, as per primary service    Diabetes, as per primary service        Thank you for allowing us to participate in the care of Agustin Holder. Please call me with any questions [phone].     Hanley Scheuermann, MD, [address] [facility] [address] [facility]  [phone] [address] [facility]  [phone] [facility]  9/29/2021 11:34 AM

## 2021-09-29 NOTE — PROGRESS NOTES
Perfect serve to Dr. Josh Mcallister:    9/29/21 9:17 AM  1) Stress test unable to be completed today d/t pending PCR. Diet order? 2) med rec complete. Please order home meds. Pt is diabetic and will need SSI coverage. 3) FYI: WBC mildly elevated & CXR showed edema vs. infection. lactic 2.2      9/29/21 9:55 AM   Pt also had an cath in February that showed mild/moderate disease but no stenting was done at the time. He may benefit from a cardio consult.  Thanks

## 2021-09-29 NOTE — H&P
Hospital Medicine History & Physical      PCP: Benita Betancourt MD    Date of Admission: 9/29/2021    Date of Service: Pt seen/examined on 9/29/2021 and placed in Observation. Chief Complaint:  Chest pain    History Of Present Illness:   [de-identified] y.o. male who presented to [facility] with chest pain. PMHx significant for CAD, COPD, DM2, HTN. Presented with episode of chest pain and lightheadedness that awoke him from sleep. Describe chest heaviness, mid sternal, mild to moderate severity. Associated with some SOB. Presented to ED for evaluation. During ED work-up he ruled out for ACS. His BNP was normal. However, CXR showed evidence of bilateral hazy airspace disease in a pattern atypical for pulmonary edema. Vaccinated for [place]. Both Rapid and PCR testing were negative. 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    Hyperlipidemia     Hypertension     Primary malignant neoplasm of skin of trunk 4/21/2009       Past Surgical History:          Procedure Laterality Date    COLONOSCOPY  5/22/12    polyps    COLONOSCOPY  7/21/15    polyps    CYSTOSCOPY N/A 10/24/2019    CYSTOSCOPY AND CLOT EVACUATION performed by Valentin Zamorano MD at [address] Bilateral 6/22/15    laparoscopic    LUNG REMOVAL, PARTIAL  2007    middle lobe R lung/histoplasmosis    TURP  2002    UPPER GASTROINTESTINAL ENDOSCOPY  1/26/2015    UPPER GASTROINTESTINAL ENDOSCOPY  2/15    dilated    UPPER GASTROINTESTINAL ENDOSCOPY  07/21/2017    dilatation, bx    UPPER GASTROINTESTINAL ENDOSCOPY  08/23/2017    dilated; Bx gastric.  VASECTOMY         Medications Prior to Admission:      Prior to Admission medications    Medication Sig Start Date End Date Taking?  Authorizing Provider   atorvastatin (LIPITOR) 10 MG tablet TAKE ONE TABLET BY MOUTH DAILY 9/20/21  Yes MT Burk MD   losartan-hydroCHLOROthiazide (HYZAAR) 100-25 MG per tablet TAKE ONE TABLET BY MOUTH DAILY 9/10/21  Yes MT Burk MD   metoprolol succinate (TOPROL XL) 50 MG extended release tablet TAKE ONE TABLET BY MOUTH DAILY 7/27/21  Yes MT Burk MD   Rosalinda Eric 100-62.5-25 MCG/INH AEPB INHALE ONE PUFF BY MOUTH DAILY 6/8/21  Yes Quentin Collins MD   metFORMIN (GLUCOPHAGE-XR) 500 MG extended release tablet TAKE TWO TABLETS BY MOUTH TWICE A DAY WITH MEALS 5/24/21  Yes MT Burk MD   amLODIPine (NORVASC) 5 MG tablet TAKE ONE TABLET BY MOUTH DAILY 5/7/21  Yes MT Burk MD   ketoconazole (NIZORAL) 2 % cream APPLY TO AFFECTED AREAS OF FACE AROUND NOSE TWO TIMES A DAY 2/18/21  Yes Cem Simental MD   pantoprazole (PROTONIX) 40 MG tablet TAKE ONE TABLET BY MOUTH DAILY 11/17/20  Yes MT Burk MD   ketoconazole (NIZORAL) 2 % shampoo WASH SCALP THREE TIMES A WEEK 10/15/20  Yes Cem Simental MD   PROAIR  (01 Base) MCG/ACT inhaler Inhale 2 puffs into the lungs every 6 hours as needed for Wheezing 8/28/20  Yes Quentin Collins MD   triamcinolone (KENALOG) 0.1 % ointment APPLY TO AFFECTED AREA(S) SPARINGLY TWO TIMES A DAY UNTIL CLEAR. DO NOT USE FOR MORE THAN TWO WEEKS STRAIGHT 5/26/20  Yes Cem Simental MD   therapeutic multivitamin-minerals [facility]) tablet Take 1 tablet by mouth daily. Yes Historical Provider, MD   aspirin 81 MG EC tablet Take 81 mg by mouth daily. Yes Historical Provider, MD   nitroGLYCERIN (NITROSTAT) 0.4 MG SL tablet Place 1 tablet under the tongue every 5 minutes as needed for Chest pain 8/16/21   Josh Alarcon MD   Handicap Placard MISC by Does not apply route 4/12/19   MOOSE Rehman       Allergies:  Patient has no known allergies. Social History:    TOBACCO:   reports that he quit smoking about 18 years ago. He has a 30.00 pack-year smoking history.  He has never used smokeless tobacco.  ETOH:   reports current alcohol use. E-Cigarettes/Vaping Use     Questions Responses    E-Cigarette/Vaping Use Never User    Start Date     Passive Exposure     Quit Date     Counseling Given     Comments             Family History:          Problem Relation Age of Onset    Cancer Brother         colon CA    Heart Disease Mother        REVIEW OF SYSTEMS:   Pertinent positives as noted in the HPI. All other systems reviewed and negative. Physical Exam Performed:    /84   Pulse 75   Temp 98.1 °F (36.7 °C) (Oral)   Resp 21   Ht 5' 8\" (1.727 m)   Wt 230 lb (104.3 kg)   SpO2 94%   BMI 34.97 kg/m²     General appearance: No apparent distress, appears stated age and cooperative. HEENT:  Normal cephalic, atraumatic without obvious deformity. Pupils equal, round, and reactive to light. Extra ocular muscles intact. Conjunctivae/corneas clear. Neck: Supple, no jugular venous distention. Trachea midline with full range of motion. Respiratory:  Normal respiratory effort. Clear to auscultation, bilaterally without Rales/Wheezes/Rhonchi. Cardiovascular: Regular rate and rhythm with normal S1/S2 without murmurs, rubs or gallops. Abdomen: Soft, non-tender, non-distended with normal bowel sounds. Musculoskelatal: No clubbing, cyanosis or edema bilaterally. Full range of motion without deformity. Neurologic:  Neurovascularly intact without any focal sensory/motor deficits. Cranial nerves: II-XII intact, grossly non-focal.  Psychiatric: Alert and oriented, thought content appropriate, normal insight  Skin: Skin color, texture, turgor normal.  No rashes or lesions.   Capillary Refill: Brisk,< 3 seconds   Peripheral Pulses: +2 palpable, equal bilaterally       Labs:     Recent Labs     09/29/21 0339   WBC 12.8*   HGB 14.0   HCT 43.0   *     Recent Labs     09/29/21 0339      K 3.9   CL 99   CO2 29   BUN 13   CREATININE 1.2   CALCIUM 9.7     Recent Labs     09/29/21 0339   AST 20   ALT 20   BILITOT 0.3   ALKPHOS 88     Recent Labs     09/29/21  0340   INR 0.91     Recent Labs     09/29/21  0339 09/29/21  0511 09/29/21  1019   TROPONINI <0.01 <0.01 <0.01       Radiology:     CXR: I have reviewed the CXR with the following interpretation: Bilateral airspace disease, atypical edema or pneumonia  EKG:  I have reviewed the EKG with the following interpretation: SR with PACs, no acute ischemic changes. XR CHEST PORTABLE    Result Date: 9/29/2021  EXAMINATION: ONE XRAY VIEW OF THE CHEST 9/29/2021 4:11 am COMPARISON: 08/09/2021 HISTORY: ORDERING SYSTEM PROVIDED HISTORY: chest pain TECHNOLOGIST PROVIDED HISTORY: Reason for exam:->chest pain Reason for Exam: Chest Pain (woke up at 0230. no history ) FINDINGS: The lungs are hypoinflated. Hazy opacities in the left lung base more than the right. No significant pleural effusion is seen and no pneumothorax. The cardiac silhouette is borderline. There is moderate pulmonary vascular congestion. Calcified right mediastinal/hilar lymph nodes are suggested. No obvious fractures are identified. Right thoracotomy defect is stable. Moderate pulmonary vascular congestion. The hazy bibasilar opacities spare the perihilar lung. Could be atypical pattern of edema or infectious. ASSESSMENT:    Active Problems:    Chest pain  Resolved Problems:    * No resolved hospital problems. *      PLAN:    Chest pain/CAD by history: Ruled out for ACS. His BNP was normal. However, CXR showed evidence of bilateral hazy airspace disease in a pattern atypical for pulmonary edema. Vaccinated for [employer]. Both Rapid and PCR testing were negative. Cardiology consulted. Recommended updated NM Stress testing. Monitor on telemetry. Diabetes Mellitus, Type 2: Controlled. Continue Sliding Scale Insulin regimen. Monitor blood sugar and adjust regimen as needed. Diabetic (Carb-controlled) diet when able. Hypertension, benign: Controlled.  Continue current medication regimen, monitor and adjust as needed. DVT Prophylaxis: Lovenox  Diet: Diet NPO Exceptions are: Ice Chips, Sips of Water with Meds  ADULT DIET;  Regular; 4 carb choices (60 gm/meal)  Code Status: Full Code    PT/OT Eval Status: NA    Dispo - 1-2 days     Bishop Lucius MD

## 2021-09-30 ENCOUNTER — TELEPHONE (OUTPATIENT)
Dept: PULMONOLOGY | Age: 81
End: 2021-09-30

## 2021-09-30 ENCOUNTER — APPOINTMENT (OUTPATIENT)
Dept: NUCLEAR MEDICINE | Age: 81
DRG: 310 | End: 2021-09-30
Payer: MEDICARE

## 2021-09-30 LAB
A/G RATIO: 1.3 (ref 1.1–2.2)
ALBUMIN SERPL-MCNC: 3.9 G/DL (ref 3.4–5)
ALP BLD-CCNC: 76 U/L (ref 40–129)
ALT SERPL-CCNC: 16 U/L (ref 10–40)
ANION GAP SERPL CALCULATED.3IONS-SCNC: 9 MMOL/L (ref 3–16)
AST SERPL-CCNC: 20 U/L (ref 15–37)
BASOPHILS ABSOLUTE: 0.1 K/UL (ref 0–0.2)
BASOPHILS RELATIVE PERCENT: 1 %
BILIRUB SERPL-MCNC: 0.9 MG/DL (ref 0–1)
BUN BLDV-MCNC: 11 MG/DL (ref 7–20)
CALCIUM SERPL-MCNC: 9.2 MG/DL (ref 8.3–10.6)
CHLORIDE BLD-SCNC: 104 MMOL/L (ref 99–110)
CO2: 25 MMOL/L (ref 21–32)
CREAT SERPL-MCNC: 1.2 MG/DL (ref 0.8–1.3)
D DIMER: 378 NG/ML DDU (ref 0–229)
EKG ATRIAL RATE: 119 BPM
EKG DIAGNOSIS: NORMAL
EKG Q-T INTERVAL: 304 MS
EKG QRS DURATION: 90 MS
EKG QTC CALCULATION (BAZETT): 450 MS
EKG R AXIS: 17 DEGREES
EKG T AXIS: 37 DEGREES
EKG VENTRICULAR RATE: 132 BPM
EOSINOPHILS ABSOLUTE: 0.4 K/UL (ref 0–0.6)
EOSINOPHILS RELATIVE PERCENT: 3.3 %
GFR AFRICAN AMERICAN: >60
GFR NON-AFRICAN AMERICAN: 58
GLOBULIN: 3 G/DL
GLUCOSE BLD-MCNC: 136 MG/DL (ref 70–99)
GLUCOSE BLD-MCNC: 145 MG/DL (ref 70–99)
GLUCOSE BLD-MCNC: 154 MG/DL (ref 70–99)
GLUCOSE BLD-MCNC: 162 MG/DL (ref 70–99)
HCT VFR BLD CALC: 40.3 % (ref 40.5–52.5)
HEMOGLOBIN: 13.4 G/DL (ref 13.5–17.5)
LV EF: 58 %
LV EF: 70 %
LVEF MODALITY: NORMAL
LVEF MODALITY: NORMAL
LYMPHOCYTES ABSOLUTE: 2.8 K/UL (ref 1–5.1)
LYMPHOCYTES RELATIVE PERCENT: 24 %
MCH RBC QN AUTO: 28.9 PG (ref 26–34)
MCHC RBC AUTO-ENTMCNC: 33.4 G/DL (ref 31–36)
MCV RBC AUTO: 86.5 FL (ref 80–100)
MONOCYTES ABSOLUTE: 1.3 K/UL (ref 0–1.3)
MONOCYTES RELATIVE PERCENT: 11 %
NEUTROPHILS ABSOLUTE: 7.1 K/UL (ref 1.7–7.7)
NEUTROPHILS RELATIVE PERCENT: 60.7 %
PDW BLD-RTO: 14.7 % (ref 12.4–15.4)
PERFORMED ON: ABNORMAL
PLATELET # BLD: 376 K/UL (ref 135–450)
PMV BLD AUTO: 7.3 FL (ref 5–10.5)
POTASSIUM REFLEX MAGNESIUM: 3.9 MMOL/L (ref 3.5–5.1)
RBC # BLD: 4.65 M/UL (ref 4.2–5.9)
SODIUM BLD-SCNC: 138 MMOL/L (ref 136–145)
TOTAL PROTEIN: 6.9 G/DL (ref 6.4–8.2)
WBC # BLD: 11.6 K/UL (ref 4–11)

## 2021-09-30 PROCEDURE — 93010 ELECTROCARDIOGRAM REPORT: CPT | Performed by: INTERNAL MEDICINE

## 2021-09-30 PROCEDURE — 6360000002 HC RX W HCPCS: Performed by: INTERNAL MEDICINE

## 2021-09-30 PROCEDURE — 94640 AIRWAY INHALATION TREATMENT: CPT

## 2021-09-30 PROCEDURE — C8929 TTE W OR WO FOL WCON,DOPPLER: HCPCS

## 2021-09-30 PROCEDURE — 2580000003 HC RX 258: Performed by: INTERNAL MEDICINE

## 2021-09-30 PROCEDURE — 85025 COMPLETE CBC W/AUTO DIFF WBC: CPT

## 2021-09-30 PROCEDURE — 78452 HT MUSCLE IMAGE SPECT MULT: CPT

## 2021-09-30 PROCEDURE — 93005 ELECTROCARDIOGRAM TRACING: CPT | Performed by: INTERNAL MEDICINE

## 2021-09-30 PROCEDURE — 80053 COMPREHEN METABOLIC PANEL: CPT

## 2021-09-30 PROCEDURE — 6370000000 HC RX 637 (ALT 250 FOR IP): Performed by: INTERNAL MEDICINE

## 2021-09-30 PROCEDURE — G0378 HOSPITAL OBSERVATION PER HR: HCPCS

## 2021-09-30 PROCEDURE — 6360000002 HC RX W HCPCS: Performed by: NURSE PRACTITIONER

## 2021-09-30 PROCEDURE — 99215 OFFICE O/P EST HI 40 MIN: CPT | Performed by: NURSE PRACTITIONER

## 2021-09-30 PROCEDURE — A9502 TC99M TETROFOSMIN: HCPCS | Performed by: INTERNAL MEDICINE

## 2021-09-30 PROCEDURE — 3430000000 HC RX DIAGNOSTIC RADIOPHARMACEUTICAL: Performed by: INTERNAL MEDICINE

## 2021-09-30 PROCEDURE — 96372 THER/PROPH/DIAG INJ SC/IM: CPT

## 2021-09-30 PROCEDURE — 93017 CV STRESS TEST TRACING ONLY: CPT

## 2021-09-30 PROCEDURE — 85379 FIBRIN DEGRADATION QUANT: CPT

## 2021-09-30 PROCEDURE — 36415 COLL VENOUS BLD VENIPUNCTURE: CPT

## 2021-09-30 RX ORDER — DILTIAZEM HYDROCHLORIDE 120 MG/1
120 CAPSULE, COATED, EXTENDED RELEASE ORAL DAILY
Status: DISCONTINUED | OUTPATIENT
Start: 2021-09-30 | End: 2021-10-01 | Stop reason: HOSPADM

## 2021-09-30 RX ADMIN — ATORVASTATIN CALCIUM 10 MG: 10 TABLET, FILM COATED ORAL at 09:13

## 2021-09-30 RX ADMIN — ENOXAPARIN SODIUM 120 MG: 150 INJECTION SUBCUTANEOUS at 16:40

## 2021-09-30 RX ADMIN — REGADENOSON 0.4 MG: 0.08 INJECTION, SOLUTION INTRAVENOUS at 13:25

## 2021-09-30 RX ADMIN — PANTOPRAZOLE SODIUM 40 MG: 40 TABLET, DELAYED RELEASE ORAL at 09:13

## 2021-09-30 RX ADMIN — METOPROLOL SUCCINATE 50 MG: 50 TABLET, EXTENDED RELEASE ORAL at 09:13

## 2021-09-30 RX ADMIN — TIOTROPIUM BROMIDE INHALATION SPRAY 2 PUFF: 3.12 SPRAY, METERED RESPIRATORY (INHALATION) at 09:22

## 2021-09-30 RX ADMIN — TETROFOSMIN 11 MILLICURIE: 1.38 INJECTION, POWDER, LYOPHILIZED, FOR SOLUTION INTRAVENOUS at 11:50

## 2021-09-30 RX ADMIN — ASPIRIN 81 MG: 81 TABLET, COATED ORAL at 09:13

## 2021-09-30 RX ADMIN — TETROFOSMIN 33.2 MILLICURIE: 1.38 INJECTION, POWDER, LYOPHILIZED, FOR SOLUTION INTRAVENOUS at 13:25

## 2021-09-30 RX ADMIN — DILTIAZEM HYDROCHLORIDE 120 MG: 120 CAPSULE, COATED, EXTENDED RELEASE ORAL at 09:13

## 2021-09-30 RX ADMIN — Medication 2 PUFF: at 20:40

## 2021-09-30 RX ADMIN — Medication 2 PUFF: at 09:22

## 2021-09-30 RX ADMIN — AMLODIPINE BESYLATE 5 MG: 5 TABLET ORAL at 09:13

## 2021-09-30 RX ADMIN — Medication 10 ML: at 09:13

## 2021-09-30 ASSESSMENT — PAIN SCALES - GENERAL
PAINLEVEL_OUTOF10: 0

## 2021-09-30 NOTE — PROGRESS NOTES
Patient admitted from emergency department via w/c on monitor. Transferred to  bed. Placed on monitors. Vital signs obtained. Orders reviewed and acknowledged. Admission completed. Oriented to room, call light and environment. Questions answered. Bed placed in low position. Call light explained and within reach.

## 2021-09-30 NOTE — TELEPHONE ENCOUNTER
Patient cancelled appointment on 9/30/21 with Dr. Veronica Díaz for HST fu.    Reason: Pt is in hospital    Patient did not reschedule appointment. Appointment rescheduled: Will call pt to r/s once discharged.

## 2021-09-30 NOTE — PROGRESS NOTES
Hospitalist Progress Note    Date of Admission: 9/29/2021    Chief Complaint: Chest pain, SOB    Hospital Course:   [de-identified] y.o. male who presented to [facility] with chest pain. PMHx significant for CAD, COPD, DM2, HTN. Presented with episode of chest pain and lightheadedness that awoke him from sleep. Describe chest heaviness, mid sternal, mild to moderate severity. Associated with some SOB. Presented to ED for evaluation. During ED work-up he ruled out for ACS. His BNP was normal. However, CXR showed evidence of bilateral hazy airspace disease in a pattern atypical for pulmonary edema. Vaccinated for Renato. Both Rapid and PCR testing were negative. Subjective: Developed Afib RVR this AM. Rate controlled after PO meds. NM Stress testing completed. Still with some mild chest pain. Labs:   Recent Labs     09/29/21 0339 09/30/21 0918   WBC 12.8* 11.6*   HGB 14.0 13.4*   HCT 43.0 40.3*   * 376     Recent Labs     09/29/21 0339 09/30/21  0918    138   K 3.9 3.9   CL 99 104   CO2 29 25   BUN 13 11   CREATININE 1.2 1.2   CALCIUM 9.7 9.2     Recent Labs     09/29/21 0339 09/30/21  0918   AST 20 20   ALT 20 16   BILITOT 0.3 0.9   ALKPHOS 88 76     Recent Labs     09/29/21  0340   INR 0.91       Physical Exam Performed:    /89   Pulse 56   Temp 97.8 °F (36.6 °C) (Oral)   Resp 18   Ht 5' 8\" (1.727 m)   Wt 248 lb 6.4 oz (112.7 kg)   SpO2 92%   BMI 37.77 kg/m²   General appearance: No apparent distress, appears stated age and cooperative. HEENT:  Normal cephalic, atraumatic without obvious deformity. Pupils equal, round, and reactive to light. Extra ocular muscles intact. Conjunctivae/corneas clear. Neck: Supple, no jugular venous distention. Trachea midline with full range of motion. Respiratory:  Normal respiratory effort. Clear to auscultation, bilaterally without Rales/Wheezes/Rhonchi.   Cardiovascular: Irregularly irregular, normal rate, with normal S1/S2 without murmurs, rubs or gallops. Abdomen: Soft, non-tender, non-distended with normal bowel sounds. Musculoskelatal: No clubbing, cyanosis or edema bilaterally. Full range of motion without deformity. Neurologic:  Neurovascularly intact without any focal sensory/motor deficits. Cranial nerves: II-XII intact, grossly non-focal.  Psychiatric: Alert and oriented, thought content appropriate, normal insight  Skin: Skin color, texture, turgor normal.  No rashes or lesions. Capillary Refill: Brisk,< 3 seconds   Peripheral Pulses: +2 palpable, equal bilaterally       Assessment/Plan:    Active Hospital Problems    Diagnosis     Chest pain [R07.9]      Chest pain/CAD by history: Ruled out for ACS. His BNP was normal. However, CXR showed evidence of bilateral hazy airspace disease in a pattern atypical for pulmonary edema. Vaccinated for Renato. Both Rapid and PCR testing were negative. Cardiology consulted. NM stress testing completed and negative. He developed Afib RVR during admission, which could explain his symptoms. D-dimer is sufficiently low to avoid CT scanning, especially since he will be anticoagulated for Afib anyway. Paroxysmal Atrial Fibrillation with RVR: New onset. He believes he may have had an isolated episode in the past. Rate controlled after addition of PO Cardizem with home Metoprolol. Started on Lovenox 1mg/kg BID for now, with likely transition to Eliquis at discharge. Diabetes Mellitus, Type 2: Controlled. Continue Sliding Scale Insulin regimen. Monitor blood sugar and adjust regimen as needed. Diabetic (Carb-controlled) diet when able. Hypertension, benign: Controlled. Continue current medication regimen, monitor and adjust as needed. DVT Prophylaxis: Lovenox 1 mg/kg  Diet: ADULT DIET;  Regular; 4 carb choices (60 gm/meal)  Code Status: Full Code  PT/OT Eval Status: NA    Dispo - Possible DC home tomorrow    Rocky Palm MD

## 2021-09-30 NOTE — PROGRESS NOTES
4 Eyes Skin Assessment     The patient is being assess for   Admission    I agree that 2 RN's have performed a thorough Head to Toe Skin Assessment on the patient. ALL assessment sites listed below have been assessed. Areas assessed for pressure by both nurses:   [x]   Head, Face, and Ears   [x]   Shoulders, Back, and Chest, Abdomen  [x]   Arms, Elbows, and Hands   [x]   Coccyx, Sacrum, and Ischium  [x]   Legs, Feet, and Heels        Skin Assessed Under all Medical Devices by both nurses: N/a          All Mepilex Borders were peeled back and area peeked at by both nurses:  No: n/a  Please list where Mepilex Borders are located:  n/a              **SHARE this note so that the co-signing nurse is able to place an eSignature**    Co-signer eSignature: Electronically signed by Mena Fairbanks RN on 9/30/21 at 1:22 AM EDT    Does the Patient have Skin Breakdown related to pressure?   N/a             Vasiliy Prevention initiated:  Yes   Wound Care Orders initiated:  NA      [facility] nurse consulted for Pressure Injury (Stage 3,4, Unstageable, DTI, NWPT, Complex wounds)and New or Established Ostomies:  NA      Primary Nurse eSignature: Electronically signed by Anette Vargas RN on 9/29/21 at 11:35 PM EDT

## 2021-09-30 NOTE — PROGRESS NOTES
[facility]   Daily Progress Note    Admit Date:  9/29/2021  HPI:    Chief Complaint   Patient presents with    Chest Pain     woke up at 0230. no history         Interval history: Kristen Vanessa is being followed for chest pain. Subjective:  Mr. Crispin Rivera is having pleuritic chest pain. Not worse with position changes. Only worse with deep breathing    Flipped into Afib with RVR.      Objective:   BP (!) 149/85   Pulse 118   Temp 97.9 °F (36.6 °C) (Oral)   Resp 18   Ht 5' 8\" (1.727 m)   Wt 248 lb 6.4 oz (112.7 kg)   SpO2 95%   BMI 37.77 kg/m²       Intake/Output Summary (Last 24 hours) at 9/30/2021 0946  Last data filed at 9/29/2021 1927  Gross per 24 hour   Intake 2000 ml   Output 740 ml   Net 1260 ml       NYHA: III    Physical Exam:  General:  Awake, alert, NAD  Skin:  Warm and dry  Neck:  JVD<8  Chest:  Clear to auscultation, no wheezes/rhonchi/rales  Telemetry: afib   Cardiovascular:  RRR S1S2, no m/r/g   Abdomen:  Soft, nontender, +bowel sounds  Extremities:  + bilateral lower extremity edema    Medications:    dilTIAZem  120 mg Oral Daily    sodium chloride flush  10 mL IntraVENous 2 times per day    amLODIPine  5 mg Oral Daily    atorvastatin  10 mg Oral Daily    metoprolol succinate  50 mg Oral Daily    pantoprazole  40 mg Oral Daily    aspirin  81 mg Oral Daily    insulin lispro  0-12 Units SubCUTAneous TID WC    insulin lispro  0-6 Units SubCUTAneous Nightly    budesonide-formoterol  2 puff Inhalation BID    And    tiotropium  2 puff Inhalation Daily      sodium chloride      dextrose         Lab Data:  CBC:   Recent Labs     09/29/21 0339 09/30/21  0918   WBC 12.8* 11.6*   HGB 14.0 13.4*   * 376     BMP:    Recent Labs     09/29/21  0339      K 3.9   CO2 29   BUN 13   CREATININE 1.2     INR:    Recent Labs     09/29/21  0340   INR 0.91     BNP:    Recent Labs     09/29/21 0339   PROBNP 72     Lab Results   Component Value Date    LVEF 58 02/04/2021 Testing:  Echo 2018   Summary   Technically difficult examination.   Normal systolic function with an estimated ejection fraction of 55-60%.  Mild concentric left ventricular hypertrophy.   No regional wall motion abnormalities are seen.   Grade I diastolic dysfunction with normal filing pressure. Stress Test:  1/2021  Conclusions        Summary    Normal LVEF >60%    Grossly normal wall motion    Apical/septal ischemia       cardiac cath 2/4/21  Procedures Performed:   1. Left heart catheterization  2. Selective left and right coronary angiogram  3. Left ventriculography      Procedure Findings:  1. Moderate coronary artery disease. 2. Normal left ventricular function with EF estimated at 55-60%  3. Normal left heart hemodynamics   Findings:     1. Left main coronary artery was normal. It gave off the left anterior descending artery and left circumflex.     2. Left anterior descending artery has mild to moderate atherosclerotic disease.  It was moderate in size. It gave off septal perforators and a moderate sized diagonal branch. The LAD covered the entire apex of the left ventricle.      3. Left circumflex has mild to moderate atherosclerotic disease.  It was moderate in size. There was a moderate sized obtuse marginal branch.     4. Right coronary artery has mild to moderate atherosclerotic disease. It was moderate in size and was the dominant artery.     5. Left ventriculogram showed normal LVEF at 55-60%. Wall motion was normal . There was no significant mitral valve or aortic valve disease noted. LVEDP was normal. There was no gradient noted across the aortic valve during pullback of the catheter. Echo 9/30/21  Summary   Patient tachy during study. Technically difficult study due to body surface area and poor acoustic   window. Normal left ventricular systolic function with ejection fraction of 55-60%. No regional wall motion abnormalites are seen.    Normal left ventricular size with moderate concentric left ventricular   hypertrophy. Left ventricular diastolic filling pressure is elevated. Compared to previous study from 6-1-2018 no changes noted in left   ventricular function. Active Problems:    Chest pain  Resolved Problems:    * No resolved hospital problems.  *    Assessment:  Chest pain- pleuritic  Moderate LVH  Hx of CAD/ASHD  HTN  HLD  PVD/AAA  COPD  Dm    Plan:  Stress test pending   Add on d-dimer given clear CXR and pleuritic chest pain  Start lovenox 1mg/kg for afib and then will need to consider DOAC prior to discharge  Continue aspirin and statin  Continue cardizem and toprol   Continue norvasc    Lillian Matias, APRN - CNP, CNP, 9/30/2021, 12:22 PM

## 2021-09-30 NOTE — ED NOTES
Report given to [facility]FF[facility], patient transported in stable condition     Sophia Bryan RN  09/29/21 4103

## 2021-10-01 VITALS
TEMPERATURE: 97.7 F | RESPIRATION RATE: 18 BRPM | OXYGEN SATURATION: 93 % | BODY MASS INDEX: 36.54 KG/M2 | HEART RATE: 83 BPM | SYSTOLIC BLOOD PRESSURE: 130 MMHG | WEIGHT: 241.1 LBS | HEIGHT: 68 IN | DIASTOLIC BLOOD PRESSURE: 83 MMHG

## 2021-10-01 PROBLEM — I48.91 ATRIAL FIBRILLATION WITH RVR (HCC): Status: ACTIVE | Noted: 2021-10-01

## 2021-10-01 PROBLEM — I48.19 PERSISTENT ATRIAL FIBRILLATION (HCC): Status: ACTIVE | Noted: 2021-10-01

## 2021-10-01 LAB
A/G RATIO: 1.3 (ref 1.1–2.2)
ALBUMIN SERPL-MCNC: 3.6 G/DL (ref 3.4–5)
ALP BLD-CCNC: 64 U/L (ref 40–129)
ALT SERPL-CCNC: 15 U/L (ref 10–40)
ANION GAP SERPL CALCULATED.3IONS-SCNC: 10 MMOL/L (ref 3–16)
AST SERPL-CCNC: 15 U/L (ref 15–37)
BASOPHILS ABSOLUTE: 0.1 K/UL (ref 0–0.2)
BASOPHILS RELATIVE PERCENT: 1 %
BILIRUB SERPL-MCNC: 0.6 MG/DL (ref 0–1)
BUN BLDV-MCNC: 10 MG/DL (ref 7–20)
CALCIUM SERPL-MCNC: 8.9 MG/DL (ref 8.3–10.6)
CHLORIDE BLD-SCNC: 104 MMOL/L (ref 99–110)
CO2: 24 MMOL/L (ref 21–32)
CREAT SERPL-MCNC: 1.1 MG/DL (ref 0.8–1.3)
EOSINOPHILS ABSOLUTE: 0.5 K/UL (ref 0–0.6)
EOSINOPHILS RELATIVE PERCENT: 4.9 %
GFR AFRICAN AMERICAN: >60
GFR NON-AFRICAN AMERICAN: >60
GLOBULIN: 2.7 G/DL
GLUCOSE BLD-MCNC: 127 MG/DL (ref 70–99)
GLUCOSE BLD-MCNC: 130 MG/DL (ref 70–99)
GLUCOSE BLD-MCNC: 140 MG/DL (ref 70–99)
HCT VFR BLD CALC: 37.7 % (ref 40.5–52.5)
HEMOGLOBIN: 13 G/DL (ref 13.5–17.5)
LYMPHOCYTES ABSOLUTE: 3.4 K/UL (ref 1–5.1)
LYMPHOCYTES RELATIVE PERCENT: 36.3 %
MCH RBC QN AUTO: 29.7 PG (ref 26–34)
MCHC RBC AUTO-ENTMCNC: 34.6 G/DL (ref 31–36)
MCV RBC AUTO: 85.8 FL (ref 80–100)
MONOCYTES ABSOLUTE: 1 K/UL (ref 0–1.3)
MONOCYTES RELATIVE PERCENT: 11 %
NEUTROPHILS ABSOLUTE: 4.4 K/UL (ref 1.7–7.7)
NEUTROPHILS RELATIVE PERCENT: 46.8 %
PDW BLD-RTO: 15 % (ref 12.4–15.4)
PERFORMED ON: ABNORMAL
PERFORMED ON: ABNORMAL
PLATELET # BLD: 379 K/UL (ref 135–450)
PMV BLD AUTO: 7.5 FL (ref 5–10.5)
POTASSIUM REFLEX MAGNESIUM: 3.8 MMOL/L (ref 3.5–5.1)
RBC # BLD: 4.39 M/UL (ref 4.2–5.9)
SODIUM BLD-SCNC: 138 MMOL/L (ref 136–145)
TOTAL PROTEIN: 6.3 G/DL (ref 6.4–8.2)
WBC # BLD: 9.5 K/UL (ref 4–11)

## 2021-10-01 PROCEDURE — G0378 HOSPITAL OBSERVATION PER HR: HCPCS

## 2021-10-01 PROCEDURE — 99233 SBSQ HOSP IP/OBS HIGH 50: CPT | Performed by: NURSE PRACTITIONER

## 2021-10-01 PROCEDURE — 2580000003 HC RX 258: Performed by: INTERNAL MEDICINE

## 2021-10-01 PROCEDURE — 6360000002 HC RX W HCPCS: Performed by: NURSE PRACTITIONER

## 2021-10-01 PROCEDURE — 85025 COMPLETE CBC W/AUTO DIFF WBC: CPT

## 2021-10-01 PROCEDURE — 36415 COLL VENOUS BLD VENIPUNCTURE: CPT

## 2021-10-01 PROCEDURE — 96372 THER/PROPH/DIAG INJ SC/IM: CPT

## 2021-10-01 PROCEDURE — 6370000000 HC RX 637 (ALT 250 FOR IP): Performed by: INTERNAL MEDICINE

## 2021-10-01 PROCEDURE — 94640 AIRWAY INHALATION TREATMENT: CPT

## 2021-10-01 PROCEDURE — 1200000000 HC SEMI PRIVATE

## 2021-10-01 PROCEDURE — 93228 REMOTE 30 DAY ECG REV/REPORT: CPT | Performed by: INTERNAL MEDICINE

## 2021-10-01 PROCEDURE — 80053 COMPREHEN METABOLIC PANEL: CPT

## 2021-10-01 RX ORDER — DILTIAZEM HYDROCHLORIDE 240 MG/1
240 CAPSULE, COATED, EXTENDED RELEASE ORAL DAILY
Qty: 30 CAPSULE | Refills: 3 | Status: SHIPPED | OUTPATIENT
Start: 2021-10-02 | End: 2022-02-24 | Stop reason: SDUPTHER

## 2021-10-01 RX ADMIN — DILTIAZEM HYDROCHLORIDE 120 MG: 120 CAPSULE, COATED, EXTENDED RELEASE ORAL at 09:37

## 2021-10-01 RX ADMIN — Medication 10 ML: at 09:37

## 2021-10-01 RX ADMIN — ASPIRIN 81 MG: 81 TABLET, COATED ORAL at 09:37

## 2021-10-01 RX ADMIN — PANTOPRAZOLE SODIUM 40 MG: 40 TABLET, DELAYED RELEASE ORAL at 09:37

## 2021-10-01 RX ADMIN — ATORVASTATIN CALCIUM 10 MG: 10 TABLET, FILM COATED ORAL at 09:37

## 2021-10-01 RX ADMIN — METOPROLOL SUCCINATE 50 MG: 50 TABLET, EXTENDED RELEASE ORAL at 09:37

## 2021-10-01 RX ADMIN — Medication 2 PUFF: at 08:26

## 2021-10-01 RX ADMIN — TIOTROPIUM BROMIDE INHALATION SPRAY 2 PUFF: 3.12 SPRAY, METERED RESPIRATORY (INHALATION) at 08:26

## 2021-10-01 RX ADMIN — ENOXAPARIN SODIUM 120 MG: 150 INJECTION SUBCUTANEOUS at 06:07

## 2021-10-01 RX ADMIN — AMLODIPINE BESYLATE 5 MG: 5 TABLET ORAL at 09:37

## 2021-10-01 ASSESSMENT — PAIN SCALES - GENERAL
PAINLEVEL_OUTOF10: 0
PAINLEVEL_OUTOF10: 0

## 2021-10-01 NOTE — PROGRESS NOTES
Pt. A&O x4, requesting to be discharged, sitting in chair, pleasant and cooperative. Call llight within reach.

## 2021-10-01 NOTE — PROGRESS NOTES
[facility]   Daily Progress Note    Admit Date:  9/29/2021  HPI:    Chief Complaint   Patient presents with    Chest Pain     woke up at 0230. no history         Interval history: Juancarlos Javier is being followed for chest pain. Subjective:  Mr. Amelie Holter seen up in chair, reports episodes of shortness of breath overnight. Denies any further pleuritic chest pain. No palpitations. + edema which is new.        Objective:   /83   Pulse 83   Temp 97.7 °F (36.5 °C) (Oral)   Resp 18   Ht 5' 8\" (1.727 m)   Wt 241 lb 1.6 oz (109.4 kg)   SpO2 93%   BMI 36.66 kg/m²       Intake/Output Summary (Last 24 hours) at 10/1/2021 1434  Last data filed at 10/1/2021 4837  Gross per 24 hour   Intake 85 ml   Output --   Net 85 ml       NYHA: III    Physical Exam:  General:  Awake, alert, NAD  Skin:  Warm and dry  Neck:  JVD~8  Chest:  Course rhonchi right lung, clear on left  Telemetry: afib 80's  Cardiovascular:  Irreg Irreg S1S2, no m/r/g   Abdomen:  Soft, nontender, +bowel sounds  Extremities:  + bilateral lower extremity edema    Medications:    dilTIAZem  120 mg Oral Daily    enoxaparin  1 mg/kg SubCUTAneous BID    sodium chloride flush  10 mL IntraVENous 2 times per day    atorvastatin  10 mg Oral Daily    metoprolol succinate  50 mg Oral Daily    pantoprazole  40 mg Oral Daily    aspirin  81 mg Oral Daily    insulin lispro  0-12 Units SubCUTAneous TID WC    insulin lispro  0-6 Units SubCUTAneous Nightly    budesonide-formoterol  2 puff Inhalation BID    And    tiotropium  2 puff Inhalation Daily      sodium chloride      dextrose         Lab Data:  CBC:   Recent Labs     09/29/21 0339 09/30/21 0918 10/01/21  0623   WBC 12.8* 11.6* 9.5   HGB 14.0 13.4* 13.0*   * 376 379     BMP:    Recent Labs     09/29/21 0339 09/30/21 0918 10/01/21  0623    138 138   K 3.9 3.9 3.8   CO2 29 25 24   BUN 13 11 10   CREATININE 1.2 1.2 1.1     INR:    Recent Labs     09/29/21  0340   INR ejection fraction of 55-60%. No regional wall motion abnormalites are seen. Normal left ventricular size with moderate concentric left ventricular   hypertrophy. Left ventricular diastolic filling pressure is elevated. Compared to previous study from 6-1-2018 no changes noted in left   ventricular function. LEXISCAN STRESS TEST 9/30/2021:     Summary  There is uniform isotope uptake at stress and rest. There is no evidence of  myocardial ischemia or scar. Normal myocardial wall motion and thickening  with hyperdynamic LV function. Post-stress LVEF is >70%. Overall findings represent a low risk scan. Active Problems:    Chest pain  Resolved Problems:    * No resolved hospital problems.  *    Assessment:  Chest pain- pleuritic, resolved, + rhonchi left lung  Afib - new diagnosis, rate controlled  Moderate LVH by echo  Hx of CAD/ASHD - mild, neg stress, neg troponin's  HYPERTENSION: stable  HLD  PVD/AAA  COPD  Dm    Plan:  Stop amlodipine  Increase cardizem to 240 mg daily  Continue same dose of metoprolol and losartan/ hct  Eliquis 5 mg bid  2 week event monitor at discharge  Will arrange a 3-4 week with our office    LUZ Gandara CNP, 10/1/2021, 2:34 PM

## 2021-10-03 LAB
BLOOD CULTURE, ROUTINE: NORMAL
CULTURE, BLOOD 2: NORMAL

## 2021-10-04 ENCOUNTER — CARE COORDINATION (OUTPATIENT)
Dept: CASE MANAGEMENT | Age: 81
End: 2021-10-04

## 2021-10-04 NOTE — CARE COORDINATION
[facility] Transitions Initial Follow Up Call    Call within 2 business days of discharge: Yes    Patient: Lauryn Cervantes Patient : 1940   MRN: [id number]  Reason for Admission: A-fib new onset  Discharge Date: 10/1/21 RARS: Readmission Risk Score: 14      Last Discharge [facility]       Complaint Diagnosis Description Type Department Provider    21 Chest Pain Chest pain, unspecified type . .. ED to Hosp-Admission (Discharged) (ADMITTED) Alessandro Cuevas MD; Jerry Huerta. .. Spoke with: [address]: [facility]      Care Transitions 24 Hour Call    Do you have any ongoing symptoms?: No  Do you have a copy of your discharge instructions?: Yes  Do you have all of your prescriptions and are they filled?: Yes  Have you been contacted by a [facility] Pharmacist?: No  Have you scheduled your follow up appointment?: Yes  How are you going to get to your appointment?: Car - family or friend to transport  Were you discharged with any Home Care or Post Acute Services: No  Do you feel like you have everything you need to keep you well at home?: Yes  Care Transitions Interventions       Transitions of Care Initial Call    Was this an external facility discharge? No     Challenges to be reviewed by the provider   Additional needs identified to be addressed with provider: No  none             Method of communication with provider : phone      Advance Care Planning:   Does patient have an Advance Directive: not on file. Was this a readmission? No  Patient stated reason for admission: A-fib new onset  Patients top risk factors for readmission: medical condition-.    Care Transition Nurse (CTN) contacted the patient by telephone to perform post hospital discharge assessment. Verified name and  with patient as identifiers. Provided introduction to self, and explanation of the CTN role.      CTN reviewed discharge instructions, medical action plan and red flags with patient who hospitalization and placed medication in a weekly pill box for pt. Pt denies any needs from CTN at this time. Will continue to follow.      KADEEM Peng, RN   Care Transition Nurse  Mobile: [phone]

## 2021-10-05 ENCOUNTER — OFFICE VISIT (OUTPATIENT)
Dept: PULMONOLOGY | Age: 81
End: 2021-10-05
Payer: MEDICARE

## 2021-10-05 VITALS
TEMPERATURE: 98.1 F | HEIGHT: 68 IN | BODY MASS INDEX: 36.92 KG/M2 | HEART RATE: 64 BPM | RESPIRATION RATE: 18 BRPM | WEIGHT: 243.6 LBS | OXYGEN SATURATION: 95 % | DIASTOLIC BLOOD PRESSURE: 61 MMHG | SYSTOLIC BLOOD PRESSURE: 112 MMHG

## 2021-10-05 DIAGNOSIS — Z23 NEED FOR IMMUNIZATION AGAINST INFLUENZA: ICD-10-CM

## 2021-10-05 DIAGNOSIS — G47.33 OSA (OBSTRUCTIVE SLEEP APNEA): Primary | ICD-10-CM

## 2021-10-05 DIAGNOSIS — R06.02 SHORTNESS OF BREATH: ICD-10-CM

## 2021-10-05 DIAGNOSIS — E66.9 OBESITY (BMI 35.0-39.9 WITHOUT COMORBIDITY): ICD-10-CM

## 2021-10-05 DIAGNOSIS — I51.89 DIASTOLIC DYSFUNCTION: ICD-10-CM

## 2021-10-05 DIAGNOSIS — J41.0 SIMPLE CHRONIC BRONCHITIS (HCC): ICD-10-CM

## 2021-10-05 PROCEDURE — G0008 ADMIN INFLUENZA VIRUS VAC: HCPCS | Performed by: INTERNAL MEDICINE

## 2021-10-05 PROCEDURE — 90694 VACC AIIV4 NO PRSRV 0.5ML IM: CPT | Performed by: INTERNAL MEDICINE

## 2021-10-05 PROCEDURE — 99214 OFFICE O/P EST MOD 30 MIN: CPT | Performed by: INTERNAL MEDICINE

## 2021-10-05 ASSESSMENT — SLEEP AND FATIGUE QUESTIONNAIRES
HOW LIKELY ARE YOU TO NOD OFF OR FALL ASLEEP WHEN YOU ARE A PASSENGER IN A CAR FOR AN HOUR WITHOUT A BREAK: 0
HOW LIKELY ARE YOU TO NOD OFF OR FALL ASLEEP WHILE SITTING AND TALKING TO SOMEONE: 0
HOW LIKELY ARE YOU TO NOD OFF OR FALL ASLEEP IN A CAR, WHILE STOPPED FOR A FEW MINUTES IN TRAFFIC: 0
HOW LIKELY ARE YOU TO NOD OFF OR FALL ASLEEP WHILE WATCHING TV: 0
NECK CIRCUMFERENCE (INCHES): 17.5
HOW LIKELY ARE YOU TO NOD OFF OR FALL ASLEEP WHILE LYING DOWN TO REST IN THE AFTERNOON WHEN CIRCUMSTANCES PERMIT: 2
HOW LIKELY ARE YOU TO NOD OFF OR FALL ASLEEP WHILE SITTING QUIETLY AFTER LUNCH WITHOUT ALCOHOL: 2
ESS TOTAL SCORE: 6
HOW LIKELY ARE YOU TO NOD OFF OR FALL ASLEEP WHILE SITTING AND READING: 1
HOW LIKELY ARE YOU TO NOD OFF OR FALL ASLEEP WHILE SITTING INACTIVE IN A PUBLIC PLACE: 1

## 2021-10-05 NOTE — PROGRESS NOTES
Chief Complaint-Pulmonary follow up to discuss the test results    Patient was subjected to a home sleep study and was told her to follow-up but apparently patient was recently hospitalized because of chest pain and was found to have A. fib with RVR along with that patient was also found to be in acute pulmonary edema and patient was treated symptomatically and specifically in the hospital and was just discharged and patient has come to the office subsequently, patient states that he has been put on a Holter monitor which he is getting at this time, patient states that he underwent an ultrasound of the heart at that time which was okay, patient does have some coughing without any expectoration, patient does not have any significant fever or chills, patient does not have any increasing wheezing, patient does not have any significant pleuritic chest pain, patient on questioning further does not have any significant shortness of breath per se, patient does not have any increasing reflux symptoms, no sinus congestion, patient does have leg edema, patient states that she he is not sure that he got any water pill at home which was given to him at the time of discharge, patient does feel tired and having less energy, patient does not have any other pertinent review of system of concern    Previous HPIPatient has come to the office for a pulmonary follow-up, patient states that his spouse is concerned about his coughing which is more than usual but patient does not think that it is more than usual, patient states that along with the coughing he does not have any significant expectoration or any increased wheezing, patient does not have any significant rhinorrhea nasal congestion sinus congestion, patient does not wear hearing aids and sometimes there is a wax buildup but does not have any ear pain or tinnitus, patient does not have any significant chest pain palpitation diaphoresis, no fever no chills, patient does not have any change in the ambient environment, patient does not have any abdominal discomfort nausea vomiting, patient does have peripheral neuropathy because of type 2 diabetes mellitus, patient states that his hemoglobin A1c is 7.3 which partly is because of his eating habits not not being optimal and also being nonactive, patient does not have any new medications, patient does not have any change in the ambient environment, patient does not have any sick contacts, patient does feel tired and sleepy in the daytime and has some sleep fragmentation, patient does not have any epistaxis hemoptysis hematochezia melena, patient states that along with Jac Grieve he has to use his rescue inhaler on average of twice a week in which he does at nighttime with improvement, patient does not have any other pertinent review of system of concern    A virtual pulmonary visit was done for the patient for his clinical status, patient states that he is clinically stable at this time patient states that he is doing very well with the Jac Grieve, patient states that he takes pro-air at nighttime just to decrease any congestion at nighttime as a habit, patient does not have any significant rhinorrhea or nasal congestion or any epistaxis, patient does not have any significant otalgia or ear discharge, patient states that once  twice a day he has a deep cough without any expectoration but if patient is not concerned about that, patient states that he does not have any significant increasing shortness of breath or wheezing, patient does not have any pleuritic chest pain, no fever no chills, no palpitation or diaphoresis, patient does not have any significant abdominal pain nausea vomiting, patient does not have any increasing reflux symptoms but patient takes medication for that patient does not have any significant epistaxis or hemoptysis, patient states that he has some soft stools but not diarrhea, patient is not concerned about on the consistency of the stools, patient states that in the last few months time he has lost about 11 to 12 pounds, patient does not have any increasing sleep fragmentation, patient does not have any confusion lethargy, patient does not have any change in the ambient environment, patient does not have any new medications of concern, patient's blood sugars are under control, patient does not have any other pertinent review of system of concern    :Patient has come to the office for pulmonary follow-up, patient says that from pulmonary standpoint of view he is stable, patient states that since that time he has started taking Trelegy Ellipta he does not have any significant cough or expectoration or shortness of breath, patient states that once in a while he takes albuterol inhaler at nighttime to decrease some congestion in the daytime, patient takes his Trelegy Ellipta in the daytime at this time, patient does not have any increasing nasal congestion, patient on questioning further states that his hoarseness of voice is not significant, patient does not have any chest pain or palpitations, patient states that recently he had come to the ER because of urine retention and hematuria and was subjected to cystoscopy and however large blood clot was removed but patient was told that he does not have any apparent pathology of concern, mom patient does not have any abdominal pain nausea vomiting, patient does not have any significant increasing leg edema, patient states that he had gone to his PCPs office and patient was told to have a PSA and the results to be sent to his urologist, patient does not have any other pertinent review of system of concern     Patient has come for pulmonary evaluation;patient states that he is more less doing OK with Trelegy but before his requirements for the rescue inhaler was minimal  but lately for last few weeks time he has noticed that he was having more chest congestion along with that patient was having increased use of albuterol inhaler which is still continuing but it is better than last week, patient has cough with mucoid expectoration without any significant purulence, patient does not have any significant fever or chills, no pleuritic chest pain patient does not have any chest pain, patient does not have any increasing nasal congestion, patient does not have any epistaxis, bleeding or hemoptysis, patient does not have any fever or chills, patient does not have any significant abdominal discomfort and nausea vomiting, patient does not complain of any increasing leg edema, patient may still has some sleep fragmentation, patient does not have any confusion or lethargy, no other pertinent review of system of concern       Patient is a 67-year-old male who was upper to the office for pulmonary consult because of increasing shortness of breath  Patient says that he has been having increasing shortness of breath.   For at least the last 1 year time and patient had a cardiac workup done which was negative for any cardiac etiology for the same; patient says that on a flat surface at his own pace he can walk about 3-4 blocks without any shortness of breath but if he has to go on little better inclined order has to climb stairs he has profound shortness of breath, along with the shortness of breath he has some cough without any phlegm, patient also has occasional wheezing, patient does not have any significant chest pain along with that no palpitations or diaphoresis, no fever no chills, patient denies any increasing headaches or blurring of vision, patient does not have any otalgia or ear discharge, patient does not have any tinnitus, patient on questioning further states that he does not have any profound or rhinorrhea or nasal congestion or sinus congestion, patient does not have any significant sore throat, no difficulty in swallowing per se but patient says that his food gets stuck in the mid chest at times and patient has history of esophageal strictures and has had a ballooning of the strictures multiple times in the last one was last fall, patient does not have any pleuritic chest pain, no fever no chills, patient's appetite is maintained, patient does not have any abdominal pain and nausea or vomiting, patient is takes medications for reflux symptoms, patient does not have any altered bowel habits, patient does not have any epistaxis, gum bleeding or hemoptysis, patient does not have any significant hematochezia or melena, patient does not have any dysuria or hematuria, patient does not have any increasing leg edema, patient does have history suggestive of some sleep fragmentation t, but patient says that he has gained about 5-10 pounds in last 1 year time, patient does not have any history of any significant exposures, patient was an educator, patient says that 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 recently for a vacation and tomorrow he is going to [place], patient does not have any confusion or lethargy, no other pertinent review of system of concern    Past Medical History:   Diagnosis Date    [facility] [address]     Decreased hearing of both ears     Diabetes mellitus 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       Past Surgical History:   Procedure Laterality Date Frequency of Social Gatherings with Friends and Family:     Attends Sabianist Services:     Active Member of Clubs or Organizations:     Attends Club or Organization Meetings:     Marital Status:    Intimate Partner Violence:     Fear of Current or Ex-Partner:     Emotionally Abused:     Physically Abused:     Sexually Abused:        Family History   Problem Relation Age of Onset    Cancer Brother         colon CA    Heart Disease Mother             ROS same as above     Physical Exam:  There were no vitals taken for this visit.'  Constitutional:  No acute distress. HENT:  Oropharynx is clear and moist. No thyromegaly. Mild nasal mucosal hyperemia; No oral thrush   Eyes:  Conjunctivae are normal. Pupils equal, round, and reactive to light. No scleral icterus. Neck: . No tracheal deviation present. No obvious thyroid mass. Neck diameter is increased  Cardiovascular: Normal rate, regular rhythm, normal heart sounds. No right ventricular heave. 1+ lower extremity edema. Pulmonary/Chest: No wheezes. No significant crackles when seen. Chest wall is not dull to percussion. No accessory muscle usage or stridor. Slightly increased prolonged expiration with breath sound intensity  Abdominal: Soft. Bowel sounds present. No distension or hernia. No tenderness. Musculoskeletal: No cyanosis. No clubbing. No obvious joint deformity. Lymphadenopathy: No cervical or supraclavicular adenopathy. Skin: Skin is warm and dry. No rash or nodules on the exposed extremities. Psychiatric: Normal mood and affect. Behavior is normal.  No anxiety. Neurologic: Alert, awake and oriented. PERRL.   Speech fluent        Data:     PFT shows patient to have mild-to-moderate obstructive airway disease with some reactive disease in the small airways along with that patient has hyperinflation and also changes in the PFT parameters because of body habitus      ECHO- Summary   Technically difficult examination.   Normal systolic function with an estimated ejection fraction of 55-60%.  Mild concentric left ventricular hypertrophy.   No regional wall motion abnormalities are seen.   Grade I diastolic dysfunction with normal filing pressure.     Patient's home study shows that patient has severe sleep apnea with AHI of 42.9/h along with that patient has nocturnal hypoxemia and patient saturation is dropping to as low as 78% and patient's saturation below 89% was for 90 minutes    ONE XRAY VIEW OF THE CHEST       9/29/2021 4:11 am       COMPARISON:   08/09/2021       HISTORY:   ORDERING SYSTEM PROVIDED HISTORY: chest pain   TECHNOLOGIST PROVIDED HISTORY:   Reason for exam:->chest pain   Reason for Exam: Chest Pain (woke up at 0230. no history )       FINDINGS:   The lungs are hypoinflated.  Hazy opacities in the left lung base more than   the right.  No significant pleural effusion is seen and no pneumothorax.  The   cardiac silhouette is borderline.  There is moderate pulmonary vascular   congestion.  Calcified right mediastinal/hilar lymph nodes are suggested.       No obvious fractures are identified.  Right thoracotomy defect is stable.           Impression   Moderate pulmonary vascular congestion.  The hazy bibasilar opacities spare   the perihilar lung.  Could be atypical pattern of edema or infectious. Assessment:    1. Shortness of breath        2. Obstructive sleep apnea      3. Diastolic dysfunction      4. Copd       5. H/O histoplasmosis      6. Obesity (BMI 35.0-39.9 without comorbidity)    7.   Recent hospitalization for chest pain A. fib with RVR and heart failure          Plan:   · Patient's clinical status and review of systems discussed  · Patient was told about the clinical finding including auscultation and implications  · Patient does not need any HRCT  · Patient's recent hospitalization was reviewed along with interventions which were done  · Clinically patient has has chronic bronchitis along with that patient appears to have obesity/suspected sleep apnea/diastolic dysfunction with history of histoplasmosis with right middle lobe resection/esophageal stricture  · Patient was told about the pathophysiology of the disease process and its modifying factors  · Patient was told about the sleep study results which shows patient to have severe RIZWANA along with nocturnal hypoxemia   · Implications of sleep severe sleep apnea discussed with patient along with options including CPAP  · Patient reluctantly agreed to try CPAP for few months time to see if it makes a difference or not and then he will decide whether to continue with that or not  · Patient to continue with  Trelegy ellipta and was shown how to take it properly and was told to take one puff daily and patient was told to make sure that he rinses his mouth with water after use otherwise he can have hoarseness of voice or oral thrush  · Patient was told to try taking the Trelegy Ellipta at nighttime rather than the daytime to see if it makes a difference in terms of slight decrease congestion in the daytime  · Patient does not need any antibiotics or steroids from pulmonary standpoint of view  ·  Patient was told to take saline nasal rinse   · Patient was also told to try some salt and water gargles  · Patient to take a sugar free candy/lozenges  · Patient was also given albuterol inhaler 2 puffs every 6 on whenever necessary basis   · Patient was told about dietary and lifestyle modifications  · Patient needs to lose weight  · Patient needs to keep himself warm for the perez  · Patient has been put on Hyzaar which has HCTZ but patient continues to have significant leg edema and patient's PCP/cardiology decide if patient will benefit from a loop diuretic  · Salt and fluid restrictions discussed  · Patient needs to lose weight  · A regular regimented exercise will help  · Compliance requirements for insurance discussed  · Management of diabetes mellitus as per patient's PCP  · Patient is up-to-date on the COVID-19 vaccine  · Patient was offered and given flu shot without any immediate complications  · Patient to follow-up in 2 months time for CPAP compliance and efficacy or earlier if required

## 2021-10-05 NOTE — PROGRESS NOTES
MA Communication: The following orders are received by verbal communication from Elmer Ahmadi MD    Orders include:  HD Flu vaccine given to pt. Pt tolerated it well. AutoCPAP (pt to let us know what DME to use). Orders in my pending folder.        31-90 day fu scheduled 12/9/21

## 2021-10-05 NOTE — PATIENT INSTRUCTIONS
Remember to bring a list of pulmonary medications and any CPAP or BiPAP machines to your next appointment with the office. Please keep all of your future appointments scheduled by [address] [facility] office. Out of respect for other patients and providers, you may be asked to reschedule your appointment if you arrive later than your scheduled appointment time. Appointments cancelled less than 24hrs in advance will be considered a no show. Patients with three missed appointments within 1 year or four missed appointments within 2 years can be dismissed from the practice. Please be aware that our physicians are required to work in the Intensive Care Unit at [facility].  Your appointment may need to be rescheduled if they are designated to work during your appointment time. You may receive a survey regarding the care you received during your visit. Your input is valuable to us. We encourage you to complete and return your survey. We hope you will choose us in the future for your healthcare needs. Pt instructed of all future appointment dates & times, including radiology, labs, procedures & referrals. If procedures were scheduled preparation instructions provided. Instructions on future appointments with [facility] were given. Vaccine Information Statement    Influenza (Flu) Vaccine (Inactivated or Recombinant): What You Need to Know    Many vaccine information statements are available in Portuguese and other languages. See www.immunize.org/vis. Hojas de información sobre vacunas están disponibles en español y en muchos otros idiomas. Visite www.immunize.org/vis. 1. Why get vaccinated? Influenza vaccine can prevent influenza (flu). Flu is a contagious disease that spreads around the [place] every year, usually between October and May. Anyone can get the flu, but it is more dangerous for some people.  Infants and young children, people 72 years and older, pregnant people, and people with certain health conditions or a weakened immune system are at greatest risk of flu complications. Pneumonia, bronchitis, sinus infections, and ear infections are examples of flu-related complications. If you have a medical condition, such as heart disease, cancer, or diabetes, flu can make it worse. Flu can cause fever and chills, sore throat, muscle aches, fatigue, cough, headache, and runny or stuffy nose. Some people may have vomiting and diarrhea, though this is more common in children than adults. In an average year, thousands of people in the [facility] die from flu, and many more are hospitalized. Flu vaccine prevents millions of illnesses and flu-related visits to the doctor each year. 2. Influenza vaccines     CDC recommends everyone 6 months and older get vaccinated every flu season. Children 6 months through 6years of age may need 2 doses during a single flu season. Everyone else needs only 1 dose each flu season. It takes about 2 weeks for protection to develop after vaccination. There are many flu viruses, and they are always changing. Each year a new flu vaccine is made to protect against the influenza viruses believed to be likely to cause disease in the upcoming flu season. Even when the vaccine doesn't exactly match these viruses, it may still provide some protection. Influenza vaccine does not cause flu. Influenza vaccine may be given at the same time as other vaccines. 3. Talk with your health care provider    Tell your vaccination provider if the person getting the vaccine:  ? Has had an allergic reaction after a previous dose of influenza vaccine, or has any severe, life-threatening allergies   ? Has ever had Guillain-Barré Syndrome (also called GBS)    In some cases, your health care provider may decide to postpone influenza vaccination until a future visit.     Influenza vaccine can be administered at any time during pregnancy. People who are or will be pregnant during influenza season should receive inactivated influenza vaccine. People with minor illnesses, such as a cold, may be vaccinated. People who are moderately or severely ill should usually wait until they recover before getting influenza vaccine. Your health care provider can give you more information. 4. Risks of a vaccine reaction    ? Soreness, redness, and swelling where the shot is given, fever, muscle aches, and headache can happen after influenza vaccination. ? There may be a very small increased risk of Guillain-Barré Syndrome (GBS) after inactivated influenza vaccine (the flu shot). [employer] children who get the flu shot along with pneumococcal vaccine (PCV13) and/or DTaP vaccine at the same time might be slightly more likely to have a seizure caused by fever. Tell your health care provider if a child who is getting flu vaccine has ever had a seizure. People sometimes faint after medical procedures, including vaccination. Tell your provider if you feel dizzy or have vision changes or ringing in the ears. As with any medicine, there is a very remote chance of a vaccine causing a severe allergic reaction, other serious injury, or death. 5. What if there is a serious problem? An allergic reaction could occur after the vaccinated person leaves the clinic. If you see signs of a severe allergic reaction (hives, swelling of the face and throat, difficulty breathing, a fast heartbeat, dizziness, or weakness), call 9-1-1 and get the person to the nearest hospital.    For other signs that concern you, call your health care provider. Adverse reactions should be reported to the Vaccine Adverse Event Reporting System (VAERS). Your health care provider will usually file this report, or you can do it yourself. Visit the VAERS website at www.vaers. hhs.gov or call 7-839.807.1076.  VAERS is only for reporting reactions, and VAERS staff

## 2021-10-11 NOTE — PROGRESS NOTES
[address]   Cardiac Consultation    Referring Provider:  Trenton Malave MD     Chief Complaint   Patient presents with    Follow-up    Atrial Fibrillation    Hypertension    Hyperlipidemia        Subjective: Agustin Holder is here for follow up for hospital cardiology follow up; c/o baseline CHEEK today      History of Present Illness:  Agustin Holder is a [de-identified] y.o. male who presents today for hospital f/u. He has PMH of moderate NOCAD (med mgt without PCI), PAF dx 9/21 on eliquis, HLD, HTN, GERD, COPD and DM. He was seen in 2018 by my partner Dr. Kit Croft for chest pain. Noted lexiscan myoview stress test 6/1/18  showed no evidence of myocardial ischemia or scar. Noted ECHO from 6/2018 EF of 55-60%. On 01/18/21 he reported c/o left sided chest pain off/on x 1 year. He could not characterize the pain. Occurred with exertion pushing objects (ie. Pushing  door). He has CHEEK which  is baseline for him. Noted lexiscan myoview stress test from 01/22/21 which showed apical/septal ischemia. Most recent LHC from 02/04/21 with Dr. Kit Croft showed mild to moderate CAD without need for PCI. Noted EKG from 02/04/21 showed SR HR 61 (no sig change from 5/18). At [address] 2/2021 he c/o CP and we discussed starting Imdur but he did not feel severe enough to start. Since his last OV he was admitted 09/29/21 with c/o CP and lightheadedness that awoken him in the middle of the night. Troponin 0.01 X3. BNP 72. Most recent EKG 09/30/21 AF with RVR 132bpm. He was started on Eliquis. Most recent [address] 09/30/21 show negative ischemia or scarring hyperdynamic LVEF > 70%. Most recent ECHO 09/30/21 EF 55-60%, with moderate concentric LVH. He was discharged with a cardiac event monitor and cardizem. Today he states SOB with walking fast on exertional activities, this is unchanged. He states no chest pain since leaving the hospital. Declines any nitro usage for chest pain.  Patient currently denies any weight gain, edema, palpitations, dizziness, and syncope. He is taking Eliquis for new diagnosis of atrial fibrillation in hospital. He states he did not feel palpitations with afib. Denies recent issues with bleeding or bruising. Past Medical History:   has a past medical history of COPD (chronic obstructive pulmonary disease) ([address] Decreased hearing of both ears, Diabetes mellitus ([address] GERD (gastroesophageal reflux disease), Histoplasmosis, Hyperlipidemia, Hypertension, and Primary malignant neoplasm of skin of trunk. Surgical History:   has a past surgical history that includes Lung removal, partial (2007); Vasectomy; TURP (2002); Upper gastrointestinal endoscopy (1/26/2015); Upper gastrointestinal endoscopy (2/15); Inguinal hernia repair (Bilateral, 6/22/15); Colonoscopy (5/22/12); Colonoscopy (7/21/15); Upper gastrointestinal endoscopy (07/21/2017); Upper gastrointestinal endoscopy (08/23/2017); and Cystoscopy (N/A, 10/24/2019). Social History:   reports that he quit smoking about 2002. He has a 30.00 pack-year smoking history. He has never used smokeless tobacco. He reports current alcohol use. He reports that he does not use drugs. Family History:  family history includes Cancer in his brother; Heart Disease in his mother. Home Medications:  Prior to Admission medications    Medication Sig Start Date End Date Taking?  Authorizing Provider   dilTIAZem (CARDIZEM CD) 240 MG extended release capsule Take 1 capsule by mouth daily 10/2/21  Yes LUZ Quigley - CNP   apixaban (ELIQUIS) 5 MG TABS tablet Take 1 tablet by mouth 2 times daily 10/1/21  Yes LUZ Nathan - CNP   atorvastatin (LIPITOR) 10 MG tablet TAKE ONE TABLET BY MOUTH DAILY 9/20/21  Yes Dayton Noel MD   losartan-hydroCHLOROthiazide (HYZAAR) 100-25 MG per tablet TAKE ONE TABLET BY MOUTH DAILY 9/10/21  Yes MT Bah MD   nitroGLYCERIN (NITROSTAT) 0.4 MG SL tablet Place 1 tablet under the tongue every 5 minutes as needed for Chest pain 8/16/21  Yes Jens Meneses MD   metoprolol succinate (TOPROL XL) 50 MG extended release tablet TAKE ONE TABLET BY MOUTH DAILY 7/27/21  Yes MT Almeida MD   Heather Quick 100-62.5-25 MCG/INH AEPB INHALE ONE PUFF BY MOUTH DAILY 6/8/21  Yes Ted Rivera MD   metFORMIN (GLUCOPHAGE-XR) 500 MG extended release tablet TAKE TWO TABLETS BY MOUTH TWICE A DAY WITH MEALS 5/24/21  Yes MT Almeida MD   ketoconazole (NIZORAL) 2 % cream APPLY TO AFFECTED AREAS OF FACE AROUND NOSE TWO TIMES A DAY 2/18/21  Yes Maura Dawson MD   pantoprazole (PROTONIX) 40 MG tablet TAKE ONE TABLET BY MOUTH DAILY 11/17/20  Yes MT Almeida MD   ketoconazole (NIZORAL) 2 % shampoo WASH SCALP THREE TIMES A WEEK 10/15/20  Yes Maura Dawson MD   PROAIR  (56 Base) MCG/ACT inhaler Inhale 2 puffs into the lungs every 6 hours as needed for Wheezing 8/28/20  Yes Ted Rivera MD   triamcinolone (KENALOG) 0.1 % ointment APPLY TO AFFECTED AREA(S) SPARINGLY TWO TIMES A DAY UNTIL CLEAR. DO NOT USE FOR MORE THAN TWO WEEKS STRAIGHT 5/26/20  Yes MD Afsaneh Campa Placard MISC by Does not apply route 4/12/19  Yes MOOSE Gonsalez   therapeutic multivitamin-minerals [facility]) tablet Take 1 tablet by mouth daily. Yes Historical Provider, MD   aspirin 81 MG EC tablet Take 81 mg by mouth daily. Yes Historical Provider, MD        Allergies:  Patient has no known allergies. Review of Systems:   · Constitutional: there has been no unanticipated weight loss. There's been no change in energy level, sleep pattern, or activity level. · Eyes: No visual changes or diplopia. No scleral icterus. · ENT: No Headaches, hearing loss or vertigo. No mouth sores or sore throat. · Cardiovascular: Reviewed in HPI  · Respiratory: No cough or wheezing, no sputum production. No hematemesis. · Gastrointestinal: No abdominal pain, appetite loss, blood in stools.  No change in bowel or bladder habits. · Genitourinary: No dysuria, trouble voiding, or hematuria. · Musculoskeletal:  No gait disturbance, weakness or joint complaints. · Integumentary: No rash or pruritis. · Neurological: No headache, diplopia, change in muscle strength, numbness or tingling. No change in gait, balance, coordination, mood, affect, memory, mentation, behavior. · Psychiatric: No anxiety, no depression. · Endocrine: No malaise, fatigue or temperature intolerance. No excessive thirst, fluid intake, or urination. No tremor. · Hematologic/Lymphatic: No abnormal bruising or bleeding, blood clots or swollen lymph nodes. · Allergic/Immunologic: No nasal congestion or hives. Physical Examination:    Vitals:    10/12/21 0825   BP: 134/70   Pulse: 61   SpO2: 98%        Constitutional and General Appearance: NAD   Respiratory:  · Normal excursion and expansion without use of accessory muscles  · Resp Auscultation: Normal breath sounds without dullness  Cardiovascular:  · The apical impulses not displaced  · Heart tones are crisp and normal  · Cervical veins are not engorged  · The carotid upstroke is normal in amplitude and contour without delay or bruit  · Normal S1S2, No S3, No Murmur  · Peripheral pulses are symmetrical and full  · There is no clubbing, cyanosis of the extremities. Trace BLE edema  · Femoral Arteries: 2+ and equal  · Pedal Pulses: 2+ and equal   Abdomen:  · No masses or tenderness  · Liver/Spleen: No Abnormalities Noted  Neurological/Psychiatric:  · Alert and oriented in all spheres  · Moves all extremities well  · Exhibits normal gait balance and coordination  · No abnormalities of mood, affect, memory, mentation, or behavior are noted  Skin:  · Skin: warm and dry. Stress test 6/2018  Summary Normal LV size and systolic function. Left ventricular ejection fraction of  63%. Normal wall motion. There is normal isotope uptake at stress and rest.  No evidence of myocardial ischemia or scar.     Stress Protocols     Resting ECG  Normal sinus rhythm. Early repolarization. Echo 6/2018 Summary   Technically difficult examination. Normal systolic function with an estimated ejection fraction of 55-60%. Mild concentric left ventricular hypertrophy. No regional wall motion abnormalities are seen. Grade I diastolic dysfunction with normal filing pressure. Stress test: 01/22/21  Summary Normal LVEF >60% Grossly normal wall motion  Apical/septal ischemia   Overall, this would be considered an abnormal, intermediate risk, study     [facility] 2/04/21 Findings:     1. Left main coronary artery was normal. It gave off the left anterior descending artery and left circumflex. 2. Left anterior descending artery has mild to moderate atherosclerotic disease. It was moderate in size. It gave off septal perforators and a moderate sized diagonal branch. The LAD covered the entire apex of the left ventricle. 3. Left circumflex has mild to moderate atherosclerotic disease. It was moderate in size. There was a moderate sized obtuse marginal branch. 4. Right coronary artery has mild to moderate atherosclerotic disease. It was moderate in size and was the dominant artery. 5. Left ventriculogram showed normal LVEF at 55-60%. Wall motion was normal . There was no significant mitral valve or aortic valve disease noted. LVEDP was normal. There was no gradient noted across the aortic valve during pullback of the catheter. CONCLUSIONS:     1. Mild to moderate coronary artery disease with preserved LVEF     ASSESSMENT/RECOMMENDATIONS:     1. Risk Factor control  2.  Consider anti-anginal therapy with long acting nitrates     Lab Results   Component Value Date    CHOL 151 02/04/2021    CHOL 169 01/11/2021    CHOL 168 08/06/2020     Lab Results   Component Value Date    TRIG 115 02/04/2021    TRIG 151 (H) 01/11/2021    TRIG 157 (H) 08/06/2020     Lab Results   Component Value Date    HDL 44 02/04/2021    HDL 45 01/11/2021    HDL 41 08/06/2020     Lab Results   Component Value Date    LDLCALC 84 02/04/2021    LDLCALC 94 01/11/2021    LDLCALC 96 08/06/2020     Lab Results   Component Value Date    LABVLDL 23 02/04/2021    LABVLDL 30 01/11/2021    LABVLDL 31 08/06/2020     No results found for: CHOLHDLRATIO     [id number] Score for Atrial Fibrillation Stroke Risk   Risk   Factors  Component Value   C CHF No 0   H HTN Yes 1   A2 Age >= 76 Yes,  [de-identified] y.o.) 2   D DM Yes 1   S2 Prior Stroke/TIA No 0   V Vascular Disease No 0   A Age 74-69 No,  [de-identified] y.o.) 0   Sc Sex male 0    [id number]  Score  4   Score last updated 10/12/21 9:12 AM EDT    Assessment:     1. HTN: Well controlled and will continue current medical regimen of hyzaar, norvasc, and Toprol XL. 2. CAD:  Mild-moderate NOCAD. Most recent cardiac cath from 02/04/21 with Dr. Curt Restrepo showed mild to moderate CAD without need for PCI. Most recent [address] [facility] 09/30/21 show negative ischemia. There are no concerning symptoms for angina currently. I have offered long-acting nitrate (imdur) in past but he declined. He is agreeable to SL NTG PRN. 3.      HLD: I personally reviewed most recent lipids from 2/4/21 in Epic and d/w Mr. Aleksander Zhu (see above). Well controlled and will continue current medical regimen. 4.       DM: per PCP    5. PAF: Newly diagnosed 9/21. Asymptomatic at time. Now taking eliquis and wearing 2 week event monitor. He is in  NSR on auscultation today. Plan:  1. Medication Reviewed. 2. Cardiac Event Monitor ~ Patient states shipping today after wearing after discharge from hospital   ~ Will await results and call you to review  3. Atrial Fibrillation ~ await cardiac event monitor results and continue taking Eliquis 5 mg tablet twice daily   ~ prevention of stoke   4. Hold off on imdur given no CP symptoms now. Use SL NTG prn. Possibly afib caused CP? 5. Follow up in 2 months for office visit. Scribe's attestation:   This note was scribed in the presence of Cesar Mancia by Olga Dial RN     I, Dr. Eveline Nelson, personally performed the services described in this documentation, as scribed by the above signed scribe in my presence. It is both accurate and complete to my knowledge. I agree with the details independently gathered by the clinical support staff, while the remaining scribed note accurately describes my personal service to the patient. Thank you for allowing me to participate in the care of this individual.    Cost of prescription medications and patient compliance have been reviewed with patient. All questions answered. Marcel Hernandez.  Negro Qureshi M.D., [facility]

## 2021-10-12 ENCOUNTER — OFFICE VISIT (OUTPATIENT)
Dept: CARDIOLOGY CLINIC | Age: 81
End: 2021-10-12
Payer: MEDICARE

## 2021-10-12 VITALS
WEIGHT: 242 LBS | SYSTOLIC BLOOD PRESSURE: 134 MMHG | OXYGEN SATURATION: 98 % | DIASTOLIC BLOOD PRESSURE: 70 MMHG | BODY MASS INDEX: 36.68 KG/M2 | HEIGHT: 68 IN | HEART RATE: 61 BPM

## 2021-10-12 DIAGNOSIS — I10 ESSENTIAL HYPERTENSION: Primary | ICD-10-CM

## 2021-10-12 DIAGNOSIS — I25.10 MILD CAD: ICD-10-CM

## 2021-10-12 DIAGNOSIS — I48.19 PERSISTENT ATRIAL FIBRILLATION (HCC): ICD-10-CM

## 2021-10-12 DIAGNOSIS — R06.02 SHORTNESS OF BREATH: ICD-10-CM

## 2021-10-12 DIAGNOSIS — R07.9 CHEST PAIN, UNSPECIFIED TYPE: ICD-10-CM

## 2021-10-12 DIAGNOSIS — Z87.891 HISTORY OF TOBACCO ABUSE: ICD-10-CM

## 2021-10-12 DIAGNOSIS — E78.2 MIXED HYPERLIPIDEMIA: ICD-10-CM

## 2021-10-12 PROCEDURE — 99214 OFFICE O/P EST MOD 30 MIN: CPT | Performed by: INTERNAL MEDICINE

## 2021-10-12 NOTE — PATIENT INSTRUCTIONS
Plan:  1. Medication Reviewed. 2. Cardiac Event Monitor ~ Patient states shipping today after wearing after discharge from hospital   ~ Will await results and call you to review  3. Atrial Fibrillation ~ await cardiac event monitor results and continue taking Eliquis 5 mg tablet twice daily   ~ prevention of stoke   4. Follow up in 2 months for office visit.

## 2021-10-12 NOTE — LETTER
Felipa Watson  1940      [facility]   Cardiac Consultation    Referring Provider:  Africa Nick MD     Chief Complaint   Patient presents with    Follow-up    Atrial Fibrillation    Hypertension    Hyperlipidemia        Subjective: Felipa Watson is here for follow up for hospital cardiology follow up; c/o baseline CHEEK today      History of Present Illness:  Felipa Watson is a [de-identified] y.o. male who presents today for hospital f/u. He has PMH of moderate NOCAD (med mgt without PCI), PAF dx 9/21 on eliquis, HLD, HTN, GERD, COPD and DM. He was seen in 2018 by my partner Dr. Anisha Ugarte for chest pain. Noted lexiscan myoview stress test 6/1/18  showed no evidence of myocardial ischemia or scar. Noted ECHO from 6/2018 EF of 55-60%. On 01/18/21 he reported c/o left sided chest pain off/on x 1 year. He could not characterize the pain. Occurred with exertion pushing objects (ie. Pushing  door). He has CHEEK which  is baseline for him. Noted lexiscan myoview stress test from 01/22/21 which showed apical/septal ischemia. Most recent LHC from 02/04/21 with Dr. Anisha Ugarte showed mild to moderate CAD without need for PCI. Noted EKG from 02/04/21 showed SR HR 61 (no sig change from 5/18). At [address] 2/2021 he c/o CP and we discussed starting Imdur but he did not feel severe enough to start. Since his last OV he was admitted 09/29/21 with c/o CP and lightheadedness that awoken him in the middle of the night. Troponin 0.01 X3. BNP 72. Most recent EKG 09/30/21 AF with RVR 132bpm. He was started on Eliquis. Most recent [address] [facility] 09/30/21 show negative ischemia or scarring hyperdynamic LVEF > 70%. Most recent ECHO 09/30/21 EF 55-60%, with moderate concentric LVH. He was discharged with a cardiac event monitor and cardizem. Today he states SOB with walking fast on exertional activities, this is unchanged.  He states no chest pain since leaving the hospital. Declines any nitro usage for chest pain. Patient currently denies any weight gain, edema, palpitations, dizziness, and syncope. He is taking Eliquis for new diagnosis of atrial fibrillation in hospital. He states he did not feel palpitations with afib. Denies recent issues with bleeding or bruising. Past Medical History:   has a past medical history of COPD (chronic obstructive pulmonary disease) ([facility] [address] Decreased hearing of both ears, Diabetes mellitus ([facility] [address] GERD (gastroesophageal reflux disease), Histoplasmosis, Hyperlipidemia, Hypertension, and Primary malignant neoplasm of skin of trunk. Surgical History:   has a past surgical history that includes Lung removal, partial (2007); Vasectomy; TURP (2002); Upper gastrointestinal endoscopy (1/26/2015); Upper gastrointestinal endoscopy (2/15); Inguinal hernia repair (Bilateral, 6/22/15); Colonoscopy (5/22/12); Colonoscopy (7/21/15); Upper gastrointestinal endoscopy (07/21/2017); Upper gastrointestinal endoscopy (08/23/2017); and Cystoscopy (N/A, 10/24/2019). Social History:   reports that he quit smoking about 2002. He has a 30.00 pack-year smoking history. He has never used smokeless tobacco. He reports current alcohol use. He reports that he does not use drugs. Family History:  family history includes Cancer in his brother; Heart Disease in his mother. Home Medications:  Prior to Admission medications    Medication Sig Start Date End Date Taking?  Authorizing Provider   dilTIAZem (CARDIZEM CD) 240 MG extended release capsule Take 1 capsule by mouth daily 10/2/21  Yes LUZ Calzada - CNP   apixaban (ELIQUIS) 5 MG TABS tablet Take 1 tablet by mouth 2 times daily 10/1/21  Yes LUZ Mariee - CNP   atorvastatin (LIPITOR) 10 MG tablet TAKE ONE TABLET BY MOUTH DAILY 9/20/21  Yes Mansi Pascal MD   losartan-hydroCHLOROthiazide (HYZAAR) 100-25 MG per tablet TAKE ONE TABLET BY MOUTH DAILY 9/10/21  Yes Mansi Pascal MD   nitroGLYCERIN (NITROSTAT) 0.4 MG SL tablet Place 1 tablet under the tongue every 5 minutes as needed for Chest pain 8/16/21  Yes Prerna Newsome MD   metoprolol succinate (TOPROL XL) 50 MG extended release tablet TAKE ONE TABLET BY MOUTH DAILY 7/27/21  Yes C Shelle Soulier, MD Sharcristina Geovany 100-62.5-25 MCG/INH AEPB INHALE ONE PUFF BY MOUTH DAILY 6/8/21  Yes Janett Espinoza MD   metFORMIN (GLUCOPHAGE-XR) 500 MG extended release tablet TAKE TWO TABLETS BY MOUTH TWICE A DAY WITH MEALS 5/24/21  Yes C Shelle Soulier, MD   ketoconazole (NIZORAL) 2 % cream APPLY TO AFFECTED AREAS OF FACE AROUND NOSE TWO TIMES A DAY 2/18/21  Yes Itzel Braga MD   pantoprazole (PROTONIX) 40 MG tablet TAKE ONE TABLET BY MOUTH DAILY 11/17/20  Yes C Shelle Soulier, MD   ketoconazole (NIZORAL) 2 % shampoo WASH SCALP THREE TIMES A WEEK 10/15/20  Yes Itzel Braga MD   PROAIR  (85 Base) MCG/ACT inhaler Inhale 2 puffs into the lungs every 6 hours as needed for Wheezing 8/28/20  Yes Janett Espinoza MD   triamcinolone (KENALOG) 0.1 % ointment APPLY TO AFFECTED AREA(S) SPARINGLY TWO TIMES A DAY UNTIL CLEAR. DO NOT USE FOR MORE THAN TWO WEEKS STRAIGHT 5/26/20  Yes Itzel Braga MD   Handicap Placard MISC by Does not apply route 4/12/19  Yes MOOSE Virk   therapeutic multivitamin-minerals [facility]) tablet Take 1 tablet by mouth daily. Yes Historical Provider, MD   aspirin 81 MG EC tablet Take 81 mg by mouth daily. Yes Historical Provider, MD        Allergies:  Patient has no known allergies. Review of Systems:   · Constitutional: there has been no unanticipated weight loss. There's been no change in energy level, sleep pattern, or activity level. · Eyes: No visual changes or diplopia. No scleral icterus. · ENT: No Headaches, hearing loss or vertigo. No mouth sores or sore throat. · Cardiovascular: Reviewed in HPI  · Respiratory: No cough or wheezing, no sputum production. No hematemesis. · Gastrointestinal: No abdominal pain, appetite loss, blood in stools.  No change in bowel or bladder habits. · Genitourinary: No dysuria, trouble voiding, or hematuria. · Musculoskeletal:  No gait disturbance, weakness or joint complaints. · Integumentary: No rash or pruritis. · Neurological: No headache, diplopia, change in muscle strength, numbness or tingling. No change in gait, balance, coordination, mood, affect, memory, mentation, behavior. · Psychiatric: No anxiety, no depression. · Endocrine: No malaise, fatigue or temperature intolerance. No excessive thirst, fluid intake, or urination. No tremor. · Hematologic/Lymphatic: No abnormal bruising or bleeding, blood clots or swollen lymph nodes. · Allergic/Immunologic: No nasal congestion or hives. Physical Examination:    Vitals:    10/12/21 0825   BP: 134/70   Pulse: 61   SpO2: 98%        Constitutional and General Appearance: NAD   Respiratory:  · Normal excursion and expansion without use of accessory muscles  · Resp Auscultation: Normal breath sounds without dullness  Cardiovascular:  · The apical impulses not displaced  · Heart tones are crisp and normal  · Cervical veins are not engorged  · The carotid upstroke is normal in amplitude and contour without delay or bruit  · Normal S1S2, No S3, No Murmur  · Peripheral pulses are symmetrical and full  · There is no clubbing, cyanosis of the extremities. Trace BLE edema  · Femoral Arteries: 2+ and equal  · Pedal Pulses: 2+ and equal   Abdomen:  · No masses or tenderness  · Liver/Spleen: No Abnormalities Noted  Neurological/Psychiatric:  · Alert and oriented in all spheres  · Moves all extremities well  · Exhibits normal gait balance and coordination  · No abnormalities of mood, affect, memory, mentation, or behavior are noted  Skin:  · Skin: warm and dry. Stress test 6/2018  Summary Normal LV size and systolic function. Left ventricular ejection fraction of  63%. Normal wall motion.  There is normal isotope uptake at stress and rest.  No evidence of myocardial ischemia or scar.    Stress Protocols     Resting ECG  Normal sinus rhythm. Early repolarization. Echo 6/2018 Summary   Technically difficult examination. Normal systolic function with an estimated ejection fraction of 55-60%. Mild concentric left ventricular hypertrophy. No regional wall motion abnormalities are seen. Grade I diastolic dysfunction with normal filing pressure. Stress test: 01/22/21  Summary Normal LVEF >60% Grossly normal wall motion  Apical/septal ischemia   Overall, this would be considered an abnormal, intermediate risk, study     [facility] 2/04/21 Findings:     1. Left main coronary artery was normal. It gave off the left anterior descending artery and left circumflex. 2. Left anterior descending artery has mild to moderate atherosclerotic disease. It was moderate in size. It gave off septal perforators and a moderate sized diagonal branch. The LAD covered the entire apex of the left ventricle. 3. Left circumflex has mild to moderate atherosclerotic disease. It was moderate in size. There was a moderate sized obtuse marginal branch. 4. Right coronary artery has mild to moderate atherosclerotic disease. It was moderate in size and was the dominant artery. 5. Left ventriculogram showed normal LVEF at 55-60%. Wall motion was normal . There was no significant mitral valve or aortic valve disease noted. LVEDP was normal. There was no gradient noted across the aortic valve during pullback of the catheter. CONCLUSIONS:     1. Mild to moderate coronary artery disease with preserved LVEF     ASSESSMENT/RECOMMENDATIONS:     1. Risk Factor control  2.  Consider anti-anginal therapy with long acting nitrates     Lab Results   Component Value Date    CHOL 151 02/04/2021    CHOL 169 01/11/2021    CHOL 168 08/06/2020     Lab Results   Component Value Date    TRIG 115 02/04/2021    TRIG 151 (H) 01/11/2021    TRIG 157 (H) 08/06/2020     Lab Results   Component Value Date    HDL 44 02/04/2021 HDL 45 01/11/2021    HDL 41 08/06/2020     Lab Results   Component Value Date    LDLCALC 84 02/04/2021    LDLCALC 94 01/11/2021    LDLCALC 96 08/06/2020     Lab Results   Component Value Date    LABVLDL 23 02/04/2021    LABVLDL 30 01/11/2021    LABVLDL 31 08/06/2020     No results found for: CHOLHDLRATIO     [id number] Score for Atrial Fibrillation Stroke Risk   Risk   Factors  Component Value   C CHF No 0   H HTN Yes 1   A2 Age >= 76 Yes,  [de-identified] y.o.) 2   D DM Yes 1   S2 Prior Stroke/TIA No 0   V Vascular Disease No 0   A Age 74-69 No,  [de-identified] y.o.) 0   Sc Sex male 0    [id number]  Score  4   Score last updated 10/12/21 9:12 AM EDT    Assessment:     1. HTN: Well controlled and will continue current medical regimen of hyzaar, norvasc, and Toprol XL. 2. CAD:  Mild-moderate NOCAD. Most recent cardiac cath from 02/04/21 with Dr. Sharee Newton showed mild to moderate CAD without need for PCI. Most recent [address] 09/30/21 show negative ischemia. There are no concerning symptoms for angina currently. I have offered long-acting nitrate (imdur) in past but he declined. He is agreeable to SL NTG PRN. 3.      HLD: I personally reviewed most recent lipids from 2/4/21 in Epic and d/w Mr. Opal Masters (see above). Well controlled and will continue current medical regimen. 4.       DM: per PCP    5. PAF: Newly diagnosed 9/21. Asymptomatic at time. Now taking eliquis and wearing 2 week event monitor. He is in  NSR on auscultation today. Plan:  1. Medication Reviewed. 2. Cardiac Event Monitor ~ Patient states shipping today after wearing after discharge from hospital   ~ Will await results and call you to review  3. Atrial Fibrillation ~ await cardiac event monitor results and continue taking Eliquis 5 mg tablet twice daily   ~ prevention of stoke   4. Hold off on imdur given no CP symptoms now. Use SL NTG prn. Possibly afib caused CP? 5. Follow up in 2 months for office visit.      Scribe's attestation: This note was scribed in the presence of Esther Boone by Thony Lobato RN     I, Dr. Mauro San, personally performed the services described in this documentation, as scribed by the above signed scribe in my presence. It is both accurate and complete to my knowledge. I agree with the details independently gathered by the clinical support staff, while the remaining scribed note accurately describes my personal service to the patient. Thank you for allowing me to participate in the care of this individual.    Cost of prescription medications and patient compliance have been reviewed with patient. All questions answered. Latisha Lenz.  Lucy Smith M.D., [facility]

## 2021-10-13 ENCOUNTER — CARE COORDINATION (OUTPATIENT)
Dept: CASE MANAGEMENT | Age: 81
End: 2021-10-13

## 2021-10-13 NOTE — CARE COORDINATION
[facility] Transitions Follow Up Call    10/13/2021    Patient: Abdirahman Sage  Patient : 1940   MRN: [id number]  Reason for Admission: a-fib   Discharge Date: 10/1/21 RARS: Readmission Risk Score: 14         Spoke with: Abdirahman Sage    Spoke with patient who reported he is doing well. Patient denied any cp or sob. Patient reported he had apt with cardiologist Dr. Kvng Bauer yesterday and reported he had apt with Dr. Chris Cardoza and waiting for cpap to arrive. Patient reported overall he is doing well and denied any further issues or concerns at this this time. Care Transitions Subsequent and Final Call    Subsequent and Final Calls  Do you have any ongoing symptoms?: No  Have your medications changed?: No  Do you have any questions related to your medications?: No  Do you currently have any active services?: No  Do you have any needs or concerns that I can assist you with?: No  Identified Barriers: None  Care Transitions Interventions  Other Interventions:            Follow Up  Future Appointments   Date Time Provider Rommel Leahy   10/25/2021  8:30 AM MT Almeida MD [facility] FP [place] - [place]   2021  3:30 PM Jens Meneses MD P [facility]   2021  9:00 AM Ted Rivera MD AND [facility] Alarcon RN

## 2021-10-19 ENCOUNTER — TELEPHONE (OUTPATIENT)
Dept: PHARMACY | Facility: CLINIC | Age: 81
End: 2021-10-19

## 2021-10-19 NOTE — PROGRESS NOTES
Physician Progress Note      PATIENT:               Susana Wen  CSN #:                  [id number]  :                       1940  ADMIT DATE:       2021 3:32 AM  Sumi Zamudio DATE:        10/1/2021 4:39 PM  RESPONDING  PROVIDER #:        Sterling Yo MD          QUERY TEXT:    Pt admitted with chest pain. Pt noted to have developed Afib RVR on . If   possible, please document in progress notes and discharge summary if you are   evaluating and/or treating any of the following: The medical record reflects the following:  Risk Factors: chest pain, Paroxysmal Atrial Fibrillation  Clinical Indicators:  NM stress testing completed and negative. Treatment: Started on Lovenox and changed to Eliquis, Increase cardizem,   Cardiology consult    Thank You Renetta Pratt RN, FRANCISCO [email or web address]  Options provided:  -- Chest pain due to Afib . -- Chest pain unrelated to Afib . -- Chest pain due to, please document. -- Other - I will add my own diagnosis  -- Disagree - Not applicable / Not valid  -- Disagree - Clinically unable to determine / Unknown  -- Refer to Clinical Documentation Reviewer    PROVIDER RESPONSE TEXT:    This patient has chest pain due to Afib .     Query created by: Blanca Chang on 10/6/2021 2:18 PM      Electronically signed by:  Sterling Yo MD 10/19/2021 9:43 AM

## 2021-10-19 NOTE — DISCHARGE SUMMARY
with transition to Eliquis at discharge. Diabetes Mellitus, Type 2: Controlled. Hypertension, benign: Controlled. Continue current medication regimen. Exam:   /83   Pulse 83   Temp 97.7 °F (36.5 °C) (Oral)   Resp 18   Ht 5' 8\" (1.727 m)   Wt 241 lb 1.6 oz (109.4 kg)   SpO2 93%   BMI 36.66 kg/m²   General appearance: No apparent distress, appears stated age and cooperative. HEENT:  Normal cephalic, atraumatic without obvious deformity. Pupils equal, round, and reactive to light. Extra ocular muscles intact. Conjunctivae/corneas clear. Neck: Supple, no jugular venous distention. Trachea midline with full range of motion. Respiratory:  Normal respiratory effort. Clear to auscultation, bilaterally without Rales/Wheezes/Rhonchi. Cardiovascular: Irregularly irregular, normal rate, with normal S1/S2 without murmurs, rubs or gallops. Abdomen: Soft, non-tender, non-distended with normal bowel sounds. Musculoskelatal: No clubbing, cyanosis or edema bilaterally. Full range of motion without deformity. Neurologic:  Neurovascularly intact without any focal sensory/motor deficits. Cranial nerves: II-XII intact, grossly non-focal.  Psychiatric: Alert and oriented, thought content appropriate, normal insight  Skin: Skin color, texture, turgor normal.  No rashes or lesions. Capillary Refill: Brisk,< 3 seconds   Peripheral Pulses: +2 palpable, equal bilaterally     Patient Discharge Instructions: Follow up:  1. Primary Care Provider Jewel Mike MD in the next 1-2 weeks.     Discharge Medications:   Discharge Medication List as of 10/1/2021  4:06 PM      START taking these medications    Details   dilTIAZem (CARDIZEM CD) 240 MG extended release capsule Take 1 capsule by mouth daily, Disp-30 capsule, R-3Normal      apixaban (ELIQUIS) 5 MG TABS tablet Take 1 tablet by mouth 2 times daily, Disp-180 tablet, R-1Normal           Discharge Medication List as of 10/1/2021  4:06 PM        Discharge Medication List as of 10/1/2021  4:06 PM      CONTINUE these medications which have NOT CHANGED    Details   atorvastatin (LIPITOR) 10 MG tablet TAKE ONE TABLET BY MOUTH DAILY, Disp-90 tablet, R-1Normal      losartan-hydroCHLOROthiazide (HYZAAR) 100-25 MG per tablet TAKE ONE TABLET BY MOUTH DAILY, Disp-90 tablet, R-1Normal      nitroGLYCERIN (NITROSTAT) 0.4 MG SL tablet Place 1 tablet under the tongue every 5 minutes as needed for Chest pain, Disp-25 tablet, R-3Normal      metoprolol succinate (TOPROL XL) 50 MG extended release tablet TAKE ONE TABLET BY MOUTH DAILY, Disp-90 tablet, R-0Normal      TRELEGY ELLIPTA 100-62.5-25 MCG/INH AEPB INHALE ONE PUFF BY MOUTH DAILY, Disp-1 each, R-2Normal      metFORMIN (GLUCOPHAGE-XR) 500 MG extended release tablet TAKE TWO TABLETS BY MOUTH TWICE A DAY WITH MEALS, Disp-360 tablet, R-1Normal      ketoconazole (NIZORAL) 2 % cream APPLY TO AFFECTED AREAS OF FACE AROUND NOSE TWO TIMES A DAY, Disp-30 g, R-1, Normal      pantoprazole (PROTONIX) 40 MG tablet TAKE ONE TABLET BY MOUTH DAILY, Disp-90 tablet, R-1Normal      ketoconazole (NIZORAL) 2 % shampoo WASH SCALP THREE TIMES A WEEK, Disp-240 mL,R-10, Normal      PROAIR  (90 Base) MCG/ACT inhaler Inhale 2 puffs into the lungs every 6 hours as needed for Wheezing, Disp-1 Inhaler,R-5,DAWNormal      triamcinolone (KENALOG) 0.1 % ointment APPLY TO AFFECTED AREA(S) SPARINGLY TWO TIMES A DAY UNTIL CLEAR. DO NOT USE FOR MORE THAN TWO WEEKS STRAIGHT, Disp-80 g, R-0, Normal      Handicap Placard MISC Starting Fri 4/12/2019, Disp-1 each, R-0, Print      therapeutic multivitamin-minerals (THERAGRAN-M) tablet Take 1 tablet by mouth daily. aspirin 81 MG EC tablet Take 81 mg by mouth daily.            Discharge Medication List as of 10/1/2021  4:06 PM      STOP taking these medications       amLODIPine (NORVASC) 5 MG tablet Comments:   Reason for Stopping:                 Significant Test Results    CTA ABDOMEN W CONTRAST    Result Date: diverticulosis. Consults:     IP CONSULT TO HOSPITALIST  IP CONSULT TO CARDIOLOGY    Labs: For convenience and continuity at follow-up the following most recent labs are provided:    Lab Results   Component Value Date    WBC 9.5 10/01/2021    HGB 13.0 10/01/2021    HCT 37.7 10/01/2021    MCV 85.8 10/01/2021     10/01/2021     10/25/2021    K 4.4 10/25/2021    K 3.8 10/01/2021     10/25/2021    CO2 26 10/25/2021    BUN 13 10/25/2021    CREATININE 1.3 10/25/2021    CALCIUM 9.6 10/25/2021    PHOS 3.8 08/06/2020    TROPONINI <0.01 09/29/2021    ALKPHOS 87 10/25/2021    ALT 14 10/25/2021    AST 13 10/25/2021    BILITOT 0.5 10/25/2021    BILIDIR <0.2 10/25/2021    LABALBU 4.1 10/25/2021    LDLCALC 116 10/25/2021    TRIG 129 10/25/2021    LABA1C 7.3 10/25/2021     Lab Results   Component Value Date    INR 0.91 09/29/2021    INR 1.00 02/04/2021         The patient was seen and examined on day of discharge and this discharge summary is in conjunction with any daily progress note from day of discharge. Time spent on discharge is more than 30 minutes in the examination, evaluation, counseling and review of medications and discharge plan. Signed:    Kristopher Castañeda MD   10/31/2021    Thank you Sony Lua MD for the opportunity to be involved in this patient's care. If you have any questions or concerns please feel free to contact my office [phone].

## 2021-10-19 NOTE — TELEPHONE ENCOUNTER
CLINICAL PHARMACY NOTE  Post-Discharge Transitions of Care [facility])    Patient appears to have been discharged from [facility]   on 10/01/21. Patient found in Outcomes MTM and is currently eligible for CMR. Please follow-up with patient to review medications.       30 days since discharge = 10/31/21     Tomasa Berry, Pharmacy    [address]   Department, toll free [phone], option 1

## 2021-10-20 ENCOUNTER — CARE COORDINATION (OUTPATIENT)
Dept: CASE MANAGEMENT | Age: 81
End: 2021-10-20

## 2021-10-20 NOTE — CARE COORDINATION
[facility] Transitions Follow Up Call    10/20/2021    Patient: Ivone Tidwell  Patient : 1940   MRN: [id number]  Reason for Admission: a-fib  Discharge Date: 10/1/21 RARS: Readmission Risk Score: 14         Spoke with: Ivone Tidwell    Spoke with patient who reported is doing well. Patient denied any cp or palpitations. Patient reported he is waiting for his cpap to arrive. Patient denied any further needs at this time. CTN advised patient of use of urgent care or physicians 24 hr access line if assistance is needed after hours. Care Transitions Subsequent and Final Call    Subsequent and Final Calls  Do you have any ongoing symptoms?: No  Have your medications changed?: No  Do you have any questions related to your medications?: No  Do you currently have any active services?: No  Do you have any needs or concerns that I can assist you with?: No  Identified Barriers: None  Care Transitions Interventions  Other Interventions:            Follow Up  Future Appointments   Date Time Provider Rommel Leahy   10/25/2021  8:30 AM MT Gagnon MD [facility] FP [place] - [facility]   2021  3:30 PM Elieser Galloway MD P [facility]   2021  9:00 AM Quin Baldwin MD AND [facility] Gaxiola RN

## 2021-10-21 NOTE — TELEPHONE ENCOUNTER
POPULATION HEALTH CLINICAL PHARMACY REVIEW: Post-Discharge Transitions of Care ([facility])    Attempted to reach patient again to review medications. Left second message asking for return call. Will send Mychart message and close encounter at this time.      Shruthi Cordova, PharmD, y [address] Pharmacist  Department: [phone]    =======================================================   For Pharmacy Admin Tracking Only     Gap Closed?: No    Time Spent (min): 10

## 2021-10-25 ENCOUNTER — OFFICE VISIT (OUTPATIENT)
Dept: FAMILY MEDICINE CLINIC | Age: 81
End: 2021-10-25
Payer: MEDICARE

## 2021-10-25 VITALS
HEART RATE: 104 BPM | OXYGEN SATURATION: 98 % | BODY MASS INDEX: 35.92 KG/M2 | DIASTOLIC BLOOD PRESSURE: 82 MMHG | HEIGHT: 68 IN | RESPIRATION RATE: 16 BRPM | WEIGHT: 237 LBS | SYSTOLIC BLOOD PRESSURE: 132 MMHG

## 2021-10-25 DIAGNOSIS — E11.9 CONTROLLED TYPE 2 DIABETES MELLITUS WITHOUT COMPLICATION, WITHOUT LONG-TERM CURRENT USE OF INSULIN (HCC): ICD-10-CM

## 2021-10-25 DIAGNOSIS — E78.2 MIXED HYPERLIPIDEMIA: ICD-10-CM

## 2021-10-25 DIAGNOSIS — R29.818 SUSPECTED SLEEP APNEA: ICD-10-CM

## 2021-10-25 DIAGNOSIS — I10 ESSENTIAL HYPERTENSION: ICD-10-CM

## 2021-10-25 DIAGNOSIS — I71.40 ABDOMINAL AORTIC ANEURYSM (AAA) WITHOUT RUPTURE: ICD-10-CM

## 2021-10-25 DIAGNOSIS — E66.9 OBESITY (BMI 35.0-39.9 WITHOUT COMORBIDITY): ICD-10-CM

## 2021-10-25 PROCEDURE — 99214 OFFICE O/P EST MOD 30 MIN: CPT | Performed by: INTERNAL MEDICINE

## 2021-10-25 PROCEDURE — 3051F HG A1C>EQUAL 7.0%<8.0%: CPT | Performed by: INTERNAL MEDICINE

## 2021-10-25 PROCEDURE — 36415 COLL VENOUS BLD VENIPUNCTURE: CPT | Performed by: INTERNAL MEDICINE

## 2021-10-25 ASSESSMENT — ENCOUNTER SYMPTOMS
GASTROINTESTINAL NEGATIVE: 1
ALLERGIC/IMMUNOLOGIC NEGATIVE: 1
SHORTNESS OF BREATH: 1

## 2021-10-25 NOTE — ASSESSMENT & PLAN NOTE
Sugars are does not check, diet is improved, weight is down 2 more lbs, no reported neuropathy, no change in vision, no claudication, no foot ulcers, no new skin lesions. No change in medications. No c/o with meds. Last eye exam 10/2021.

## 2021-10-25 NOTE — PROGRESS NOTES
Nicholas Ayala presents today for   Chief Complaint   Patient presents with    Diabetes        Abdominal aortic aneurysm (AAA) without rupture ([facility] [address]  4.5 cm AAA  US 7/1/2020. Was to have CTA in 8/2020 but not done, reordered 7/2021 and not done. Was due to see Dr Estrella Magaña but not done. Controlled type 2 diabetes mellitus without complication, without long-term current use of insulin ([facility])  Sugars are does not check, diet is improved, weight is down 2 more lbs, no reported neuropathy, no change in vision, no claudication, no foot ulcers, no new skin lesions. No change in medications. No c/o with meds. Last eye exam 10/2021. Essential hypertension  No change in meds, no c/o with meds, no chest pain since hospitalized(9/29/2021), SOB chonic but stable w/ Inhaler, no palpatations, or syncope. Home bp was 130-140s/80s. Mixed hyperlipidemia  Lost 2 lbs. No c/o w/ meds. Obesity (BMI 35.0-39.9 without comorbidity)  Lost 2 lbs and doing better w/ activity. Suspected sleep apnea  Has done test and awaiting C-pap. Review of Systems   Constitutional: Positive for fatigue. Respiratory: Positive for shortness of breath. Cardiovascular: Negative. Gastrointestinal: Negative. Endocrine: Negative. Genitourinary: Negative. Musculoskeletal: Negative. Allergic/Immunologic: Negative. Neurological: Negative. Hematological: Negative. Psychiatric/Behavioral: Negative. Vitals:    10/25/21 0834 10/25/21 0905   BP: 122/80 132/82   Pulse: 104    Resp: 16    SpO2: 98%    Weight: 237 lb (107.5 kg)    Height: 5' 8\" (1.727 m)         Physical Exam  Constitutional:       General: He is not in acute distress. Appearance: Normal appearance. He is well-developed. He is not ill-appearing, toxic-appearing or diaphoretic. HENT:      Head: Normocephalic and atraumatic.       Right Ear: Hearing, tympanic membrane, ear canal and external ear normal.      Left Ear: Hearing, tympanic membrane, ear canal and external ear normal.      Nose: Nose normal.      Right Sinus: No maxillary sinus tenderness or frontal sinus tenderness. Left Sinus: No maxillary sinus tenderness or frontal sinus tenderness. Mouth/Throat:      Pharynx: Uvula midline. No oropharyngeal exudate. Eyes:      General: Lids are normal. No scleral icterus. Right eye: No discharge. Left eye: No discharge. Conjunctiva/sclera: Conjunctivae normal.      Pupils: Pupils are equal, round, and reactive to light. Funduscopic exam:     Right eye: No hemorrhage, exudate, AV nicking, arteriolar narrowing or papilledema. Left eye: No hemorrhage, exudate, AV nicking, arteriolar narrowing or papilledema. Neck:      Thyroid: No thyroid mass or thyromegaly. Vascular: Normal carotid pulses. No carotid bruit, hepatojugular reflux or JVD. Trachea: Trachea normal. No tracheal deviation. Cardiovascular:      Rate and Rhythm: Normal rate and regular rhythm. No extrasystoles are present. Chest Wall: PMI is not displaced. Pulses: Normal pulses. No decreased pulses. Carotid pulses are 2+ on the right side and 2+ on the left side. Radial pulses are 2+ on the right side and 2+ on the left side. Femoral pulses are 2+ on the right side and 2+ on the left side. Popliteal pulses are 2+ on the right side and 2+ on the left side. Dorsalis pedis pulses are 2+ on the right side and 2+ on the left side. Posterior tibial pulses are 2+ on the right side and 2+ on the left side. Heart sounds: Normal heart sounds. No murmur heard. No friction rub. No gallop. Pulmonary:      Effort: Pulmonary effort is normal. No tachypnea, bradypnea, accessory muscle usage or respiratory distress. Breath sounds: Normal breath sounds. No stridor. No decreased breath sounds, wheezing, rhonchi or rales. Chest:      Chest wall: No tenderness.    Abdominal:      General: Bowel sounds are normal. There is no distension or abdominal bruit. Palpations: Abdomen is soft. There is no shifting dullness, fluid wave, mass or pulsatile mass. Tenderness: There is no abdominal tenderness. There is no right CVA tenderness, left CVA tenderness, guarding or rebound. Hernia: No hernia is present. There is no hernia in the ventral area or left inguinal area. Genitourinary:     Penis: Normal. No tenderness. Testes: Normal. Cremasteric reflex is present. Prostate: Normal.      Rectum: Normal. Guaiac result negative. Musculoskeletal:         General: No tenderness. Normal range of motion. Cervical back: Full passive range of motion without pain, normal range of motion and neck supple. Right lower leg: Edema (2+) present. Left lower leg: Edema (2+) present. Lymphadenopathy:      Head:      Right side of head: No submental, submandibular, tonsillar, preauricular, posterior auricular or occipital adenopathy. Left side of head: No submental, submandibular, tonsillar, preauricular, posterior auricular or occipital adenopathy. Cervical: No cervical adenopathy. Upper Body:      Right upper body: No supraclavicular or epitrochlear adenopathy. Left upper body: No supraclavicular or epitrochlear adenopathy. Skin:     General: Skin is warm and dry. Capillary Refill: Capillary refill takes less than 2 seconds. Coloration: Skin is not jaundiced or pale. Findings: No abrasion, bruising, erythema, lesion or rash. Nails: There is no clubbing. Neurological:      General: No focal deficit present. Mental Status: He is alert and oriented to person, place, and time. Mental status is at baseline. He is not disoriented. Cranial Nerves: No cranial nerve deficit. Sensory: No sensory deficit. Motor: No tremor, atrophy, abnormal muscle tone or seizure activity.       Coordination: Coordination normal.      Gait: Gait normal. Deep Tendon Reflexes: Reflexes are normal and symmetric. Reflexes normal. Babinski sign absent on the right side. Babinski sign absent on the left side. Reflex Scores:       Tricep reflexes are 2+ on the right side and 2+ on the left side. Bicep reflexes are 2+ on the right side and 2+ on the left side. Brachioradialis reflexes are 2+ on the right side and 2+ on the left side. Patellar reflexes are 2+ on the right side and 2+ on the left side. Achilles reflexes are 2+ on the right side and 2+ on the left side. Comments: Diabetic foot exam was normal with intact light touch and vibratory senses. Psychiatric:         Speech: Speech normal.         Behavior: Behavior normal.         Thought Content: Thought content normal.         Judgment: Judgment normal.          A/P:     Abdominal Aortic Aneurysm (Aaa) Without Rupture ([facility]). Will do CTA. May need to see Vascular. Obesity (Bmi 35.0-39.9 Without Comorbidity). Continue wt loss. Suspected Sleep Apnea. To begin C-pap when available. Essential Hypertension. Continue present meds. Home BP checks. Call if>140/90. Improve diet. Avoid caffeine and salt. Call if new c/o w/ meds. Controlled Type 2 Diabetes Mellitus Without Complication, Without Long-Term Current Use of Insulin ([facility]). Will do labs and adjust meds after results are evaluated. To continue to improve diet and lose weight. To follow Diabetic diet. If needs further help w/ Diabetic diet to call and will refer back to dietician. Home sugar checks. To call if sudden change up or down in sugars. To do regular foot checks. Call if new problems. Mixed Hyperlipidemia. To continue to lose weight and exercise as tolerated. Will do labs and adjust meds if needed.               Nate Ward MD

## 2021-10-25 NOTE — ASSESSMENT & PLAN NOTE
4.5 cm AAA  US 7/1/2020. Was to have CTA in 8/2020 but not done, reordered 7/2021 and not done. Was due to see Dr Leigh Lucero but not done.

## 2021-10-25 NOTE — PATIENT INSTRUCTIONS
A/P:     Abdominal Aortic Aneurysm (Aaa) Without Rupture ([facility]). Will do CTA. May need to see Vascular. Obesity (Bmi 35.0-39.9 Without Comorbidity). Continue wt loss. Suspected Sleep Apnea. To begin C-pap when available. Essential Hypertension. Continue present meds. Home BP checks. Call if>140/90. Improve diet. Avoid caffeine and salt. Call if new c/o w/ meds. Controlled Type 2 Diabetes Mellitus Without Complication, Without Long-Term Current Use of Insulin ([facility]). Will do labs and adjust meds after results are evaluated. To continue to improve diet and lose weight. To follow Diabetic diet. If needs further help w/ Diabetic diet to call and will refer back to dietician. Home sugar checks. To call if sudden change up or down in sugars. To do regular foot checks. Call if new problems. Mixed Hyperlipidemia. To continue to lose weight and exercise as tolerated. Will do labs and adjust meds if needed.               Kurt Hoang MD

## 2021-10-26 ENCOUNTER — TELEPHONE (OUTPATIENT)
Dept: CARDIOLOGY CLINIC | Age: 81
End: 2021-10-26

## 2021-10-26 DIAGNOSIS — I48.19 PERSISTENT ATRIAL FIBRILLATION (HCC): ICD-10-CM

## 2021-10-26 DIAGNOSIS — I25.10 MILD CAD: ICD-10-CM

## 2021-10-26 DIAGNOSIS — I10 ESSENTIAL HYPERTENSION, BENIGN: ICD-10-CM

## 2021-10-26 DIAGNOSIS — R06.02 SHORTNESS OF BREATH: ICD-10-CM

## 2021-10-26 LAB
ALBUMIN SERPL-MCNC: 4.1 G/DL (ref 3.4–5)
ALP BLD-CCNC: 87 U/L (ref 40–129)
ALT SERPL-CCNC: 14 U/L (ref 10–40)
ANION GAP SERPL CALCULATED.3IONS-SCNC: 15 MMOL/L (ref 3–16)
AST SERPL-CCNC: 13 U/L (ref 15–37)
BILIRUB SERPL-MCNC: 0.5 MG/DL (ref 0–1)
BILIRUBIN DIRECT: <0.2 MG/DL (ref 0–0.3)
BILIRUBIN, INDIRECT: ABNORMAL MG/DL (ref 0–1)
BUN BLDV-MCNC: 13 MG/DL (ref 7–20)
CALCIUM SERPL-MCNC: 9.6 MG/DL (ref 8.3–10.6)
CHLORIDE BLD-SCNC: 100 MMOL/L (ref 99–110)
CHOLESTEROL, TOTAL: 182 MG/DL (ref 0–199)
CO2: 26 MMOL/L (ref 21–32)
CREAT SERPL-MCNC: 1.3 MG/DL (ref 0.8–1.3)
ESTIMATED AVERAGE GLUCOSE: 162.8 MG/DL
GFR AFRICAN AMERICAN: >60
GFR NON-AFRICAN AMERICAN: 53
GLUCOSE BLD-MCNC: 145 MG/DL (ref 70–99)
HBA1C MFR BLD: 7.3 %
HDLC SERPL-MCNC: 40 MG/DL (ref 40–60)
LDL CHOLESTEROL CALCULATED: 116 MG/DL
POTASSIUM SERPL-SCNC: 4.4 MMOL/L (ref 3.5–5.1)
SODIUM BLD-SCNC: 141 MMOL/L (ref 136–145)
TOTAL PROTEIN: 7.2 G/DL (ref 6.4–8.2)
TRIGL SERPL-MCNC: 129 MG/DL (ref 0–150)
VLDLC SERPL CALC-MCNC: 26 MG/DL

## 2021-10-26 NOTE — TELEPHONE ENCOUNTER
Please call patient and let him know his cardiac monitor has been read by electrophysiologist with predominately NSR but still ~ 4% PAF with only fair rate control. Let's change his metoprolol (Toprol XL) to 50 mg bid. Continue Eliquis for anticoagulation. Follow up with Dr. Harley Began as scheduled.    Thank you, Ivis Little

## 2021-10-27 ENCOUNTER — CARE COORDINATION (OUTPATIENT)
Dept: CASE MANAGEMENT | Age: 81
End: 2021-10-27

## 2021-10-28 DIAGNOSIS — E78.2 MIXED HYPERLIPIDEMIA: ICD-10-CM

## 2021-10-28 RX ORDER — ATORVASTATIN CALCIUM 20 MG/1
TABLET, FILM COATED ORAL
Qty: 90 TABLET | Refills: 1 | Status: SHIPPED | OUTPATIENT
Start: 2021-10-28 | End: 2021-12-06

## 2021-10-28 RX ORDER — METOPROLOL SUCCINATE 50 MG/1
50 TABLET, EXTENDED RELEASE ORAL 2 TIMES DAILY
Qty: 180 TABLET | Refills: 0 | Status: SHIPPED | OUTPATIENT
Start: 2021-10-28 | End: 2021-11-04

## 2021-10-28 NOTE — TELEPHONE ENCOUNTER
Spoke with the patent and advised him of the results below along with med change. Pt voiced understanding and aware of the change and reasoning for better rate control. Pharmacy confirmed with patient and new rx has been pended .

## 2021-10-30 ENCOUNTER — HOSPITAL ENCOUNTER (OUTPATIENT)
Dept: CT IMAGING | Age: 81
Discharge: HOME | End: 2021-10-30
Payer: MEDICARE

## 2021-10-30 DIAGNOSIS — I71.40 ABDOMINAL AORTIC ANEURYSM (AAA) WITHOUT RUPTURE: ICD-10-CM

## 2021-10-30 PROCEDURE — 74175 CTA ABDOMEN W/CONTRAST: CPT

## 2021-10-30 PROCEDURE — 6360000004 HC RX CONTRAST MEDICATION: Performed by: INTERNAL MEDICINE

## 2021-10-30 RX ADMIN — IOPAMIDOL 75 ML: 755 INJECTION, SOLUTION INTRAVENOUS at 09:24

## 2021-11-03 ENCOUNTER — CARE COORDINATION (OUTPATIENT)
Dept: CASE MANAGEMENT | Age: 81
End: 2021-11-03

## 2021-11-03 NOTE — CARE COORDINATION
[facility] Transitions Follow Up Call    11/3/2021    Patient: Yanna Meyer  Patient : 1940   MRN: [id number]  Reason for Admission: CP   Discharge Date: 10/1/21 RARS: Readmission Risk Score: 14         Spoke with: Yanna Meyer    Spoke with patient who reported he is doing fairly well. Patient reported some mild sob at night when going to bed and hopes for improvement when his cpap arrives. Patient denied any other issues or concerns at this time. CTN advised patient of use of urgent care or physicians 24 hr access line if assistance is needed after hours. Care Transitions Subsequent and Final Call    Subsequent and Final Calls  Do you have any ongoing symptoms?: No  Have your medications changed?: No  Do you have any questions related to your medications?: No  Do you currently have any active services?: No  Do you have any needs or concerns that I can assist you with?: No  Identified Barriers: None  Care Transitions Interventions  Other Interventions:            Follow Up  Future Appointments   Date Time Provider Rommel Leahy   2021  3:30 PM Maria A Bowden MD [facility]   2021  9:00 AM Julia Prabhakar MD [facility] Arneta Eisenmenger BSN, RN, [facility]  Care Transition Nurse  [phone] mobile

## 2021-11-04 DIAGNOSIS — K21.9 GASTROESOPHAGEAL REFLUX DISEASE WITHOUT ESOPHAGITIS: ICD-10-CM

## 2021-11-04 DIAGNOSIS — I10 ESSENTIAL HYPERTENSION, BENIGN: ICD-10-CM

## 2021-11-04 RX ORDER — PANTOPRAZOLE SODIUM 40 MG/1
TABLET, DELAYED RELEASE ORAL
Qty: 90 TABLET | Refills: 1 | Status: SHIPPED | OUTPATIENT
Start: 2021-11-04 | End: 2022-05-02

## 2021-11-04 RX ORDER — METOPROLOL SUCCINATE 50 MG/1
TABLET, EXTENDED RELEASE ORAL
Qty: 90 TABLET | Refills: 1 | Status: SHIPPED | OUTPATIENT
Start: 2021-11-04 | End: 2021-12-06

## 2021-11-04 NOTE — TELEPHONE ENCOUNTER
Lindsey Dumont is requesting refill(s)   Last OV 10/25/2021 (pertaining to medication)  LR 10/28/2021 and 11/17/2020 (per medication requested)  Next office visit scheduled or attempted none

## 2021-11-10 NOTE — TELEPHONE ENCOUNTER
----- Message from Wallace Smith sent at 11/10/2021  2:48 PM EST -----  Subject: Refill Request    QUESTIONS  Name of Medication? ketoconazole (NIZORAL) 2 % shampoo  Patient-reported dosage and instructions? 8 Leia Baker Labidi SCALP THREE TIMES A WEEK  How many days do you have left? 0  [place] [id number] [place] phone number (if available)? [phone]  ---------------------------------------------------------------------------  --------------  Wilbur MONTAÑO  What is the best way for the office to contact you? OK to leave message on   voicemail  Preferred Call Back Phone Number?  [phone]

## 2021-11-11 RX ORDER — KETOCONAZOLE 20 MG/ML
SHAMPOO TOPICAL
Qty: 240 ML | Refills: 5 | Status: ON HOLD | OUTPATIENT
Start: 2021-11-11 | End: 2022-08-07

## 2021-12-06 ENCOUNTER — OFFICE VISIT (OUTPATIENT)
Dept: CARDIOLOGY CLINIC | Age: 81
End: 2021-12-06
Payer: MEDICARE

## 2021-12-06 VITALS
HEART RATE: 98 BPM | BODY MASS INDEX: 36.37 KG/M2 | HEIGHT: 68 IN | OXYGEN SATURATION: 95 % | DIASTOLIC BLOOD PRESSURE: 70 MMHG | SYSTOLIC BLOOD PRESSURE: 110 MMHG | WEIGHT: 240 LBS | TEMPERATURE: 98.3 F

## 2021-12-06 DIAGNOSIS — Z79.899 MEDICATION MANAGEMENT: ICD-10-CM

## 2021-12-06 DIAGNOSIS — E78.2 MIXED HYPERLIPIDEMIA: ICD-10-CM

## 2021-12-06 DIAGNOSIS — I10 ESSENTIAL HYPERTENSION, BENIGN: ICD-10-CM

## 2021-12-06 DIAGNOSIS — I10 ESSENTIAL HYPERTENSION: ICD-10-CM

## 2021-12-06 DIAGNOSIS — I48.19 PERSISTENT ATRIAL FIBRILLATION (HCC): Primary | ICD-10-CM

## 2021-12-06 DIAGNOSIS — Z87.891 HISTORY OF TOBACCO ABUSE: ICD-10-CM

## 2021-12-06 PROCEDURE — 93000 ELECTROCARDIOGRAM COMPLETE: CPT | Performed by: INTERNAL MEDICINE

## 2021-12-06 PROCEDURE — 99214 OFFICE O/P EST MOD 30 MIN: CPT | Performed by: INTERNAL MEDICINE

## 2021-12-06 RX ORDER — ATORVASTATIN CALCIUM 40 MG/1
TABLET, FILM COATED ORAL
Qty: 90 TABLET | Refills: 3 | Status: SHIPPED | OUTPATIENT
Start: 2021-12-06 | End: 2022-10-25

## 2021-12-06 RX ORDER — METOPROLOL SUCCINATE 50 MG/1
50 TABLET, EXTENDED RELEASE ORAL 2 TIMES DAILY
Qty: 180 TABLET | Refills: 3 | Status: SHIPPED | OUTPATIENT
Start: 2021-12-06

## 2021-12-06 NOTE — PROGRESS NOTES
[address]   Cardiac Follow Up    Referring Provider:  Collette Grant MD     Chief Complaint   Patient presents with    3 Month Follow-Up     AAA    Hypertension    Coronary Artery Disease    Other     has had shortness of breath today, had the Covid booster vaccine today    Other     feels a little tired today        Subjective: Kenia Davis is here for follow up for hospital cardiology follow up; c/o of heart racing today. History of Present Illness:  Kenia Davis is a [de-identified] y.o. male who presents today for routine f/u. He has PMH moderate NOCAD (med mgt without PCI), PAF dx 9/21 on eliquis, HLD, HTN, GERD, COPD and DM. He was seen in 2018 by my partner Dr. Mariaa Beyer for chest pain. Noted lexiscan myoview stress test 6/1/18  showed no evidence of myocardial ischemia or scar. Noted ECHO from 6/2018 EF of 55-60%. On 01/18/21 he reported left CP and CHEEK. Noted lexiscan myoview stress test from 01/22/21 which showed apical/septal ischemia. Most recent LHC from 02/04/21 with Dr. Mariaa Beyer showed mild to moderate CAD without need for PCI. Noted EKG from 02/04/21 showed SR HR 61 (no sig change from 5/18). Admitted 09/29/21 with c/o CP and lightheadedness that woke him up from sleep. Most recent EKG 09/30/21 AF with RVR 132bpm. He was started on Eliquis of new-onset afib. Most recent [address] [facility] 09/30/21 show negative ischemia or scarring hyperdynamic LVEF > 70%. Most recent ECHO 09/30/21 EF 55-60%, with moderate concentric LVH. He was d/c'd with a cardiac event monitor and cardizem. Since last OV, most recent 2 week event cardiac monitor 10/1/21 showed AF/AFL burden of 3.71%, rate borderline controlled. Average heartrate 98 bpm. CTA Abdomen 10/30/21 showed stable 4-cm infrarenal abdominal aortic aneurysm being followed by Dr. Silverio Banks. Will recheck in 1 year. Today, he reports he is doing well. Recent diagnosis of RIZWANA but has not started CPAP yet from Dr. Aviva Chang.  Patient denies current  chest pain, sob, dizziness or syncope. Patient is taking all cardiac medications as prescribed and tolerates them well. Patient notes that today he can feel his heart racing today. Reports feeling occasional palpitations. Patient with no complaints of chest pain, SOB, dizziness, edema, or orthopnea/PND. Past Medical History:   has a past medical history of COPD (chronic obstructive pulmonary disease) ([address] Decreased hearing of both ears, Diabetes mellitus ([address] GERD (gastroesophageal reflux disease), Histoplasmosis, Hyperlipidemia, Hypertension, and Primary malignant neoplasm of skin of trunk. Surgical History:   has a past surgical history that includes Lung removal, partial (2007); Vasectomy; TURP (2002); Upper gastrointestinal endoscopy (1/26/2015); Upper gastrointestinal endoscopy (2/15); Inguinal hernia repair (Bilateral, 6/22/15); Colonoscopy (5/22/12); Colonoscopy (7/21/15); Upper gastrointestinal endoscopy (07/21/2017); Upper gastrointestinal endoscopy (08/23/2017); and Cystoscopy (N/A, 10/24/2019). Social History:   reports that he quit smoking about 2002. He has a 30.00 pack-year smoking history. He has never used smokeless tobacco. He reports current alcohol use. He reports that he does not use drugs. Family History:  family history includes Cancer in his brother; Heart Disease in his mother. Home Medications:  Prior to Admission medications    Medication Sig Start Date End Date Taking?  Authorizing Provider   PROAIR  (15 Base) MCG/ACT inhaler Inhale 2 puffs into the lungs every 6 hours as needed for Wheezing 11/15/21  Yes Gentry Coulter MD   ketoconazole (NIZORAL) 2 % shampoo Wash scalp three times a week 11/11/21  Yes MT Baca MD   pantoprazole (PROTONIX) 40 MG tablet TAKE ONE TABLET BY MOUTH DAILY 11/4/21  Yes MT Bcaa MD   metoprolol succinate (TOPROL XL) 50 MG extended release tablet TAKE ONE TABLET BY MOUTH DAILY 11/4/21  Yes MT Baca MD   atorvastatin (LIPITOR) 20 MG tablet TAKE ONE TABLET BY MOUTH DAILY 10/28/21  Yes MT Boyce MD   dilTIAZem (CARDIZEM CD) 240 MG extended release capsule Take 1 capsule by mouth daily 10/2/21  Yes LUZ Dalal - CNP   apixaban (ELIQUIS) 5 MG TABS tablet Take 1 tablet by mouth 2 times daily 10/1/21  Yes LUZ Aragon - [place]   losartan-hydroCHLOROthiazide (HYZAAR) 100-25 MG per tablet TAKE ONE TABLET BY MOUTH DAILY 9/10/21  Yes MT Boyce MD   nitroGLYCERIN (NITROSTAT) 0.4 MG SL tablet Place 1 tablet under the tongue every 5 minutes as needed for Chest pain 8/16/21  Yes Kameron Barragan MD   Thedora Bobby 100-62.5-25 MCG/INH AEPB INHALE ONE PUFF BY MOUTH DAILY 6/8/21  Yes Nini Alan MD   metFORMIN (GLUCOPHAGE-XR) 500 MG extended release tablet TAKE TWO TABLETS BY MOUTH TWICE A DAY WITH MEALS 5/24/21  Yes Elissa Friedman MD   ketoconazole (NIZORAL) 2 % cream APPLY TO AFFECTED AREAS OF FACE AROUND NOSE TWO TIMES A DAY 2/18/21  Yes Wilbur Contreras MD   triamcinolone (KENALOG) 0.1 % ointment APPLY TO AFFECTED AREA(S) SPARINGLY TWO TIMES A DAY UNTIL CLEAR. DO NOT USE FOR MORE THAN TWO WEEKS STRAIGHT 5/26/20  Yes Wilbur Contreras MD   Handicap Placard MISC by Does not apply route 4/12/19  Yes MOOSE Bridges   therapeutic multivitamin-minerals [facility] SYSTEM) tablet Take 1 tablet by mouth daily. Yes Historical Provider, MD   aspirin 81 MG EC tablet Take 81 mg by mouth daily. Yes Historical Provider, MD        Allergies:  Patient has no known allergies. Review of Systems:   · Constitutional: there has been no unanticipated weight loss. There's been no change in energy level, sleep pattern, or activity level. · Eyes: No visual changes or diplopia. No scleral icterus. · ENT: No Headaches, hearing loss or vertigo. No mouth sores or sore throat. · Cardiovascular: Reviewed in HPI  · Respiratory: No cough or wheezing, no sputum production. No hematemesis.     · Gastrointestinal: No abdominal pain, appetite loss, blood in stools. No change in bowel or bladder habits. · Genitourinary: No dysuria, trouble voiding, or hematuria. · Musculoskeletal:  No gait disturbance, weakness or joint complaints. · Integumentary: No rash or pruritis. · Neurological: No headache, diplopia, change in muscle strength, numbness or tingling. No change in gait, balance, coordination, mood, affect, memory, mentation, behavior. · Psychiatric: No anxiety, no depression. · Endocrine: No malaise, fatigue or temperature intolerance. No excessive thirst, fluid intake, or urination. No tremor. · Hematologic/Lymphatic: No abnormal bruising or bleeding, blood clots or swollen lymph nodes. · Allergic/Immunologic: No nasal congestion or hives. Physical Examination:    Vitals:    12/06/21 1548   BP: 110/70   Pulse: 98   Temp: 98.3 °F (36.8 °C)   SpO2: 95%        Constitutional and General Appearance: NAD   Respiratory:  · Normal excursion and expansion without use of accessory muscles  · Resp Auscultation: Normal breath sounds without dullness  Cardiovascular:  · The apical impulses not displaced  · Heart tones are crisp and normal  · Cervical veins are not engorged  · The carotid upstroke is normal in amplitude and contour without delay or bruit  · Normal S1S2, No S3, No Murmur. Irregularly irregular and mildly tachycardic  · Peripheral pulses are symmetrical and full  · There is no clubbing, cyanosis of the extremities. 1+ BLE edema  · Femoral Arteries: 2+ and equal  · Pedal Pulses: 2+ and equal   Abdomen:  · No masses or tenderness  · Liver/Spleen: No Abnormalities Noted  Neurological/Psychiatric:  · Alert and oriented in all spheres  · Moves all extremities well  · Exhibits normal gait balance and coordination  · No abnormalities of mood, affect, memory, mentation, or behavior are noted  Skin:  · Skin: warm and dry. Stress test 6/2018  Summary Normal LV size and systolic function.  Left ventricular ejection fraction of  63%. Normal wall motion. There is normal isotope uptake at stress and rest.  No evidence of myocardial ischemia or scar. Stress Protocols     Resting ECG  Normal sinus rhythm. Early repolarization. Echo 6/2018 Summary   Technically difficult examination. Normal systolic function with an estimated ejection fraction of 55-60%. Mild concentric left ventricular hypertrophy. No regional wall motion abnormalities are seen. Grade I diastolic dysfunction with normal filing pressure. Stress test: 01/22/21  Summary Normal LVEF >60% Grossly normal wall motion  Apical/septal ischemia   Overall, this would be considered an abnormal, intermediate risk, study     [facility] 2/04/21 Findings:     1. Left main coronary artery was normal. It gave off the left anterior descending artery and left circumflex. 2. Left anterior descending artery has mild to moderate atherosclerotic disease. It was moderate in size. It gave off septal perforators and a moderate sized diagonal branch. The LAD covered the entire apex of the left ventricle. 3. Left circumflex has mild to moderate atherosclerotic disease. It was moderate in size. There was a moderate sized obtuse marginal branch. 4. Right coronary artery has mild to moderate atherosclerotic disease. It was moderate in size and was the dominant artery. 5. Left ventriculogram showed normal LVEF at 55-60%. Wall motion was normal . There was no significant mitral valve or aortic valve disease noted. LVEDP was normal. There was no gradient noted across the aortic valve during pullback of the catheter. CONCLUSIONS:     1. Mild to moderate coronary artery disease with preserved LVEF     ASSESSMENT/RECOMMENDATIONS:     1. Risk Factor control  2.  Consider anti-anginal therapy with long acting nitrates     Lab Results   Component Value Date    CHOL 182 10/25/2021    CHOL 151 02/04/2021    CHOL 169 01/11/2021     Lab Results   Component Value Date TRIG 129 10/25/2021    TRIG 115 02/04/2021    TRIG 151 (H) 01/11/2021     Lab Results   Component Value Date    HDL 40 10/25/2021    HDL 44 02/04/2021    HDL 45 01/11/2021     Lab Results   Component Value Date    LDLCALC 116 (H) 10/25/2021    LDLCALC 84 02/04/2021    LDLCALC 94 01/11/2021     Lab Results   Component Value Date    LABVLDL 26 10/25/2021    LABVLDL 23 02/04/2021    LABVLDL 30 01/11/2021     No results found for: CHOLHDLRATIO     I have personally reviewed all labs including lipids 10/25/21    [id number] Score for Atrial Fibrillation Stroke Risk   Risk   Factors  Component Value   C CHF No 0   H HTN Yes 1   A2 Age >= 76 Yes,  [de-identified] y.o.) 2   D DM Yes 1   S2 Prior Stroke/TIA No 0   V Vascular Disease No 0   A Age 74-69 No,  [de-identified] y.o.) 0   Sc Sex male 0    [id number]  Score  4   Score last updated 10/12/21 9:12 AM EDT    Assessment:     1. HTN: Well controlled and will continue current medical regimen of hyzaar, norvasc, and Toprol XL. 2. CAD:  Mild-moderate NOCAD. Most recent cardiac cath from 02/04/21 with Dr. Anni Rosa showed mild to moderate CAD without need for PCI. Most recent [address] [facility] 09/30/21 show negative ischemia. I have offered long-acting nitrate (imdur) in past but he declined. He is agreeable to SL NTG PRN. There are no concerning symptoms for angina currently. 3.      HLD: I personally reviewed most recent lipids from 10/25/21 in Epic and d/w Mr. Rodrigo Martinez (see above). Suboptimal LDL > 100 and I will titrate lipitor to 40mg daily and recheck FLP in 2 months. 4.       DM: per PCP    5. PAF: Newly diagnosed 9/21. Asymptomatic at time but today in afib and feels palpitations. I will increase  metoprolol XL 50mg to BID regimen to help HR control. Continue eliquis 5mg BID for AC. Plan:  1. Call our office if you continue to feel your heart racing  2. EKG today shows afib with HR 109bpm; nonspecific ST change  3. Increase Lipitor to 40 mg daily  4.  Increase metoprolol XL to 50 mg twice a day  5. Repeat cholesterol and liver labs in 2 months   -fasting lipids - do not eat for 8 hours before labs  6. Follow up with Anna Morataya for PCP     Follow up with me in 3 months    This note is scribed in the presence of Dr. Pa Gamino. Thiago Emery by Lazarus Broaden, RN.    I, Dr. Keyonna Olson, personally performed the services described in this documentation, as scribed by the above signed scribe in my presence. It is both accurate and complete to my knowledge. I agree with the details independently gathered by the clinical support staff, while the remaining scribed note accurately describes my personal service to the patient. Thank you for allowing me to participate in the care of this individual.    Cost of prescription medications and patient compliance have been reviewed with patient. All questions answered. Pa Gamino.  Thiago Emery M.D., [facility] [place]

## 2021-12-06 NOTE — PATIENT INSTRUCTIONS
Plan:  1. Call our office if you continue to feel your heart racing  2. EKG today  3. Increase Lipitor to 40 mg daily  4. Increase metoprolol to 50 mg twice a day  5. Repeat cholesterol and liver labs in 2 months   -fasting lipids - do not eat for 8 hours before labs  6.  Follow up with Nancie Peterson for PCP     Follow up with me in 3 months

## 2021-12-09 DIAGNOSIS — J41.0 SIMPLE CHRONIC BRONCHITIS (HCC): ICD-10-CM

## 2021-12-09 RX ORDER — FLUTICASONE FUROATE, UMECLIDINIUM BROMIDE AND VILANTEROL TRIFENATATE 100; 62.5; 25 UG/1; UG/1; UG/1
POWDER RESPIRATORY (INHALATION)
Qty: 1 EACH | Refills: 5 | Status: SHIPPED | OUTPATIENT
Start: 2021-12-09 | End: 2022-06-07 | Stop reason: SDUPTHER

## 2021-12-14 ENCOUNTER — TELEPHONE (OUTPATIENT)
Dept: FAMILY MEDICINE CLINIC | Age: 81
End: 2021-12-14

## 2021-12-14 NOTE — TELEPHONE ENCOUNTER
Pt calling in with concerns regarding the Atorvastatin. Dr. Enma Meehan bumped the med from 10mg to 20mg. Patient just saw his cardiologist Dr. David Del Valle and he bumped it to 40mg. Patient wanted to make sure that this is ok with PCP. Pt wanted confirmation before taking his next dose.   Office Note is in chart from Cardiologist.    Pt phone [phone]

## 2021-12-21 DIAGNOSIS — E11.65 UNCONTROLLED TYPE 2 DIABETES MELLITUS WITH HYPERGLYCEMIA (HCC): ICD-10-CM

## 2021-12-21 RX ORDER — METFORMIN HYDROCHLORIDE 500 MG/1
TABLET, EXTENDED RELEASE ORAL
Qty: 360 TABLET | Refills: 1 | Status: SHIPPED | OUTPATIENT
Start: 2021-12-21 | End: 2022-05-31

## 2022-01-03 ENCOUNTER — VIRTUAL VISIT (OUTPATIENT)
Dept: FAMILY MEDICINE CLINIC | Age: 82
End: 2022-01-03
Payer: MEDICARE

## 2022-01-03 DIAGNOSIS — J44.1 COPD WITH ACUTE EXACERBATION (HCC): ICD-10-CM

## 2022-01-03 DIAGNOSIS — Z20.822 ENCOUNTER BY TELEHEALTH FOR SUSPECTED COVID-19: Primary | ICD-10-CM

## 2022-01-03 PROCEDURE — 99214 OFFICE O/P EST MOD 30 MIN: CPT | Performed by: PHYSICIAN ASSISTANT

## 2022-01-03 RX ORDER — DOXYCYCLINE HYCLATE 100 MG
100 TABLET ORAL 2 TIMES DAILY
Qty: 20 TABLET | Refills: 0 | Status: SHIPPED | OUTPATIENT
Start: 2022-01-03 | End: 2022-01-13

## 2022-01-03 NOTE — PROGRESS NOTES
[id number] [facility]    22     TELEHEALTH EVALUATION -- Audio/Visual (During NSESA-51 public health emergency)  Chief Complaint   Patient presents with    Congestion     x 10 days, cough, nasal congestion, has gotten worse within the last 3 days, has not been Covid tested since he has been sick     Cough       HPI:    Anisha Neely (:  1940) has requested an audio/video evaluation for the following concern(s):   as per chief complaint. Began 7d ago with  myalgias/arthralgias, headache, nasal congestion, sore throat, wheezing/chest tightness and cough. Remedies  NSAID'S, symptom relief meds over the counter. ROS: Denies loss of taste or smell, fever, shortness of breath, dizziness, rash, nausea/vomiting and diarrhea. Prior to Visit Medications    Medication Sig Taking?  Authorizing Provider   doxycycline hyclate (VIBRA-TABS) 100 MG tablet Take 1 tablet by mouth 2 times daily for 10 days Yes MOOSE Rowan   metFORMIN (GLUCOPHAGE-XR) 500 MG extended release tablet TAKE TWO TABLETS BY MOUTH TWICE A DAY WITH MEALS Yes Mary Ann Gutierrez MD   fluticasone-umeclidin-vilant (TRELEGY ELLIPTA) 100-62.5-25 MCG/INH AEPB INHALE ONE PUFF BY MOUTH DAILY Yes Jesusita Redding MD   metoprolol succinate (TOPROL XL) 50 MG extended release tablet Take 1 tablet by mouth 2 times daily Yes Evangelina Sandoval MD   atorvastatin (LIPITOR) 40 MG tablet TAKE ONE TABLET BY MOUTH DAILY Yes Evangelina Sandoval MD   PROAIR  (60 Base) MCG/ACT inhaler Inhale 2 puffs into the lungs every 6 hours as needed for Wheezing Yes Jesusita Redding MD   ketoconazole (NIZORAL) 2 % shampoo Wash scalp three times a week Yes Mary Ann Gutierrez MD   pantoprazole (PROTONIX) 40 MG tablet TAKE ONE TABLET BY MOUTH DAILY Yes MT Lees MD   dilTIAZem (CARDIZEM CD) 240 MG extended release capsule Take 1 capsule by mouth daily Yes Ang Cortes APRN - CNP   apixaban (ELIQUIS) 5 MG TABS tablet Take 1 tablet by mouth 2 times daily Yes Ntiza Angeles APRN - CNP   losartan-hydroCHLOROthiazide (HYZAAR) 100-25 MG per tablet TAKE ONE TABLET BY MOUTH DAILY Yes MT Pierre MD   nitroGLYCERIN (NITROSTAT) 0.4 MG SL tablet Place 1 tablet under the tongue every 5 minutes as needed for Chest pain Yes Krunal Pena MD   ketoconazole (NIZORAL) 2 % cream APPLY TO AFFECTED AREAS OF FACE AROUND NOSE TWO TIMES A DAY Yes Sukhjinder Doss MD   triamcinolone (KENALOG) 0.1 % ointment APPLY TO AFFECTED AREA(S) SPARINGLY TWO TIMES A DAY UNTIL CLEAR. DO NOT USE FOR MORE THAN TWO WEEKS STRAIGHT Yes Sukhjinder Doss MD   Handicap Placard MISC by Does not apply route Yes MOOSE Griffin   therapeutic multivitamin-minerals [facility]) tablet Take 1 tablet by mouth daily. Yes Historical Provider, MD   aspirin 81 MG EC tablet Take 81 mg by mouth daily. Yes Historical Provider, MD     No Known Allergies  MED-SURG- SOC  HISTORY- in chart and reviewed. PHYSICAL EXAMINATION:  Patient-Reported Vitals 1/3/2022   Patient-Reported Weight 240lb   Patient-Reported Height 5'8   Patient-Reported Systolic -   Patient-Reported Diastolic -   Patient-Reported Pulse -   Patient-Reported Temperature -         Constitutional: [x] Appears well-developed and well-nourished [x] No apparent distress      [] Abnormal- Looks sick, non toxic    Mental status  [x] Alert and awake  [x] Oriented to person/place/time [x]Able to follow commands      Eyes:  EOM    [x]  Normal  [] Abnormal-  Sclera  [x]  Normal  [] Abnormal -         Discharge [x]  None visible  [] Abnormal -    HENT:   [x] Normocephalic, atraumatic. [] Abnormal   [x] Mouth/Throat: Mucous membranes are moist.     External Ears [x] Normal  [] Abnormal-     Neck: [x] No visualized mass     Pulmonary/Chest: [x] Respiratory effort normal.  [x] No visualized signs of difficulty breathing or respiratory distress    [x] Abnormal- productive cough noted.      Musculoskeletal:   [] Normal gait with no signs of ataxia [x] Normal range of motion of neck   [] Abnormal-       Neurological:        [x] No Facial Asymmetry (Cranial nerve 7 motor function) (limited exam to video visit)          [x] No gaze palsy   [] Abnormal-         Skin:        [x] No significant exanthematous lesions or discoloration noted on facial skin    [] Abnormal-            Psychiatric:       [x] Normal Affect [x] No Hallucinations    [] Abnormal-   Other pertinent observable physical exam findings-     ASSESSMENT/PLAN:  1. Encounter by telehealth for suspected COVID-19    2. COPD with acute exacerbation ([facility] [address]     - COVID test tomorrow at [facility] thru     - Discussed CDC guidelines for quarantine. Anyone exposed to you must quarantine minimum of 5 days, if still symptomatic 7 days and wear mask around others for 14 days no matter what. - start Doxycycline x 10 d  - Symptom relief with OTC meds. Add daily Airborne and/or MVI. - Rest, increase fluids. - If worse, call clinic or go to ER if dyspnea  becomes severe. Fabian Payton is a [de-identified] y.o. male being evaluated by a Virtual Visit (video visit) encounter to address concerns as mentioned above. A caregiver was present when appropriate. Due to this being a TeleHealth encounter (During UTJ-11 public health emergency), evaluation of the following organ systems was limited: Vitals/Constitutional/EENT/Resp/CV/GI//MS/Neuro/Skin/Heme-Lymph-Imm. Pursuant to the emergency declaration under the [facility], [address] [place] [address] authority and the [employer] Act, this Virtual Visit was conducted with patient's (and/or legal guardian's) consent, to reduce the patient's risk of exposure to COVID-19 and provide necessary medical care.   The patient (and/or legal guardian) has also been advised to contact this office for worsening conditions or problems, and seek emergency medical treatment and/or call 911 if deemed necessary. Patient identification was verified at the start of the visit: Yes  Total time spent on this encounter: Not billed by time  Services were provided through a video synchronous discussion virtually to substitute for in-person clinic visit. Patient and provider were located at their individual homes. --MOOSE Martinez on 1/3/2022 at 4:39 PM    An electronic signature was used to authenticate this note.

## 2022-01-04 DIAGNOSIS — Z20.822 ENCOUNTER BY TELEHEALTH FOR SUSPECTED COVID-19: ICD-10-CM

## 2022-01-04 DIAGNOSIS — J44.1 COPD WITH ACUTE EXACERBATION (HCC): ICD-10-CM

## 2022-01-05 LAB — SARS-COV-2: NOT DETECTED

## 2022-01-11 ENCOUNTER — TELEPHONE (OUTPATIENT)
Dept: PULMONOLOGY | Age: 82
End: 2022-01-11

## 2022-01-11 NOTE — TELEPHONE ENCOUNTER
Patient came into the office requesting a pressure adjustment for his cpap. He states that he has been afraid to use it because he feels that the pressure is too strong. Please advise.     Compliance Report requested from [facility].

## 2022-01-12 NOTE — TELEPHONE ENCOUNTER
OCTAVIO pt.  She said he has not used the machine yet. He said when he first put it on the air was strong and he felt like he couldn't breathe. I suggested to the patient to try to wear it during the day when he is sitting watching TV to see if he can get used to the pressure. I told him to try this for about a week and if he still cannot handle it, he could call us back. I explained that it can take some getting used to when you first start out. Pt was OK with this plan of action.

## 2022-01-12 NOTE — TELEPHONE ENCOUNTER
[facility] unable to obtain compliance report. Patient will need to either take his machine to them or call them with the numbers to obtain the compliance report.

## 2022-01-14 ENCOUNTER — TELEPHONE (OUTPATIENT)
Dept: FAMILY MEDICINE CLINIC | Age: 82
End: 2022-01-14

## 2022-01-14 NOTE — TELEPHONE ENCOUNTER
Patient called in today to request an appointment to be seen. He saw MOOSE Matamoros virtually on 01/03/2022, he states he is still having cough, congestion and some shortness of breath. He states that he normally has shortness of breath due to his lung conditions. I advised the patient that we could schedule him an appointment on Monday but if any of his symptoms worsen over the weekend then he should proceed to the ER. I also advised him to buy a pulse ox so he can keep an eye on his 02 levels.

## 2022-01-17 ENCOUNTER — HOSPITAL ENCOUNTER (OUTPATIENT)
Dept: GENERAL RADIOLOGY | Age: 82
Discharge: HOME | End: 2022-01-17
Payer: MEDICARE

## 2022-01-17 ENCOUNTER — OFFICE VISIT (OUTPATIENT)
Dept: FAMILY MEDICINE CLINIC | Age: 82
End: 2022-01-17
Payer: MEDICARE

## 2022-01-17 VITALS
RESPIRATION RATE: 16 BRPM | DIASTOLIC BLOOD PRESSURE: 60 MMHG | OXYGEN SATURATION: 94 % | HEIGHT: 68 IN | HEART RATE: 61 BPM | BODY MASS INDEX: 35.58 KG/M2 | SYSTOLIC BLOOD PRESSURE: 124 MMHG | TEMPERATURE: 97.5 F | WEIGHT: 234.8 LBS

## 2022-01-17 DIAGNOSIS — R09.89 RHONCHI AT BOTH LUNG BASES: ICD-10-CM

## 2022-01-17 DIAGNOSIS — R05.9 COUGH: ICD-10-CM

## 2022-01-17 DIAGNOSIS — E11.9 CONTROLLED TYPE 2 DIABETES MELLITUS WITHOUT COMPLICATION, WITHOUT LONG-TERM CURRENT USE OF INSULIN (HCC): ICD-10-CM

## 2022-01-17 DIAGNOSIS — R06.2 WHEEZING: ICD-10-CM

## 2022-01-17 DIAGNOSIS — I10 ESSENTIAL HYPERTENSION, BENIGN: ICD-10-CM

## 2022-01-17 DIAGNOSIS — J44.1 COPD WITH ACUTE EXACERBATION (HCC): Primary | ICD-10-CM

## 2022-01-17 DIAGNOSIS — J44.1 COPD WITH ACUTE EXACERBATION (HCC): ICD-10-CM

## 2022-01-17 LAB
ANION GAP SERPL CALCULATED.3IONS-SCNC: 14 MMOL/L (ref 3–16)
BASOPHILS ABSOLUTE: 0 K/UL (ref 0–0.2)
BASOPHILS RELATIVE PERCENT: 0.4 %
BUN BLDV-MCNC: 18 MG/DL (ref 7–20)
CALCIUM SERPL-MCNC: 9.3 MG/DL (ref 8.3–10.6)
CHLORIDE BLD-SCNC: 101 MMOL/L (ref 99–110)
CO2: 23 MMOL/L (ref 21–32)
CREAT SERPL-MCNC: 1.2 MG/DL (ref 0.8–1.3)
EOSINOPHILS ABSOLUTE: 0.4 K/UL (ref 0–0.6)
EOSINOPHILS RELATIVE PERCENT: 4 %
GFR AFRICAN AMERICAN: >60
GFR NON-AFRICAN AMERICAN: 58
GLUCOSE BLD-MCNC: 100 MG/DL (ref 70–99)
HCT VFR BLD CALC: 41.1 % (ref 40.5–52.5)
HEMOGLOBIN: 13.5 G/DL (ref 13.5–17.5)
LYMPHOCYTES ABSOLUTE: 2.3 K/UL (ref 1–5.1)
LYMPHOCYTES RELATIVE PERCENT: 25.1 %
MCH RBC QN AUTO: 28.6 PG (ref 26–34)
MCHC RBC AUTO-ENTMCNC: 32.8 G/DL (ref 31–36)
MCV RBC AUTO: 87.4 FL (ref 80–100)
MONOCYTES ABSOLUTE: 0.8 K/UL (ref 0–1.3)
MONOCYTES RELATIVE PERCENT: 8.7 %
NEUTROPHILS ABSOLUTE: 5.7 K/UL (ref 1.7–7.7)
NEUTROPHILS RELATIVE PERCENT: 61.8 %
PDW BLD-RTO: 16.6 % (ref 12.4–15.4)
PLATELET # BLD: 394 K/UL (ref 135–450)
PMV BLD AUTO: 8 FL (ref 5–10.5)
POTASSIUM SERPL-SCNC: 4.6 MMOL/L (ref 3.5–5.1)
PRO-BNP: 97 PG/ML (ref 0–449)
RBC # BLD: 4.7 M/UL (ref 4.2–5.9)
SODIUM BLD-SCNC: 138 MMOL/L (ref 136–145)
WBC # BLD: 9.2 K/UL (ref 4–11)

## 2022-01-17 PROCEDURE — 71046 X-RAY EXAM CHEST 2 VIEWS: CPT

## 2022-01-17 PROCEDURE — 36415 COLL VENOUS BLD VENIPUNCTURE: CPT | Performed by: PHYSICIAN ASSISTANT

## 2022-01-17 PROCEDURE — 99214 OFFICE O/P EST MOD 30 MIN: CPT | Performed by: PHYSICIAN ASSISTANT

## 2022-01-17 RX ORDER — METHYLPREDNISOLONE 4 MG/1
TABLET ORAL
Qty: 1 KIT | Refills: 0 | Status: SHIPPED | OUTPATIENT
Start: 2022-01-17 | End: 2022-01-31 | Stop reason: ALTCHOICE

## 2022-01-17 RX ORDER — AMLODIPINE BESYLATE 5 MG/1
1 TABLET ORAL DAILY
COMMUNITY
Start: 2021-11-04 | End: 2022-04-29 | Stop reason: SDUPTHER

## 2022-01-17 SDOH — ECONOMIC STABILITY: FOOD INSECURITY: WITHIN THE PAST 12 MONTHS, THE FOOD YOU BOUGHT JUST DIDN'T LAST AND YOU DIDN'T HAVE MONEY TO GET MORE.: NEVER TRUE

## 2022-01-17 SDOH — ECONOMIC STABILITY: FOOD INSECURITY: WITHIN THE PAST 12 MONTHS, YOU WORRIED THAT YOUR FOOD WOULD RUN OUT BEFORE YOU GOT MONEY TO BUY MORE.: NEVER TRUE

## 2022-01-17 ASSESSMENT — PATIENT HEALTH QUESTIONNAIRE - PHQ9
SUM OF ALL RESPONSES TO PHQ QUESTIONS 1-9: 0
4. FEELING TIRED OR HAVING LITTLE ENERGY: 0
1. LITTLE INTEREST OR PLEASURE IN DOING THINGS: 0
2. FEELING DOWN, DEPRESSED OR HOPELESS: 0
8. MOVING OR SPEAKING SO SLOWLY THAT OTHER PEOPLE COULD HAVE NOTICED. OR THE OPPOSITE, BEING SO FIGETY OR RESTLESS THAT YOU HAVE BEEN MOVING AROUND A LOT MORE THAN USUAL: 0
SUM OF ALL RESPONSES TO PHQ QUESTIONS 1-9: 0
SUM OF ALL RESPONSES TO PHQ QUESTIONS 1-9: 0
9. THOUGHTS THAT YOU WOULD BE BETTER OFF DEAD, OR OF HURTING YOURSELF: 0
7. TROUBLE CONCENTRATING ON THINGS, SUCH AS READING THE NEWSPAPER OR WATCHING TELEVISION: 0
3. TROUBLE FALLING OR STAYING ASLEEP: 0
10. IF YOU CHECKED OFF ANY PROBLEMS, HOW DIFFICULT HAVE THESE PROBLEMS MADE IT FOR YOU TO DO YOUR WORK, TAKE CARE OF THINGS AT HOME, OR GET ALONG WITH OTHER PEOPLE: 0
SUM OF ALL RESPONSES TO PHQ QUESTIONS 1-9: 0
6. FEELING BAD ABOUT YOURSELF - OR THAT YOU ARE A FAILURE OR HAVE LET YOURSELF OR YOUR FAMILY DOWN: 0
SUM OF ALL RESPONSES TO PHQ9 QUESTIONS 1 & 2: 0
5. POOR APPETITE OR OVEREATING: 0

## 2022-01-17 ASSESSMENT — SOCIAL DETERMINANTS OF HEALTH (SDOH): HOW HARD IS IT FOR YOU TO PAY FOR THE VERY BASICS LIKE FOOD, HOUSING, MEDICAL CARE, AND HEATING?: NOT HARD AT ALL

## 2022-01-17 NOTE — PROGRESS NOTES
[address] [facility]    01/17/22     CHIEF COMPLAINT  Chief Complaint   Patient presents with    Cough     Patient is here with cough and congestion, he tested negative for Covid about 10 days ago.  Congestion       HISTORY OF PRESENT  ILLNESS  Fabian Payton is a [de-identified] y.o.  male   Still coughing despite Doxycycline x 10d. Tested COVID negative. Cough is dry and hacking. Taking Mucinex DM w/out change. Cough worse with laying. No new dyspnea or new CHEEK. No chest pain. No pedal edema. ROS:  Remaining 14 ROS were reviewed and are unremarkable for other constitutional, EENT, cardiac, pulmonary, GI, , neurologic, musculoskeletal, or integumentary complaints. PAST MEDICAL/SURGICAL, SOCIAL, &  FAMILY HISTORY:  Reviewed and updated accordingly. ALLERGIES :   Patient has no known allergies. MEDICATIONS:  Prior to Visit Medications    Medication Sig Taking?  Authorizing Provider   amLODIPine (NORVASC) 5 MG tablet Take 1 tablet by mouth daily Yes Historical Provider, MD   metFORMIN (GLUCOPHAGE-XR) 500 MG extended release tablet TAKE TWO TABLETS BY MOUTH TWICE A DAY WITH MEALS Yes MT eRyna MD   fluticasone-umeclidin-vilant (TRELEGY ELLIPTA) 100-62.5-25 MCG/INH AEPB INHALE ONE PUFF BY MOUTH DAILY Yes Viviana Landon MD   metoprolol succinate (TOPROL XL) 50 MG extended release tablet Take 1 tablet by mouth 2 times daily Yes Ashley Forte MD   atorvastatin (LIPITOR) 40 MG tablet TAKE ONE TABLET BY MOUTH DAILY Yes Ashley Forte MD   PROAIR  (50 Base) MCG/ACT inhaler Inhale 2 puffs into the lungs every 6 hours as needed for Wheezing Yes Viviana Landon MD   ketoconazole (NIZORAL) 2 % shampoo Wash scalp three times a week Yes Tino Faith MD   pantoprazole (PROTONIX) 40 MG tablet TAKE ONE TABLET BY MOUTH DAILY Yes MT Reyna MD   dilTIAZem (CARDIZEM CD) 240 MG extended release capsule Take 1 capsule by mouth daily Yes Mauro Delong, APRN - CNP   apixaban (ELIQUIS) 5 MG TABS tablet Take 1 tablet by mouth 2 times daily Yes Royal Carbajal, APRN - CNP   losartan-hydroCHLOROthiazide (HYZAAR) 100-25 MG per tablet TAKE ONE TABLET BY MOUTH DAILY Yes MT Crouch MD   nitroGLYCERIN (NITROSTAT) 0.4 MG SL tablet Place 1 tablet under the tongue every 5 minutes as needed for Chest pain Yes Alem Liz MD   ketoconazole (NIZORAL) 2 % cream APPLY TO AFFECTED AREAS OF FACE AROUND NOSE TWO TIMES A DAY Yes Whitney Lennon MD   triamcinolone (KENALOG) 0.1 % ointment APPLY TO AFFECTED AREA(S) SPARINGLY TWO TIMES A DAY UNTIL CLEAR. DO NOT USE FOR MORE THAN TWO WEEKS STRAIGHT Yes Whitney Lennon MD   Handicap Placard MISC by Does not apply route Yes Cristy Goltz, PA   therapeutic multivitamin-minerals [facility] SYSTEM) tablet Take 1 tablet by mouth daily. Yes Historical Provider, MD   aspirin 81 MG EC tablet Take 81 mg by mouth daily. Yes Historical Provider, MD         PHYSICAL EXAM     Vitals:    01/17/22 1058 01/17/22 1117   BP: 124/60    Pulse: 58 61   Resp: 18 16   Temp: 97.5 °F (36.4 °C)    TempSrc: Temporal    SpO2: 95% 94%   Weight: 234 lb 12.8 oz (106.5 kg)    Height: 5' 8\" (1.727 m)    Body mass index is 35.7 kg/m². APPEARANCE: Well nourished. No distress. HEENT: Head: Normocephalic, atraumatic. Eyes: EOMI,PERRLA. Conjunctiva pink and moist. Sclera white. Ears: Hearing intact. Nose/ Mouth: not examined today. NECK: No lymphadenopathy or masses. Thyroid is smooth. Moves neck fully. HEART: Reg rate and rhythm. No murmurs, rubs, or gallops. LUNGS: Bibasilar and whole of right lung rhonchi. Dry cough. Equal chest percussion  ABDOMEN:  Soft, bowel sounds present, non-tender, no masses or organomegaly. MUSCULOSKELETAL:  No clubbing, cyanosis or edema. Moves all joints. NEUROLOGIC: normal coordination and normal general cortical function  SKIN: Warm, dry, normal turgor. Cap refill <3secs. No rashes, petechiae, purpura.      ADDITIONAL STUDIES     Pertinent prior laboratory results and/or imaging reviewed. Orders Placed This Encounter   Procedures    XR CHEST STANDARD (2 VW)    CBC Auto Differential    Basic Metabolic Panel    Brain Natriuretic Peptide       IMPRESSION/ PLAN    1. COPD with acute exacerbation ([facility] [address]    2. Cough    3. Essential hypertension, benign    4. Controlled type 2 diabetes mellitus without complication, without long-term current use of insulin ([facility] [address]    5. Rhonchi at both lung bases    6. Wheezing      Labs and chest xray today. Start Steroids   Orders Placed This Encounter   Medications    methylPREDNISolone (MEDROL DOSEPACK) 4 MG tablet     Sig: Take by mouth. Follow directions on pack. Dispense:  1 kit     Refill:  0     Continue inhaler  Follow up 2 weeks.      He is planning to  set up appointment with Dr Nixon Monroy as PMD     Electronically signed by MOOSE Alexander on 1/17/2022 at 12:57 PM

## 2022-01-24 ENCOUNTER — TELEPHONE (OUTPATIENT)
Dept: FAMILY MEDICINE CLINIC | Age: 82
End: 2022-01-24

## 2022-01-24 NOTE — TELEPHONE ENCOUNTER
If he can get into pulmonologist, that would be best to get this under control.  Otherwise keep Follow up

## 2022-01-24 NOTE — TELEPHONE ENCOUNTER
Patient calling in stating that he has completed all of his medication and still has a cough and some crackling, he is asking if he should just wait until his appt next week?

## 2022-01-25 NOTE — TELEPHONE ENCOUNTER
Patient called back, stating he has been seeing Dr. Josef Farah [facility]. He has an appointment coming up, he will refer to them for this.

## 2022-01-26 ENCOUNTER — TELEPHONE (OUTPATIENT)
Dept: FAMILY MEDICINE CLINIC | Age: 82
End: 2022-01-26

## 2022-01-26 NOTE — TELEPHONE ENCOUNTER
----- Message from Michelle Chun sent at 2022 11:30 AM EST -----  Subject: Appointment Request    Reason for Call: New Patient Request Appointment    QUESTIONS  Type of Appointment? New Patient/New to Provider  Reason for appointment request? No appointments available during search  Additional Information for Provider? Pt was with Dr. Michael Briseno who has   retired. His son is a PA with [employer], and he advised that   he should try to get in with Dr. Tank Sinha. Would he be able to get him in as   a new patient? Can you please follow up with him to advise?   ---------------------------------------------------------------------------  --------------  CALL BACK INFO  What is the best way for the office to contact you? OK to leave message on   voicemail  Preferred Call Back Phone Number? [phone]  ---------------------------------------------------------------------------  --------------  SCRIPT ANSWERS  Relationship to Patient? Self  Specialty Confirmation? Primary Care  Is this the first appointment to establish care for a ? No  Have you been diagnosed with, awaiting test results for, or told that you   are suspected of having COVID-19 (Coronavirus)? (If patient has tested   negative or was tested as a requirement for work, school, or travel and   not based on symptoms, answer no)? No  Within the past two weeks have you developed any of the following symptoms   (answer no if symptoms have been present longer than 2 weeks or began   more than 2 weeks ago)? Fever or Chills, Cough, Shortness of breath or   difficulty breathing, Loss of taste or smell, Sore throat, Nasal   congestion, Sneezing or runny nose, Fatigue or generalized body aches   (answer no if pain is specific to a body part e.g. back pain), Diarrhea,   Headache? No  Have you had close contact with someone with COVID-19 in the last 14 days? No  (Service Expert  click yes below to proceed with [email or web address] As Usual   Scheduling)? Yes

## 2022-01-31 ENCOUNTER — OFFICE VISIT (OUTPATIENT)
Dept: FAMILY MEDICINE CLINIC | Age: 82
End: 2022-01-31
Payer: MEDICARE

## 2022-01-31 VITALS
WEIGHT: 233 LBS | RESPIRATION RATE: 18 BRPM | HEART RATE: 98 BPM | TEMPERATURE: 97.4 F | HEIGHT: 68 IN | BODY MASS INDEX: 35.31 KG/M2 | OXYGEN SATURATION: 98 % | SYSTOLIC BLOOD PRESSURE: 118 MMHG | DIASTOLIC BLOOD PRESSURE: 78 MMHG

## 2022-01-31 DIAGNOSIS — J44.1 COPD WITH ACUTE EXACERBATION (HCC): Primary | ICD-10-CM

## 2022-01-31 PROCEDURE — 99213 OFFICE O/P EST LOW 20 MIN: CPT | Performed by: PHYSICIAN ASSISTANT

## 2022-01-31 NOTE — PROGRESS NOTES
[address] [facility]  01/31/22     CHIEF COMPLAINT  Chief Complaint   Patient presents with    Other     Pt. here for 2 week follow up with COPD. HISTORY OF PRESENT  ILLNESS  Kimberly Olivares is a [de-identified] y.o.  male  2 week follow up COPD exacerbation. Steroid dose nohemi helped but still coughing. Has appointment with pulmonologist next month. Denies chest pain, edema, headaches, fever. PHYSICAL EXAM     Vitals:    01/31/22 1115   BP: 118/78   Site: Right Upper Arm   Position: Sitting   Pulse: 98   Resp: 18   Temp: 97.4 °F (36.3 °C)   TempSrc: Axillary   SpO2: 98%   Weight: 233 lb (105.7 kg)   Height: 5' 8\" (1.727 m)      APPEARANCE: Pleasant, friendly, well-nourished. No acute distress. HEENT: Normocephalic, atraumatic. EOMI,PERRLA. , conjunctiva pink /moist, sclera white. Hearing intact. Nose/ Mouth: not examined today-wearing mask  NECK: No lymphadenopathy or masses. Moves neck fully. HEART: Reg rate and rhythm. No murmurs, rubs, or gallops. LUNGS: Expiratory wheezes bilaterally. No rales, or rhonchi. ABDOMEN:  Soft,    BACK/EXTREMITIES: Moves all joints. NEUROLOGIC: Normal gross and sensory exam.  SKIN: Warm, dry, normal turgor. Cap refill <3secs. No rashes, petechiae, purpura. No clubbing, cyanosis or edema. IMPRESSION/ PLAN     1. COPD with acute exacerbation (HCC)      Improved some. Advised to take deep inspirations thruout day to expand lungs. Keep pulmonologist appointment. - has new MD appointment with Dr Roshan Miguel next month and cardiology appointment in March.       FOLLOW UP prn

## 2022-02-23 ENCOUNTER — TELEPHONE (OUTPATIENT)
Dept: PULMONOLOGY | Age: 82
End: 2022-02-23

## 2022-02-23 ENCOUNTER — OFFICE VISIT (OUTPATIENT)
Dept: PULMONOLOGY | Age: 82
End: 2022-02-23
Payer: MEDICARE

## 2022-02-23 VITALS
SYSTOLIC BLOOD PRESSURE: 145 MMHG | OXYGEN SATURATION: 97 % | HEIGHT: 68 IN | BODY MASS INDEX: 36.07 KG/M2 | DIASTOLIC BLOOD PRESSURE: 78 MMHG | RESPIRATION RATE: 16 BRPM | HEART RATE: 65 BPM | WEIGHT: 238 LBS | TEMPERATURE: 94.8 F

## 2022-02-23 DIAGNOSIS — R06.02 SHORTNESS OF BREATH: Primary | ICD-10-CM

## 2022-02-23 DIAGNOSIS — E66.9 OBESITY (BMI 35.0-39.9 WITHOUT COMORBIDITY): ICD-10-CM

## 2022-02-23 DIAGNOSIS — J41.0 SIMPLE CHRONIC BRONCHITIS (HCC): ICD-10-CM

## 2022-02-23 DIAGNOSIS — G47.33 OSA (OBSTRUCTIVE SLEEP APNEA): ICD-10-CM

## 2022-02-23 PROCEDURE — 99213 OFFICE O/P EST LOW 20 MIN: CPT | Performed by: INTERNAL MEDICINE

## 2022-02-23 ASSESSMENT — SLEEP AND FATIGUE QUESTIONNAIRES
HOW LIKELY ARE YOU TO NOD OFF OR FALL ASLEEP WHILE SITTING QUIETLY AFTER LUNCH WITHOUT ALCOHOL: 1
HOW LIKELY ARE YOU TO NOD OFF OR FALL ASLEEP WHILE SITTING INACTIVE IN A PUBLIC PLACE: 0
HOW LIKELY ARE YOU TO NOD OFF OR FALL ASLEEP WHILE LYING DOWN TO REST IN THE AFTERNOON WHEN CIRCUMSTANCES PERMIT: 0
HOW LIKELY ARE YOU TO NOD OFF OR FALL ASLEEP WHILE SITTING AND READING: 3
HOW LIKELY ARE YOU TO NOD OFF OR FALL ASLEEP WHILE WATCHING TV: 2
HOW LIKELY ARE YOU TO NOD OFF OR FALL ASLEEP IN A CAR, WHILE STOPPED FOR A FEW MINUTES IN TRAFFIC: 0
HOW LIKELY ARE YOU TO NOD OFF OR FALL ASLEEP WHILE SITTING AND TALKING TO SOMEONE: 0
HOW LIKELY ARE YOU TO NOD OFF OR FALL ASLEEP WHEN YOU ARE A PASSENGER IN A CAR FOR AN HOUR WITHOUT A BREAK: 0
ESS TOTAL SCORE: 6

## 2022-02-23 NOTE — PATIENT INSTRUCTIONS
Remember to bring a list of pulmonary medications and any CPAP or BiPAP machines to your next appointment with the office. Please keep all of your future appointments scheduled by [address], [facility] office. Out of respect for other patients and providers, you may be asked to reschedule your appointment if you arrive later than your scheduled appointment time. Appointments cancelled less than 24hrs in advance will be considered a no show. Patients with three missed appointments within 1 year or four missed appointments within 2 years can be dismissed from the practice. Please be aware that our physicians are required to work in the Intensive Care Unit at [facility].  Your appointment may need to be rescheduled if they are designated to work during your appointment time. You may receive a survey regarding the care you received during your visit. Your input is valuable to us. We encourage you to complete and return your survey. We hope you will choose us in the future for your healthcare needs. Pt instructed of all future appointment dates & times, including radiology, labs, procedures & referrals. If procedures were scheduled preparation instructions provided. Instructions on future appointments with [facility] were given.

## 2022-02-23 NOTE — PROGRESS NOTES
MA Communication:   The following orders are received by verbal communication from Dhruv Carbone MD    Orders include:  CR in 1 month        FU 6 mo

## 2022-02-23 NOTE — PROGRESS NOTES
Chief Complaint-Pulmonary follow up to discuss the compliance data    Patient has come back to the office for a pulmonary evaluation to discuss the compliance data, patient states that he has been using his CPAP machine and patient states that he is getting used to it, patient used the CPAP machine for 7 hours yesterday but patient states some days it is not that much, patient states that also does not were concerned that his wife gets up with the noise of the CPAP machine, patient does not have any significant nasal congestion, no increasing cough expectorant shortness of breath or wheezing, patient does not have any chest pain or palpitation, no fever or chills, no abdominal pain nausea vomiting or any increasing lipids swelling, patient's need for rescue inhaler is limited, patient does not have any wheezing or chest pain which is pleuritic in nature, no other pertinent review of system of concern    Previous HPIPatient was subjected to a home sleep study and was told her to follow-up but apparently patient was recently hospitalized because of chest pain and was found to have A. fib with RVR along with that patient was also found to be in acute pulmonary edema and patient was treated symptomatically and specifically in the hospital and was just discharged and patient has come to the office subsequently, patient states that he has been put on a Holter monitor which he is getting at this time, patient states that he underwent an ultrasound of the heart at that time which was okay, patient does have some coughing without any expectoration, patient does not have any significant fever or chills, patient does not have any increasing wheezing, patient does not have any significant pleuritic chest pain, patient on questioning further does not have any significant shortness of breath per se, patient does not have any increasing reflux symptoms, no sinus congestion, patient does have leg edema, patient states that she he is not sure that he got any water pill at home which was given to him at the time of discharge, patient does feel tired and having less energy, patient does not have any other pertinent review of system of concern    Patient has come to the office for a pulmonary follow-up, patient states that his spouse is concerned about his coughing which is more than usual but patient does not think that it is more than usual, patient states that along with the coughing he does not have any significant expectoration or any increased wheezing, patient does not have any significant rhinorrhea nasal congestion sinus congestion, patient does not wear hearing aids and sometimes there is a wax buildup but does not have any ear pain or tinnitus, patient does not have any significant chest pain palpitation diaphoresis, no fever no chills, patient does not have any change in the ambient environment, patient does not have any abdominal discomfort nausea vomiting, patient does have peripheral neuropathy because of type 2 diabetes mellitus, patient states that his hemoglobin A1c is 7.3 which partly is because of his eating habits not not being optimal and also being nonactive, patient does not have any new medications, patient does not have any change in the ambient environment, patient does not have any sick contacts, patient does feel tired and sleepy in the daytime and has some sleep fragmentation, patient does not have any epistaxis hemoptysis hematochezia melena, patient states that along with Delmar Saul he has to use his rescue inhaler on average of twice a week in which he does at nighttime with improvement, patient does not have any other pertinent review of system of concern    A virtual pulmonary visit was done for the patient for his clinical status, patient states that he is clinically stable at this time patient states that he is doing very well with the Trelegy Ellipta, patient states that he takes pro-air at nighttime just to decrease any congestion at nighttime as a habit, patient does not have any significant rhinorrhea or nasal congestion or any epistaxis, patient does not have any significant otalgia or ear discharge, patient states that once  twice a day he has a deep cough without any expectoration but if patient is not concerned about that, patient states that he does not have any significant increasing shortness of breath or wheezing, patient does not have any pleuritic chest pain, no fever no chills, no palpitation or diaphoresis, patient does not have any significant abdominal pain nausea vomiting, patient does not have any increasing reflux symptoms but patient takes medication for that patient does not have any significant epistaxis or hemoptysis, patient states that he has some soft stools but not diarrhea, patient is not concerned about on the consistency of the stools, patient states that in the last few months time he has lost about 11 to 12 pounds, patient does not have any increasing sleep fragmentation, patient does not have any confusion lethargy, patient does not have any change in the ambient environment, patient does not have any new medications of concern, patient's blood sugars are under control, patient does not have any other pertinent review of system of concern    :Patient has come to the office for pulmonary follow-up, patient says that from pulmonary standpoint of view he is stable, patient states that since that time he has started taking Trelegy Ellipta he does not have any significant cough or expectoration or shortness of breath, patient states that once in a while he takes albuterol inhaler at nighttime to decrease some congestion in the daytime, patient takes his Trelegy Ellipta in the daytime at this time, patient does not have any increasing nasal congestion, patient on questioning further states that his hoarseness of voice is not significant, patient does not have any chest pain or palpitations, patient states that recently he had come to the ER because of urine retention and hematuria and was subjected to cystoscopy and however large blood clot was removed but patient was told that he does not have any apparent pathology of concern, mom patient does not have any abdominal pain nausea vomiting, patient does not have any significant increasing leg edema, patient states that he had gone to his PCPs office and patient was told to have a PSA and the results to be sent to his urologist, patient does not have any other pertinent review of system of concern     Patient has come for pulmonary evaluation;patient states that he is more less doing OK with Trelegy but before his requirements for the rescue inhaler was minimal  but lately for last few weeks time he has noticed that he was having more chest congestion along with that patient was having increased use of albuterol inhaler which is still continuing but it is better than last week, patient has cough with mucoid expectoration without any significant purulence, patient does not have any significant fever or chills, no pleuritic chest pain patient does not have any chest pain, patient does not have any increasing nasal congestion, patient does not have any epistaxis, bleeding or hemoptysis, patient does not have any fever or chills, patient does not have any significant abdominal discomfort and nausea vomiting, patient does not complain of any increasing leg edema, patient may still has some sleep fragmentation, patient does not have any confusion or lethargy, no other pertinent review of system of concern       Patient is a 71-year-old male who was upper to the office for pulmonary consult because of increasing shortness of breath  Patient says that he has been having increasing shortness of breath.   For at least the last 1 year time and patient had a cardiac workup done which was negative for any cardiac etiology for the same; patient says that on a flat surface at his own pace he can walk about 3-4 blocks without any shortness of breath but if he has to go on little better inclined order has to climb stairs he has profound shortness of breath, along with the shortness of breath he has some cough without any phlegm, patient also has occasional wheezing, patient does not have any significant chest pain along with that no palpitations or diaphoresis, no fever no chills, patient denies any increasing headaches or blurring of vision, patient does not have any otalgia or ear discharge, patient does not have any tinnitus, patient on questioning further states that he does not have any profound or rhinorrhea or nasal congestion or sinus congestion, patient does not have any significant sore throat, no difficulty in swallowing per se but patient says that his food gets stuck in the mid chest at times and patient has history of esophageal strictures and has had a ballooning of the strictures multiple times in the last one was last fall, patient does not have any pleuritic chest pain, no fever no chills, patient's appetite is maintained, patient does not have any abdominal pain and nausea or vomiting, patient is takes medications for reflux symptoms, patient does not have any altered bowel habits, patient does not have any epistaxis, gum bleeding or hemoptysis, patient does not have any significant hematochezia or melena, patient does not have any dysuria or hematuria, patient does not have any increasing leg edema, patient does have history suggestive of some sleep fragmentation t, but patient says that he has gained about 5-10 pounds in last 1 year time, patient does not have any history of any significant exposures, patient was an educator, patient says that 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 and he underwent a right middle lobe lobectomy at [facility] to that time, patient does feel tired, patient says he is a former smoker he quit about 14 years back, patient does not take any inhalers or nebulizer at this time, patient does not have any change in the ambient environment at this time, patient does not have any significant history of any recent travels or change of medications, patient says that he went to [facility] recently for a vacation and tomorrow he is going to [place], patient does not have any confusion or lethargy, no other pertinent review of system of concern    Past Medical History:   Diagnosis Date    [facility] [address] [facility]     Decreased hearing of both ears     Diabetes mellitus ([facility] [address] [facility]     GERD (gastroesophageal reflux disease)     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    Hyperlipidemia     Hypertension     Primary malignant neoplasm of skin of trunk 4/21/2009       Past Surgical History:   Procedure Laterality Date    COLONOSCOPY  5/22/12    polyps    COLONOSCOPY  7/21/15    polyps    CYSTOSCOPY N/A 10/24/2019    CYSTOSCOPY AND CLOT EVACUATION performed by Cristo Traore MD at [address] [place] [address] Bilateral 6/22/15    laparoscopic    LUNG REMOVAL, PARTIAL  2007    middle lobe R lung/histoplasmosis    TURP  2002    UPPER GASTROINTESTINAL ENDOSCOPY  1/26/2015    UPPER GASTROINTESTINAL ENDOSCOPY  2/15    dilated    UPPER GASTROINTESTINAL ENDOSCOPY  07/21/2017    dilatation, bx    UPPER GASTROINTESTINAL ENDOSCOPY  08/23/2017    dilated; Bx gastric.     VASECTOMY         No Known Allergies    Medication list was reviewed and updated as needed in Epic    Social History     Socioeconomic History    Marital status:      Spouse name: Not on file    Number of children: Not on file    Years of education: Not on file    Highest education level: Not on file   Occupational History    Not on file   Tobacco Use    Smoking status: Former Smoker     Packs/day: 1.00     Years: 30.00     Pack years: 30.00     Start date: 4/1/1971     Quit date: 1/1/2003     Years since quittin.1    Smokeless tobacco: Never Used   Vaping Use    Vaping Use: Never used   Substance and Sexual Activity    Alcohol use: Yes     Alcohol/week: 0.0 standard drinks     Comment: 1/ week    Drug use: No    Sexual activity: Not on file   Other Topics Concern    Not on file   Social History Narrative    Not on file     Social Determinants of Health     Financial Resource Strain: Low Risk     Difficulty of Paying Living Expenses: Not hard at all   Food Insecurity: No Food Insecurity    Worried About [address] [employer] in the Last Year: Never true    [facility] in the Last Year: Never true   Transportation Needs:     Lack of Transportation (Medical): Not on file    Lack of Transportation (Non-Medical): Not on file   Physical Activity:     Days of Exercise per Week: Not on file    Minutes of Exercise per Session: Not on file   Stress:     Feeling of Stress : Not on file   Social Connections:     Frequency of Communication with Friends and Family: Not on file    Frequency of Social Gatherings with Friends and Family: Not on file    Attends Islam Services: Not on file    Active Member of [address] [place] [address] or Organizations: Not on file    Attends Club or Organization Meetings: Not on file    Marital Status: Not on file   Intimate Partner Violence:     Fear of Current or Ex-Partner: Not on file    Emotionally Abused: Not on file    Physically Abused: Not on file    Sexually Abused: Not on file   Housing Stability:     Unable to Pay for Housing in the Last Year: Not on file    Number of [place] in the Last Year: Not on file    Unstable Housing in the Last Year: Not on file       Family History   Problem Relation Age of Onset    Cancer Brother         colon CA    Heart Disease Mother             ROS same as above     Physical Exam:  Blood pressure (!) 145/78, pulse 65, temperature 94.8 °F (34.9 °C), temperature source Temporal, resp.  rate 16, height 5' 8\" (1.727 m), weight 238 lb (108 kg), SpO2 97 %.'  Constitutional:  No acute distress. HENT:  Oropharynx is clear and moist. No thyromegaly. Mild nasal mucosal hyperemia; No oral thrush   Eyes:  Conjunctivae are normal. Pupils equal, round, and reactive to light. No scleral icterus. Neck: . No tracheal deviation present. No obvious thyroid mass. Neck diameter is increased  Cardiovascular: Normal rate, regular rhythm, normal heart sounds. No right ventricular heave. 1+ lower extremity edema. Pulmonary/Chest: No wheezes. No significant crackles when seen. Chest wall is not dull to percussion. No accessory muscle usage or stridor. Slightly increased prolonged expiration with breath sound intensity  Abdominal: Soft. Bowel sounds present. No distension or hernia. No tenderness. Musculoskeletal: No cyanosis. No clubbing. No obvious joint deformity. Lymphadenopathy: No cervical or supraclavicular adenopathy. Skin: Skin is warm and dry. No rash or nodules on the exposed extremities. Psychiatric: Normal mood and affect. Behavior is normal.  No anxiety. Neurologic: Alert, awake and oriented. PERRL. Speech fluent        Data:     PFT shows patient to have mild-to-moderate obstructive airway disease with some reactive disease in the small airways along with that patient has hyperinflation and also changes in the PFT parameters because of body habitus      ECHO- Summary   Technically difficult examination.   Normal systolic function with an estimated ejection fraction of 55-60%.    Mild concentric left ventricular hypertrophy.   No regional wall motion abnormalities are seen.   Grade I diastolic dysfunction with normal filing pressure.     Patient's home study shows that patient has severe sleep apnea with AHI of 42.9/h along with that patient has nocturnal hypoxemia and patient saturation is dropping to as low as 78% and patient's saturation below 89% was for 90 minutes    ONE XRAY VIEW OF THE CHEST       9/29/2021 4:11 am     COMPARISON:   08/09/2021       HISTORY:   ORDERING SYSTEM PROVIDED HISTORY: chest pain   TECHNOLOGIST PROVIDED HISTORY:   Reason for exam:->chest pain   Reason for Exam: Chest Pain (woke up at 0230. no history )       FINDINGS:   The lungs are hypoinflated.  Hazy opacities in the left lung base more than   the right.  No significant pleural effusion is seen and no pneumothorax.  The   cardiac silhouette is borderline.  There is moderate pulmonary vascular   congestion.  Calcified right mediastinal/hilar lymph nodes are suggested.       No obvious fractures are identified.  Right thoracotomy defect is stable.           Impression   Moderate pulmonary vascular congestion.  The hazy bibasilar opacities spare   the perihilar lung.  Could be atypical pattern of edema or infectious. Patient's compliance data shows that patient has used a CPAP machine on only 18 days over 30 and patient uses of CPAP machine more than 4 hours was 60%, patient's AHI has come down to 2/h    Assessment:    1. Shortness of breath        2. Obstructive sleep apnea      3. Diastolic dysfunction      4. Copd       5. H/O histoplasmosis      6.  Obesity (BMI 35.0-39.9 without comorbidity)              Plan:   · Patient's clinical status and review of systems discussed  · Patient was told about the clinical finding including auscultation and implications  · Clinically patient has has chronic bronchitis along with that patient appears to have obesity/suspected sleep apnea/diastolic dysfunction with history of histoplasmosis with right middle lobe resection/esophageal stricture  · Patient was told about the pathophysiology of the disease process and its modifying factors  · Patient was told about the sleep study results which shows patient to have severe RIZWANA along with nocturnal hypoxemia   · Patient's CPAP compliance data was reviewed and patient has not been compliant with the use of CPAP machine but the machine is efficacious and does not need any titration changes and patient was told the implication of that and patient was told that he needs to use it more often and also can use it when he is taking a nap in the daytime  · Patient's sleep really results were reviewed once again and the implications discussed  · Patient to continue with  Clarene Rank and was shown how to take it properly and was told to take one puff daily and patient was told to make sure that he rinses his mouth with water after use otherwise he can have hoarseness of voice or oral thrush  · Patient was told to try taking the Clarene Rank at nighttime rather than the daytime to see if it makes a difference in terms of slight decrease congestion in the daytime  · Patient does not need any antibiotics or steroids from pulmonary standpoint of view but patient was told that he has increasing wheezing and requirement rescue inhaler is going up and to call  ·  Patient was told to take saline nasal rinse   · Patient was also told to try some salt and water gargles  · Patient to take a sugar free candy/lozenges  · Patient was also given albuterol inhaler 2 puffs every 6 on whenever necessary basis   · Patient was told about dietary and lifestyle modifications  · Patient needs to lose weight  · Patient needs to keep himself warm for the perez  · Patient has been put on Hyzaar which has HCTZ but patient continues to have significant leg edema and patient's PCP/cardiology decide if patient will benefit from a loop diuretic  · Salt and fluid restrictions discussed  · Patient needs to lose weight  · A regular regimented exercise will help  · Patient's [employer] company to be requested to send a letter compliance data in 1 month time for review  · Management of diabetes mellitus as per patient's PCP  · Patient is up-to-date on the COVID-19 vaccine  · Patient to follow-up in 6 months time or earlier if required

## 2022-02-24 ENCOUNTER — OFFICE VISIT (OUTPATIENT)
Dept: FAMILY MEDICINE CLINIC | Age: 82
End: 2022-02-24
Payer: MEDICARE

## 2022-02-24 VITALS
BODY MASS INDEX: 35.61 KG/M2 | SYSTOLIC BLOOD PRESSURE: 128 MMHG | OXYGEN SATURATION: 92 % | HEIGHT: 68 IN | HEART RATE: 69 BPM | WEIGHT: 235 LBS | DIASTOLIC BLOOD PRESSURE: 78 MMHG

## 2022-02-24 DIAGNOSIS — I10 ESSENTIAL HYPERTENSION, BENIGN: ICD-10-CM

## 2022-02-24 DIAGNOSIS — E11.9 CONTROLLED TYPE 2 DIABETES MELLITUS WITHOUT COMPLICATION, WITHOUT LONG-TERM CURRENT USE OF INSULIN (HCC): ICD-10-CM

## 2022-02-24 DIAGNOSIS — I48.19 PERSISTENT ATRIAL FIBRILLATION (HCC): Primary | ICD-10-CM

## 2022-02-24 DIAGNOSIS — J44.1 COPD WITH ACUTE EXACERBATION (HCC): ICD-10-CM

## 2022-02-24 DIAGNOSIS — E78.2 MIXED HYPERLIPIDEMIA: ICD-10-CM

## 2022-02-24 PROCEDURE — 99214 OFFICE O/P EST MOD 30 MIN: CPT | Performed by: FAMILY MEDICINE

## 2022-02-24 RX ORDER — DILTIAZEM HYDROCHLORIDE 240 MG/1
240 CAPSULE, COATED, EXTENDED RELEASE ORAL DAILY
Qty: 90 CAPSULE | Refills: 3 | Status: ON HOLD
Start: 2022-02-24 | End: 2022-08-10 | Stop reason: HOSPADM

## 2022-02-27 ASSESSMENT — ENCOUNTER SYMPTOMS
EYE PAIN: 0
DIARRHEA: 0
BACK PAIN: 0
SHORTNESS OF BREATH: 0
CHEST TIGHTNESS: 0
CONSTIPATION: 0
COLOR CHANGE: 0
TROUBLE SWALLOWING: 0
RHINORRHEA: 0

## 2022-02-27 NOTE — PROGRESS NOTES
Jyothi Owens (:  1940) is a [de-identified] y.o. male,Established patient, here for evaluation of the following chief complaint(s):  New Patient         ASSESSMENT/PLAN:  1. Persistent atrial fibrillation (HCC)  -     dilTIAZem (CARDIZEM CD) 240 MG extended release capsule; Take 1 capsule by mouth daily, Disp-90 capsule, R-3Normal  Refill of medicine today. 2. Controlled type 2 diabetes mellitus without complication, without long-term current use of insulin ([facility] [address]  Fairly well-controlled by A1c. Patient will continue current medications. Work on diet. 3. COPD with acute exacerbation ([address]  Currently being treated and having sleep apnea treated as well)  4. Essential hypertension, benign  Well-controlled on current medication. 5. Mixed hyperlipidemia  Lab Results   Component Value Date    CHOL 182 10/25/2021    CHOL 151 2021    CHOL 169 2021     Lab Results   Component Value Date    TRIG 129 10/25/2021    TRIG 115 2021    TRIG 151 (H) 2021     Lab Results   Component Value Date    HDL 40 10/25/2021    HDL 44 2021    HDL 45 2021     Lab Results   Component Value Date    LDLCALC 116 (H) 10/25/2021    LDLCALC 84 2021    LDLCALC 94 2021     Lab Results   Component Value Date    LABVLDL 26 10/25/2021    LABVLDL 23 2021    LABVLDL 30 2021     No results found for: CHOLHDLRATIO  Well-controlled      No follow-ups on file. Subjective   SUBJECTIVE/OBJECTIVE:  Jyothi Owens is a [de-identified] y.o. male. Patient presents with:  New Patient      This is an 70-year-old male who presents to [facility] care. He has the following conditions    1. Persistent atrial fibrillation. He goes been out of A. fib. He does need his diltiazem refilled today. He is running out of this 1. Patient is on a beta-blocker as well. This has had him rate controlled. 2. Hypertension this is been well controlled on current medication.  Denies any significant chest pain shortness of breath or headaches. Has had long-term treatment of this. 3. Hyperlipidemia. Patient has been on statin treatment. Denies myalgia. Does have a history of mild CAD. 4. COPD. Patient has COPD and sleep apnea. He is currently being treated for sleep apnea. Having a hard time tolerating the mask. 5. Diabetes mellitus. This has been under fair control in the past. Patient continues on medication for this. Following a diet therapy as well. Lab Results       Component                Value               Date                       LABA1C                   7.3                 10/25/2021            Lab Results       Component                Value               Date                       EAG                      162.8               10/25/2021            No apparent endorgan damage. Possible mild kidney disease. The patients PMH, surgical history, family history, medications, allergies were all reviewed and updated as appropriate today. Review of Systems   Constitutional: Negative for fatigue and unexpected weight change. HENT: Negative for congestion, rhinorrhea and trouble swallowing. Eyes: Negative for pain and visual disturbance. Respiratory: Negative for chest tightness and shortness of breath. Cardiovascular: Negative for chest pain and palpitations. Gastrointestinal: Negative for constipation and diarrhea. Endocrine: Negative for cold intolerance and heat intolerance. Genitourinary: Negative for frequency and urgency. Musculoskeletal: Negative for arthralgias and back pain. Skin: Negative for color change and rash. Allergic/Immunologic: Negative for environmental allergies and food allergies. Neurological: Negative for dizziness and light-headedness. Hematological: Negative for adenopathy. Does not bruise/bleed easily. Psychiatric/Behavioral: Negative for dysphoric mood and sleep disturbance. Objective   Physical Exam  Vitals and nursing note reviewed.    Constitutional: Appearance: Normal appearance. He is well-developed. HENT:      Head: Normocephalic and atraumatic. Right Ear: External ear normal.      Left Ear: External ear normal.      Nose: Nose normal.   Eyes:      Conjunctiva/sclera: Conjunctivae normal.      Pupils: Pupils are equal, round, and reactive to light. Cardiovascular:      Rate and Rhythm: Regular rhythm. Tachycardia present. Heart sounds: Normal heart sounds. No murmur heard. No friction rub. No gallop. Pulmonary:      Effort: Pulmonary effort is normal. No respiratory distress. Breath sounds: Normal breath sounds. No wheezing. Abdominal:      General: Bowel sounds are normal. There is no distension. Palpations: Abdomen is soft. Tenderness: There is no abdominal tenderness. Musculoskeletal:         General: No tenderness. Normal range of motion. Cervical back: Normal range of motion and neck supple. Skin:     General: Skin is warm and dry. Neurological:      Mental Status: He is alert and oriented to person, place, and time. Deep Tendon Reflexes: Reflexes are normal and symmetric. Psychiatric:         Behavior: Behavior normal.         Thought Content: Thought content normal.         Judgment: Judgment normal.              An electronic signature was used to authenticate this note.     --Court Harada, MD

## 2022-03-10 ENCOUNTER — TELEPHONE (OUTPATIENT)
Dept: PULMONOLOGY | Age: 82
End: 2022-03-10

## 2022-03-10 DIAGNOSIS — J41.0 SIMPLE CHRONIC BRONCHITIS (HCC): ICD-10-CM

## 2022-03-10 NOTE — TELEPHONE ENCOUNTER
Patient does have an appt with cardio on Monday for his 3 month follow up. He says that during his last appointment he was told that there was a stronger inhaler he could try if needed. After about 6 hours he feels like his lungs are full again. He has also increased the use of his rescue inhaler.

## 2022-03-10 NOTE — TELEPHONE ENCOUNTER
Patient called stating that he doesn't feel like the trelegy is working as effectively as it should. He is wondering if there is something stronger that he can take.

## 2022-03-10 NOTE — TELEPHONE ENCOUNTER
There is nothing stronger than Trelegy  Why does he feel that it is not working effectively ? Is his atrial fibrillation under control ?

## 2022-03-11 RX ORDER — METHYLPREDNISOLONE 4 MG/1
TABLET ORAL
Qty: 1 KIT | Refills: 0 | Status: SHIPPED | OUTPATIENT
Start: 2022-03-11 | End: 2022-03-17

## 2022-03-15 RX ORDER — APIXABAN 5 MG/1
TABLET, FILM COATED ORAL
Qty: 60 TABLET | Refills: 5 | Status: SHIPPED | OUTPATIENT
Start: 2022-03-15 | End: 2022-10-21 | Stop reason: SDUPTHER

## 2022-03-18 DIAGNOSIS — I10 ESSENTIAL HYPERTENSION, BENIGN: ICD-10-CM

## 2022-03-18 RX ORDER — LOSARTAN POTASSIUM AND HYDROCHLOROTHIAZIDE 25; 100 MG/1; MG/1
TABLET ORAL
Qty: 90 TABLET | Refills: 1 | OUTPATIENT
Start: 2022-03-18

## 2022-03-19 DIAGNOSIS — I10 ESSENTIAL HYPERTENSION, BENIGN: ICD-10-CM

## 2022-03-19 RX ORDER — LOSARTAN POTASSIUM AND HYDROCHLOROTHIAZIDE 25; 100 MG/1; MG/1
TABLET ORAL
Qty: 90 TABLET | Refills: 1 | Status: SHIPPED | OUTPATIENT
Start: 2022-03-19 | End: 2022-09-29

## 2022-03-30 NOTE — PROGRESS NOTES
[address]   Cardiac Follow Up    Referring Provider:  Jair Corona MD     Chief Complaint   Patient presents with    3 Month Follow-Up    Hypertension    Hyperlipidemia    Atrial Fibrillation    Other     swelling on the lower extremitites        Subjective: Ledy Siddiqi is here for follow up for hospital cardiology follow up; c/o of BLE Edema today. History of Present Illness:  Ledy Siddiqi is a [de-identified] y.o. male who presents today for routine f/u. He has PMH moderate NOCAD (med mgt without PCI), PAF dx 9/21 on eliquis, HLD, HTN, GERD, COPD, RIZWANA on CPAP (started early 2022), and DM. Saw Dr. Alba Stone in 2018 for chest pain. ECHO and romario nuc normal at the time. On 01/18/21 he c/o CP/SOB> Noted lexiscan myoview stress test from 01/22/21 which showed apical/septal ischemia. Most recent LHC from 02/04/21 with Dr. Alba Stone showed mild-mod CAD without need for PCI. Admitted 09/29/21 with c/o CP and dizziness. Most recent EKG 09/30/21 AF with RVR 132bpm. He was started on Eliquis of new-onset afib. Most recent [address] 09/30/21 show negative ischemia or scarring hyperdynamic LVEF > 70%. Most recent ECHO 09/30/21 EF 55-60%, with moderate concentric LVH. Most recent 2 week event cardiac monitor 10/1/21 showed AF/AFL burden of 3.71%, rate borderline controlled. Average heartrate 98 bpm. CTA Abdomen 10/30/21 showed stable 4-cm infrarenal abdominal aortic aneurysm being followed by Dr. Santa Yee. Most recent EKG 12/6/21 shows afib with HR 109bpm; nonspecific ST change. Today, he reports BLE edema and her notes being fatigued. He has started using his CPAP for new diagnosis of sleep apnea. He is getting used to it. Last OV 12/21 I increased lipitor to 40mg and metoprolol to 50mg BID  He is compliant with medications. Patient with no complaints of chest pain, palpitations, or dizziness. Weight dxtlp=191# (240# in 12/21).     Past Medical History:   has a past medical history of COPD (chronic obstructive pulmonary disease) ([facility] [address] Decreased hearing of both ears, Diabetes mellitus ([facility] [address] GERD (gastroesophageal reflux disease), Histoplasmosis, Hyperlipidemia, Hypertension, and Primary malignant neoplasm of skin of trunk. Surgical History:   has a past surgical history that includes Lung removal, partial (2007); Vasectomy; TURP (2002); Upper gastrointestinal endoscopy (1/26/2015); Upper gastrointestinal endoscopy (2/15); Inguinal hernia repair (Bilateral, 6/22/15); Colonoscopy (5/22/12); Colonoscopy (7/21/15); Upper gastrointestinal endoscopy (07/21/2017); Upper gastrointestinal endoscopy (08/23/2017); and Cystoscopy (N/A, 10/24/2019). Social History:   reports that he quit smoking about 2002. He has a 30.00 pack-year smoking history. He has never used smokeless tobacco. He reports current alcohol use. He reports that he does not use drugs. Family History:  family history includes Cancer in his brother; Heart Disease in his mother. Home Medications:  Prior to Admission medications    Medication Sig Start Date End Date Taking?  Authorizing Provider   losartan-hydroCHLOROthiazide (HYZAAR) 100-25 MG per tablet TAKE ONE TABLET BY MOUTH DAILY 3/19/22  Yes Danette Shipley MD   ELIQUIS 5 MG TABS tablet TAKE ONE TABLET BY MOUTH TWICE A DAY 3/15/22  Yes Noelle Joe MD   dilTIAZem (CARDIZEM CD) 240 MG extended release capsule Take 1 capsule by mouth daily 2/24/22  Yes Danette Shipley MD   amLODIPine (NORVASC) 5 MG tablet Take 1 tablet by mouth daily 11/4/21  Yes Historical Provider, MD   metFORMIN (GLUCOPHAGE-XR) 500 MG extended release tablet TAKE TWO TABLETS BY MOUTH TWICE A DAY WITH MEALS 12/21/21  Yes Declan Delaney MD   fluticasone-umeclidin-vilant (Elane Pulling) 100-62.5-25 MCG/INH AEPB INHALE ONE PUFF BY MOUTH DAILY 12/9/21  Yes Justin Earl MD   metoprolol succinate (TOPROL XL) 50 MG extended release tablet Take 1 tablet by mouth 2 times daily 12/6/21  Yes Abigail Howell pruritis. · Neurological: No headache, diplopia, change in muscle strength, numbness or tingling. No change in gait, balance, coordination, mood, affect, memory, mentation, behavior. · Psychiatric: No anxiety, no depression. · Endocrine: No malaise, fatigue or temperature intolerance. No excessive thirst, fluid intake, or urination. No tremor. · Hematologic/Lymphatic: No abnormal bruising or bleeding, blood clots or swollen lymph nodes. · Allergic/Immunologic: No nasal congestion or hives. Physical Examination:    Vitals:    03/31/22 1412   BP: 130/60   Pulse: 63   Temp: 97.8 °F (36.6 °C)   SpO2: 93%        Constitutional and General Appearance: NAD   Respiratory:  · Normal excursion and expansion without use of accessory muscles  · Resp Auscultation: Soft crackles at the right base. Cardiovascular:  · The apical impulses not displaced  · Heart tones are crisp and normal  · Cervical veins are not engorged  · The carotid upstroke is normal in amplitude and contour without delay or bruit  · Normal S1S2, No S3, No Murmur. RRR  · Peripheral pulses are symmetrical and full  · There is no clubbing, cyanosis of the extremities. 2+ BLE edema  · Femoral Arteries: 2+ and equal  · Pedal Pulses: 2+ and equal   Abdomen:  · No masses or tenderness  · Liver/Spleen: No Abnormalities Noted  Neurological/Psychiatric:  · Alert and oriented in all spheres  · Moves all extremities well  · Exhibits normal gait balance and coordination  · No abnormalities of mood, affect, memory, mentation, or behavior are noted  Skin:  · Skin: warm and dry. Stress test: 01/22/21  Summary Normal LVEF >60% Grossly normal wall motion  Apical/septal ischemia   Overall, this would be considered an abnormal, intermediate risk, study     [facility] 2/04/21 Findings:     1. Left main coronary artery was normal. It gave off the left anterior descending artery and left circumflex.      2. Left anterior descending artery has mild to moderate atherosclerotic disease. It was moderate in size. It gave off septal perforators and a moderate sized diagonal branch. The LAD covered the entire apex of the left ventricle. 3. Left circumflex has mild to moderate atherosclerotic disease. It was moderate in size. There was a moderate sized obtuse marginal branch. 4. Right coronary artery has mild to moderate atherosclerotic disease. It was moderate in size and was the dominant artery. 5. Left ventriculogram showed normal LVEF at 55-60%. Wall motion was normal . There was no significant mitral valve or aortic valve disease noted. LVEDP was normal. There was no gradient noted across the aortic valve during pullback of the catheter. CONCLUSIONS:     1. Mild to moderate coronary artery disease with preserved LVEF     ASSESSMENT/RECOMMENDATIONS:     1. Risk Factor control  2.  Consider anti-anginal therapy with long acting nitrates    Lab Results   Component Value Date     01/17/2022    K 4.6 01/17/2022     01/17/2022    CO2 23 01/17/2022    BUN 18 01/17/2022    CREATININE 1.2 01/17/2022    GLUCOSE 100 (H) 01/17/2022    CALCIUM 9.3 01/17/2022    PROT 7.2 10/25/2021    LABALBU 4.1 10/25/2021    BILITOT 0.5 10/25/2021    ALKPHOS 87 10/25/2021    AST 13 (L) 10/25/2021    ALT 14 10/25/2021    LABGLOM 58 (A) 01/17/2022    GFRAA >60 01/17/2022    AGRATIO 1.3 10/01/2021    GLOB 2.7 10/01/2021       Lab Results   Component Value Date    WBC 9.2 01/17/2022    HGB 13.5 01/17/2022    HCT 41.1 01/17/2022    MCV 87.4 01/17/2022     01/17/2022        Lab Results   Component Value Date    CHOL 182 10/25/2021    CHOL 151 02/04/2021    CHOL 169 01/11/2021     Lab Results   Component Value Date    TRIG 129 10/25/2021    TRIG 115 02/04/2021    TRIG 151 (H) 01/11/2021     Lab Results   Component Value Date    HDL 40 10/25/2021    HDL 44 02/04/2021    HDL 45 01/11/2021     Lab Results   Component Value Date    LDLCALC 116 (H) 10/25/2021    LDLCALC 84 02/04/2021    [address] 01/11/2021     Lab Results   Component Value Date    LABVLDL 26 10/25/2021    LABVLDL 23 02/04/2021    LABVLDL 30 01/11/2021     No results found for: CHOLHDLRLIENO     I have personally reviewed all labs including lipids 10/25/21    [id number] Score for Atrial Fibrillation Stroke Risk   Risk   Factors  Component Value   C CHF No 0   H HTN Yes 1   A2 Age >= 76 Yes,  [de-identified] y.o.) 2   D DM Yes 1   S2 Prior Stroke/TIA No 0   V Vascular Disease No 0   A Age 74-69 No,  [de-identified] y.o.) 0   Sc Sex male 0    [id number]  Score  4   Score last updated 10/12/21 9:12 AM EDT    Assessment:     1. HTN: Well controlled and will continue current medical regimen of hyzaar, norvasc, and Toprol XL. 2. CAD:  Mild-moderate NOCAD. Most recent cardiac cath from 02/04/21 with Dr. Mar Nathan showed mild to moderate CAD without need for PCI. Most recent [address] 09/30/21 show negative ischemia. I have offered long-acting nitrate (imdur) in past but he declined. He is agreeable to SL NTG PRN. There are no concerning symptoms for angina currently. 3.      HLD: I personally reviewed most recent lipids from 10/25/21 in [facility] and d/w Mr. Elier Boucher (see above). I increased lipitor to 40mg daily and will recheck FLP now and adjust accordingly. 4.       DM: per PCP    5. PAF: Diagnosed 9/21. Asymptomatic. Continue metoprolol XL 50mg BID regimen to help HR control. Continue eliquis 5mg BID for AC. Plan:  1. Have Dr. Nixon Monroy get CT of Abd for AAA follow up in Oct/Nov. 2022.  2. Start Lasix 40 mg daily to help with edema. ? Venous insufficiency vs mild diastolic CHF (BNP low 4/47=36). Will recheck near future. 3. Wear compression stockings for edema if needed. 4. Recommend fasting blood work in 7-10 days: CMP, FLP, and BNP   5. Plan to follow up in 6 months    This note was scribed in the presence of Samreen Sanchez MD by Marian Arceo RN.      I, Dr. Denver Burkett, personally performed the services described in this documentation, as scribed by the above signed scribe in my presence. It is both accurate and complete to my knowledge. I agree with the details independently gathered by the clinical support staff, while the remaining scribed note accurately describes my personal service to the patient. Thank you for allowing me to participate in the care of this individual.    Cost of prescription medications and patient compliance have been reviewed with patient. All questions answered. Sherry Srivastava.  Brissa Sanchez M.D., [address] [facility]

## 2022-03-31 ENCOUNTER — OFFICE VISIT (OUTPATIENT)
Dept: CARDIOLOGY CLINIC | Age: 82
End: 2022-03-31
Payer: MEDICARE

## 2022-03-31 VITALS
TEMPERATURE: 97.8 F | BODY MASS INDEX: 36.07 KG/M2 | OXYGEN SATURATION: 93 % | WEIGHT: 238 LBS | SYSTOLIC BLOOD PRESSURE: 130 MMHG | HEART RATE: 63 BPM | DIASTOLIC BLOOD PRESSURE: 60 MMHG | HEIGHT: 68 IN

## 2022-03-31 DIAGNOSIS — Z87.891 HISTORY OF TOBACCO ABUSE: ICD-10-CM

## 2022-03-31 DIAGNOSIS — R06.02 SHORTNESS OF BREATH: ICD-10-CM

## 2022-03-31 DIAGNOSIS — R60.0 LOCALIZED EDEMA: ICD-10-CM

## 2022-03-31 DIAGNOSIS — I48.91 ATRIAL FIBRILLATION WITH RVR (HCC): ICD-10-CM

## 2022-03-31 DIAGNOSIS — I71.40 ABDOMINAL AORTIC ANEURYSM (AAA) WITHOUT RUPTURE: Primary | ICD-10-CM

## 2022-03-31 DIAGNOSIS — E78.2 MIXED HYPERLIPIDEMIA: ICD-10-CM

## 2022-03-31 DIAGNOSIS — I25.10 MILD CAD: ICD-10-CM

## 2022-03-31 DIAGNOSIS — I10 ESSENTIAL HYPERTENSION: ICD-10-CM

## 2022-03-31 PROCEDURE — 99214 OFFICE O/P EST MOD 30 MIN: CPT | Performed by: INTERNAL MEDICINE

## 2022-03-31 RX ORDER — FUROSEMIDE 40 MG/1
40 TABLET ORAL DAILY
Qty: 30 TABLET | Refills: 5 | Status: ON HOLD
Start: 2022-03-31 | End: 2022-08-10 | Stop reason: HOSPADM

## 2022-03-31 NOTE — PATIENT INSTRUCTIONS
Plan:  1. Have Dr. Lynne Velásquez get CT of Abd for AAA follow up in Oct/Nov.    2. Start Lasix 40 mg daily to help with edema and sob  3. Wear compression stockings for edema  4. Recommend fasting blood work in 7-10 days: CMP, FLP, and BNP   5.  Plan to follow up in 6 months

## 2022-04-29 ENCOUNTER — TELEPHONE (OUTPATIENT)
Dept: FAMILY MEDICINE CLINIC | Age: 82
End: 2022-04-29

## 2022-04-29 RX ORDER — AMLODIPINE BESYLATE 5 MG/1
5 TABLET ORAL DAILY
Qty: 30 TABLET | Refills: 2 | Status: SHIPPED | OUTPATIENT
Start: 2022-04-29 | End: 2022-07-27

## 2022-04-29 RX ORDER — AMLODIPINE BESYLATE 5 MG/1
TABLET ORAL
Qty: 90 TABLET | OUTPATIENT
Start: 2022-04-29

## 2022-05-01 DIAGNOSIS — K21.9 GASTROESOPHAGEAL REFLUX DISEASE WITHOUT ESOPHAGITIS: ICD-10-CM

## 2022-05-02 RX ORDER — PANTOPRAZOLE SODIUM 40 MG/1
TABLET, DELAYED RELEASE ORAL
Qty: 90 TABLET | Refills: 1 | Status: SHIPPED | OUTPATIENT
Start: 2022-05-02 | End: 2022-10-25 | Stop reason: SDUPTHER

## 2022-05-02 NOTE — TELEPHONE ENCOUNTER
Last Office Visit  -  2/24/22  Next Office Visit  -  6/23/22    Last Filled  -    Last UDS -    Contract -

## 2022-05-31 DIAGNOSIS — E11.65 UNCONTROLLED TYPE 2 DIABETES MELLITUS WITH HYPERGLYCEMIA (HCC): ICD-10-CM

## 2022-05-31 RX ORDER — METFORMIN HYDROCHLORIDE 500 MG/1
TABLET, EXTENDED RELEASE ORAL
Qty: 360 TABLET | Refills: 1 | Status: SHIPPED | OUTPATIENT
Start: 2022-05-31

## 2022-06-07 DIAGNOSIS — J41.0 SIMPLE CHRONIC BRONCHITIS (HCC): ICD-10-CM

## 2022-06-07 RX ORDER — FLUTICASONE FUROATE, UMECLIDINIUM BROMIDE AND VILANTEROL TRIFENATATE 100; 62.5; 25 UG/1; UG/1; UG/1
POWDER RESPIRATORY (INHALATION)
Qty: 1 EACH | Refills: 5 | Status: SHIPPED | OUTPATIENT
Start: 2022-06-07

## 2022-06-28 ENCOUNTER — OFFICE VISIT (OUTPATIENT)
Dept: FAMILY MEDICINE CLINIC | Age: 82
End: 2022-06-28
Payer: MEDICARE

## 2022-06-28 VITALS
SYSTOLIC BLOOD PRESSURE: 132 MMHG | WEIGHT: 237 LBS | OXYGEN SATURATION: 97 % | BODY MASS INDEX: 35.92 KG/M2 | HEIGHT: 68 IN | DIASTOLIC BLOOD PRESSURE: 74 MMHG | HEART RATE: 70 BPM

## 2022-06-28 DIAGNOSIS — L57.0 AK (ACTINIC KERATOSIS): ICD-10-CM

## 2022-06-28 DIAGNOSIS — E11.9 CONTROLLED TYPE 2 DIABETES MELLITUS WITHOUT COMPLICATION, WITHOUT LONG-TERM CURRENT USE OF INSULIN (HCC): Primary | ICD-10-CM

## 2022-06-28 DIAGNOSIS — J41.0 SIMPLE CHRONIC BRONCHITIS (HCC): ICD-10-CM

## 2022-06-28 DIAGNOSIS — I48.91 ATRIAL FIBRILLATION WITH RVR (HCC): ICD-10-CM

## 2022-06-28 LAB — HBA1C MFR BLD: 7.3 %

## 2022-06-28 PROCEDURE — 99214 OFFICE O/P EST MOD 30 MIN: CPT | Performed by: FAMILY MEDICINE

## 2022-06-28 PROCEDURE — 1123F ACP DISCUSS/DSCN MKR DOCD: CPT | Performed by: FAMILY MEDICINE

## 2022-06-28 PROCEDURE — 83036 HEMOGLOBIN GLYCOSYLATED A1C: CPT | Performed by: FAMILY MEDICINE

## 2022-06-28 PROCEDURE — 3051F HG A1C>EQUAL 7.0%<8.0%: CPT | Performed by: FAMILY MEDICINE

## 2022-06-28 PROCEDURE — 17003 DESTRUCT PREMALG LES 2-14: CPT | Performed by: FAMILY MEDICINE

## 2022-06-28 PROCEDURE — 17000 DESTRUCT PREMALG LESION: CPT | Performed by: FAMILY MEDICINE

## 2022-06-28 NOTE — PROGRESS NOTES
Ciic Story (:  1940) is a 81 y.o. male,Established patient, here for evaluation of the following chief complaint(s):  COPD, Atrial Fibrillation, and Diabetes         ASSESSMENT/PLAN:  1. Controlled type 2 diabetes mellitus without complication, without long-term current use of insulin (HCC)  -     POCT glycosylated hemoglobin (Hb A1C)  Lab Results   Component Value Date    LABA1C 7.3 2022     Lab Results   Component Value Date    .8 10/25/2021   Control for age. 2. Simple chronic bronchitis (HCC)  Continue current inhalers. 3. Atrial fibrillation with RVR (HCC)  Continue rate control and anticoagulation. No follow-ups on file. Subjective   SUBJECTIVE/OBJECTIVE:  Cici Story is a 81 y.o. male. Patient presents with:  COPD  Atrial Fibrillation  Diabetes      72-year-old male who presents for follow-up of COPD atrial fibrillation and diabetes. Patient has been doing well recently. He has not been significantly short of breath. He is not having palpitations or chest pains. He is not having peripheral edema. Diabetes has been under good control. He denies any significant polyphagia polydipsia or polyuria. The patients PMH, surgical history, family history, medications, allergies were all reviewed and updated as appropriate today. Review of Systems       Objective   Physical Exam  Vitals and nursing note reviewed. Constitutional:       Appearance: He is well-developed. HENT:      Head: Normocephalic and atraumatic. Right Ear: External ear normal.      Left Ear: External ear normal.      Nose: Nose normal.   Eyes:      Conjunctiva/sclera: Conjunctivae normal.      Pupils: Pupils are equal, round, and reactive to light. Cardiovascular:      Rate and Rhythm: Normal rate and regular rhythm. Heart sounds: Normal heart sounds. No murmur heard. No friction rub. No gallop. Pulmonary:      Effort: Pulmonary effort is normal. No respiratory distress. Breath sounds: Normal breath sounds. No wheezing. Abdominal:      General: Bowel sounds are normal. There is no distension. Palpations: Abdomen is soft. Tenderness: There is no abdominal tenderness. Musculoskeletal:         General: No tenderness. Normal range of motion. Cervical back: Normal range of motion and neck supple. Skin:     General: Skin is warm and dry. Neurological:      Mental Status: He is alert and oriented to person, place, and time. Deep Tendon Reflexes: Reflexes are normal and symmetric. Psychiatric:         Behavior: Behavior normal.         Thought Content: Thought content normal.         Judgment: Judgment normal.            On this date 6/28/2022 I have spent 30 minutes reviewing previous notes, test results and face to face with the patient discussing the diagnosis and importance of compliance with the treatment plan as well as documenting on the day of the visit. An electronic signature was used to authenticate this note.     --Tabatha Kwon MD

## 2022-07-27 RX ORDER — AMLODIPINE BESYLATE 5 MG/1
TABLET ORAL
Qty: 30 TABLET | Refills: 2 | Status: SHIPPED
Start: 2022-07-27 | End: 2022-08-10

## 2022-08-07 ENCOUNTER — APPOINTMENT (OUTPATIENT)
Dept: GENERAL RADIOLOGY | Age: 82
DRG: 309 | End: 2022-08-07
Payer: MEDICARE

## 2022-08-07 ENCOUNTER — HOSPITAL ENCOUNTER (INPATIENT)
Age: 82
LOS: 3 days | Discharge: HOME | DRG: 309 | End: 2022-08-10
Attending: EMERGENCY MEDICINE | Admitting: HOSPITALIST
Payer: MEDICARE

## 2022-08-07 DIAGNOSIS — I48.91 ATRIAL FIBRILLATION WITH RVR (HCC): Primary | ICD-10-CM

## 2022-08-07 DIAGNOSIS — R77.8 ELEVATED TROPONIN: ICD-10-CM

## 2022-08-07 DIAGNOSIS — N17.9 AKI (ACUTE KIDNEY INJURY) (HCC): ICD-10-CM

## 2022-08-07 LAB
A/G RATIO: 1.3 (ref 1.1–2.2)
ALBUMIN SERPL-MCNC: 4.4 G/DL (ref 3.4–5)
ALP BLD-CCNC: 103 U/L (ref 40–129)
ALT SERPL-CCNC: 13 U/L (ref 10–40)
ANION GAP SERPL CALCULATED.3IONS-SCNC: 14 MMOL/L (ref 3–16)
AST SERPL-CCNC: 15 U/L (ref 15–37)
BILIRUB SERPL-MCNC: 0.5 MG/DL (ref 0–1)
BILIRUBIN URINE: NEGATIVE
BLOOD, URINE: NEGATIVE
BUN BLDV-MCNC: 15 MG/DL (ref 7–20)
CALCIUM SERPL-MCNC: 9.5 MG/DL (ref 8.3–10.6)
CHLORIDE BLD-SCNC: 98 MMOL/L (ref 99–110)
CHLORIDE URINE RANDOM: 38 MMOL/L
CLARITY: CLEAR
CO2: 24 MMOL/L (ref 21–32)
COLOR: YELLOW
CREAT SERPL-MCNC: 1.6 MG/DL (ref 0.8–1.3)
CREATININE URINE: 36.2 MG/DL (ref 39–259)
D DIMER: 0.35 UG/ML FEU (ref 0–0.6)
EKG ATRIAL RATE: 441 BPM
EKG DIAGNOSIS: NORMAL
EKG Q-T INTERVAL: 302 MS
EKG QRS DURATION: 98 MS
EKG QTC CALCULATION (BAZETT): 449 MS
EKG R AXIS: 21 DEGREES
EKG T AXIS: 44 DEGREES
EKG VENTRICULAR RATE: 133 BPM
GFR AFRICAN AMERICAN: 50
GFR NON-AFRICAN AMERICAN: 42
GLUCOSE BLD-MCNC: 136 MG/DL (ref 70–99)
GLUCOSE BLD-MCNC: 160 MG/DL (ref 70–99)
GLUCOSE BLD-MCNC: 167 MG/DL (ref 70–99)
GLUCOSE URINE: NEGATIVE MG/DL
HCT VFR BLD CALC: 39.5 % (ref 40.5–52.5)
HEMOGLOBIN: 12.7 G/DL (ref 13.5–17.5)
KETONES, URINE: NEGATIVE MG/DL
LEUKOCYTE ESTERASE, URINE: NEGATIVE
MAGNESIUM: 1.8 MG/DL (ref 1.8–2.4)
MCH RBC QN AUTO: 28.4 PG (ref 26–34)
MCHC RBC AUTO-ENTMCNC: 32.2 G/DL (ref 31–36)
MCV RBC AUTO: 88.3 FL (ref 80–100)
MICROSCOPIC EXAMINATION: NORMAL
NITRITE, URINE: NEGATIVE
PDW BLD-RTO: 15 % (ref 12.4–15.4)
PERFORMED ON: ABNORMAL
PERFORMED ON: ABNORMAL
PH UA: 6.5 (ref 5–8)
PHOSPHORUS: 2.4 MG/DL (ref 2.5–4.9)
PLATELET # BLD: 497 K/UL (ref 135–450)
PMV BLD AUTO: 7.5 FL (ref 5–10.5)
POTASSIUM SERPL-SCNC: 4.2 MMOL/L (ref 3.5–5.1)
POTASSIUM, UR: 14.4 MMOL/L
PRO-BNP: 191 PG/ML (ref 0–449)
PROTEIN UA: NEGATIVE MG/DL
RBC # BLD: 4.48 M/UL (ref 4.2–5.9)
SODIUM BLD-SCNC: 136 MMOL/L (ref 136–145)
SODIUM URINE: 44 MMOL/L
SPECIFIC GRAVITY UA: <=1.005 (ref 1–1.03)
TOTAL PROTEIN: 7.8 G/DL (ref 6.4–8.2)
TROPONIN: 0.02 NG/ML
URIC ACID, SERUM: 12.3 MG/DL (ref 3.5–7.2)
URINE REFLEX TO CULTURE: NORMAL
URINE TYPE: NORMAL
UROBILINOGEN, URINE: 0.2 E.U./DL
WBC # BLD: 11.9 K/UL (ref 4–11)

## 2022-08-07 PROCEDURE — 84100 ASSAY OF PHOSPHORUS: CPT

## 2022-08-07 PROCEDURE — 83930 ASSAY OF BLOOD OSMOLALITY: CPT

## 2022-08-07 PROCEDURE — 81003 URINALYSIS AUTO W/O SCOPE: CPT

## 2022-08-07 PROCEDURE — 84300 ASSAY OF URINE SODIUM: CPT

## 2022-08-07 PROCEDURE — 94640 AIRWAY INHALATION TREATMENT: CPT

## 2022-08-07 PROCEDURE — 83735 ASSAY OF MAGNESIUM: CPT

## 2022-08-07 PROCEDURE — 84133 ASSAY OF URINE POTASSIUM: CPT

## 2022-08-07 PROCEDURE — 82570 ASSAY OF URINE CREATININE: CPT

## 2022-08-07 PROCEDURE — 6360000002 HC RX W HCPCS: Performed by: HOSPITALIST

## 2022-08-07 PROCEDURE — 85027 COMPLETE CBC AUTOMATED: CPT

## 2022-08-07 PROCEDURE — 84484 ASSAY OF TROPONIN QUANT: CPT

## 2022-08-07 PROCEDURE — 85379 FIBRIN DEGRADATION QUANT: CPT

## 2022-08-07 PROCEDURE — 71045 X-RAY EXAM CHEST 1 VIEW: CPT

## 2022-08-07 PROCEDURE — 84550 ASSAY OF BLOOD/URIC ACID: CPT

## 2022-08-07 PROCEDURE — 83036 HEMOGLOBIN GLYCOSYLATED A1C: CPT

## 2022-08-07 PROCEDURE — 2580000003 HC RX 258: Performed by: HOSPITALIST

## 2022-08-07 PROCEDURE — 84439 ASSAY OF FREE THYROXINE: CPT

## 2022-08-07 PROCEDURE — 36415 COLL VENOUS BLD VENIPUNCTURE: CPT

## 2022-08-07 PROCEDURE — 6370000000 HC RX 637 (ALT 250 FOR IP): Performed by: HOSPITALIST

## 2022-08-07 PROCEDURE — 84443 ASSAY THYROID STIM HORMONE: CPT

## 2022-08-07 PROCEDURE — 2580000003 HC RX 258: Performed by: EMERGENCY MEDICINE

## 2022-08-07 PROCEDURE — 96365 THER/PROPH/DIAG IV INF INIT: CPT

## 2022-08-07 PROCEDURE — 99285 EMERGENCY DEPT VISIT HI MDM: CPT

## 2022-08-07 PROCEDURE — 2500000003 HC RX 250 WO HCPCS: Performed by: EMERGENCY MEDICINE

## 2022-08-07 PROCEDURE — 83880 ASSAY OF NATRIURETIC PEPTIDE: CPT

## 2022-08-07 PROCEDURE — 93005 ELECTROCARDIOGRAM TRACING: CPT | Performed by: EMERGENCY MEDICINE

## 2022-08-07 PROCEDURE — 82436 ASSAY OF URINE CHLORIDE: CPT

## 2022-08-07 PROCEDURE — 1200000000 HC SEMI PRIVATE

## 2022-08-07 PROCEDURE — 80053 COMPREHEN METABOLIC PANEL: CPT

## 2022-08-07 PROCEDURE — 93010 ELECTROCARDIOGRAM REPORT: CPT | Performed by: INTERNAL MEDICINE

## 2022-08-07 RX ORDER — SENNA PLUS 8.6 MG/1
1 TABLET ORAL DAILY PRN
Status: DISCONTINUED | OUTPATIENT
Start: 2022-08-07 | End: 2022-08-10 | Stop reason: HOSPADM

## 2022-08-07 RX ORDER — DEXTROSE MONOHYDRATE 100 MG/ML
INJECTION, SOLUTION INTRAVENOUS CONTINUOUS PRN
Status: DISCONTINUED | OUTPATIENT
Start: 2022-08-07 | End: 2022-08-10 | Stop reason: HOSPADM

## 2022-08-07 RX ORDER — INSULIN GLARGINE 100 [IU]/ML
12 INJECTION, SOLUTION SUBCUTANEOUS NIGHTLY
Status: DISCONTINUED | OUTPATIENT
Start: 2022-08-07 | End: 2022-08-10 | Stop reason: HOSPADM

## 2022-08-07 RX ORDER — PANTOPRAZOLE SODIUM 40 MG/1
40 TABLET, DELAYED RELEASE ORAL DAILY
Status: DISCONTINUED | OUTPATIENT
Start: 2022-08-07 | End: 2022-08-10 | Stop reason: HOSPADM

## 2022-08-07 RX ORDER — SODIUM CHLORIDE 9 MG/ML
INJECTION, SOLUTION INTRAVENOUS PRN
Status: DISCONTINUED | OUTPATIENT
Start: 2022-08-07 | End: 2022-08-10 | Stop reason: HOSPADM

## 2022-08-07 RX ORDER — MAGNESIUM SULFATE 1 G/100ML
1000 INJECTION INTRAVENOUS ONCE
Status: COMPLETED | OUTPATIENT
Start: 2022-08-07 | End: 2022-08-07

## 2022-08-07 RX ORDER — ATORVASTATIN CALCIUM 40 MG/1
40 TABLET, FILM COATED ORAL DAILY
Status: DISCONTINUED | OUTPATIENT
Start: 2022-08-08 | End: 2022-08-10 | Stop reason: HOSPADM

## 2022-08-07 RX ORDER — MAGNESIUM SULFATE IN WATER 40 MG/ML
2000 INJECTION, SOLUTION INTRAVENOUS PRN
Status: DISCONTINUED | OUTPATIENT
Start: 2022-08-07 | End: 2022-08-10 | Stop reason: HOSPADM

## 2022-08-07 RX ORDER — ACETAMINOPHEN 650 MG/1
650 SUPPOSITORY RECTAL EVERY 6 HOURS PRN
Status: DISCONTINUED | OUTPATIENT
Start: 2022-08-07 | End: 2022-08-10 | Stop reason: HOSPADM

## 2022-08-07 RX ORDER — SODIUM CHLORIDE 0.9 % (FLUSH) 0.9 %
10 SYRINGE (ML) INJECTION PRN
Status: DISCONTINUED | OUTPATIENT
Start: 2022-08-07 | End: 2022-08-10 | Stop reason: HOSPADM

## 2022-08-07 RX ORDER — INSULIN LISPRO 100 [IU]/ML
0-8 INJECTION, SOLUTION INTRAVENOUS; SUBCUTANEOUS EVERY 4 HOURS
Status: DISCONTINUED | OUTPATIENT
Start: 2022-08-07 | End: 2022-08-09

## 2022-08-07 RX ORDER — SODIUM CHLORIDE 9 MG/ML
INJECTION, SOLUTION INTRAVENOUS CONTINUOUS
Status: ACTIVE | OUTPATIENT
Start: 2022-08-07 | End: 2022-08-08

## 2022-08-07 RX ORDER — M-VIT,TX,IRON,MINS/CALC/FOLIC 27MG-0.4MG
1 TABLET ORAL DAILY
Status: DISCONTINUED | OUTPATIENT
Start: 2022-08-07 | End: 2022-08-10 | Stop reason: HOSPADM

## 2022-08-07 RX ORDER — POTASSIUM CHLORIDE 20 MEQ/1
40 TABLET, EXTENDED RELEASE ORAL PRN
Status: DISCONTINUED | OUTPATIENT
Start: 2022-08-07 | End: 2022-08-10 | Stop reason: HOSPADM

## 2022-08-07 RX ORDER — POTASSIUM CHLORIDE 7.45 MG/ML
10 INJECTION INTRAVENOUS PRN
Status: DISCONTINUED | OUTPATIENT
Start: 2022-08-07 | End: 2022-08-10 | Stop reason: HOSPADM

## 2022-08-07 RX ORDER — AMLODIPINE BESYLATE 5 MG/1
5 TABLET ORAL DAILY
Status: DISCONTINUED | OUTPATIENT
Start: 2022-08-08 | End: 2022-08-07

## 2022-08-07 RX ORDER — METOPROLOL SUCCINATE 50 MG/1
50 TABLET, EXTENDED RELEASE ORAL 2 TIMES DAILY
Status: DISCONTINUED | OUTPATIENT
Start: 2022-08-07 | End: 2022-08-10 | Stop reason: HOSPADM

## 2022-08-07 RX ORDER — DILTIAZEM HYDROCHLORIDE 5 MG/ML
10 INJECTION INTRAVENOUS ONCE
Status: COMPLETED | OUTPATIENT
Start: 2022-08-07 | End: 2022-08-07

## 2022-08-07 RX ORDER — FUROSEMIDE 40 MG/1
40 TABLET ORAL DAILY
Status: DISCONTINUED | OUTPATIENT
Start: 2022-08-08 | End: 2022-08-10

## 2022-08-07 RX ORDER — ONDANSETRON 2 MG/ML
4 INJECTION INTRAMUSCULAR; INTRAVENOUS EVERY 6 HOURS PRN
Status: DISCONTINUED | OUTPATIENT
Start: 2022-08-07 | End: 2022-08-10 | Stop reason: HOSPADM

## 2022-08-07 RX ORDER — ASPIRIN 81 MG/1
81 TABLET ORAL DAILY
Status: DISCONTINUED | OUTPATIENT
Start: 2022-08-07 | End: 2022-08-10 | Stop reason: HOSPADM

## 2022-08-07 RX ORDER — ACETAMINOPHEN 325 MG/1
650 TABLET ORAL EVERY 6 HOURS PRN
Status: DISCONTINUED | OUTPATIENT
Start: 2022-08-07 | End: 2022-08-10 | Stop reason: HOSPADM

## 2022-08-07 RX ORDER — DILTIAZEM HYDROCHLORIDE 240 MG/1
240 CAPSULE, COATED, EXTENDED RELEASE ORAL DAILY
Status: DISCONTINUED | OUTPATIENT
Start: 2022-08-08 | End: 2022-08-09

## 2022-08-07 RX ORDER — PROMETHAZINE HYDROCHLORIDE 25 MG/1
12.5 TABLET ORAL EVERY 6 HOURS PRN
Status: DISCONTINUED | OUTPATIENT
Start: 2022-08-07 | End: 2022-08-10 | Stop reason: HOSPADM

## 2022-08-07 RX ORDER — LOSARTAN POTASSIUM AND HYDROCHLOROTHIAZIDE 25; 100 MG/1; MG/1
1 TABLET ORAL DAILY
Status: DISCONTINUED | OUTPATIENT
Start: 2022-08-07 | End: 2022-08-10 | Stop reason: HOSPADM

## 2022-08-07 RX ORDER — SODIUM CHLORIDE 0.9 % (FLUSH) 0.9 %
10 SYRINGE (ML) INJECTION EVERY 12 HOURS SCHEDULED
Status: DISCONTINUED | OUTPATIENT
Start: 2022-08-07 | End: 2022-08-10 | Stop reason: HOSPADM

## 2022-08-07 RX ORDER — ALBUTEROL SULFATE 2.5 MG/3ML
2.5 SOLUTION RESPIRATORY (INHALATION) EVERY 4 HOURS PRN
Status: DISCONTINUED | OUTPATIENT
Start: 2022-08-07 | End: 2022-08-10 | Stop reason: HOSPADM

## 2022-08-07 RX ADMIN — DILTIAZEM HYDROCHLORIDE 10 MG: 5 INJECTION, SOLUTION INTRAVENOUS at 14:06

## 2022-08-07 RX ADMIN — DILTIAZEM HYDROCHLORIDE 2.5 MG/HR: 5 INJECTION INTRAVENOUS at 14:10

## 2022-08-07 RX ADMIN — SODIUM CHLORIDE: 9 INJECTION, SOLUTION INTRAVENOUS at 16:46

## 2022-08-07 RX ADMIN — ASPIRIN 81 MG: 81 TABLET, COATED ORAL at 22:04

## 2022-08-07 RX ADMIN — METOPROLOL SUCCINATE 50 MG: 50 TABLET, EXTENDED RELEASE ORAL at 22:05

## 2022-08-07 RX ADMIN — Medication 2 PUFF: at 20:17

## 2022-08-07 RX ADMIN — INSULIN GLARGINE 12 UNITS: 100 INJECTION, SOLUTION SUBCUTANEOUS at 22:05

## 2022-08-07 RX ADMIN — APIXABAN 2.5 MG: 2.5 TABLET, FILM COATED ORAL at 22:05

## 2022-08-07 RX ADMIN — SODIUM CHLORIDE, PRESERVATIVE FREE 10 ML: 5 INJECTION INTRAVENOUS at 21:15

## 2022-08-07 RX ADMIN — MAGNESIUM SULFATE HEPTAHYDRATE 1000 MG: 10 INJECTION, SOLUTION INTRAVENOUS at 16:53

## 2022-08-07 ASSESSMENT — PAIN SCALES - GENERAL
PAINLEVEL_OUTOF10: 0

## 2022-08-07 ASSESSMENT — PAIN - FUNCTIONAL ASSESSMENT: PAIN_FUNCTIONAL_ASSESSMENT: NONE - DENIES PAIN

## 2022-08-07 NOTE — PROGRESS NOTES
Physical/Occupational Therapy      Physical/occupational therapy attempted to evaluate this patient. However the patient's RN Mainor Griggs said that the patient still has tachycardia and he is a medical hold for now. PT/OT will re-attempt to evaluate this patient when he is medically stable. Thank you.      Simon De La Rosa PT  Cam Corrales OT

## 2022-08-07 NOTE — CONSULTS
Nephrology Consult Note  [facility] [address] [facility]. [facility]    I have been notified of the new consult request for us to see Mr. Amanda Perez  Formal consult & note to follow in AM.  Pt admitted for a fib rvr and noted to have SONIDO w cr of 1.6, urinary retention and needing straight cath and high uric acid level of 12.3  -will hold off on lasix. Stop amlodipine as pt on dilt gtt. Check UA, urine lytes and cr along w renal USG and uric acid. Agree with IVF.      Selena Blackwell MD     For questions or concerns please contact us at   Telephone: [phone]

## 2022-08-07 NOTE — CONSULTS
Consult placed    Who:CARDIOLOGY, [facility]  Date:8/7/2022,  Time:3:58 PM        Electronically signed by Elui Sierra on 8/7/2022 at 3:58 PM

## 2022-08-07 NOTE — ED PROVIDER NOTES
CHIEF COMPLAINT  Chest Pain      HISTORY OF PRESENT ILLNESS  David Augustine is a 81 y.o. male with a history of COPD, GERD, hyperlipidemia, hypertension, A. fib on anticoagulation, rate control medicine who presents emergency department for evaluation of chest pain, palpitations. He states that this started several hours ago. He initially states \"I think I had a heart attack. \"  Denies any active chest pain upon my initial evaluation. Describes more as a fluttering sensation in his chest.  He denies any increased swelling in his legs. No fevers, cough. He has been compliant with his home medicines. No other complaints, modifying factors or associated symptoms. I have reviewed the following from the nursing documentation. 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     VASECTOMY       Family History   Problem Relation Age of Onset    Cancer Brother         colon CA    Heart Disease Mother      Social History     Socioeconomic History    Marital status:      Spouse name: Not on file    Number of children: Not on file Years of education: Not on file    Highest education level: Not on file   Occupational History    Not on file   Tobacco Use    Smoking status: Former     Packs/day: 1.00     Years: 30.00     Pack years: 30.00     Types: Cigarettes     Start date: 1971     Quit date: 2003     Years since quittin.6    Smokeless tobacco: Never   Vaping Use    Vaping Use: Never used   Substance and Sexual Activity    Alcohol use: Yes     Alcohol/week: 0.0 standard drinks     Comment: 1/ week    Drug use: No    Sexual activity: Not on file   Other Topics Concern    Not on file   Social History Narrative    Not on file     Social Determinants of Health     Financial Resource Strain: Low Risk     Difficulty of Paying Living Expenses: Not hard at all   Food Insecurity: No Food Insecurity    Worried About Running Out of Food in the Last Year: Never true    Ran Out of Food in the Last Year: Never true   Transportation Needs: Not on file   Physical Activity: Not on file   Stress: Not on file   Social Connections: Not on file   Intimate Partner Violence: Not on file   Housing Stability: Not on file     Current Facility-Administered Medications   Medication Dose Route Frequency Provider Last Rate Last Admin    dilTIAZem 125 mg in dextrose 5 % 125 mL infusion  2.5-15 mg/hr IntraVENous Continuous Carmina Hinojosa MD 5 mL/hr at 22 1446 5 mg/hr at 22 1446    perflutren lipid microspheres (DEFINITY) injection 1.65 mg  1.5 mL IntraVENous ONCE PRN Tracy Verdugo MD        albuterol (PROVENTIL) nebulizer solution 2.5 mg  2.5 mg Nebulization Q4H PRN Tracy Verdugo MD         No Known Allergies    REVIEW OF SYSTEMS  Positive and pertinent negatives as per HPI. All other systems were reviewed and are negative. PHYSICAL EXAM  /69   Pulse (!) 103   Temp 97.9 °F (36.6 °C) (Oral)   Resp 20   Ht 5' 8\" (1.727 m)   Wt 230 lb (104.3 kg)   SpO2 96%   BMI 34.97 kg/m²   GENERAL APPEARANCE: Awake and alert. Cooperative. HEAD: Normocephalic. Atraumatic. HEART: Tachycardic, irregular no harsh murmurs. Intact radial pulses 2+ bilaterally. LUNGS: Respirations unlabored without accessory muscle use. Speaking comfortably in full sentences. ABDOMEN: Soft. Non-distended. Non-tender. No guarding or rebound. EXTREMITIES: 2+ pitting edema to lower extremities bilaterally no acute deformities. SKIN: Warm and dry. No acute rashes. NEUROLOGICAL: Alert and oriented X 3. No focal deficits    LABS  I have reviewed all labs for this visit.    Results for orders placed or performed during the hospital encounter of 08/07/22   CBC   Result Value Ref Range    WBC 11.9 (H) 4.0 - 11.0 K/uL    RBC 4.48 4.20 - 5.90 M/uL    Hemoglobin 12.7 (L) 13.5 - 17.5 g/dL    Hematocrit 39.5 (L) 40.5 - 52.5 %    MCV 88.3 80.0 - 100.0 fL    MCH 28.4 26.0 - 34.0 pg    MCHC 32.2 31.0 - 36.0 g/dL    RDW 15.0 12.4 - 15.4 %    Platelets 693 (H) 537 - 450 K/uL    MPV 7.5 5.0 - 10.5 fL   Comprehensive Metabolic Panel   Result Value Ref Range    Sodium 136 136 - 145 mmol/L    Potassium 4.2 3.5 - 5.1 mmol/L    Chloride 98 (L) 99 - 110 mmol/L    CO2 24 21 - 32 mmol/L    Anion Gap 14 3 - 16    Glucose 136 (H) 70 - 99 mg/dL    BUN 15 7 - 20 mg/dL    Creatinine 1.6 (H) 0.8 - 1.3 mg/dL    GFR Non-African American 42 (A) >60    GFR  50 (A) >60    Calcium 9.5 8.3 - 10.6 mg/dL    Total Protein 7.8 6.4 - 8.2 g/dL    Albumin 4.4 3.4 - 5.0 g/dL    Albumin/Globulin Ratio 1.3 1.1 - 2.2    Total Bilirubin 0.5 0.0 - 1.0 mg/dL    Alkaline Phosphatase 103 40 - 129 U/L    ALT 13 10 - 40 U/L    AST 15 15 - 37 U/L   Troponin   Result Value Ref Range    Troponin 0.02 (H) <0.01 ng/mL   Magnesium   Result Value Ref Range    Magnesium 1.80 1.80 - 2.40 mg/dL   Brain Natriuretic Peptide   Result Value Ref Range    Pro- 0 - 449 pg/mL   D-Dimer, Quantitative   Result Value Ref Range    D-Dimer, Quant 0.35 0.00 - 0.60 ug/mL FEU   EKG 12 Lead   Result Value Ref Range Ventricular Rate 133 BPM    Atrial Rate 441 BPM    QRS Duration 98 ms    Q-T Interval 302 ms    QTc Calculation (Bazett) 449 ms    R Axis 21 degrees    T Axis 44 degrees    Diagnosis       Atrial fibrillation with rapid ventricular response with premature ventricular or aberrantly conducted complexesAbnormal ECGWhen compared with ECG of 30-SEP-2021 09:30,No significant change was found       EKG  The Ekg interpreted by myself in the emergency department in the absence of a cardiologist.  atrial fibrillation with a rate of 133  Axis is   Normal  QTc is  within an acceptable range  Intervals and Durations are unremarkable. No specific ST-T wave changes appreciated. No evidence of acute ischemia. No significant change from prior EKG dated 12/6/2021      RADIOLOGY  X-RAYS:  I have reviewed radiologic plain film image(s). ALL OTHER NON-PLAIN FILM IMAGES SUCH AS CT, ULTRASOUND AND MRI HAVE BEEN READ BY THE RADIOLOGIST. XR CHEST PORTABLE   Final Result   Postoperative or posttraumatic change of the right rib cage again seen with   right basilar pleuroparenchymal opacity which is not markedly changed. No results found. Critical Care: Total critical care time is 36 minutes, which excludes separately billable procedures and updating family. Time spent is specifically for management of the presenting complaint and symptoms initially, direct bedside care, reevaluation, review of records, and consultation. There was a high probability of clinically significant life-threatening deterioration in the patient's condition, which required my urgent intervention.      During the patient's ED course, the patient was given:  Medications   dilTIAZem injection 10 mg (10 mg IntraVENous Given 8/7/22 1406)     Followed by   dilTIAZem 125 mg in dextrose 5 % 125 mL infusion (5 mg/hr IntraVENous Rate/Dose Change 8/7/22 1446)   perflutren lipid microspheres (DEFINITY) injection 1.65 mg (has no administration in time range)   albuterol (PROVENTIL) nebulizer solution 2.5 mg (has no administration in time range)        ED COURSE/MDM  Patient seen and evaluated. Old records reviewed. Labs and imaging reviewed and results discussed with patient. 28-year-old male presenting for evaluation of A. fib with RVR. Patient arrives with heart rate variable between 130s to 150s. He has fluttering in his chest.  Otherwise hemodynamically stable. SPO2 in the high 90s. He is afebrile. Baseline lower extremity swelling. We will obtain laboratory evaluation, start the patient on diltiazem infusion because of his rate. Patient is agreeable with plan for admission. Dimer was negative so I do not suspect PE as a source for worsening A. fib burden. He notes that his swelling is not different than baseline although volume overload could be a reason for his worsening A. fib. He has no fevers to suggest infectious source. Initial troponin 0.02 likely secondary to tachyarrhythmia. Signed out to hospitalist in stable condition. Is this patient to be included in the SEP-1 Core Measure due to severe sepsis or septic shock? No   Exclusion criteria - the patient is NOT to be included for SEP-1 Core Measure due to: Alternative explanation for abnormal labs/vitals that do not relate to sepsis, see MDM for further explanation    Patient was given scripts for the following medications. I counseled patient how to take these medications. Current Discharge Medication List          CLINICAL IMPRESSION  1. Atrial fibrillation with RVR (HCC)    2. Elevated troponin        Blood pressure 115/69, pulse (!) 103, temperature 97.9 °F (36.6 °C), temperature source Oral, resp. rate 20, height 5' 8\" (1.727 m), weight 230 lb (104.3 kg), SpO2 96 %. DISPOSITION  Janie Roca was admitted in stable condition.      This chart was generated in part by using [employer] system and may contain errors related to that system including errors in grammar, punctuation, and spelling, as well as words and phrases that may be inappropriate. If there are any questions or concerns please feel free to contact the dictating provider for clarification.         Nahun Kitchen MD  08/07/22 1041

## 2022-08-07 NOTE — ED TRIAGE NOTES
Patient become short of breath while walking in his driveway. His heart began fluttering and he took two nitro since onset. States he hasnt had chest pain just chest fluttering.

## 2022-08-07 NOTE — CONSULTS
Consult placed    Checo Paz,  Time:4:33 PM        Electronically signed by Miah Dolan on 8/7/2022 at 4:33 PM

## 2022-08-07 NOTE — PROGRESS NOTES
Uses CPAP at home but family unable to bring tonight. Order for CPAP and evaluation by respiratory therapy.      Polina Pyle MD  [facility]

## 2022-08-07 NOTE — PROGRESS NOTES
Called cardiology d/t pt. At goal with diltazem drip with hr below 120 running at 2.5mg/hr. Current hr 99. Per Cardiologist Romy Tam, leave pt. On diltazem drip until cardiology sees pt. Tomorrow. Will continue to monitor.

## 2022-08-07 NOTE — H&P
HOSPITALISTS HISTORY AND PHYSICAL    8/7/2022 2:49 PM    Patient Information:  Pam Fish is a 81 y.o. male [id number]  PCP:  Ranjana Trujillo MD (Tel: [phone] )    Chief complaint:    Chief Complaint   Patient presents with    Chest Pain        History of Present Illness:  Margarita Caro is a 81 y.o. male who presented to the ED to be evaluated for heart palpitations and exertional dyspnea ongoing for 1 day PTA. He reports that his symptoms began when he was walking in his driveway earlier today, and he instantly took 2 SL NTG prior to ED transport. Patient specifically denies oliguria, dysuria, or frequency. The patient has an underlying history of A. fib on chronic Eliquis anticoagulation, CAD, diastolic dysfunction, HTN, and type II DM. Patient states that he is compliant with all of his medications. He is a non-smoker and does not drink alcohol regularly. Upon arrival to the ED EKG was obtained revealing A. fib with RVR. CXR with chronic postoperative rib cage/pleural opacification s/p treatment of remote empyema; no acute disease. Notable labs include: SONIDO with BUNs/CR 15/1.6 GFR 42, troponin mild elevation 0.02, , D-dimer not elevated, and mild anemia with H/H 12.7/39.5. Patient received Cardizem bolus and initiation of drip per ED attending prior to request for admission. History obtained from patient and review of [facility] chart    Old medical records show patient's most recent echo was obtained on 9/30/2021 with the following results:   Summary   Patient tachy during study. Technically difficult study due to body surface area and poor acoustic   window. Normal left ventricular systolic function with ejection fraction of 55-60%. No regional wall motion abnormalites are seen. Normal left ventricular size with moderate concentric left ventricular   hypertrophy.    Left ventricular diastolic filling pressure is elevated. Compared to previous study from 6-1-2018 no changes noted in left   ventricular function. REVIEW OF SYSTEMS:   Constitutional: Negative for fever,chills; positive generalized weakness  ENT: Negative for headache, rhinorrhea, and sore throat. Respiratory: Positive acute exertional dyspnea; underlying COPD on Trelegy  Cardiovascular: Negative for chest pain; positive palpitations without peripheral edema; history of RIZWANA  Gastrointestinal: Negative for N/V/D and abdominal pain; no hematemesis, hematochezia, or melena; no anorexia  Genitourinary: Negative for dysuria, frequency, retention; no incontinence  Hematologic/Lymphatic: Negative for bleeding tendency/excessive bruising  Musculoskeletal: Negative for myalgias and arthalgias; able to ambulate without difficulty  Neurologic: Negative for LOC, seizure activity, paresthesias, dysarthria, vertigo, and gait disturbance  Skin: Negative for itching,rash, decubitus  Psychiatric: Negative for depression,anxiety, and agitation; no hallucinations; denies SI/HI  Endocrine: Type II DM managed with oral hypoglycemic agent metformin    Past Medical History:   has a past medical history of COPD (chronic obstructive pulmonary disease) ([facility] Decreased hearing of both ears, Diabetes mellitus ([facility] [address] GERD (gastroesophageal reflux disease), Histoplasmosis, Hyperlipidemia, Hypertension, and Primary malignant neoplasm of skin of trunk. Past Surgical History:   has a past surgical history that includes Lung removal, partial (2007); Vasectomy; TURP (2002); Upper gastrointestinal endoscopy (1/26/2015); Upper gastrointestinal endoscopy (2/15); Inguinal hernia repair (Bilateral, 6/22/15); Colonoscopy (5/22/12); Colonoscopy (7/21/15); Upper gastrointestinal endoscopy (07/21/2017); Upper gastrointestinal endoscopy (08/23/2017); and Cystoscopy (N/A, 10/24/2019).      Medications:  No current facility-administered medications on file prior to encounter. Current Outpatient Medications on File Prior to Encounter   Medication Sig Dispense Refill    amLODIPine (NORVASC) 5 MG tablet TAKE ONE TABLET BY MOUTH DAILY 30 tablet 2    fluticasone-umeclidin-vilant (TRELEGY ELLIPTA) 100-62.5-25 MCG/INH AEPB INHALE ONE PUFF BY MOUTH DAILY 1 each 5    metFORMIN (GLUCOPHAGE-XR) 500 mg extended release tablet TAKE TWO TABLETS BY MOUTH TWICE A DAY WITH MEALS 360 tablet 1    pantoprazole (PROTONIX) 40 MG tablet TAKE ONE TABLET BY MOUTH DAILY 90 tablet 1    furosemide (LASIX) 40 MG tablet Take 1 tablet by mouth daily 30 tablet 5    losartan-hydroCHLOROthiazide (HYZAAR) 100-25 MG per tablet TAKE ONE TABLET BY MOUTH DAILY 90 tablet 1    ELIQUIS 5 MG TABS tablet TAKE ONE TABLET BY MOUTH TWICE A DAY 60 tablet 5    dilTIAZem (CARDIZEM CD) 240 MG extended release capsule Take 1 capsule by mouth daily 90 capsule 3    metoprolol succinate (TOPROL XL) 50 MG extended release tablet Take 1 tablet by mouth 2 times daily 180 tablet 3    atorvastatin (LIPITOR) 40 MG tablet TAKE ONE TABLET BY MOUTH DAILY 90 tablet 3    PROAIR  (90 Base) MCG/ACT inhaler Inhale 2 puffs into the lungs every 6 hours as needed for Wheezing 1 each 5    ketoconazole (NIZORAL) 2 % shampoo Wash scalp three times a week 240 mL 5    nitroGLYCERIN (NITROSTAT) 0.4 MG SL tablet Place 1 tablet under the tongue every 5 minutes as needed for Chest pain 25 tablet 3    ketoconazole (NIZORAL) 2 % cream APPLY TO AFFECTED AREAS OF FACE AROUND NOSE TWO TIMES A DAY 30 g 1    triamcinolone (KENALOG) 0.1 % ointment APPLY TO AFFECTED AREA(S) SPARINGLY TWO TIMES A DAY UNTIL CLEAR. DO NOT USE FOR MORE THAN TWO WEEKS STRAIGHT 80 g 0    Handicap Placard MISC by Does not apply route 1 each 0    therapeutic multivitamin-minerals (THERAGRAN-M) tablet Take 1 tablet by mouth daily. aspirin 81 MG EC tablet Take 81 mg by mouth daily.          Allergies:  No Known Allergies     Social History:   reports that he quit smoking about 19 years ago. He started smoking about 51 years ago. He has a 30.00 pack-year smoking history. He has never used smokeless tobacco. He reports current alcohol use. He reports that he does not use drugs. Family History:  family history includes Cancer in his brother; Heart Disease in his mother.      Physical Exam:  /68   Pulse (!) 107   Temp 98.3 °F (36.8 °C) (Oral)   Resp 22   Ht 5' 8\" (1.727 m)   Wt 230 lb (104.3 kg)   SpO2 94%   BMI 34.97 kg/m²     General appearance: Pleasant elderly male resting comfortably in bed, NAD  Eyes: Sclera clear without conjunctival injection; PERRLA; EOMI  ENT: Mucous membranes moist without thrush; normal dentition  Neck: Supple without meningismus; no goiter; no carotid bruit bilaterally  Cardiovascular: Irregularly irregular tachyarrhythmia; normal S1-S2 with no murmurs; no peripheral edema; no JVD  Respiratory: No tachypnea; CTAB with diminished air exchange, throughout;no wheeze, rhonchi or rales; I:E intact  Gastrointestinal: Abdomen soft, non-tender, not distended; bowel sounds normal; no masses/organomegaly appreciated  Musculoskeletal: FROM spine and extremities x4; no gross deformity  Neurology: A&O x3; cranial nerves 2-12 grossly intact; motor 5/5  BUE/BLE; no seizure activity  Psychiatry: Well-groomed with good eye contact; appropriate affect; no visual/auditory hallucination  Skin: Warm, dry, normal turgor, no rash  PV: 2/4 radial and dorsalis pedis bilaterally; brisk capillary refill    Labs:  CBC:   Lab Results   Component Value Date/Time    WBC 11.9 08/07/2022 01:25 PM    RBC 4.48 08/07/2022 01:25 PM    HGB 12.7 08/07/2022 01:25 PM    HCT 39.5 08/07/2022 01:25 PM    MCV 88.3 08/07/2022 01:25 PM    MCH 28.4 08/07/2022 01:25 PM    MCHC 32.2 08/07/2022 01:25 PM    RDW 15.0 08/07/2022 01:25 PM     08/07/2022 01:25 PM    MPV 7.5 08/07/2022 01:25 PM     BMP:    Lab Results   Component Value Date/Time     08/07/2022 01:25 PM    K 4.2 08/07/2022 01:25 PM    K 3.8 10/01/2021 06:23 AM    CL 98 08/07/2022 01:25 PM    CO2 24 08/07/2022 01:25 PM    BUN 15 08/07/2022 01:25 PM    CREATININE 1.6 08/07/2022 01:25 PM    CALCIUM 9.5 08/07/2022 01:25 PM    GFRAA 50 08/07/2022 01:25 PM    GFRAA >60 03/12/2013 12:33 AM    LABGLOM 42 08/07/2022 01:25 PM    GLUCOSE 136 08/07/2022 01:25 PM     XR CHEST PORTABLE   Final Result   Postoperative or posttraumatic change of the right rib cage again seen with   right basilar pleuroparenchymal opacity which is not markedly changed. EKG: Ventricular Rate 133 P BPM QTc Calculation (Bazett) 449 P ms   Atrial Rate 441 P BPM R Axis 21 P degrees   QRS Duration 98 P ms T Axis 44 P degrees   Q-T Interval 302 P ms Diagnosis Atrial fibrillation with rapid ventricular response with premature ventricular or aberrantly cond. .. P        I visualized CXR images and EKG strips personally and agree with documented interpretation    Discussed case  with ED provider    Problem List:  Principal Problem:    Atrial fibrillation with RVR ([facility] [address]  Active Problems:    SONIDO (acute kidney injury) ([facility] [address]    Controlled type 2 diabetes mellitus without complication, without long-term current use of insulin (HCC)    Diastolic dysfunction    Mild CAD    RIZWANA (obstructive sleep apnea)    COPD (chronic obstructive pulmonary disease) ([facility] [address]  Resolved Problems:    * No resolved hospital problems.  *        Consults:  IP CONSULT TO CARDIOLOGY  IP CONSULT TO NEPHROLOGY      Assessment/Plan:     A. fib with RVR  -Admit to medical floor for continuous telemetry and pulse oximetry monitoring  -Patient initiated on IV Cardizem bolus and drip, the latter of which will be continued overnight  -Electrolytes, thyroid studies to assess for secondary causes of this tachyarrhythmia  -Patient already on long-term anticoagulation with Eliquis due to elevated CHADS2 score; dosage decreased to 2.5 mg twice daily due to SONIDO  -Echo scheduled for the a.m. to further assess the dictating provider for clarification.          Vic Cowart MD    8/7/2022 2:49 PM

## 2022-08-08 ENCOUNTER — APPOINTMENT (OUTPATIENT)
Dept: ULTRASOUND IMAGING | Age: 82
DRG: 309 | End: 2022-08-08
Payer: MEDICARE

## 2022-08-08 LAB
A/G RATIO: 1.4 (ref 1.1–2.2)
ALBUMIN SERPL-MCNC: 3.7 G/DL (ref 3.4–5)
ALP BLD-CCNC: 86 U/L (ref 40–129)
ALT SERPL-CCNC: 11 U/L (ref 10–40)
ANION GAP SERPL CALCULATED.3IONS-SCNC: 11 MMOL/L (ref 3–16)
AST SERPL-CCNC: 12 U/L (ref 15–37)
BASOPHILS ABSOLUTE: 0.1 K/UL (ref 0–0.2)
BASOPHILS RELATIVE PERCENT: 0.8 %
BILIRUB SERPL-MCNC: 0.4 MG/DL (ref 0–1)
BUN BLDV-MCNC: 16 MG/DL (ref 7–20)
CALCIUM SERPL-MCNC: 9.7 MG/DL (ref 8.3–10.6)
CHLORIDE BLD-SCNC: 100 MMOL/L (ref 99–110)
CO2: 28 MMOL/L (ref 21–32)
CREAT SERPL-MCNC: 1.6 MG/DL (ref 0.8–1.3)
EOSINOPHILS ABSOLUTE: 0.4 K/UL (ref 0–0.6)
EOSINOPHILS RELATIVE PERCENT: 4.4 %
ESTIMATED AVERAGE GLUCOSE: 174.3 MG/DL
GFR AFRICAN AMERICAN: 50
GFR NON-AFRICAN AMERICAN: 42
GLUCOSE BLD-MCNC: 113 MG/DL (ref 70–99)
GLUCOSE BLD-MCNC: 126 MG/DL (ref 70–99)
GLUCOSE BLD-MCNC: 126 MG/DL (ref 70–99)
GLUCOSE BLD-MCNC: 133 MG/DL (ref 70–99)
GLUCOSE BLD-MCNC: 140 MG/DL (ref 70–99)
GLUCOSE BLD-MCNC: 151 MG/DL (ref 70–99)
GLUCOSE BLD-MCNC: 166 MG/DL (ref 70–99)
HBA1C MFR BLD: 7.7 %
HCT VFR BLD CALC: 35.3 % (ref 40.5–52.5)
HEMOGLOBIN: 11.7 G/DL (ref 13.5–17.5)
LYMPHOCYTES ABSOLUTE: 2.6 K/UL (ref 1–5.1)
LYMPHOCYTES RELATIVE PERCENT: 28.6 %
MCH RBC QN AUTO: 28.9 PG (ref 26–34)
MCHC RBC AUTO-ENTMCNC: 33.1 G/DL (ref 31–36)
MCV RBC AUTO: 87.3 FL (ref 80–100)
MONOCYTES ABSOLUTE: 1 K/UL (ref 0–1.3)
MONOCYTES RELATIVE PERCENT: 11.1 %
NEUTROPHILS ABSOLUTE: 5 K/UL (ref 1.7–7.7)
NEUTROPHILS RELATIVE PERCENT: 55.1 %
OSMOLALITY: 297 MOSM/KG (ref 280–301)
PDW BLD-RTO: 14.8 % (ref 12.4–15.4)
PERFORMED ON: ABNORMAL
PLATELET # BLD: 428 K/UL (ref 135–450)
PMV BLD AUTO: 7.2 FL (ref 5–10.5)
POTASSIUM REFLEX MAGNESIUM: 4.4 MMOL/L (ref 3.5–5.1)
RBC # BLD: 4.04 M/UL (ref 4.2–5.9)
SARS-COV-2, NAAT: NOT DETECTED
SODIUM BLD-SCNC: 139 MMOL/L (ref 136–145)
T4 FREE: 1 NG/DL (ref 0.9–1.8)
TOTAL PROTEIN: 6.4 G/DL (ref 6.4–8.2)
TROPONIN: <0.01 NG/ML
TSH SERPL DL<=0.05 MIU/L-ACNC: 1.57 UIU/ML (ref 0.27–4.2)
URIC ACID, SERUM: 11.8 MG/DL (ref 3.5–7.2)
WBC # BLD: 9.1 K/UL (ref 4–11)

## 2022-08-08 PROCEDURE — 84484 ASSAY OF TROPONIN QUANT: CPT

## 2022-08-08 PROCEDURE — 97530 THERAPEUTIC ACTIVITIES: CPT

## 2022-08-08 PROCEDURE — 80053 COMPREHEN METABOLIC PANEL: CPT

## 2022-08-08 PROCEDURE — 6370000000 HC RX 637 (ALT 250 FOR IP): Performed by: HOSPITALIST

## 2022-08-08 PROCEDURE — 76770 US EXAM ABDO BACK WALL COMP: CPT

## 2022-08-08 PROCEDURE — 2580000003 HC RX 258: Performed by: HOSPITALIST

## 2022-08-08 PROCEDURE — 97116 GAIT TRAINING THERAPY: CPT

## 2022-08-08 PROCEDURE — 85025 COMPLETE CBC W/AUTO DIFF WBC: CPT

## 2022-08-08 PROCEDURE — 97165 OT EVAL LOW COMPLEX 30 MIN: CPT

## 2022-08-08 PROCEDURE — 94640 AIRWAY INHALATION TREATMENT: CPT

## 2022-08-08 PROCEDURE — 99222 1ST HOSP IP/OBS MODERATE 55: CPT | Performed by: INTERNAL MEDICINE

## 2022-08-08 PROCEDURE — 87635 SARS-COV-2 COVID-19 AMP PRB: CPT

## 2022-08-08 PROCEDURE — 84550 ASSAY OF BLOOD/URIC ACID: CPT

## 2022-08-08 PROCEDURE — 2580000003 HC RX 258: Performed by: INTERNAL MEDICINE

## 2022-08-08 PROCEDURE — 36415 COLL VENOUS BLD VENIPUNCTURE: CPT

## 2022-08-08 PROCEDURE — 97161 PT EVAL LOW COMPLEX 20 MIN: CPT

## 2022-08-08 PROCEDURE — 1200000000 HC SEMI PRIVATE

## 2022-08-08 RX ORDER — SODIUM CHLORIDE 9 MG/ML
INJECTION, SOLUTION INTRAVENOUS CONTINUOUS
Status: DISCONTINUED | OUTPATIENT
Start: 2022-08-08 | End: 2022-08-10

## 2022-08-08 RX ADMIN — DILTIAZEM HYDROCHLORIDE 240 MG: 240 CAPSULE, COATED, EXTENDED RELEASE ORAL at 08:04

## 2022-08-08 RX ADMIN — Medication 2 PUFF: at 19:49

## 2022-08-08 RX ADMIN — ASPIRIN 81 MG: 81 TABLET, COATED ORAL at 19:59

## 2022-08-08 RX ADMIN — APIXABAN 2.5 MG: 2.5 TABLET, FILM COATED ORAL at 08:04

## 2022-08-08 RX ADMIN — METOPROLOL SUCCINATE 50 MG: 50 TABLET, EXTENDED RELEASE ORAL at 19:59

## 2022-08-08 RX ADMIN — SODIUM CHLORIDE, PRESERVATIVE FREE 10 ML: 5 INJECTION INTRAVENOUS at 08:25

## 2022-08-08 RX ADMIN — Medication 1 TABLET: at 08:04

## 2022-08-08 RX ADMIN — INSULIN GLARGINE 12 UNITS: 100 INJECTION, SOLUTION SUBCUTANEOUS at 21:26

## 2022-08-08 RX ADMIN — SODIUM CHLORIDE: 9 INJECTION, SOLUTION INTRAVENOUS at 20:02

## 2022-08-08 RX ADMIN — APIXABAN 2.5 MG: 2.5 TABLET, FILM COATED ORAL at 19:59

## 2022-08-08 RX ADMIN — ATORVASTATIN CALCIUM 40 MG: 40 TABLET, FILM COATED ORAL at 08:04

## 2022-08-08 RX ADMIN — TIOTROPIUM BROMIDE INHALATION SPRAY 2 PUFF: 3.12 SPRAY, METERED RESPIRATORY (INHALATION) at 07:35

## 2022-08-08 RX ADMIN — Medication 2 PUFF: at 07:35

## 2022-08-08 RX ADMIN — PANTOPRAZOLE SODIUM 40 MG: 40 TABLET, DELAYED RELEASE ORAL at 08:03

## 2022-08-08 RX ADMIN — METOPROLOL SUCCINATE 50 MG: 50 TABLET, EXTENDED RELEASE ORAL at 08:04

## 2022-08-08 ASSESSMENT — PAIN SCALES - GENERAL
PAINLEVEL_OUTOF10: 0

## 2022-08-08 NOTE — CONSULTS
[address] [facility]   Cardiovascular Evaluation    PATIENT: Kenia Davis  DATE: 2022  MRN: [id number]  CSN: [id number]  : 1940    Primary Care Doctor: Marquita Hodges MD    Reason for evaluation/Chief complaint:   Chest Pain      Subjective:    History of present illness on initial date of evaluation:   Kenia Davis is a 81 y.o. patient presented to the hospital for the evaluation of exertional shortness of breath and palpitations. Patient is known to our cardiac practice and has a history of atrial fibrillation that is chronic. He is on anticoagulation therapy with Eliquis. He states that he was walking yesterday in the driveway and experienced shortness of breath while going up slight grade. The symptoms at first were not concerning to him. Patient states that he subsequently went out for the afternoon and returned and had recurrent symptomatology. At this point he presented to the hospital.  He was seen and evaluated in our emergency room and found to have atrial fibrillation with rapid ventricular response. Patient was started on diltiazem drip with improvement in his heart rate and resolution of symptoms. Cardiology was asked to see the patient for further evaluation and management. Patient currently denies any chest pain shortness of breath or heart palpitations.       Patient Active Problem List   Diagnosis    GERD (gastroesophageal reflux disease)    Controlled type 2 diabetes mellitus without complication, without long-term current use of insulin (HCC)    Intractable migraine with aura    Chronic tension headache    Essential hypertension    Brain benign neoplasm ([address]    Empyema of pleura (HCC)    Mixed hyperlipidemia    Primary malignant neoplasm of skin of trunk    Dermatophytosis of nail    Basal cell carcinoma of left side of nose    Non morbid obesity due to excess calories    Shortness of breath    Suspected sleep apnea    Diastolic dysfunction    History of Provider   amLODIPine (NORVASC) 5 MG tablet TAKE ONE TABLET BY MOUTH DAILY 7/27/22   Sadia Moses APRN - [place]   fluticasone-umeclidin-vilant (Darlis Rolling) 100-62.5-25 MCG/INH AEPB INHALE ONE PUFF BY MOUTH DAILY 6/7/22   Mil Santoro MD   metFORMIN (GLUCOPHAGE-XR) 500 mg extended release tablet TAKE TWO TABLETS BY MOUTH TWICE A DAY WITH MEALS 5/31/22   Lexie Garland MD   pantoprazole (PROTONIX) 40 MG tablet TAKE ONE TABLET BY MOUTH DAILY 5/2/22   Lexie Garland MD   furosemide (LASIX) 40 MG tablet Take 1 tablet by mouth daily 3/31/22   Debra Baltazar MD   losartan-hydroCHLOROthiazide [facility]) 100-25 MG per tablet TAKE ONE TABLET BY MOUTH DAILY 3/19/22   Lexie Garland MD   ELIQUIS 5 MG TABS tablet TAKE ONE TABLET BY MOUTH TWICE A DAY 3/15/22   Debra Baltazar MD   dilTIAZem (CARDIZEM CD) 240 MG extended release capsule Take 1 capsule by mouth daily 2/24/22   Lexie Garland MD   metoprolol succinate (TOPROL XL) 50 MG extended release tablet Take 1 tablet by mouth 2 times daily 12/6/21   Debra Baltazar MD   atorvastatin (LIPITOR) 40 MG tablet TAKE ONE TABLET BY MOUTH DAILY 12/6/21   Debra Baltazar MD   nitroGLYCERIN (NITROSTAT) 0.4 MG SL tablet Place 1 tablet under the tongue every 5 minutes as needed for Chest pain 8/16/21   Debra Baltazar MD   Handicap Placard MISC by Does not apply route 4/12/19   Candido Goltz, PA   therapeutic multivitamin-minerals [facility]) tablet Take 1 tablet by mouth daily. Historical Provider, MD   aspirin 81 MG EC tablet Take 81 mg by mouth daily. Historical Provider, MD       In-patient schedule medications:        Infusion Medications: Allergies:  Patient has no known allergies. Review of Systems:   All 14 point review of symptoms completed. Pertinent positives identified in the HPI, all other review of symptoms findings as below.    Physical Examination:    /71 (Site: Right Arm, Position: Sitting, Cuff Size: Large Adult)   Pulse 67   Ht 5' 8\" (1.727 m)   Wt 241 lb 3.2 oz (109.4 kg)   SpO2 97%   BMI 36.67 kg/m²   /77   Pulse 65   Temp 97.4 °F (36.3 °C) (Oral)   Resp 18   Ht 5' 8\" (1.727 m)   Wt 230 lb (104.3 kg)   SpO2 97%   BMI 34.97 kg/m²    Weight: 230 lb (104.3 kg)     Wt Readings from Last 3 Encounters:   08/07/22 230 lb (104.3 kg)   06/28/22 237 lb (107.5 kg)   03/31/22 238 lb (108 kg)       Intake/Output Summary (Last 24 hours) at 8/8/2022 1618  Last data filed at 8/8/2022 1338  Gross per 24 hour   Intake 360 ml   Output 2175 ml   Net -1815 ml       General Appearance:  Alert, cooperative, no distress, appears stated age   Head:  Normocephalic, without obvious abnormality, atraumatic   Eyes:  PERRL, conjunctiva/corneas clear       Nose: Nares normal, no drainage or sinus tenderness   Throat: Lips, mucosa, and tongue normal   Neck: Supple, symmetrical, trachea midline, no adenopathy, thyroid: not enlarged, symmetric, no tenderness/mass/nodules, no carotid bruit or JVD       Lungs:   Clear to auscultation bilaterally, respirations unlabored   Chest Wall:  No tenderness or deformity   Heart:  irregular rhythm and normal rate; S1, S2 are normal; no murmur noted; no rub or gallop   Abdomen:   Soft, non-tender, bowel sounds active all four quadrants,  no masses, no organomegaly           Extremities: Extremities normal, atraumatic, no cyanosis or edema   Pulses: 2+ and symmetric   Skin: Skin color, texture, turgor normal, no rashes or lesions   Pysch: Normal mood and affect   Neurologic: Normal gross motor and sensory exam.         Labs  Recent Labs     08/07/22  1325 08/08/22  0631   WBC 11.9* 9.1   HGB 12.7* 11.7*   HCT 39.5* 35.3*   MCV 88.3 87.3   * 428     Recent Labs     08/07/22  1325 08/08/22  0631   CREATININE 1.6* 1.6*   BUN 15 16    139   K 4.2 4.4   CL 98* 100   CO2 24 28     No results for input(s): INR, PROTIME in the last 72 hours.   Recent Labs     08/07/22  1325   TROPONINI 0.02*     Invalid input(s): PRO-BNP  No results for input(s): CHOL, LDL, HDL in the last 72 hours. Invalid input(s): TG      Imaging:  I have reviewed the below testing personally and my interpretation is below. EKG:  Atrial fibrillation with rapid ventricular response with premature ventricular or aberrantly conducted complexesAbnormal ECGWhen compared with ECG of 30-SEP-2021 09:30,No significant change was foundConfirmed by Piotr Lancaster ([id number]) on 8/7/2022 3:48:29 PM    CXR:      Assessment:  81 y.o. patient with:  Problem List Items Addressed This Visit       * (Principal) Atrial fibrillation with RVR ([address] - Primary     Other Visit Diagnoses       Elevated troponin                Plan:  Rate control  On AC  Echocardiogram  Elevated troponin is not a concern for progressive CAD but made need repeat ischemic evaluation. All questions and concerns were addressed to the patient/family. Alternatives to my treatment were discussed. The note was completed using EMR. Every effort was made to ensure accuracy; however, inadvertent computerized transcription errors may be present.     Kerry Maldonado MD, [address], [facility]  [phone] [facility] office  [phone] Main central  8/8/2022  4:18 PM

## 2022-08-08 NOTE — PROGRESS NOTES
Hospitalist Progress Note      PCP: Chelsy Sweeney MD    Date of Admission: 8/7/2022    Chief Complaint: chest pain    Hospital Course: reviewed     Subjective: feels well, remains on dilt ggt, no cp(never had any apparently despite prior provider documentation), pt's wife also at bedside       Medications:  Reviewed    Infusion Medications    dilTIAZem 2.5 mg/hr (08/07/22 1649)    dextrose      sodium chloride       Scheduled Medications    aspirin  81 mg Oral Daily    atorvastatin  40 mg Oral Daily    dilTIAZem  240 mg Oral Daily    apixaban  2.5 mg Oral BID    [Held by provider] furosemide  40 mg Oral Daily    [Held by provider] losartan-hydroCHLOROthiazide  1 tablet Oral Daily    metoprolol succinate  50 mg Oral BID    pantoprazole  40 mg Oral Daily    therapeutic multivitamin-minerals  1 tablet Oral Daily    sodium chloride flush  10 mL IntraVENous 2 times per day    insulin glargine  12 Units SubCUTAneous Nightly    insulin lispro  0-8 Units SubCUTAneous Q4H    mometasone-formoterol  2 puff Inhalation BID    tiotropium  2 puff Inhalation Daily     PRN Meds: glucose, dextrose bolus **OR** dextrose bolus, glucagon (rDNA), dextrose, sodium chloride flush, sodium chloride, potassium chloride **OR** potassium alternative oral replacement **OR** potassium chloride, magnesium sulfate, promethazine **OR** ondansetron, senna, acetaminophen **OR** acetaminophen, perflutren lipid microspheres, albuterol      Intake/Output Summary (Last 24 hours) at 8/8/2022 1119  Last data filed at 8/8/2022 0802  Gross per 24 hour   Intake 240 ml   Output 1325 ml   Net -1085 ml       Physical Exam Performed:    /76   Pulse 67   Temp 97.3 °F (36.3 °C) (Oral)   Resp 18   Ht 5' 8\" (1.727 m)   Wt 230 lb (104.3 kg)   SpO2 95%   BMI 34.97 kg/m²     General appearance: No apparent distress, appears stated age and cooperative. HEENT: Pupils equal, round, and reactive to light. Conjunctivae/corneas clear.   Neck: Supple, with full range of motion. No jugular venous distention. Trachea midline. Respiratory:  Normal respiratory effort. Clear to auscultation, bilaterally without Rales/Wheezes/Rhonchi. Cardiovascular: Irregular rate and rhythm with normal S1/S2 without murmurs, rubs or gallops. Abdomen: Soft, non-tender, non-distended with normal bowel sounds. Musculoskeletal: No clubbing, cyanosis or edema bilaterally. Full range of motion without deformity. Skin: Skin color, texture, turgor normal.  No rashes or lesions. Neurologic:  Neurovascularly intact without any focal sensory/motor deficits. Cranial nerves: II-XII intact, grossly non-focal.  Psychiatric: Alert and oriented, thought content appropriate, normal insight  Capillary Refill: Brisk,3 seconds, normal   Peripheral Pulses: +2 palpable, equal bilaterally       Labs:   Recent Labs     08/07/22  1325 08/08/22  0631   WBC 11.9* 9.1   HGB 12.7* 11.7*   HCT 39.5* 35.3*   * 428     Recent Labs     08/07/22  1325 08/07/22  1620 08/08/22  0631     --  139   K 4.2  --  4.4   CL 98*  --  100   CO2 24  --  28   BUN 15  --  16   CREATININE 1.6*  --  1.6*   CALCIUM 9.5  --  9.7   PHOS  --  2.4*  --      Recent Labs     08/07/22  1325 08/08/22  0631   AST 15 12*   ALT 13 11   BILITOT 0.5 0.4   ALKPHOS 103 86     No results for input(s): INR in the last 72 hours. Recent Labs     08/07/22  1325   TROPONINI 0.02*       Urinalysis:      Lab Results   Component Value Date/Time    NITRU Negative 08/07/2022 06:33 PM    WBCUA see below 10/24/2019 05:00 AM    RBCUA >100 10/24/2019 05:00 AM    BLOODU Negative 08/07/2022 06:33 PM    SPECGRAV <=1.005 08/07/2022 06:33 PM    GLUCOSEU Negative 08/07/2022 06:33 PM       Radiology:  XR CHEST PORTABLE   Final Result   Postoperative or posttraumatic change of the right rib cage again seen with   right basilar pleuroparenchymal opacity which is not markedly changed.          US RENAL COMPLETE    (Results Pending) Assessment/Plan:    Active Hospital Problems    Diagnosis     SONIDO (acute kidney injury) ([address] [N17.9]      Priority: Medium    COPD (chronic obstructive pulmonary disease) ([address] [J44.9]     Atrial fibrillation with RVR (HCC) [I48.91]     RIZWANA (obstructive sleep apnea) [G47.33]     Mild CAD [E59.68]     Diastolic dysfunction [J64.70]     Controlled type 2 diabetes mellitus without complication, without long-term current use of insulin (HCC) [E11.9]        A. fib with RVR  -Admitted to medical floor for continuous telemetry and pulse oximetry monitoring  -Patient initiated on IV Cardizem bolus and drip,  -checked Electrolytes, thyroid studies to assess for secondary causes of this tachyarrhythmia  -Patient already on long-term anticoagulation with Eliquis due to elevated CHADS2 score; dosage decreased to 2.5 mg twice daily due to SONIDO  -Echo scheduled for the a.m. to further assess cardiac structure and function     CAD- pt denied any chest pain during this episode  -jannette were trended  -Continue daily ASA and statin therapy; FLP in a.m.  -ECHO ordered  -Lexiscan nuclear stress testing deferred to CARDS recommendations in the setting of A. fib RVR     SONIDO  -Patient admitted to telemetry floor with strict I's and O's during stay  -Patient aggressively hydrated in ED with NS, with maintenance fluids continued overnight  -Straight cath for urinary retention greater than 300 cc  -Nephrotoxic medications held, including home dosages of ACE-RI, diuretics, and metformin  -Consulted NEPHRO for further recommendations      COPD  -Continuous pulse oximetry monitoring initiated with PRN supplemental O2  -Continue home maintenance Cruz MDI  -Encourage aggressive pulmonary toilet including incentive spirometry every 4H while awake  - Albuterol available Q4H as needed  -Patient quit smoking in 1971     Type II DM  -A1c ordered , 7.7  -Metformin placed on hold due to SONIDO  -Low-dose based basal insulin 10 units initiated QHS  -PRN Humalog medium dose SSI scheduled before meals and at bedtime based on POC glucose  -Carbohydrate restriction placed on diet        DVT prophylaxis-continue Eliquis with dose reduced to 2.5 mg twice daily  Diet: ADULT DIET; Regular; 4 carb choices (60 gm/meal); Low Fat/Low Chol/High Fiber/2 gm Na; Low Potassium (Less than 3000 mg/day);  Low Phosphorus (Less than 1000 mg); 2000 ml  Code Status: Full Code    PT/OT Eval Status: home with prn assist    Dispo - pending workup    Karlene Kelly MD

## 2022-08-08 NOTE — CARE COORDINATION
CASE MANAGEMENT INITIAL ASSESSMENT      Reviewed chart and completed assessment with patient:bedside  Family present: wife/Fartun  Explained Case Management role/services. Primary contact information:Fartun/wife    Health Care Decision Maker :   Primary Decision Maker: Lavern Parent Spouse - [phone]    Secondary Decision Maker: Laura Pappas - Child - [phone]    Supplemental (Other) Decision Maker: Behzad Vasquez - [phone]          Can this person be reached and be able to respond quickly, such as within a few minutes or hours? Yes      Admit date/status:8/7/22  Diagnosis:AFIB RVR   Is this a Readmission?:  No      Insurance:[facility] medicare   Precert required for SNF: Yes       3 night stay required: No    Living arrangements, Adls, care needs, prior to admission:lives in a ranch style home with wife. 2 TIFFANY, IPTA. Durable Medical Equipment at home:  Walker__Cane__RTS_x_ BSC__Shower Chair_x_  02__ HHN__ CPAP__  BiPap__  Hospital Bed__ W/C___ Other_____    Services in the home and/or outpatient, prior to admission:none    Current PCP:Tu Restrepo MD                                Medications: Prescription coverage? Yes Will pt require financial assistance with medications No     Transportation needs: none/private       PT/OT recs:none    Hospital Exemption Notification (HEN):needed for snf    Barriers to discharge:none    Plan/comments:from home with wife, Audrey Valles, denies discharge planning needs.  Coleen Best RN      ECOC on chart for MD signature

## 2022-08-08 NOTE — CONSULTS
[facility]. [facility]  Nephrology Consult Note                Reason for Consult: SONIDO  Requesting Physician:     CHIEF COMPLAINT:  SONIDO        HISTORY OF PRESENT ILLNESS:    Ivone Tidwell is a 81 y.o. male who presented to the ED to be evaluated for heart palpitations and exertional dyspnea ongoing for 1 day PTA. Upon arrival to the ED EKG was obtained revealing A. fib with RVR. Creatinine 1.6mg/ d l ,  hence the nephrology consult  Denies previous CKD;no newurin arysymptom s               Creatinine baseline seems t obe  at 1.1-1.3 mg/dl                               Past Medical History:     COPD (chronic obstructive pulmonary disease) ([facility] [address] Decreased hearing of both ears, Diabetes mellitus ([facility] [address] GERD (gastroesophageal reflux disease), Histoplasmosis, Hyperlipidemia, Hypertension, and Primary malignant neoplasm of skin of trunk. Past Surgical History:    has a past surgical history that includes Lung removal, partial (2007); Vasectomy; TURP (2002); Upper gastrointestinal endoscopy (1/26/2015); Upper gastrointestinal endoscopy (2/15); Inguinal hernia repair (Bilateral, 6/22/15); Colonoscopy (5/22/12); Colonoscopy (7/21/15); Upper gastrointestinal endoscopy (07/21/2017); Upper gastrointestinal endoscopy (08/23/2017); and Cystoscopy (N/A, 10/24/2019).    Current Medications:    Current Outpatient Medications on File Prior to Encounter   Medication Sig Dispense Refill    amLODIPine (NORVASC) 5 MG tablet TAKE ONE TABLET BY MOUTH DAILY 30 tablet 2    fluticasone-umeclidin-vilant (TRELEGY ELLIPTA) 100-62.5-25 MCG/INH AEPB INHALE ONE PUFF BY MOUTH DAILY 1 each 5    metFORMIN (GLUCOPHAGE-XR) 500 mg extended release tablet TAKE TWO TABLETS BY MOUTH TWICE A DAY WITH MEALS 360 tablet 1    pantoprazole (PROTONIX) 40 MG tablet TAKE ONE TABLET BY MOUTH DAILY 90 tablet 1    furosemide (LASIX) 40 MG tablet Take 1 tablet by mouth daily 30 tablet 5    losartan-hydroCHLOROthiazide (HYZAAR) 100-25 MG per tablet TAKE ONE TABLET BY MOUTH DAILY 90 tablet 1    ELIQUIS 5 MG TABS tablet TAKE ONE TABLET BY MOUTH TWICE A DAY 60 tablet 5    dilTIAZem (CARDIZEM CD) 240 MG extended release capsule Take 1 capsule by mouth daily 90 capsule 3    metoprolol succinate (TOPROL XL) 50 MG extended release tablet Take 1 tablet by mouth 2 times daily 180 tablet 3    atorvastatin (LIPITOR) 40 MG tablet TAKE ONE TABLET BY MOUTH DAILY 90 tablet 3    PROAIR  (90 Base) MCG/ACT inhaler Inhale 2 puffs into the lungs every 6 hours as needed for Wheezing 1 each 5    ketoconazole (NIZORAL) 2 % shampoo Wash scalp three times a week 240 mL 5    nitroGLYCERIN (NITROSTAT) 0.4 MG SL tablet Place 1 tablet under the tongue every 5 minutes as needed for Chest pain 25 tablet 3    ketoconazole (NIZORAL) 2 % cream APPLY TO AFFECTED AREAS OF FACE AROUND NOSE TWO TIMES A DAY 30 g 1    triamcinolone (KENALOG) 0.1 % ointment APPLY TO AFFECTED AREA(S) SPARINGLY TWO TIMES A DAY UNTIL CLEAR. DO NOT USE FOR MORE THAN TWO WEEKS STRAIGHT 80 g 0    Handicap Placard MISC by Does not apply route 1 each 0    therapeutic multivitamin-minerals (THERAGRAN-M) tablet Take 1 tablet by mouth daily. aspirin 81 MG EC tablet Take 81 mg by mouth daily. Allergies:  Patient has no known allergies. Social History:    Wifeatbedside    Family History:   N o nisa al disease  REVIEW OF SYSTEMS:    As perHPI,  otherwise n eg ativ e or noncontributory  on review of 10systems  PHYSICAL EXAM:      Vitals:     Wt Readings from Last 3 Encounters:   08/07/22 230 lb (104.3 kg)   06/28/22 237 lb (107.5 kg)   03/31/22 238 lb (108 kg)     Temp Readings from Last 3 Encounters:   08/08/22 97.4 °F (36.3 °C) (Oral)   03/31/22 97.8 °F (36.6 °C)   02/23/22 94.8 °F (34.9 °C) (Temporal)     BP Readings from Last 3 Encounters:   08/08/22 123/77   06/28/22 132/74   03/31/22 130/60     Pulse Readings from Last 3 Encounters:   08/08/22 65   06/28/22 70   03/31/22 63      General appearance: Pleasant elderly male resting comfortably in bed, NAD  Eyes: Sclera clear without conjunctival injection; PERRLA; EOMI  ENT: Mucous membranes moist without thrush; normal dentition  Neck: Supple without meningismus; no goiter; no carotid bruit bilaterally  Cardiovascular: Irregularly irregular tachyarrhythmia; normal S1-S2 with no murmurs; no peripheral edema; no JVD  Respiratory: No tachypnea; CTAB with diminished air exchange, throughout;no wheeze, rhonchi or rales; I:E intact  Gastrointestinal: Abdomen soft, non-tender, not distended; bowel sounds normal; no masses/organomegaly appreciated  Musculoskeletal: FROM spine and extremities x4; no gross deformity  Neurology: A&O x3; cranial nerves 2-12 grossly intact; motor 5/5  BUE/BLE; no seizure activity  Psychiatry: Well-groomed with good eye contact; appropriate affect; no visual/auditory hallucination  Skin: Warm, dry, normal turgor, no rash    DATA:      Lab Results   Component Value Date    WBC 9.1 08/08/2022    HGB 11.7 (L) 08/08/2022    HCT 35.3 (L) 08/08/2022    MCV 87.3 08/08/2022     08/08/2022     Lab Results   Component Value Date/Time     08/08/2022 06:31 AM    K 4.4 08/08/2022 06:31 AM     08/08/2022 06:31 AM    CO2 28 08/08/2022 06:31 AM    BUN 16 08/08/2022 06:31 AM    CREATININE 1.6 08/08/2022 06:31 AM    GLUCOSE 140 08/08/2022 06:31 AM    CALCIUM 9.7 08/08/2022 06:31 AM          IMPRESSION/RECOMMENDATIONS:    #SONIDO VS ckd  -  creatinine 1.6 on admission  -previous baseline seems to be at 1.1-1.3 mg/dl  UA non contributory, no proteinuria  Renal US  with no obstruction  -continue gentle iv fluids for now  -avoid nephrotoxic agents, no NSAID's       #Mild CKD atbaseline  1.1-1.3  -likely some degree of nephrosclerosis  -no proteinuria on UA      #Afib-per cardiology

## 2022-08-08 NOTE — PROGRESS NOTES
Occupational Therapy  Facility/Department: [facility] B3 - MED SURG  Occupational Therapy Initial Assessment and Treatment x1    Co-tx collaboration this date to safely meet goals and will have better occupational performance outcomes with in a co-treatment than 1:1 session. Name: Lauryn Cervantes  : 1940  MRN: [id number]  Date of Service: 2022    Discharge Recommendations:  Home with assist PRN  OT Equipment Recommendations  Equipment Needed: No       Patient Diagnosis(es): The primary encounter diagnosis was Atrial fibrillation with RVR ([facility] [address] A diagnosis of Elevated troponin was also pertinent to this visit. Past Medical History:  has a past medical history of COPD (chronic obstructive pulmonary disease) ([address] Decreased hearing of both ears, Diabetes mellitus ([address] GERD (gastroesophageal reflux disease), Histoplasmosis, Hyperlipidemia, Hypertension, and Primary malignant neoplasm of skin of trunk. Past Surgical History:  has a past surgical history that includes Lung removal, partial (); Vasectomy; TURP (); Upper gastrointestinal endoscopy (2015); Upper gastrointestinal endoscopy (2/15); Inguinal hernia repair (Bilateral, 6/22/15); Colonoscopy (12); Colonoscopy (7/21/15); Upper gastrointestinal endoscopy (2017); Upper gastrointestinal endoscopy (2017); and Cystoscopy (N/A, 10/24/2019). Assessment   Performance deficits / Impairments: Decreased functional mobility ; Decreased endurance  Assessment: Lauryn Cervantes is a(n) 81 y.o. male admitted to [facility]  with A fib. Prior to admission, pt reports living independently with wife at home. Pt required:   Functional Mobility: supervision for supine <>sit, SBA for sit <> stand, SBA with no AD for functional mobility, SBA for toilet transfer  ADL Training: supervision for grooming (oral care in stance)  Pt required minimal verbal cues for safety, including safety with IV lines.    Once medically appropriate, rec Observation/Palpation  Posture: Good  Safety Devices  Type of Devices: Call light within reach;Gait belt;Patient at risk for falls; Left in chair;Nurse notified  Restraints  Restraints Initially in Place: No  Bed Mobility Training  Bed Mobility Training: Yes  Supine to Sit: Supervision (HOB elevated)  Sit to Supine: Other (comment) (ANEL pt in chair at end of session)  Scooting: Supervision (to EOB)  Transfer Training  Transfer Training: Yes  Sit to Stand: Stand-by assistance  Stand to Sit: Stand-by assistance  Bed to Chair: Stand-by assistance  Toilet Transfer: Stand-by assistance (no use of AD or grab bar)     AROM: Generally decreased, functional  PROM: Generally decreased, functional  Strength: Generally decreased, functional  Coordination: Generally decreased, functional  Tone: Normal  Sensation: Impaired (BLE toes to calves)  ADL  Grooming: Supervision  Grooming Skilled Clinical Factors: in stance at sink for oral care for ~3 minutes     Activity Tolerance  Activity Tolerance: Patient tolerated evaluation without incident;Patient tolerated treatment well  Activity Tolerance Comments: 124/76, 94%, 68 bpm        Vision  Vision: Impaired  Vision Exceptions: Wears glasses for reading  Hearing  Hearing: Within functional limits  Hearing Exceptions: Hard of hearing/hearing concerns;Bilateral hearing aid  Cognition  Overall Cognitive Status: WFL  Orientation  Overall Orientation Status: Within Normal Limits  Perception  Overall Perceptual Status: WFL            Dynamic Standing Balance Exercises: functional mobility in room with SBA bed to pinto to toilet to chair  Education Given To: Patient; Family  Education Provided: Role of Therapy;Precautions;Plan of Care;Transfer Training;Equipment  Education Provided Comments: disease specific: use of call light for staff assist for transfer/needs; benefits of OOB mobility  Education Method: Verbal  Barriers to Learning: None  Education Outcome: Verbalized understanding AM-PAC Score        AM-PAC Inpatient Daily Activity Raw Score: 23 (08/08/22 1021)  AM-PAC Inpatient ADL T-Scale Score : 51.12 (08/08/22 1021)  ADL Inpatient CMS 0-100% Score: 15.86 (08/08/22 1021)  ADL Inpatient CMS G-Code Modifier : CI (08/08/22 1021)    Goals  Short Term Goals  Time Frame for Short term goals: within one OT session  Short Term Goal 1: Pt will perform toilet transfer with SBA- GOAL MET 8/08  Short Term Goal 2: Pt will perform grooming with SBA- GOAL MET 8/08  Patient Goals   Patient goals : to go home today       Therapy Time   Individual Concurrent Group Co-treatment   Time In 0904         Time Out 0925         Minutes 21         Timed Code Treatment Minutes: 11 Minutes (10 minute eval)     Please let this note serve as discharge summary. Pt is to be discharged from acute care services at this time, pt is safe to perform ADLs with SBA d/t IV lines and is safe to d/c home from OT perspective. Please re consult OT services should pt's functional status changes during this admission. Please see case management note for discharge disposition. Thank you.     Alexa Stanley, OT

## 2022-08-08 NOTE — PROGRESS NOTES
Physical Therapy  Facility/Department: [place] [id number] - MED SURG  Physical Therapy Initial Assessment and Discharge Summary    Name: Ankur Sanchez  : 1940  MRN: [id number]  Date of Service: 2022    Discharge Recommendations:  Home with assist PRN   PT Equipment Recommendations  Equipment Needed: No      Patient Diagnosis(es): The primary encounter diagnosis was Atrial fibrillation with RVR ([facility] [address] A diagnosis of Elevated troponin was also pertinent to this visit. Past Medical History:  has a past medical history of COPD (chronic obstructive pulmonary disease) ([facility] [address] Decreased hearing of both ears, Diabetes mellitus ([facility] [address] GERD (gastroesophageal reflux disease), Histoplasmosis, Hyperlipidemia, Hypertension, and Primary malignant neoplasm of skin of trunk. Past Surgical History:  has a past surgical history that includes Lung removal, partial (); Vasectomy; TURP (); Upper gastrointestinal endoscopy (2015); Upper gastrointestinal endoscopy (2/15); Inguinal hernia repair (Bilateral, 6/22/15); Colonoscopy (12); Colonoscopy (7/21/15); Upper gastrointestinal endoscopy (2017); Upper gastrointestinal endoscopy (2017); and Cystoscopy (N/A, 10/24/2019). Assessment  Body Structures, Functions, Activity Limitations Requiring Skilled Therapeutic Intervention: Decreased functional mobility ; Decreased balance;Decreased endurance  Assessment: Patient seen for PT evaluation and gait training. Patient cleared by RN for therapy participation this date. Patient agreeable to therapy. Patient admitted for exertional dyspnea with PMH COPD, DM, and CAD. Patient completed mobility with supervision-mod I without AD. Pt states mobility feels at his baseline. D/C acute PT. Recommending DC to home with assist prn.   Treatment Diagnosis: decreased endurance  Therapy Prognosis: Excellent  Decision Making: Low Complexity  Barriers to Learning: none  Requires PT Follow-Up: No  Activity Tolerance  Activity Tolerance: Patient tolerated evaluation without incident;Patient tolerated treatment well  Activity Tolerance Comments: 124/76, 94%, 68 bpm     Plan   Plan  Plan: Discharge with evaluation only  Safety Devices  Type of Devices: Call light within reach, Gait belt, Patient at risk for falls, Left in chair, Nurse notified  Restraints  Restraints Initially in Place: No     Restrictions  Restrictions/Precautions  Restrictions/Precautions: Fall Risk, General Precautions  Position Activity Restriction  Other position/activity restrictions: tele with cont pulse ox; R IV; up with assist     Subjective   General  Chart Reviewed: Yes  Patient assessed for rehabilitation services?: Yes  Response To Previous Treatment: Not applicable  Family / Caregiver Present: Yes  Referring Practitioner: Allyn Meza MD  Referral Date : 08/07/22  Diagnosis: exertional dyspnea  Follows Commands: Within Functional Limits  Subjective  Subjective: Pt in bed, agreeable to therapy         Social/Functional History  Social/Functional History  Lives With: Spouse  Type of Home: House  Home Layout: One level  Home Access: Stairs to enter without rails  Entrance Stairs - Number of Steps: 1+1  Bathroom Shower/Tub: Walk-in shower  Bathroom Toilet: Standard  Bathroom Equipment: Built-in shower seat, Grab bars in shower  Has the patient had two or more falls in the past year or any fall with injury in the past year?: No  ADL Assistance: Independent  Homemaking Assistance: Independent  Homemaking Responsibilities: Yes  Ambulation Assistance: Independent  Transfer Assistance: Independent (cleaning service 2x/month)  Active : Yes  Occupation: Retired  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, principal  Leisure & Hobbies: bowling, golf  Vision/Hearing  Vision  Vision: Impaired  Vision Exceptions: Wears glasses for reading  Hearing  Hearing: Within functional limits  Hearing Exceptions: Hard of hearing/hearing concerns;Bilateral hearing aid Cognition   Orientation  Overall Orientation Status: Within Normal Limits  Cognition  Overall Cognitive Status: WFL     Objective   Heart Rate: 67  Heart Rate Source: Monitor  BP: 124/76  BP Location: Left upper arm  BP Method: Automatic  Patient Position: Semi fowlers  MAP (Calculated): 92  Resp: 18  SpO2: 95 %  O2 Device: None (Room air)     Observation/Palpation  Posture: Good  Gross Assessment  Sensation: Impaired (pt reporting neuropathy BLE)     AROM RLE (degrees)  RLE AROM: WFL  AROM LLE (degrees)  LLE AROM : WFL  Strength RLE  Strength RLE: WFL  Strength LLE  Strength LLE: WFL        Bed Mobility Training  Bed Mobility Training: Yes  Supine to Sit: Supervision (HOB elevated)  Sit to Supine: Other (comment) (ANEL pt in chair at end of session)  Scooting: Supervision (to EOB)  Transfer Training  Transfer Training: Yes  Sit to Stand: Stand-by assistance  Stand to Sit: Stand-by assistance  Bed to Chair: Stand-by assistance  Toilet Transfer: Stand-by assistance (no use of AD or grab bar)  Bed mobility  Supine to Sit: Modified independent (HOB elevated)  Sit to Supine: Unable to assess  Transfers  Sit to Stand: Supervision  Stand to sit: Supervision  Ambulation  Surface: level tile  Device: No Device  Assistance: Supervision  Quality of Gait: Pt ambulates with slow blanka and mild lateral trunk sway without LOB. Pt reports ambulation feels typical for pt. Gait Deviations: Slow Blanka; Increased DK; Decreased step length;Decreased step height  Distance: 150 ft               AM-PAC Score  AM-PAC Inpatient Mobility Raw Score : 19 (08/08/22 1021)  AM-PAC Inpatient T-Scale Score : 45.44 (08/08/22 1021)  Mobility Inpatient CMS 0-100% Score: 41.77 (08/08/22 1021)  Mobility Inpatient CMS G-Code Modifier : CK (08/08/22 1021)        Goals  Short Term Goals  Time Frame for Short term goals: 8/10/22  Short term goal 1: Pt will complete bed mobility mod I; goal met 8/8  Short term goal 2: Pt will complete transfers with supervision; goal met 8/8  Short term goal 3: Pt will ambulate 150 ft with supervision; goal met 8/8  Patient Goals   Patient goals :  \"Go home\"       Education  Patient Education  Education Given To: Patient  Education Provided: Role of Therapy;Transfer Training;Plan of Care;Precautions;Orientation  Education Provided Comments: Pt educated on role of therapy, importance of OOB mobility, vitals response with activity - demos understanding  Education Method: Verbal  Barriers to Learning: None  Education Outcome: Verbalized understanding      Therapy Time   Individual Concurrent Group Co-treatment   Time In 0906         Time Out 0929         Minutes 23         Timed Code Treatment Minutes: 23 Minutes     Stewart Olmos, PT

## 2022-08-09 LAB
ALBUMIN SERPL-MCNC: 3.7 G/DL (ref 3.4–5)
ANION GAP SERPL CALCULATED.3IONS-SCNC: 9 MMOL/L (ref 3–16)
BUN BLDV-MCNC: 16 MG/DL (ref 7–20)
CALCIUM SERPL-MCNC: 8.7 MG/DL (ref 8.3–10.6)
CHLORIDE BLD-SCNC: 104 MMOL/L (ref 99–110)
CO2: 27 MMOL/L (ref 21–32)
CREAT SERPL-MCNC: 1.5 MG/DL (ref 0.8–1.3)
GFR AFRICAN AMERICAN: 54
GFR NON-AFRICAN AMERICAN: 45
GLUCOSE BLD-MCNC: 123 MG/DL (ref 70–99)
GLUCOSE BLD-MCNC: 127 MG/DL (ref 70–99)
GLUCOSE BLD-MCNC: 130 MG/DL (ref 70–99)
GLUCOSE BLD-MCNC: 131 MG/DL (ref 70–99)
GLUCOSE BLD-MCNC: 172 MG/DL (ref 70–99)
LV EF: 58 %
LVEF MODALITY: NORMAL
PERFORMED ON: ABNORMAL
PHOSPHORUS: 3.8 MG/DL (ref 2.5–4.9)
POTASSIUM SERPL-SCNC: 4.4 MMOL/L (ref 3.5–5.1)
SODIUM BLD-SCNC: 140 MMOL/L (ref 136–145)
TROPONIN: <0.01 NG/ML
TROPONIN: <0.01 NG/ML

## 2022-08-09 PROCEDURE — 84484 ASSAY OF TROPONIN QUANT: CPT

## 2022-08-09 PROCEDURE — 94640 AIRWAY INHALATION TREATMENT: CPT

## 2022-08-09 PROCEDURE — C8929 TTE W OR WO FOL WCON,DOPPLER: HCPCS

## 2022-08-09 PROCEDURE — 36415 COLL VENOUS BLD VENIPUNCTURE: CPT

## 2022-08-09 PROCEDURE — 80069 RENAL FUNCTION PANEL: CPT

## 2022-08-09 PROCEDURE — 2580000003 HC RX 258: Performed by: HOSPITALIST

## 2022-08-09 PROCEDURE — 6370000000 HC RX 637 (ALT 250 FOR IP): Performed by: NURSE PRACTITIONER

## 2022-08-09 PROCEDURE — 99232 SBSQ HOSP IP/OBS MODERATE 35: CPT | Performed by: NURSE PRACTITIONER

## 2022-08-09 PROCEDURE — 1200000000 HC SEMI PRIVATE

## 2022-08-09 PROCEDURE — 6370000000 HC RX 637 (ALT 250 FOR IP): Performed by: HOSPITALIST

## 2022-08-09 RX ORDER — INSULIN LISPRO 100 [IU]/ML
0-8 INJECTION, SOLUTION INTRAVENOUS; SUBCUTANEOUS
Status: DISCONTINUED | OUTPATIENT
Start: 2022-08-09 | End: 2022-08-10 | Stop reason: HOSPADM

## 2022-08-09 RX ORDER — DILTIAZEM HYDROCHLORIDE 180 MG/1
180 CAPSULE, COATED, EXTENDED RELEASE ORAL DAILY
Status: DISCONTINUED | OUTPATIENT
Start: 2022-08-10 | End: 2022-08-10 | Stop reason: HOSPADM

## 2022-08-09 RX ADMIN — SODIUM CHLORIDE, PRESERVATIVE FREE 10 ML: 5 INJECTION INTRAVENOUS at 10:25

## 2022-08-09 RX ADMIN — METOPROLOL SUCCINATE 50 MG: 50 TABLET, EXTENDED RELEASE ORAL at 10:23

## 2022-08-09 RX ADMIN — Medication 1 TABLET: at 10:23

## 2022-08-09 RX ADMIN — SODIUM CHLORIDE, PRESERVATIVE FREE 10 ML: 5 INJECTION INTRAVENOUS at 20:38

## 2022-08-09 RX ADMIN — DILTIAZEM HYDROCHLORIDE 240 MG: 240 CAPSULE, COATED, EXTENDED RELEASE ORAL at 12:15

## 2022-08-09 RX ADMIN — ATORVASTATIN CALCIUM 40 MG: 40 TABLET, FILM COATED ORAL at 10:23

## 2022-08-09 RX ADMIN — PANTOPRAZOLE SODIUM 40 MG: 40 TABLET, DELAYED RELEASE ORAL at 10:24

## 2022-08-09 RX ADMIN — ASPIRIN 81 MG: 81 TABLET, COATED ORAL at 10:25

## 2022-08-09 RX ADMIN — Medication 2 PUFF: at 20:24

## 2022-08-09 RX ADMIN — INSULIN GLARGINE 12 UNITS: 100 INJECTION, SOLUTION SUBCUTANEOUS at 20:39

## 2022-08-09 RX ADMIN — APIXABAN 2.5 MG: 2.5 TABLET, FILM COATED ORAL at 10:23

## 2022-08-09 RX ADMIN — APIXABAN 5 MG: 5 TABLET, FILM COATED ORAL at 20:38

## 2022-08-09 RX ADMIN — METOPROLOL SUCCINATE 50 MG: 50 TABLET, EXTENDED RELEASE ORAL at 20:38

## 2022-08-09 RX ADMIN — Medication 2 PUFF: at 07:37

## 2022-08-09 RX ADMIN — TIOTROPIUM BROMIDE INHALATION SPRAY 2 PUFF: 3.12 SPRAY, METERED RESPIRATORY (INHALATION) at 07:37

## 2022-08-09 ASSESSMENT — PAIN SCALES - GENERAL: PAINLEVEL_OUTOF10: 0

## 2022-08-09 NOTE — PROGRESS NOTES
Dr. Arroyo [facility] ok'd to stop IV cardizem. Pt now SB in 52's. Oral cardizem and metoprolol given prior to stopping gtt.   Will continue to monitor

## 2022-08-09 NOTE — PROGRESS NOTES
Physician Progress Note      Lesly Retana  CSN #:                  [id number]  :                       1940  ADMIT DATE:       2022 1:14 PM  [address] [place] DATE:  RESPONDING  PROVIDER #:        Rox Nichols MD          QUERY TEXT:    Patient admitted with  \"Atrial fibrillation with rapid ventricular response\",   maintained on Eliquis. If possible, please document in progress notes and   discharge summary if you are evaluating and/or treating any of the following: The medical record reflects the following:  Risk Factors: afib, anticoagulants, CAD, DM  Clinical Indicators: -PN- \"Patient? already on long-term anticoagulation with   Eliquis due to elevated CHADS2 score; dosage decreased to 2.5 mg twice daily   due to SONIDO\"  Treatment:  dilt ggt, Cardiology consult, continuous telemetry and pulse   oximetry monitoring  -Patient initiated on IV Cardizem bolus and drip,-checked Electrolytes,   thyroid studies -Echo    Thank-You, Carmen Niño RN, BSN, CCDS  Options provided:  -- Secondary hypercoagulable state in a patient with atrial fibrillation  -- Other - I will add my own diagnosis  -- Disagree - Not applicable / Not valid  -- Disagree - Clinically unable to determine / Unknown  -- Refer to Clinical Documentation Reviewer    PROVIDER RESPONSE TEXT:    This patient has 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     Query created by: Matthew Martínez on 2022 10:27 AM      Electronically signed by:  Rox Nichols MD 2022 2:17 PM

## 2022-08-09 NOTE — PROGRESS NOTES
Nephrology Progress Note   [employer]       CC:SONIDO  HPI  Taylor Oneill is a 81 y.o. male who presented to the ED to be evaluated for heart palpitations and exertional dyspnea ongoing for 1 day PTA. Upon arrival to the ED EKG was obtained revealing A. fib with RVR.   Creatinine 1.6mg/ dl ,  hence the nephrology consult  Denies previous CKD;no new urinary symptoms              Creatinine baseline seems to be  at 1.1-1.3 mg/dl       With iv fluids overnight, the creatinine is now trending down, at 1.5 mg/dl today    SUBJECTIVE  Feels fine today  In NSR    SOC: wife at bedside        Scheduled Meds:   aspirin  81 mg Oral Daily    atorvastatin  40 mg Oral Daily    dilTIAZem  240 mg Oral Daily    apixaban  2.5 mg Oral BID    [Held by provider] furosemide  40 mg Oral Daily    [Held by provider] losartan-hydroCHLOROthiazide  1 tablet Oral Daily    metoprolol succinate  50 mg Oral BID    pantoprazole  40 mg Oral Daily    therapeutic multivitamin-minerals  1 tablet Oral Daily    sodium chloride flush  10 mL IntraVENous 2 times per day    insulin glargine  12 Units SubCUTAneous Nightly    insulin lispro  0-8 Units SubCUTAneous Q4H    mometasone-formoterol  2 puff Inhalation BID    tiotropium  2 puff Inhalation Daily     Continuous Infusions:   sodium chloride 75 mL/hr at 08/08/22 2002    dilTIAZem Stopped (08/08/22 2045)    dextrose      sodium chloride       PRN Meds:glucose, dextrose bolus **OR** dextrose bolus, glucagon (rDNA), dextrose, sodium chloride flush, sodium chloride, potassium chloride **OR** potassium alternative oral replacement **OR** potassium chloride, magnesium sulfate, promethazine **OR** ondansetron, senna, acetaminophen **OR** acetaminophen, perflutren lipid microspheres, albuterol      Objective:      Physical Exam  Wt Readings from Last 3 Encounters:   08/07/22 230 lb (104.3 kg)   06/28/22 237 lb (107.5 kg)   03/31/22 238 lb (108 kg)     Temp Readings from Last 3 Encounters:   08/09/22 97.4 °F (36.3 °C) (Oral)   03/31/22 97.8 °F (36.6 °C)   02/23/22 94.8 °F (34.9 °C) (Temporal)     BP Readings from Last 3 Encounters:   08/09/22 137/75   06/28/22 132/74   03/31/22 130/60     Pulse Readings from Last 3 Encounters:   08/09/22 61   06/28/22 70   03/31/22 63       63      General appearance: Pleasant elderly male resting comfortably in bed, NAD  Neck: Supple without meningismus; no goiter; no carotid bruit bilaterally  Cardiovascular: RRR, no rub; no edema  Respiratory: No tachypnea; CTAB   Gastrointestinal: Abdomen soft, non-tender, not distended; bowel sounds normal; no masses/organomegaly appreciated  Neurology: non-focal  Skin: Warm, dry, normal turgor, no rash       Lab Review   Lab Results   Component Value Date    WBC 9.1 08/08/2022    HGB 11.7 (L) 08/08/2022    HCT 35.3 (L) 08/08/2022    MCV 87.3 08/08/2022     08/08/2022     Lab Results   Component Value Date/Time     08/09/2022 06:23 AM    K 4.4 08/09/2022 06:23 AM    K 4.4 08/08/2022 06:31 AM     08/09/2022 06:23 AM    CO2 27 08/09/2022 06:23 AM    BUN 16 08/09/2022 06:23 AM    CREATININE 1.5 08/09/2022 06:23 AM    GLUCOSE 130 08/09/2022 06:23 AM    CALCIUM 8.7 08/09/2022 06:23 AM          Patient Active Problem List    Diagnosis Date Noted    SONIDO (acute kidney injury) ([facility] 08/07/2022    Localized edema 03/31/2022    COPD (chronic obstructive pulmonary disease) (HCC)     Persistent atrial fibrillation (HCC) 10/01/2021    Atrial fibrillation with RVR ([facility] [address] 10/01/2021    Chest pain 09/29/2021    RIZWANA (obstructive sleep apnea) 07/22/2021    Mild CAD 02/10/2021    Abnormal cardiovascular stress test 02/04/2021    Other chest pain 01/18/2021    History of tobacco abuse 01/18/2021    Abdominal aortic aneurysm (AAA) without rupture ([facility] [address] 10/24/2019    Hematuria 10/24/2019    Acute urinary retention 10/24/2019    Simple chronic bronchitis ([facility] 07/27/2018    History of esophageal stricture 06/22/2018    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

## 2022-08-09 NOTE — PROGRESS NOTES
Hospitalist Progress Note      PCP: Bart Bradshaw MD    Date of Admission: 8/7/2022    Chief Complaint: chest pain    Hospital Course: reviewed     Subjective: feels well, off dilt ggt since last night, no cp; labs reviewed-creatinine 1.5 this morning slightly better with IV hydration. Continue to hold diuretics and encourage p.o. liberal fluids for today. Await nephrology rounds. D/w patient's son Barbi Rodriguez) over the phone regarding care plan. RN reports patient had intermittent bradycardic episodes symptomatic. Discussed with cardiology NP (Karen) and she planning to reassess patient's home dose of Cardizem and adjust as needed.     Medications:  Reviewed    Infusion Medications    sodium chloride 75 mL/hr at 08/08/22 2002    dilTIAZem Stopped (08/08/22 2045)    dextrose      sodium chloride       Scheduled Medications    aspirin  81 mg Oral Daily    atorvastatin  40 mg Oral Daily    dilTIAZem  240 mg Oral Daily    apixaban  2.5 mg Oral BID    [Held by provider] furosemide  40 mg Oral Daily    [Held by provider] losartan-hydroCHLOROthiazide  1 tablet Oral Daily    metoprolol succinate  50 mg Oral BID    pantoprazole  40 mg Oral Daily    therapeutic multivitamin-minerals  1 tablet Oral Daily    sodium chloride flush  10 mL IntraVENous 2 times per day    insulin glargine  12 Units SubCUTAneous Nightly    insulin lispro  0-8 Units SubCUTAneous Q4H    mometasone-formoterol  2 puff Inhalation BID    tiotropium  2 puff Inhalation Daily     PRN Meds: glucose, dextrose bolus **OR** dextrose bolus, glucagon (rDNA), dextrose, sodium chloride flush, sodium chloride, potassium chloride **OR** potassium alternative oral replacement **OR** potassium chloride, magnesium sulfate, promethazine **OR** ondansetron, senna, acetaminophen **OR** acetaminophen, perflutren lipid microspheres, albuterol      Intake/Output Summary (Last 24 hours) at 8/9/2022 1418  Last data filed at 8/8/2022 2000  Gross per 24 hour   Intake 360 ml   Output --   Net 360 ml         Physical Exam Performed:  /75   Pulse 61   Temp 97.4 °F (36.3 °C) (Oral)   Resp 16   Ht 5' 8\" (1.727 m)   Wt 230 lb (104.3 kg)   SpO2 93%   BMI 34.97 kg/m²     General appearance: No apparent distress, appears stated age and cooperative. HEENT: Pupils equal, round, and reactive to light. Conjunctivae/corneas clear. Neck: Supple, with full range of motion. No jugular venous distention. Trachea midline. Respiratory:  Normal respiratory effort. Clear to auscultation, bilaterally without Rales/Wheezes/Rhonchi. Cardiovascular: Irregular rate and rhythm with normal S1/S2 without murmurs, rubs or gallops. Abdomen: Soft, non-tender, non-distended with normal bowel sounds. Musculoskeletal: No clubbing, cyanosis or edema bilaterally. Full range of motion without deformity. Skin: Skin color, texture, turgor normal.  No rashes or lesions. Neurologic:  Neurovascularly intact without any focal sensory/motor deficits. Cranial nerves: II-XII intact, grossly non-focal.  Psychiatric: Alert and oriented, thought content appropriate, normal insight  Capillary Refill: Brisk,3 seconds, normal   Peripheral Pulses: +2 palpable, equal bilaterally       Labs:   Recent Labs     08/07/22  1325 08/08/22  0631   WBC 11.9* 9.1   HGB 12.7* 11.7*   HCT 39.5* 35.3*   * 428       Recent Labs     08/07/22  1325 08/07/22  1620 08/08/22  0631 08/09/22  0623     --  139 140   K 4.2  --  4.4 4.4   CL 98*  --  100 104   CO2 24  --  28 27   BUN 15  --  16 16   CREATININE 1.6*  --  1.6* 1.5*   CALCIUM 9.5  --  9.7 8.7   PHOS  --  2.4*  --  3.8       Recent Labs     08/07/22  1325 08/08/22  0631   AST 15 12*   ALT 13 11   BILITOT 0.5 0.4   ALKPHOS 103 86       No results for input(s): INR in the last 72 hours.   Recent Labs     08/08/22  1753 08/08/22  2347 08/09/22  0623   TROPONINI <0.01 <0.01 <0.01         Urinalysis:      Lab Results   Component Value Date/Time    NITRU Negative 08/07/2022 06:33 PM    BLOODU Negative 08/07/2022 06:33 PM    SPECGRAV <=1.005 08/07/2022 06:33 PM    GLUCOSEU Negative 08/07/2022 06:33 PM       Radiology:  US RENAL COMPLETE   Final Result   No hydronephrosis. Ureteral jets visualized. Decompressed bladder with   prevoid volume of 79 cc. Bilateral renal cysts; a complex left renal cyst measuring 5.2 cm is noted in   the upper pole; sonographic appearance is probably limited by technique. Compared to CTA abdomen 10/30/2021, multiple simple renal cysts are noted in   the left kidney. Can consider follow-up ultrasound in 6-12 months for   stability of this finding. XR CHEST PORTABLE   Final Result   Postoperative or posttraumatic change of the right rib cage again seen with   right basilar pleuroparenchymal opacity which is not markedly changed. Assessment/Plan:    Active Hospital Problems    Diagnosis     SONIDO (acute kidney injury) ([address] [N17.9]      Priority: Medium    COPD (chronic obstructive pulmonary disease) ([address] [J44.9]     Atrial fibrillation with RVR (HCC) [I48.91]     RIZWANA (obstructive sleep apnea) [G47.33]     Mild CAD [U84.10]     Diastolic dysfunction [S96.06]     Controlled type 2 diabetes mellitus without complication, without long-term current use of insulin ([address] [E11.9]        A. fib with RVR  -Admitted to medical floor for continuous telemetry and pulse oximetry monitoring  -Patient initiated on IV Cardizem bolus and drip, rate controlled and transition back to p.o. Cardizem per cardiology/EP.   -checked Electrolytes, thyroid studies to assess for secondary causes of this tachyarrhythmia  -Patient already on long-term anticoagulation with Eliquis due to elevated CHADS2 score  -Echo as below     CAD- pt denied any chest pain during this episode  -jannette were trended  -Continue daily ASA and statin therapy; FLP in a.m.  -ECHO on 8/9/2022 showed normal LVEF 55 to 60%; no R WMA; left ventricular diastolic filling pressure is normal.  - Reviewed last stress test dated 10/3/2021 showed no evidence of myocardial ischemia or scar. SONIDO -likely prerenal and compounded by diuretic use. Creatinine 1.6 on admission (baseline 1.1-1.3)  -Patient aggressively hydrated in ED with NS, with maintenance fluids continued overnight  -Straight cath for urinary retention greater than 300 cc  -Nephrotoxic medications held, including home dosages of ACE-RI, diuretics, and metformin  - Nephrology following and assisting with management. Ultrasound renal on 8/8 -no hydronephrosis. Bilateral renal cysts; complex left renal cyst measuring 5.2 cm is noted in the upper pole. Radiology recommended follow-up ultrasound in 6 to 12 months for stability of this finding. COPD w/o exacerbation.  -Continuous pulse oximetry monitoring initiated with PRN supplemental O2  -Continue home maintenance Trelegy MDI  -Encourage aggressive pulmonary toilet including incentive spirometry every 4H while awake  - Albuterol available Q4H as needed  -Patient quit smoking in 1971     Type II DM  -A1c  7.7 percent  -Metformin placed on hold due to SONIDO  -Low-dose based basal insulin 10 units initiated QHS  -PRN Humalog medium dose SSI scheduled before meals and at bedtime based on POC glucose  -Carbohydrate restriction placed on diet        DVT prophylaxis-continue Eliquis   Diet: ADULT DIET; Regular; 4 carb choices (60 gm/meal); Low Fat/Low Chol/High Fiber/2 gm Na; Low Potassium (Less than 3000 mg/day); Low Phosphorus (Less than 1000 mg); 2000 ml  Code Status: Full Code    PT/OT Eval Status: home with prn assist    Dispo -likely tomorrow pending improvement in creatinine.   Tangela Sherwood MD

## 2022-08-09 NOTE — PROGRESS NOTES
[facility]     Cardiology                                     Progress Note    Admission date:  2022    Reason for follow up visit: AF    HPI/CC: Ana Celeste was admitted on 2022 with shortness of breath. EKG showed rapid AF. He was started in IV Cardizem and spontaneously converted to SR/SB. Echo showed an EF of 55-60% and no WMA. Has also been treated for SONIDO. Rhythm has been SB/SR. Subjective: He complains of some exertional shortness of breath. Denies chest pain, palpitations and dizziness. Vitals:  Blood pressure 137/75, pulse 61, temperature 97.4 °F (36.3 °C), temperature source Oral, resp. rate 16, height 5' 8\" (1.727 m), weight 230 lb (104.3 kg), SpO2 93 %.   Temp  Av.4 °F (36.3 °C)  Min: 97.2 °F (36.2 °C)  Max: 97.6 °F (36.4 °C)  Pulse  Av.9  Min: 59  Max: 68  BP  Min: 123/77  Max: 138/82  SpO2  Av.4 %  Min: 93 %  Max: 97 %    24 hour I/O    Intake/Output Summary (Last 24 hours) at 2022 1335  Last data filed at 2022  Gross per 24 hour   Intake 480 ml   Output --   Net 480 ml     Current Facility-Administered Medications   Medication Dose Route Frequency Provider Last Rate Last Admin    0.9 % sodium chloride infusion   IntraVENous Continuous Jun Shukla MD 75 mL/hr at 22 New Bag at 22    dilTIAZem 125 mg in dextrose 5 % 125 mL infusion  2.5-15 mg/hr IntraVENous Continuous Beth Marroquin MD   Stopped at 22    aspirin EC tablet 81 mg  81 mg Oral Daily Beth Marroquin MD   81 mg at 22 1025    atorvastatin (LIPITOR) tablet 40 mg  40 mg Oral Daily Beth Marroquin MD   40 mg at 22 1023    dilTIAZem (CARDIZEM CD) extended release capsule 240 mg  240 mg Oral Daily Beth Marroquin MD   240 mg at 22 1215    apixaban (ELIQUIS) tablet 2.5 mg  2.5 mg Oral BID Beht Marroquin MD   2.5 mg at 22 1023    [Held by provider] furosemide (LASIX) tablet 40 mg  40 mg Oral Daily Beth Marroquin MD [Held by provider] losartan-hydroCHLOROthiazide (HYZAAR) 100-25 MG per tablet 1 tablet  1 tablet Oral Daily Jamal Harp MD        metoprolol succinate (TOPROL XL) extended release tablet 50 mg  50 mg Oral BID Jamal Harp MD   50 mg at 08/09/22 1023    pantoprazole (PROTONIX) tablet 40 mg  40 mg Oral Daily Jamal Harp MD   40 mg at 08/09/22 1024    therapeutic multivitamin-minerals 1 tablet  1 tablet Oral Daily Jamal Harp MD   1 tablet at 08/09/22 1023    glucose chewable tablet 16 g  4 tablet Oral PRN Jamal Harp MD        dextrose bolus 10% 125 mL  125 mL IntraVENous PRN Jamal Harp MD        Or    dextrose bolus 10% 250 mL  250 mL IntraVENous PRN Jamal Harp MD        glucagon (rDNA) injection 1 mg  1 mg SubCUTAneous PRN Jamal Harp MD        dextrose 10 % infusion   IntraVENous Continuous PRN Jamal Harp MD        sodium chloride flush 0.9 % injection 10 mL  10 mL IntraVENous 2 times per day Jamal Harp MD   10 mL at 08/09/22 1025    sodium chloride flush 0.9 % injection 10 mL  10 mL IntraVENous PRN Jamal Harp MD        0.9 % sodium chloride infusion   IntraVENous PRN Jamal Harp MD        potassium chloride (KLOR-CON M) extended release tablet 40 mEq  40 mEq Oral PRN Jamal Harp MD        Or    potassium bicarb-citric acid (EFFER-K) effervescent tablet 40 mEq  40 mEq Oral PRN Jamal Harp MD        Or    potassium chloride 10 mEq/100 mL IVPB (Peripheral Line)  10 mEq IntraVENous PRN Jamal Harp MD        magnesium sulfate 2000 mg in 50 mL IVPB premix  2,000 mg IntraVENous PRN Jamal Harp MD        promethazine (PHENERGAN) tablet 12.5 mg  12.5 mg Oral Q6H PRN Jamal Harp MD        Or    [facility]ansetron [facility]) injection 4 mg  4 mg IntraVENous Q6H PRN Jamal Harp MD        senna (SENOKOT) tablet 8.6 mg  1 tablet Oral Daily PRN Jamal Harp MD        acetaminophen (TYLENOL) tablet 650 mg  650 mg Oral Q6H PRN Jamal Harp MD        Or    acetaminophen (TYLENOL) suppository 650 mg  650 mg Rectal Q6H PRN Denise Wood MD        insulin glargine (LANTUS) injection vial 12 Units  12 Units SubCUTAneous Nightly Denise Wood MD   12 Units at 08/08/22 2126    insulin lispro (HUMALOG) injection vial 0-8 Units  0-8 Units SubCUTAneous Q4H Denise Wood MD        perflutren lipid microspheres (DEFINITY) injection 1.65 mg  1.5 mL IntraVENous ONCE PRN Denise Wood MD        albuterol (PROVENTIL) nebulizer solution 2.5 mg  2.5 mg Nebulization Q4H PRN Denise Wood MD        mometasone-formoterol (DULERA) 200-5 MCG/ACT inhaler 2 puff  2 puff Inhalation BID Denise Wood MD   2 puff at 08/09/22 0737    tiotropium (SPIRIVA RESPIMAT) 2.5 MCG/ACT inhaler 2 puff  2 puff Inhalation Daily Denise Wood MD   2 puff at 08/09/22 0737       Objective:     Telemetry monitor: SB/SR    Physical Exam:  Constitutional and general appearance: alert, cooperative, no distress, and appears stated age  HEENT: PERRL, no cervical lymphadenopathy. No masses palpable. Normal oral mucosa  Respiratory:  Normal excursion and expansion without use of accessory muscles  Resp auscultation: Normal breath sounds without wheezing, rhonchi, and rales  Cardiovascular: The apical impulse is not displaced  Heart tones are crisp and normal. regular S1 and S2.  Jugular venous pulsation Normal  The carotid upstroke is normal in amplitude and contour without delay or bruit  Peripheral pulses are symmetrical and full   Abdomen:  No masses or tenderness  Bowel sounds present  Extremities:   No cyanosis or clubbing   No lower extremity edema   Skin: warm and dry  Neurological:  Alert and oriented  Moves all extremities well  No abnormalities of mood, affect, memory, mentation, or behavior are noted    Data      Echo 8/9/2022:   Summary   Technically difficult study due to body surface area and poor acoustic   window.    Normal left ventricular systolic function with ejection fraction of 55-60%. No regional wall motion abnormalites are seen. Left ventricular diastolic filling pressure is normal.   Compared to previous study left ventricle systolic function appears same as   echo on 9-. No thrombus noted. Cath 2/2021:  CONCLUSIONS:     1. Mild to moderate coronary artery disease with preserved LVEF     ASSESSMENT/RECOMMENDATIONS:     1. Risk Factor control  2. Consider anti-anginal therapy with long acting nitrates       All labs and testing reviewed.   Lab Review     Renal Profile:   Lab Results   Component Value Date/Time    CREATININE 1.5 08/09/2022 06:23 AM    BUN 16 08/09/2022 06:23 AM     08/09/2022 06:23 AM    K 4.4 08/09/2022 06:23 AM    K 4.4 08/08/2022 06:31 AM     08/09/2022 06:23 AM    CO2 27 08/09/2022 06:23 AM     CBC:    Lab Results   Component Value Date/Time    WBC 9.1 08/08/2022 06:31 AM    RBC 4.04 08/08/2022 06:31 AM    HGB 11.7 08/08/2022 06:31 AM    HCT 35.3 08/08/2022 06:31 AM    MCV 87.3 08/08/2022 06:31 AM    RDW 14.8 08/08/2022 06:31 AM     08/08/2022 06:31 AM     BNP:  No results found for: BNP  Fasting Lipid Panel:    Lab Results   Component Value Date/Time    CHOL 182 10/25/2021 09:23 AM    HDL 40 10/25/2021 09:23 AM    HDL 41 02/20/2012 11:03 AM    TRIG 129 10/25/2021 09:23 AM     Cardiac Enzymes:  CK/MbTroponin  Lab Results   Component Value Date/Time    TROPONINI <0.01 08/09/2022 06:23 AM     PT/ INR   Lab Results   Component Value Date/Time    INR 0.91 09/29/2021 03:40 AM    INR 1.00 02/04/2021 07:04 AM    PROTIME 10.2 09/29/2021 03:40 AM    PROTIME 11.6 02/04/2021 07:04 AM     PTT No results found for: PTT   Lab Results   Component Value Date/Time    MG 1.80 08/07/2022 01:25 PM      Lab Results   Component Value Date/Time    TSH 1.57 08/07/2022 04:20 PM       Assessment:  Paroxysmal atrial fibrillation: stable    -[id number] score 5 (age, HTN, DM, CAD)    -TSH normal 8/2022  HTN: controlled   HLD CAD   -nonobstructive on [facility] 2/2021  SONIDO  DM  COPD  Suspected RIZWANA           Plan:   1. Would resume full dose anticoagulation with Eliquis 5 mg PO BID and recheck BMP in one week. If creatinine remains > 1.5, then can consider dose reduction to 2.5 mg PO BID. 2. Continue ASA and Toprol  3. Decrease Cardizem to 180 mg PO daily for bradycardia in sinus   4. Follow up with Dr. Avinash Ricardo on 9/29/2022  5.  Okay for discharge       LUZ De Santiago-CNP  [facility]  [phone]

## 2022-08-09 NOTE — CARE COORDINATION
Chart review day 2. Management per nephrology, cardiology and IM. Pt to follow up with nephrology as outpatient for mild CKD. AFIB RVR, now NSR/SB, medication changes per cardiology. Pt from home with wife, Roge Saul. Denies discharge planning needs. CM following.

## 2022-08-10 ENCOUNTER — TELEPHONE (OUTPATIENT)
Dept: CARDIOLOGY | Age: 82
End: 2022-08-10

## 2022-08-10 VITALS
HEART RATE: 58 BPM | RESPIRATION RATE: 16 BRPM | TEMPERATURE: 97.3 F | OXYGEN SATURATION: 96 % | DIASTOLIC BLOOD PRESSURE: 71 MMHG | BODY MASS INDEX: 36.12 KG/M2 | WEIGHT: 238.3 LBS | SYSTOLIC BLOOD PRESSURE: 141 MMHG | HEIGHT: 68 IN

## 2022-08-10 LAB
ALBUMIN SERPL-MCNC: 3.9 G/DL (ref 3.4–5)
ANION GAP SERPL CALCULATED.3IONS-SCNC: 9 MMOL/L (ref 3–16)
BUN BLDV-MCNC: 13 MG/DL (ref 7–20)
CALCIUM SERPL-MCNC: 8.9 MG/DL (ref 8.3–10.6)
CHLORIDE BLD-SCNC: 104 MMOL/L (ref 99–110)
CO2: 27 MMOL/L (ref 21–32)
CREAT SERPL-MCNC: 1.3 MG/DL (ref 0.8–1.3)
GFR AFRICAN AMERICAN: >60
GFR NON-AFRICAN AMERICAN: 53
GLUCOSE BLD-MCNC: 128 MG/DL (ref 70–99)
GLUCOSE BLD-MCNC: 133 MG/DL (ref 70–99)
PERFORMED ON: ABNORMAL
PHOSPHORUS: 3.8 MG/DL (ref 2.5–4.9)
POTASSIUM SERPL-SCNC: 4.3 MMOL/L (ref 3.5–5.1)
SODIUM BLD-SCNC: 140 MMOL/L (ref 136–145)

## 2022-08-10 PROCEDURE — 36415 COLL VENOUS BLD VENIPUNCTURE: CPT

## 2022-08-10 PROCEDURE — 6370000000 HC RX 637 (ALT 250 FOR IP): Performed by: NURSE PRACTITIONER

## 2022-08-10 PROCEDURE — 94640 AIRWAY INHALATION TREATMENT: CPT

## 2022-08-10 PROCEDURE — 99232 SBSQ HOSP IP/OBS MODERATE 35: CPT | Performed by: NURSE PRACTITIONER

## 2022-08-10 PROCEDURE — 6370000000 HC RX 637 (ALT 250 FOR IP): Performed by: HOSPITALIST

## 2022-08-10 PROCEDURE — 2580000003 HC RX 258: Performed by: HOSPITALIST

## 2022-08-10 PROCEDURE — 80069 RENAL FUNCTION PANEL: CPT

## 2022-08-10 RX ORDER — DILTIAZEM HYDROCHLORIDE 180 MG/1
180 CAPSULE, COATED, EXTENDED RELEASE ORAL DAILY
Qty: 30 CAPSULE | Refills: 3 | Status: SHIPPED | OUTPATIENT
Start: 2022-08-11 | End: 2022-08-22 | Stop reason: ALTCHOICE

## 2022-08-10 RX ORDER — FUROSEMIDE 20 MG/1
20 TABLET ORAL DAILY
Status: DISCONTINUED | OUTPATIENT
Start: 2022-08-11 | End: 2022-08-10 | Stop reason: HOSPADM

## 2022-08-10 RX ORDER — FUROSEMIDE 20 MG/1
20 TABLET ORAL DAILY
Qty: 60 TABLET | Refills: 3 | Status: SHIPPED | OUTPATIENT
Start: 2022-08-11

## 2022-08-10 RX ADMIN — TIOTROPIUM BROMIDE INHALATION SPRAY 2 PUFF: 3.12 SPRAY, METERED RESPIRATORY (INHALATION) at 07:39

## 2022-08-10 RX ADMIN — DILTIAZEM HYDROCHLORIDE 180 MG: 180 CAPSULE, COATED, EXTENDED RELEASE ORAL at 11:59

## 2022-08-10 RX ADMIN — METOPROLOL SUCCINATE 50 MG: 50 TABLET, EXTENDED RELEASE ORAL at 08:17

## 2022-08-10 RX ADMIN — SODIUM CHLORIDE, PRESERVATIVE FREE 10 ML: 5 INJECTION INTRAVENOUS at 08:18

## 2022-08-10 RX ADMIN — ASPIRIN 81 MG: 81 TABLET, COATED ORAL at 08:17

## 2022-08-10 RX ADMIN — PANTOPRAZOLE SODIUM 40 MG: 40 TABLET, DELAYED RELEASE ORAL at 08:18

## 2022-08-10 RX ADMIN — ATORVASTATIN CALCIUM 40 MG: 40 TABLET, FILM COATED ORAL at 08:18

## 2022-08-10 RX ADMIN — APIXABAN 5 MG: 5 TABLET, FILM COATED ORAL at 08:17

## 2022-08-10 RX ADMIN — Medication 1 TABLET: at 08:17

## 2022-08-10 RX ADMIN — Medication 2 PUFF: at 07:38

## 2022-08-10 ASSESSMENT — PAIN SCALES - GENERAL: PAINLEVEL_OUTOF10: 0

## 2022-08-10 NOTE — TELEPHONE ENCOUNTER
Patient was just discharged from [facility]. Please call later and schedule him a new patient appointment with Dr. Francisco Arriaga for atrial fib per Dr. Nathalia Rodriguez. Appointment needs to be prior to appointment with Dr. Nathalia Rodriguez on 9/29/22. Thank You.

## 2022-08-10 NOTE — PLAN OF CARE
Increase function to baseline.
Problem: Chronic Conditions and Co-morbidities  Goal: Patient's chronic conditions and co-morbidity symptoms are monitored and maintained or improved  Outcome: Progressing   The patient will demonstrate an understanding of s/s & treatment of hyperglycemia, by verbalization,  with the goal of completion at discharge.
Problem: Discharge Planning  Goal: Discharge to home or other facility with appropriate resources  8/7/2022 1624 by Renee Salgado RN  Outcome: Progressing  8/7/2022 1624 by Renee Salgado RN  Outcome: Progressing  Flowsheets  Taken 8/7/2022 1556  Discharge to home or other facility with appropriate resources: Identify barriers to discharge with patient and caregiver  Taken 8/7/2022 1550  Discharge to home or other facility with appropriate resources: Identify barriers to discharge with patient and caregiver     Problem: Safety - Adult  Goal: Free from fall injury  8/7/2022 1624 by Renee Salgado RN  Outcome: Progressing  8/7/2022 1624 by Renee Salgado RN  Outcome: Progressing
Problem: Discharge Planning  Goal: Discharge to home or other facility with appropriate resources  Outcome: Progressing  Flowsheets  Taken 8/7/2022 1556  Discharge to home or other facility with appropriate resources: Identify barriers to discharge with patient and caregiver  Taken 8/7/2022 1550  Discharge to home or other facility with appropriate resources: Identify barriers to discharge with patient and caregiver     Problem: Safety - Adult  Goal: Free from fall injury  Outcome: Progressing
Conditions and Co-morbidities  Goal: Patient's chronic conditions and co-morbidity symptoms are monitored and maintained or improved  8/10/2022 1153 by Page Echevarria RN  Outcome: Completed  Flowsheets (Taken 8/10/2022 1174)  Care Plan - Patient's Chronic Conditions and Co-Morbidity Symptoms are Monitored and Maintained or Improved: Monitor and assess patient's chronic conditions and comorbid symptoms for stability, deterioration, or improvement  8/9/2022 2310 by Kj Bryan RN  Outcome: Progressing

## 2022-08-10 NOTE — CARE COORDINATION
CASE MANAGEMENT DISCHARGE SUMMARY      Discharge to: home with wife    Precertification completed: NA  Hospital Exemption Notification (HENS) completed: NA    IMM given: (date) 8/10/22    New Durable Medical Equipment ordered/agency: none    Transportation:    Family/car: private vehicle/family     Confirmed discharge plan with:     Patient: yes, per nursing     Family:  yes, per patient        RN, name: Domo Esposito

## 2022-08-10 NOTE — PROGRESS NOTES
[address]     Cardiology                                     Progress Note    Admission date:  2022    Reason for follow up visit: AF    HPI/CC: Cb Butts was admitted on 2022 with shortness of breath. EKG showed rapid AF. He was started in IV Cardizem and spontaneously converted to SR/SB. Echo showed an EF of 55-60% and no WMA. Has also been treated for SONIDO. Rhythm has been SB/SR. Subjective: He has no complaints. Denies chest pain, palpitations, shortness of breath, and dizziness. Vitals:  Blood pressure (!) 159/64, pulse 58, temperature 97.4 °F (36.3 °C), temperature source Axillary, resp. rate 16, height 5' 8\" (1.727 m), weight 238 lb 4.8 oz (108.1 kg), SpO2 96 %.   Temp  Av.5 °F (36.4 °C)  Min: 97.4 °F (36.3 °C)  Max: 97.8 °F (36.6 °C)  Pulse  Av.8  Min: 58  Max: 65  BP  Min: 114/76  Max: 159/64  SpO2  Av.7 %  Min: 93 %  Max: 97 %    24 hour I/O    Intake/Output Summary (Last 24 hours) at 8/10/2022 0851  Last data filed at 2022 1410  Gross per 24 hour   Intake 600 ml   Output --   Net 600 ml       Current Facility-Administered Medications   Medication Dose Route Frequency Provider Last Rate Last Admin    dilTIAZem (CARDIZEM CD) extended release capsule 180 mg  180 mg Oral Daily LUZ Bran CNP        apixaban (ELIQUIS) tablet 5 mg  5 mg Oral BID LUZ Bran CNP   5 mg at 08/10/22 0817    insulin lispro (HUMALOG) injection vial 0-8 Units  0-8 Units SubCUTAneous 4x Daily AC & HS Malina Junior MD        0.9 % sodium chloride infusion   IntraVENous Continuous Alicia Blanco MD 75 mL/hr at 22 New Bag at 22    aspirin EC tablet 81 mg  81 mg Oral Daily Cee Pierce MD   81 mg at 08/10/22 0817    atorvastatin (LIPITOR) tablet 40 mg  40 mg Oral Daily Cee Pierce MD   40 mg at 08/10/22 0818    [Held by provider] furosemide (LASIX) tablet 40 mg  40 mg Oral Daily Cee Pierce MD        [Held by provider] losartan-hydroCHLOROthiazide (HYZAAR) 100-25 MG per tablet 1 tablet  1 tablet Oral Daily Dante Huitron MD        metoprolol succinate (TOPROL XL) extended release tablet 50 mg  50 mg Oral BID Dante Huitron MD   50 mg at 08/10/22 0817    pantoprazole (PROTONIX) tablet 40 mg  40 mg Oral Daily Dante Huitron MD   40 mg at 08/10/22 0818    therapeutic multivitamin-minerals 1 tablet  1 tablet Oral Daily Dante Huitron MD   1 tablet at 08/10/22 0817    glucose chewable tablet 16 g  4 tablet Oral PRN Dante Huitron MD        dextrose bolus 10% 125 mL  125 mL IntraVENous PRN Dante Huitron MD        Or    dextrose bolus 10% 250 mL  250 mL IntraVENous PRN Dante Huitron MD        glucagon (rDNA) injection 1 mg  1 mg SubCUTAneous PRN Dante Huitron MD        dextrose 10 % infusion   IntraVENous Continuous PRCLAUDIA Huitron MD        sodium chloride flush 0.9 % injection 10 mL  10 mL IntraVENous 2 times per day Dante Huitron MD   10 mL at 08/10/22 0818    sodium chloride flush 0.9 % injection 10 mL  10 mL IntraVENous PRN Dante Huitron MD        0.9 % sodium chloride infusion   IntraVENous PRN Dante Huitron MD        potassium chloride (KLOR-CON M) extended release tablet 40 mEq  40 mEq Oral PRN Dante Huitron MD        Or    potassium bicarb-citric acid (EFFER-K) effervescent tablet 40 mEq  40 mEq Oral PRN Dante Huitron MD        Or    potassium chloride 10 mEq/100 mL IVPB (Peripheral Line)  10 mEq IntraVENous PRN Dante Huitron MD        magnesium sulfate 2000 mg in 50 mL IVPB premix  2,000 mg IntraVENous PRN Dante Huitron MD        promethazine (PHENERGAN) tablet 12.5 mg  12.5 mg Oral Q6H PRN Dante Huitron MD        Or    ondansetron [facility]) injection 4 mg  4 mg IntraVENous Q6H PRN Dante Huitron MD        senna (SENOKOT) tablet 8.6 mg  1 tablet Oral Daily PRN Dante Huitron MD        acetaminophen (TYLENOL) tablet 650 mg  650 mg Oral Q6H PRN Dante Huitron MD        Or acetaminophen (TYLENOL) suppository 650 mg  650 mg Rectal Q6H PRN Rosendo Valdivia MD        insulin glargine (LANTUS) injection vial 12 Units  12 Units SubCUTAneous Nightly Rosendo Valdivia MD   12 Units at 08/09/22 2039    perflutren lipid microspheres (DEFINITY) injection 1.65 mg  1.5 mL IntraVENous ONCE PRN Rosendo Valdivia MD        albuterol (PROVENTIL) nebulizer solution 2.5 mg  2.5 mg Nebulization Q4H PRN Rosendo Valdivia MD        mometasone-formoterol (DULERA) 200-5 MCG/ACT inhaler 2 puff  2 puff Inhalation BID Rosendo Valdivia MD   2 puff at 08/10/22 0738    tiotropium (SPIRIVA RESPIMAT) 2.5 MCG/ACT inhaler 2 puff  2 puff Inhalation Daily Rosendo Valdivia MD   2 puff at 08/10/22 0739       Objective:     Telemetry monitor: SB/SR    Physical Exam:  Constitutional and general appearance: alert, cooperative, no distress, and appears stated age  HEENT: PERRL, no cervical lymphadenopathy. No masses palpable. Normal oral mucosa  Respiratory:  Normal excursion and expansion without use of accessory muscles  Resp auscultation: + RLL fine crackles   Cardiovascular: The apical impulse is not displaced  Heart tones are crisp and normal. regular S1 and S2.  Jugular venous pulsation Normal  The carotid upstroke is normal in amplitude and contour without delay or bruit  Peripheral pulses are symmetrical and full   Abdomen:  No masses or tenderness  Bowel sounds present  Extremities:   No cyanosis or clubbing   No lower extremity edema   Skin: warm and dry  Neurological:  Alert and oriented  Moves all extremities well  No abnormalities of mood, affect, memory, mentation, or behavior are noted    Data      Echo 8/9/2022:   Summary   Technically difficult study due to body surface area and poor acoustic   window. Normal left ventricular systolic function with ejection fraction of 55-60%. No regional wall motion abnormalites are seen.    Left ventricular diastolic filling pressure is normal.   Compared to previous study left ventricle systolic function appears same as   echo on 9-. No thrombus noted. Cath 2/2021:  CONCLUSIONS:     1. Mild to moderate coronary artery disease with preserved LVEF     ASSESSMENT/RECOMMENDATIONS:     1. Risk Factor control  2. Consider anti-anginal therapy with long acting nitrates       All labs and testing reviewed. Lab Review     Renal Profile:   Lab Results   Component Value Date/Time    CREATININE 1.3 08/10/2022 06:31 AM    BUN 13 08/10/2022 06:31 AM     08/10/2022 06:31 AM    K 4.3 08/10/2022 06:31 AM    K 4.4 08/08/2022 06:31 AM     08/10/2022 06:31 AM    CO2 27 08/10/2022 06:31 AM     CBC:    Lab Results   Component Value Date/Time    WBC 9.1 08/08/2022 06:31 AM    RBC 4.04 08/08/2022 06:31 AM    HGB 11.7 08/08/2022 06:31 AM    HCT 35.3 08/08/2022 06:31 AM    MCV 87.3 08/08/2022 06:31 AM    RDW 14.8 08/08/2022 06:31 AM     08/08/2022 06:31 AM     BNP:  No results found for: BNP  Fasting Lipid Panel:    Lab Results   Component Value Date/Time    CHOL 182 10/25/2021 09:23 AM    HDL 40 10/25/2021 09:23 AM    HDL 41 02/20/2012 11:03 AM    TRIG 129 10/25/2021 09:23 AM     Cardiac Enzymes:  CK/MbTroponin  Lab Results   Component Value Date/Time    TROPONINI <0.01 08/09/2022 06:23 AM     PT/ INR   Lab Results   Component Value Date/Time    INR 0.91 09/29/2021 03:40 AM    INR 1.00 02/04/2021 07:04 AM    PROTIME 10.2 09/29/2021 03:40 AM    PROTIME 11.6 02/04/2021 07:04 AM     PTT No results found for: PTT   Lab Results   Component Value Date/Time    MG 1.80 08/07/2022 01:25 PM      Lab Results   Component Value Date/Time    TSH 1.57 08/07/2022 04:20 PM       Assessment:  Paroxysmal atrial fibrillation: stable    -[id number] score 5 (age, HTN, DM, CAD)    -TSH normal 8/2022  HTN: controlled   HLD   CAD   -nonobstructive on LHC 2/2021  SONIDO  DM  COPD  Suspected RIZWANA           Plan:   1.  Would resume full dose anticoagulation with Eliquis 5 mg PO BID and recheck BMP in one week. If creatinine remains > 1.5, then can consider dose reduction to 2.5 mg PO BID. 2. Continue ASA, Cardizem and Toprol  3. Follow up with Dr. Teena Perez on 9/29/2022  4.  Okay for discharge       LUZ Chowdary-WESTON  [address]  [phone]

## 2022-08-10 NOTE — PROGRESS NOTES
Nephrology Progress Note   [employer]       CC:SONIDO  HPI  Felipa Watson is a 81 y.o. male who presented to the ED to be evaluated for heart palpitations and exertional dyspnea ongoing for 1 day PTA. Upon arrival to the ED EKG was obtained revealing A. fib with RVR.   Creatinine 1.6mg/ dl ,  hence the nephrology consult  Denies previous CKD;no new urinary symptoms ; but  creatinine baseline seems to be  at 1.1-1.3 mg/dl       With iv fluids initially, the creatinine trending down  Iv fluids stopped on 8/9, creatinine continues to trend down and is at 1.3mg/dl today    SUBJECTIVE  Feels fine today  In NSR  Minimal LE edema    SOC: wife  and son at bedside        Scheduled Meds:   dilTIAZem  180 mg Oral Daily    apixaban  5 mg Oral BID    insulin lispro  0-8 Units SubCUTAneous 4x Daily AC & HS    aspirin  81 mg Oral Daily    atorvastatin  40 mg Oral Daily    [Held by provider] furosemide  40 mg Oral Daily    [Held by provider] losartan-hydroCHLOROthiazide  1 tablet Oral Daily    metoprolol succinate  50 mg Oral BID    pantoprazole  40 mg Oral Daily    therapeutic multivitamin-minerals  1 tablet Oral Daily    sodium chloride flush  10 mL IntraVENous 2 times per day    insulin glargine  12 Units SubCUTAneous Nightly    mometasone-formoterol  2 puff Inhalation BID    tiotropium  2 puff Inhalation Daily     Continuous Infusions:   sodium chloride 75 mL/hr at 08/08/22 2002    dextrose      sodium chloride       PRN Meds:glucose, dextrose bolus **OR** dextrose bolus, glucagon (rDNA), dextrose, sodium chloride flush, sodium chloride, potassium chloride **OR** potassium alternative oral replacement **OR** potassium chloride, magnesium sulfate, promethazine **OR** ondansetron, senna, acetaminophen **OR** acetaminophen, perflutren lipid microspheres, albuterol      Objective:      Physical Exam  Wt Readings from Last 3 Encounters:   08/10/22 238 lb 4.8 oz (108.1 kg)   06/28/22 237 lb (107.5 kg)   03/31/22 238 lb (108 kg) Temp Readings from Last 3 Encounters:   08/10/22 97.4 °F (36.3 °C) (Axillary)   03/31/22 97.8 °F (36.6 °C)   02/23/22 94.8 °F (34.9 °C) (Temporal)     BP Readings from Last 3 Encounters:   08/10/22 (!) 159/64   06/28/22 132/74   03/31/22 130/60     Pulse Readings from Last 3 Encounters:   08/10/22 58   06/28/22 70   03/31/22 63       63      General appearance: Pleasant elderly male resting comfortably in bed, NAD  Neck: Supple without meningismus; no goiter; no carotid bruit bilaterally  Cardiovascular: RRR, no rub; no edema  Respiratory: No tachypnea; CTAB   Gastrointestinal: Abdomen soft, non-tender, not distended; bowel sounds normal; no masses/organomegaly appreciated  Neurology: non-focal  Skin: Warm, dry, normal turgor, no rash       Lab Review   Lab Results   Component Value Date    WBC 9.1 08/08/2022    HGB 11.7 (L) 08/08/2022    HCT 35.3 (L) 08/08/2022    MCV 87.3 08/08/2022     08/08/2022     Lab Results   Component Value Date/Time     08/10/2022 06:31 AM    K 4.3 08/10/2022 06:31 AM    K 4.4 08/08/2022 06:31 AM     08/10/2022 06:31 AM    CO2 27 08/10/2022 06:31 AM    BUN 13 08/10/2022 06:31 AM    CREATININE 1.3 08/10/2022 06:31 AM    GLUCOSE 133 08/10/2022 06:31 AM    CALCIUM 8.9 08/10/2022 06:31 AM          Patient Active Problem List    Diagnosis Date Noted    SONIDO (acute kidney injury) ([facility] [address] 08/07/2022    Localized edema 03/31/2022    COPD (chronic obstructive pulmonary disease) (HCC)     Persistent atrial fibrillation (HCC) 10/01/2021    Atrial fibrillation with RVR ([facility] [address] 10/01/2021    Chest pain 09/29/2021    RIZWANA (obstructive sleep apnea) 07/22/2021    Mild CAD 02/10/2021    Abnormal cardiovascular stress test 02/04/2021    Other chest pain 01/18/2021    History of tobacco abuse 01/18/2021    Abdominal aortic aneurysm (AAA) without rupture ([facility] [address] 10/24/2019    Hematuria 10/24/2019    Acute urinary retention 10/24/2019    Simple chronic bronchitis ([facility] 07/27/2018    History of esophageal stricture 06/22/2018    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    Obesity (BMI 35.0-39.9 without comorbidity) 06/22/2018    Pleural effusion 06/22/2018    Suspected sleep apnea 31/86/0143    Diastolic dysfunction 69/66/6037    Shortness of breath 05/01/2018    Non morbid obesity due to excess calories 04/15/2017    Basal cell carcinoma of left side of nose 04/07/2015    Dermatophytosis of nail 01/14/2015    Intractable migraine with aura 11/05/2014    Chronic tension headache 11/05/2014    Essential hypertension 11/05/2014    Brain benign neoplasm ([facility] [address] 11/05/2014    Controlled type 2 diabetes mellitus without complication, without long-term current use of insulin ([facility] [address] 09/23/2014    GERD (gastroesophageal reflux disease) 09/14/2010    Primary malignant neoplasm of skin of trunk 04/21/2009    Empyema of pleura ([facility] [address] 11/26/2007    Mixed hyperlipidemia 11/26/2007       ASSESSMENT AND PLAN     #SONIDO   -  creatinine 1.6 on admission  -previous baseline seems to be at 1.1-1.3 mg/dl  -resolved, creatinine back to baseline today    UA no proteinuria   Renal US  with no obstruction      #Mild CKD atbaseline  1.1-1.3  -likely some degree of nephrosclerosis; no proteinuria   -will f/u in the office; he prefers the [facility] appointment for 8/29 at 3 pm     #Afib-per cardiology  -in NSR now    DISPO- plans for discharge with f/u  Amlodipine stopped by cardiology; on cardizem  Will resume lasix at smaller dose( 20 mg vs 40 mg daily), and losartan-HCTZ   Advised low salt diet  DR Luna WILL REEVALUATE IN 2 WEEKS, LIKELY HE WILL NOT NEED 2 DIURETICS GOING FORWARD( ESPECIALLY IF ABLE TO ADHERE TO LOW SALT DIET)

## 2022-08-12 RX ORDER — FUROSEMIDE 40 MG/1
TABLET ORAL
Qty: 30 TABLET | Refills: 5 | OUTPATIENT
Start: 2022-08-12

## 2022-08-12 NOTE — DISCHARGE SUMMARY
ACE-RI, diuretics, and metformin   - Nephrology followed and assisted with management. Ultrasound renal on 8/8 -no hydronephrosis. Bilateral renal cysts; complex left renal cyst measuring 5.2 cm is noted in the upper pole. Radiology recommended follow-up ultrasound in 6 to 12 months for stability of this finding.  -Nephrology okay to resume metformin, ACE inhibitor's at discharge. We will hold off on Lasix for now until 8/15/2022 and get BMP, fax results to PCP and nephrology (Dr. Cortes Hurd) and okay to resume at half the dose (at 20 mg daily)  if nephrology agreeable. COPD w/o exacerbation.  -Continuous pulse oximetry monitoring initiated with PRN supplemental O2  -Continue home maintenance Trelegy MDI  -Encourage aggressive pulmonary toilet including incentive spirometry every 4H while awake  - Albuterol available Q4H as needed  -Patient quit smoking in 1971     Type II DM  -A1c  7.7 percent  -Metformin placed on hold due to SONIDO  -Low-dose based basal insulin 10 units initiated QHS  -PRN Humalog medium dose SSI scheduled before meals and at bedtime based on POC glucose  -Carbohydrate restriction placed on diet    Physical Exam Performed:   BP (!) 141/71   Pulse 58   Temp 97.3 °F (36.3 °C) (Oral)   Resp 16   Ht 5' 8\" (1.727 m)   Wt 238 lb 4.8 oz (108.1 kg)   SpO2 96%   BMI 36.23 kg/m²       General appearance:  No apparent distress, appears stated age and cooperative. HEENT:  Normal cephalic, atraumatic without obvious deformity. Pupils equal, round, and reactive to light. Extra ocular muscles intact. Conjunctivae/corneas clear. Neck: Supple, with full range of motion. No jugular venous distention. Trachea midline. Respiratory:  Normal respiratory effort. Clear to auscultation, bilaterally without Rales/Wheezes/Rhonchi. Cardiovascular:  Regular rate and rhythm with normal S1/S2 without murmurs, rubs or gallops. Abdomen: Soft, non-tender, non-distended with normal bowel sounds.   Musculoskeletal:  No clubbing, cyanosis or edema bilaterally. Full range of motion without deformity. Skin: Skin color, texture, turgor normal.  No rashes or lesions. Neurologic:  Neurovascularly intact without any focal sensory/motor deficits. Cranial nerves: II-XII intact, grossly non-focal.  Psychiatric:  Alert and oriented, thought content appropriate, normal insight  Capillary Refill: Brisk,< 3 seconds   Peripheral Pulses: +2 palpable, equal bilaterally       Labs: For convenience and continuity at follow-up the following most recent labs are provided:      CBC:    Lab Results   Component Value Date/Time    WBC 9.1 08/08/2022 06:31 AM    HGB 11.7 08/08/2022 06:31 AM    HCT 35.3 08/08/2022 06:31 AM     08/08/2022 06:31 AM       Renal:    Lab Results   Component Value Date/Time     08/10/2022 06:31 AM    K 4.3 08/10/2022 06:31 AM    K 4.4 08/08/2022 06:31 AM     08/10/2022 06:31 AM    CO2 27 08/10/2022 06:31 AM    BUN 13 08/10/2022 06:31 AM    CREATININE 1.3 08/10/2022 06:31 AM    CALCIUM 8.9 08/10/2022 06:31 AM    PHOS 3.8 08/10/2022 06:31 AM         Significant Diagnostic Studies    Radiology:   US RENAL COMPLETE   Final Result   No hydronephrosis. Ureteral jets visualized. Decompressed bladder with   prevoid volume of 79 cc. Bilateral renal cysts; a complex left renal cyst measuring 5.2 cm is noted in   the upper pole; sonographic appearance is probably limited by technique. Compared to CTA abdomen 10/30/2021, multiple simple renal cysts are noted in   the left kidney. Can consider follow-up ultrasound in 6-12 months for   stability of this finding. XR CHEST PORTABLE   Final Result   Postoperative or posttraumatic change of the right rib cage again seen with   right basilar pleuroparenchymal opacity which is not markedly changed.                 Consults:   IP CONSULT TO RESPIRATORY CARE  IP CONSULT TO CARDIOLOGY  IP CONSULT TO NEPHROLOGY    Disposition: Home    Condition at Discharge: Stable    Discharge Instructions/Follow-up: PCP, cardiology and nephrology as instructed    Code Status:  Prior     Activity: activity as tolerated    Diet: cardiac diet      Discharge Medications:   Discharge Medication List as of 8/10/2022 12:21 PM             Details   furosemide (LASIX) 20 MG tablet Take 1 tablet by mouth in the morning., Disp-60 tablet, R-3Normal      dilTIAZem (CARDIZEM CD) 180 MG extended release capsule Take 1 capsule by mouth in the morning., Disp-30 capsule, R-3Normal                Details   fluticasone-umeclidin-vilant (TRELEGY ELLIPTA) 100-62.5-25 MCG/INH AEPB INHALE ONE PUFF BY MOUTH DAILY, Disp-1 each, R-5Normal      metFORMIN (GLUCOPHAGE-XR) 500 mg extended release tablet TAKE TWO TABLETS BY MOUTH TWICE A DAY WITH MEALS, Disp-360 tablet, R-1Normal      pantoprazole (PROTONIX) 40 MG tablet TAKE ONE TABLET BY MOUTH DAILY, Disp-90 tablet, R-1Normal      losartan-hydroCHLOROthiazide (HYZAAR) 100-25 MG per tablet TAKE ONE TABLET BY MOUTH DAILY, Disp-90 tablet, R-1Normal      ELIQUIS 5 MG TABS tablet TAKE ONE TABLET BY MOUTH TWICE A DAY, Disp-60 tablet, R-5Normal      metoprolol succinate (TOPROL XL) 50 MG extended release tablet Take 1 tablet by mouth 2 times daily, Disp-180 tablet, R-3Normal      atorvastatin (LIPITOR) 40 MG tablet TAKE ONE TABLET BY MOUTH DAILY, Disp-90 tablet, R-3Normal      nitroGLYCERIN (NITROSTAT) 0.4 MG SL tablet Place 1 tablet under the tongue every 5 minutes as needed for Chest pain, Disp-25 tablet, R-3Normal      Handicap Placard [facility] [place] 4[place]12/2019, Disp-1 each, R-0, Print      therapeutic multivitamin-minerals (THERAGRAN-M) tablet Take 1 tablet by mouth daily. Historical Med      aspirin 81 MG EC tablet Take 81 mg by mouth daily. Historical Med             Time Spent on discharge is more than 30 minutes in the examination, evaluation, counseling and review of medications and discharge plan.       Signed:    Yolanda Rivera MD   8/10/2022      Thank you Sathish Whalen MD for the opportunity to be involved in this patient's care. If you have any questions or concerns, please feel free to contact me at [phone].

## 2022-08-15 ENCOUNTER — OFFICE VISIT (OUTPATIENT)
Dept: FAMILY MEDICINE CLINIC | Age: 82
End: 2022-08-15
Payer: MEDICARE

## 2022-08-15 VITALS
DIASTOLIC BLOOD PRESSURE: 68 MMHG | WEIGHT: 238 LBS | HEART RATE: 56 BPM | SYSTOLIC BLOOD PRESSURE: 130 MMHG | BODY MASS INDEX: 36.19 KG/M2 | OXYGEN SATURATION: 96 %

## 2022-08-15 DIAGNOSIS — N17.9 AKI (ACUTE KIDNEY INJURY) (HCC): ICD-10-CM

## 2022-08-15 DIAGNOSIS — E11.65 TYPE 2 DIABETES MELLITUS WITH HYPERGLYCEMIA, WITHOUT LONG-TERM CURRENT USE OF INSULIN (HCC): ICD-10-CM

## 2022-08-15 DIAGNOSIS — I48.91 ATRIAL FIBRILLATION, UNSPECIFIED TYPE (HCC): ICD-10-CM

## 2022-08-15 DIAGNOSIS — Z09 HOSPITAL DISCHARGE FOLLOW-UP: Primary | ICD-10-CM

## 2022-08-15 DIAGNOSIS — J44.9 CHRONIC OBSTRUCTIVE PULMONARY DISEASE, UNSPECIFIED COPD TYPE (HCC): ICD-10-CM

## 2022-08-15 PROCEDURE — 99214 OFFICE O/P EST MOD 30 MIN: CPT | Performed by: NURSE PRACTITIONER

## 2022-08-15 PROCEDURE — 1123F ACP DISCUSS/DSCN MKR DOCD: CPT | Performed by: NURSE PRACTITIONER

## 2022-08-15 PROCEDURE — 1111F DSCHRG MED/CURRENT MED MERGE: CPT | Performed by: NURSE PRACTITIONER

## 2022-08-15 PROCEDURE — 3051F HG A1C>EQUAL 7.0%<8.0%: CPT | Performed by: NURSE PRACTITIONER

## 2022-08-15 RX ORDER — KETOCONAZOLE 20 MG/G
CREAM TOPICAL
Qty: 30 G | Refills: 3 | Status: SHIPPED | OUTPATIENT
Start: 2022-08-15

## 2022-08-15 RX ORDER — KETOCONAZOLE 20 MG/G
CREAM TOPICAL DAILY
COMMUNITY
End: 2022-08-15 | Stop reason: SDUPTHER

## 2022-08-15 NOTE — PROGRESS NOTES
Post-Discharge Transitional Care  Follow Up      Mihaela Gutierres   YOB: 1940    Date of Office Visit:  8/15/2022  Date of Hospital Admission: 8/7/22  Date of Hospital Discharge: 8/10/22  Risk of hospital readmission (high >=14%. Medium >=10%) :Readmission Risk Score: 10.6      Care management risk score Rising risk (score 2-5) and Complex Care (Scores >=6): No Risk Score On File     Non face to face  following discharge, date last encounter closed (first attempt may have been earlier): *No documented post hospital discharge outreach found in the last 14 days    Call initiated 2 business days of discharge: *No response recorded in the last 14 days    ASSESSMENT/PLAN:   Hospital discharge follow-up  -     NM DISCHARGE MEDS RECONCILED W/ CURRENT OUTPATIENT MED LIST  SONIDO (acute kidney injury) ([facility] [address]  Chronic obstructive pulmonary disease, unspecified COPD type ([facility] [address]  Atrial fibrillation, unspecified type ([facility] [address]  Type 2 diabetes mellitus with hyperglycemia, without long-term current use of insulin [facility])    Medical Decision Making: low complexity  No follow-ups on file. Subjective:   HPI:  Follow up of Hospital problems/diagnosis(es): Afib, SONIDO, COPD, DM  Now stable without complaints  Will get BMP today       Inpatient course: Discharge summary reviewed- see chart.     Interval history/Current status: stable    Patient Active Problem List   Diagnosis    GERD (gastroesophageal reflux disease)    Controlled type 2 diabetes mellitus without complication, without long-term current use of insulin (HCC)    Intractable migraine with aura    Chronic tension headache    Essential hypertension    Brain benign neoplasm ([facility] [address]    Empyema of pleura (HCC)    Mixed hyperlipidemia    Primary malignant neoplasm of skin of trunk    Dermatophytosis of nail    Basal cell carcinoma of left side of nose    Non morbid obesity due to excess calories    Shortness of breath    Suspected sleep apnea    Diastolic dysfunction History of esophageal stricture    H/O histoplasmosis    Obesity (BMI 35.0-39.9 without comorbidity)    Pleural effusion    Simple chronic bronchitis (HCC)    Abdominal aortic aneurysm (AAA) without rupture (HCC)    Hematuria    Acute urinary retention    Other chest pain    History of tobacco abuse    Abnormal cardiovascular stress test    Mild CAD    RIZWANA (obstructive sleep apnea)    Chest pain    Persistent atrial fibrillation (HCC)    Atrial fibrillation with RVR (HCC)    COPD (chronic obstructive pulmonary disease) (HCC)    Localized edema    SONIDO (acute kidney injury) ([facility] [address]       Medications listed as ordered at the time of discharge from hospital     Medication List            Accurate as of August 15, 2022  1:24 PM. If you have any questions, ask your nurse or doctor. CHANGE how you take these medications      ketoconazole 2 % cream  Commonly known as: NIZORAL  Apply topically daily  What changed:   how to take this  when to take this  additional instructions  Changed by: LUZ Espinoza CNP            CONTINUE taking these medications      aspirin 81 MG EC tablet     atorvastatin 40 MG tablet  Commonly known as: LIPITOR  TAKE ONE TABLET BY MOUTH DAILY     dilTIAZem 180 MG extended release capsule  Commonly known as: CARDIZEM CD  Take 1 capsule by mouth in the morning. Eliquis 5 MG Tabs tablet  Generic drug: apixaban  TAKE ONE TABLET BY MOUTH TWICE A DAY     furosemide 20 MG tablet  Commonly known as: LASIX  Take 1 tablet by mouth in the morning.      Handicap Placard Misc  by Does not apply route     losartan-hydroCHLOROthiazide 100-25 MG per tablet  Commonly known as: HYZAAR  TAKE ONE TABLET BY MOUTH DAILY     metFORMIN 500 MG extended release tablet  Commonly known as: GLUCOPHAGE-XR  TAKE TWO TABLETS BY MOUTH TWICE A DAY WITH MEALS     metoprolol succinate 50 MG extended release tablet  Commonly known as: TOPROL XL  Take 1 tablet by mouth 2 times daily     nitroGLYCERIN 0.4 MG SL tablet  Commonly known as: Nitrostat  Place 1 tablet under the tongue every 5 minutes as needed for Chest pain     pantoprazole 40 MG tablet  Commonly known as: PROTONIX  TAKE ONE TABLET BY MOUTH DAILY     therapeutic multivitamin-minerals tablet     Trelegy Ellipta 100-62.5-25 MCG/INH Aepb  Generic drug: fluticasone-umeclidin-vilant  INHALE ONE PUFF BY MOUTH DAILY               Where to Get Your Medications        These medications were sent to [id number] [address] [place] [address]      Phone: [phone]   ketoconazole 2 % cream           Medications marked \"taking\" at this time  Outpatient Medications Marked as Taking for the 8/15/22 encounter (Office Visit) with LUZ Quiñones - CNP   Medication Sig Dispense Refill    ketoconazole (NIZORAL) 2 % cream Apply topically daily 30 g 3    furosemide (LASIX) 20 MG tablet Take 1 tablet by mouth in the morning. 60 tablet 3    dilTIAZem (CARDIZEM CD) 180 MG extended release capsule Take 1 capsule by mouth in the morning.  30 capsule 3    fluticasone-umeclidin-vilant (TRELEGY ELLIPTA) 100-62.5-25 MCG/INH AEPB INHALE ONE PUFF BY MOUTH DAILY 1 each 5    metFORMIN (GLUCOPHAGE-XR) 500 mg extended release tablet TAKE TWO TABLETS BY MOUTH TWICE A DAY WITH MEALS 360 tablet 1    pantoprazole (PROTONIX) 40 MG tablet TAKE ONE TABLET BY MOUTH DAILY 90 tablet 1    losartan-hydroCHLOROthiazide (HYZAAR) 100-25 MG per tablet TAKE ONE TABLET BY MOUTH DAILY 90 tablet 1    ELIQUIS 5 MG TABS tablet TAKE ONE TABLET BY MOUTH TWICE A DAY 60 tablet 5    metoprolol succinate (TOPROL XL) 50 MG extended release tablet Take 1 tablet by mouth 2 times daily 180 tablet 3    atorvastatin (LIPITOR) 40 MG tablet TAKE ONE TABLET BY MOUTH DAILY 90 tablet 3    nitroGLYCERIN (NITROSTAT) 0.4 MG SL tablet Place 1 tablet under the tongue every 5 minutes as needed for Chest pain 25 tablet 3    Handicap Placard MISC by Does not apply route 1 each 0    therapeutic multivitamin-minerals (THERAGRAN-M) tablet Take 1 tablet by mouth daily. aspirin 81 MG EC tablet Take 81 mg by mouth daily. Medications patient taking as of now reconciled against medications ordered at time of hospital discharge: Yes    A comprehensive review of systems was negative except for what was noted in the HPI. Objective:    /68   Pulse 56   Wt 238 lb (108 kg)   SpO2 96%   BMI 36.19 kg/m²   General Appearance: alert and oriented to person, place and time, well-developed and well-nourished, in no acute distress, alert and oriented to person, place and time, and well-developed and well nourished  Skin: warm and dry, no rash or erythema  Eyes: conjunctivae normal  Pulmonary/Chest: clear to auscultation bilaterally- no wheezes, rales or rhonchi, normal air movement, no respiratory distress  Cardiovascular: normal rate, normal S1 and S2, no gallops, and intact distal pulses      An electronic signature was used to authenticate this note.   --LUZ Gillespie - CNP

## 2022-08-16 LAB
ANION GAP SERPL CALCULATED.3IONS-SCNC: 15 MMOL/L (ref 3–16)
BUN BLDV-MCNC: 11 MG/DL (ref 7–20)
CALCIUM SERPL-MCNC: 9.9 MG/DL (ref 8.3–10.6)
CHLORIDE BLD-SCNC: 101 MMOL/L (ref 99–110)
CO2: 25 MMOL/L (ref 21–32)
CREAT SERPL-MCNC: 1.3 MG/DL (ref 0.8–1.3)
GFR AFRICAN AMERICAN: >60
GFR NON-AFRICAN AMERICAN: 53
GLUCOSE BLD-MCNC: 143 MG/DL (ref 70–99)
POTASSIUM SERPL-SCNC: 4.7 MMOL/L (ref 3.5–5.1)
SODIUM BLD-SCNC: 141 MMOL/L (ref 136–145)

## 2022-08-19 NOTE — PROGRESS NOTES
Assessment:     1. Atrial fibrillation: patient has paroxysmal atrial fibrillation in setting of treated obstructive sleep apnea. He reports two episodes of atrial fibrillation in the last two years. Last episode earlier this month requiring an ED visit. Episodes remain sporadic. Being on both diltiazem and metoprolol is causing resting bradycardia, fatigue and inability to exercise. There is no good reason to be on dual AV noman blockers at this point. We will go ahead and stop diltiazem and monitor how the patient does. Escalation of therapy for atrial fibrillation can be considered if patient has more frequent episodes. For now, I encouraged him to be very compliant with sleep apnea treatment using his CPAP machine. I also urged him to exercise regularly with a goal of losing at least a few pounds. He does not consume significant amounts of caffeine or alcohol. Patient has a relatively normal heart by echocardiography with no significant valvular abnormalities and preserved LV systolic function. His left atrium is normal in size. I had a long discussion with the patient about issues related to atrial fibrillation (including etiology, disease progression patterns, stroke risk and rate control issues). Patient had her questions answered to her satisfaction. Patient has a [id number] score of at least 4 (age over 76, hypertension, diabetes mellitus). He is on apixaban 5 mg BID with no reported bleeding issues (but has easy bruising). There is no clear indication for aspirin in his case (no history of coronary artery disease or coronary stents). To avoid excess risk of bleeding, will go ahead and stop aspirin. 2. Obstructive sleep apnea: Previously noted to have severe obstructive sleep apnea and is quite compliant with CPAP therapy. Lasted a few pounds at least would be very helpful in his case as well. 3. Hypertension: reasonable blood pressure control.  Monitor with the discontinuation of diltiazem. 4. Diabetes mellitus: On oral therapy. Management as per primary physician. Plan:     Follow up with your sleep medicine doctor as planned. Discontinue taking Aspirin to decrease risk of bleeding  Discontinue taking Diltiazem  Encourage exercise as tolerated. Follow up in 2 months with me    Subjective:       Patient ID: Lauryn Cervantes is a 81 y.o. male. Chief Complaint:  Chief Complaint   Patient presents with    New Patient    Atrial Fibrillation    Hyperlipidemia    Hypertension    Coronary Artery Disease    Chest Pain       HPI    Patient is a pleasant 81 y.o. male who presents for evaluation of atrial fibrillation. The patient has a past medical history including COPD, diabetes, hypertension, and atrial fibrillation. On 08/07/2022 the patient presented to the emergency room with complaints of exertional shortness of breath and palpitations. His ECG demonstrated atrial fibrillation (133 BPM). He was treated with IV diltiazem and converted to SR/SB spontaneously. Hospital Consult (EP Nurse Practitioner, 08/10/2022)  HPI/CC: Lauryn Cervantes was admitted on 8/7/2022 with shortness of breath. EKG showed rapid AF. He was started in IV Cardizem and spontaneously converted to SR/SB. Echo showed an EF of 55-60% and no WMA. Has also been treated for SONIDO. Rhythm has been SB/SR. Office Visit (EP clinic, 08/22/2022)  Today he presents for initial EP evaluation following hospital discharge. He states he presented to the hospital with a rapid heart beat and was found to have medication interactions as well. He states he has had this rhythm for around 2 years. He reports he has not had any hospital visits this year for atrial fibrillation. He reports he does have chest pain under his left breast in the morning. He reports he does feel fatigued throughout the day. He reports he feels as if his fatigue has increased since starting Diltiazem.  He states his activity has decreased due to having no energy. He reports he does have exertional shortness of breath. He reports he can tolerate ascending/descending a flight of stairs, but he must do so slowly. He reports he does have some lightheadedness and occasional issues with imbalance. He states he has edema in his legs and he has compression socks at home but he does not use them often. He reports he was diagnosed with sleep apnea around 1 year ago. He states he is compliant with wearing his CPAP machine and minimizes his time sleeping without it. He rarely uses alcohol. He reports he drinks one cup of coffee daily. Patient denies current dizziness or syncope. Patient is taking all cardiac medications as prescribed and tolerates them well. He denies any recent issues with bleeding or bruising. Review of Systems  Review of Systems   Constitutional: Positive for malaise/fatigue. Negative for weight gain and weight loss. HENT:  Negative for nosebleeds and stridor. Eyes:  Negative for vision loss in left eye and vision loss in right eye. Cardiovascular:  Positive for chest pain, dyspnea on exertion and leg swelling. Negative for syncope. Respiratory:  Positive for shortness of breath. Negative for wheezing. Hematologic/Lymphatic: Negative for bleeding problem. Does not bruise/bleed easily. Skin:  Negative for itching and rash. Musculoskeletal:  Positive for joint pain. Negative for joint swelling. Gastrointestinal:  Negative for hematemesis and hematochezia. Genitourinary:  Negative for dysuria and hematuria. Neurological:  Positive for light-headedness. Negative for dizziness. Psychiatric/Behavioral:  Negative for altered mental status. The patient is not nervous/anxious.         Past Medical History:   Diagnosis Date    Chronic atrial fibrillation (HCC)     COPD (chronic obstructive pulmonary disease) (HCC)     Decreased hearing of both ears     Diabetes mellitus (HCC)     GERD (gastroesophageal reflux Pulmonary:      Effort: Pulmonary effort is normal.      Breath sounds: Normal breath sounds. Abdominal:      General: There is no distension. Musculoskeletal:         General: Normal range of motion. Cervical back: Normal range of motion and neck supple. Skin:     General: Skin is warm and dry. Neurological:      General: No focal deficit present. Mental Status: He is alert and oriented to person, place, and time. Psychiatric:         Mood and Affect: Mood normal.         Behavior: Behavior normal.       ECG Interpretation: (Date: 08/22/2022)  Rhythm: Sinus Bradycardia  Rate: 56 BPM          ECG Interpretation:  (Date: 08/07/2022)  Rhythm: Atrial fibrillation  Rate: 133 BPM        Echocardiogram  (Date: 08/09/2022)  Summary  Technically difficult study due to body surface area and poor acoustic  window. Normal left ventricular systolic function with ejection fraction of 55-60%. No regional wall motion abnormalites are seen. Left ventricular diastolic filling pressure is normal.  Compared to previous study left ventricle systolic function appears same as  echo on 9-. No thrombus noted. Stress Test (Date: 09/30/2021)  Summary   There is uniform isotope uptake at stress and rest. There is no evidence of  myocardial ischemia or scar. Normal myocardial wall motion and thickening  with hyperdynamic LV function. Post-stress LVEF is >70%. Current Medications     Current Outpatient Medications   Medication Sig Dispense Refill    furosemide (LASIX) 20 MG tablet Take 1 tablet by mouth in the morning. 60 tablet 3    dilTIAZem (CARDIZEM CD) 180 MG extended release capsule Take 1 capsule by mouth in the morning.  30 capsule 3    fluticasone-umeclidin-vilant (TRELEGY ELLIPTA) 100-62.5-25 MCG/INH AEPB INHALE ONE PUFF BY MOUTH DAILY 1 each 5    metFORMIN (GLUCOPHAGE-XR) 500 mg extended release tablet TAKE TWO TABLETS BY MOUTH TWICE A DAY WITH MEALS 360 tablet 1    pantoprazole (PROTONIX) 40 MG tablet TAKE ONE TABLET BY MOUTH DAILY 90 tablet 1    losartan-hydroCHLOROthiazide (HYZAAR) 100-25 MG per tablet TAKE ONE TABLET BY MOUTH DAILY 90 tablet 1    ELIQUIS 5 MG TABS tablet TAKE ONE TABLET BY MOUTH TWICE A DAY 60 tablet 5    metoprolol succinate (TOPROL XL) 50 MG extended release tablet Take 1 tablet by mouth 2 times daily 180 tablet 3    atorvastatin (LIPITOR) 40 MG tablet TAKE ONE TABLET BY MOUTH DAILY 90 tablet 3    nitroGLYCERIN (NITROSTAT) 0.4 MG SL tablet Place 1 tablet under the tongue every 5 minutes as needed for Chest pain 25 tablet 3    Handicap Placard MISC by Does not apply route 1 each 0    therapeutic multivitamin-minerals (THERAGRAN-M) tablet Take 1 tablet by mouth daily. aspirin 81 MG EC tablet Take 81 mg by mouth daily. ketoconazole (NIZORAL) 2 % cream Apply topically daily (Patient not taking: Reported on 8/22/2022) 30 g 3     No current facility-administered medications for this visit.            Lab Review     Lab Results   Component Value Date/Time     08/15/2022 01:40 PM    K 4.7 08/15/2022 01:40 PM    K 4.4 08/08/2022 06:31 AM     08/15/2022 01:40 PM    CO2 25 08/15/2022 01:40 PM    BUN 11 08/15/2022 01:40 PM    CREATININE 1.3 08/15/2022 01:40 PM    GLUCOSE 143 08/15/2022 01:40 PM    CALCIUM 9.9 08/15/2022 01:40 PM        Lab Results   Component Value Date    WBC 9.1 08/08/2022    HGB 11.7 (L) 08/08/2022    HCT 35.3 (L) 08/08/2022    MCV 87.3 08/08/2022     08/08/2022       Lab Results   Component Value Date    TSH 1.57 08/07/2022       No results found for: BNP

## 2022-08-22 ENCOUNTER — OFFICE VISIT (OUTPATIENT)
Dept: CARDIOLOGY CLINIC | Age: 82
End: 2022-08-22
Payer: MEDICARE

## 2022-08-22 VITALS
HEIGHT: 68 IN | WEIGHT: 237 LBS | SYSTOLIC BLOOD PRESSURE: 136 MMHG | OXYGEN SATURATION: 96 % | HEART RATE: 60 BPM | BODY MASS INDEX: 35.92 KG/M2 | DIASTOLIC BLOOD PRESSURE: 74 MMHG

## 2022-08-22 DIAGNOSIS — I10 ESSENTIAL HYPERTENSION: ICD-10-CM

## 2022-08-22 DIAGNOSIS — R00.1 BRADYCARDIA: ICD-10-CM

## 2022-08-22 DIAGNOSIS — G47.33 OSA (OBSTRUCTIVE SLEEP APNEA): ICD-10-CM

## 2022-08-22 DIAGNOSIS — I48.91 ATRIAL FIBRILLATION WITH RVR (HCC): Primary | ICD-10-CM

## 2022-08-22 DIAGNOSIS — R53.82 CHRONIC FATIGUE: ICD-10-CM

## 2022-08-22 PROCEDURE — 99214 OFFICE O/P EST MOD 30 MIN: CPT | Performed by: INTERNAL MEDICINE

## 2022-08-22 PROCEDURE — 93000 ELECTROCARDIOGRAM COMPLETE: CPT | Performed by: INTERNAL MEDICINE

## 2022-08-22 ASSESSMENT — ENCOUNTER SYMPTOMS
WHEEZING: 0
STRIDOR: 0
HEMATOCHEZIA: 0
RIGHT EYE: 0
LEFT EYE: 0
SHORTNESS OF BREATH: 1
HEMATEMESIS: 0

## 2022-08-22 NOTE — PATIENT INSTRUCTIONS
Plan:     Follow up with your sleep medicine doctor as planned. Discontinue taking Aspirin to decrease risk of bleeding  Discontinue taking Diltiazem  Encourage exercise as tolerated.    Follow up in 2 months with me

## 2022-08-23 ENCOUNTER — OFFICE VISIT (OUTPATIENT)
Dept: PULMONOLOGY | Age: 82
End: 2022-08-23
Payer: MEDICARE

## 2022-08-23 VITALS
HEIGHT: 68 IN | OXYGEN SATURATION: 97 % | BODY MASS INDEX: 36.07 KG/M2 | RESPIRATION RATE: 16 BRPM | WEIGHT: 238 LBS | HEART RATE: 61 BPM | TEMPERATURE: 98.1 F | SYSTOLIC BLOOD PRESSURE: 132 MMHG | DIASTOLIC BLOOD PRESSURE: 70 MMHG

## 2022-08-23 DIAGNOSIS — E66.9 OBESITY (BMI 35.0-39.9 WITHOUT COMORBIDITY): ICD-10-CM

## 2022-08-23 DIAGNOSIS — G47.33 OSA (OBSTRUCTIVE SLEEP APNEA): ICD-10-CM

## 2022-08-23 DIAGNOSIS — J41.0 SIMPLE CHRONIC BRONCHITIS (HCC): Primary | ICD-10-CM

## 2022-08-23 DIAGNOSIS — I51.89 DIASTOLIC DYSFUNCTION: ICD-10-CM

## 2022-08-23 DIAGNOSIS — Z87.891 HISTORY OF TOBACCO ABUSE: ICD-10-CM

## 2022-08-23 PROCEDURE — 99213 OFFICE O/P EST LOW 20 MIN: CPT | Performed by: INTERNAL MEDICINE

## 2022-08-23 PROCEDURE — 1123F ACP DISCUSS/DSCN MKR DOCD: CPT | Performed by: INTERNAL MEDICINE

## 2022-08-23 ASSESSMENT — SLEEP AND FATIGUE QUESTIONNAIRES
HOW LIKELY ARE YOU TO NOD OFF OR FALL ASLEEP IN A CAR, WHILE STOPPED FOR A FEW MINUTES IN TRAFFIC: 0
HOW LIKELY ARE YOU TO NOD OFF OR FALL ASLEEP WHILE SITTING AND TALKING TO SOMEONE: 0
HOW LIKELY ARE YOU TO NOD OFF OR FALL ASLEEP WHILE SITTING AND READING: 3
ESS TOTAL SCORE: 7
HOW LIKELY ARE YOU TO NOD OFF OR FALL ASLEEP WHILE WATCHING TV: 2
HOW LIKELY ARE YOU TO NOD OFF OR FALL ASLEEP WHEN YOU ARE A PASSENGER IN A CAR FOR AN HOUR WITHOUT A BREAK: 1
HOW LIKELY ARE YOU TO NOD OFF OR FALL ASLEEP WHILE SITTING QUIETLY AFTER LUNCH WITHOUT ALCOHOL: 1
HOW LIKELY ARE YOU TO NOD OFF OR FALL ASLEEP WHILE SITTING INACTIVE IN A PUBLIC PLACE: 0
HOW LIKELY ARE YOU TO NOD OFF OR FALL ASLEEP WHILE LYING DOWN TO REST IN THE AFTERNOON WHEN CIRCUMSTANCES PERMIT: 0

## 2022-08-23 NOTE — PROGRESS NOTES
MA Communication:   The following orders are received by verbal communication from Erica Palma MD    Orders include:    6 month follow up 02/28/2023 9:40 am

## 2022-08-23 NOTE — PROGRESS NOTES
pill at home which was given to him at the time of discharge, patient does feel tired and having less energy, patient does not have any other pertinent review of system of concern    Patient has come to the office for a pulmonary follow-up, patient states that his spouse is concerned about his coughing which is more than usual but patient does not think that it is more than usual, patient states that along with the coughing he does not have any significant expectoration or any increased wheezing, patient does not have any significant rhinorrhea nasal congestion sinus congestion, patient does not wear hearing aids and sometimes there is a wax buildup but does not have any ear pain or tinnitus, patient does not have any significant chest pain palpitation diaphoresis, no fever no chills, patient does not have any change in the ambient environment, patient does not have any abdominal discomfort nausea vomiting, patient does have peripheral neuropathy because of type 2 diabetes mellitus, patient states that his hemoglobin A1c is 7.3 which partly is because of his eating habits not not being optimal and also being nonactive, patient does not have any new medications, patient does not have any change in the ambient environment, patient does not have any sick contacts, patient does feel tired and sleepy in the daytime and has some sleep fragmentation, patient does not have any epistaxis hemoptysis hematochezia melena, patient states that along with [place] he has to use his rescue inhaler on average of twice a week in which he does at nighttime with improvement, patient does not have any other pertinent review of system of concern    A virtual pulmonary visit was done for the patient for his clinical status, patient states that he is clinically stable at this time patient states that he is doing very well with the Trelegy Ellipta, patient states that he takes pro-air at nighttime just to decrease any congestion at nighttime as a habit, patient does not have any significant rhinorrhea or nasal congestion or any epistaxis, patient does not have any significant otalgia or ear discharge, patient states that once  twice a day he has a deep cough without any expectoration but if patient is not concerned about that, patient states that he does not have any significant increasing shortness of breath or wheezing, patient does not have any pleuritic chest pain, no fever no chills, no palpitation or diaphoresis, patient does not have any significant abdominal pain nausea vomiting, patient does not have any increasing reflux symptoms but patient takes medication for that patient does not have any significant epistaxis or hemoptysis, patient states that he has some soft stools but not diarrhea, patient is not concerned about on the consistency of the stools, patient states that in the last few months time he has lost about 11 to 12 pounds, patient does not have any increasing sleep fragmentation, patient does not have any confusion lethargy, patient does not have any change in the ambient environment, patient does not have any new medications of concern, patient's blood sugars are under control, patient does not have any other pertinent review of system of concern    :Patient has come to the office for pulmonary follow-up, patient says that from pulmonary standpoint of view he is stable, patient states that since that time he has started taking Trelegy Ellipta he does not have any significant cough or expectoration or shortness of breath, patient states that once in a while he takes albuterol inhaler at nighttime to decrease some congestion in the daytime, patient takes his Trelegy Ellipta in the daytime at this time, patient does not have any increasing nasal congestion, patient on questioning further states that his hoarseness of voice is not significant, patient does not have any chest pain or palpitations, patient states that recently he had come to the ER because of urine retention and hematuria and was subjected to cystoscopy and however large blood clot was removed but patient was told that he does not have any apparent pathology of concern, mom patient does not have any abdominal pain nausea vomiting, patient does not have any significant increasing leg edema, patient states that he had gone to his PCPs office and patient was told to have a PSA and the results to be sent to his urologist, patient does not have any other pertinent review of system of concern     Patient has come for pulmonary evaluation;patient states that he is more less doing OK with Trelegy but before his requirements for the rescue inhaler was minimal  but lately for last few weeks time he has noticed that he was having more chest congestion along with that patient was having increased use of albuterol inhaler which is still continuing but it is better than last week, patient has cough with mucoid expectoration without any significant purulence, patient does not have any significant fever or chills, no pleuritic chest pain patient does not have any chest pain, patient does not have any increasing nasal congestion, patient does not have any epistaxis, bleeding or hemoptysis, patient does not have any fever or chills, patient does not have any significant abdominal discomfort and nausea vomiting, patient does not complain of any increasing leg edema, patient may still has some sleep fragmentation, patient does not have any confusion or lethargy, no other pertinent review of system of concern       Patient is a 67-year-old male who was upper to the office for pulmonary consult because of increasing shortness of breath  Patient says that he has been having increasing shortness of breath.   For at least the last 1 year time and patient had a cardiac workup done which was negative for any cardiac etiology for the same; patient says that on a flat surface at his own pace he can walk about 3-4 blocks without any shortness of breath but if he has to go on little better inclined order has to climb stairs he has profound shortness of breath, along with the shortness of breath he has some cough without any phlegm, patient also has occasional wheezing, patient does not have any significant chest pain along with that no palpitations or diaphoresis, no fever no chills, patient denies any increasing headaches or blurring of vision, patient does not have any otalgia or ear discharge, patient does not have any tinnitus, patient on questioning further states that he does not have any profound or rhinorrhea or nasal congestion or sinus congestion, patient does not have any significant sore throat, no difficulty in swallowing per se but patient says that his food gets stuck in the mid chest at times and patient has history of esophageal strictures and has had a ballooning of the strictures multiple times in the last one was last fall, patient does not have any pleuritic chest pain, no fever no chills, patient's appetite is maintained, patient does not have any abdominal pain and nausea or vomiting, patient is takes medications for reflux symptoms, patient does not have any altered bowel habits, patient does not have any epistaxis, gum bleeding or hemoptysis, patient does not have any significant hematochezia or melena, patient does not have any dysuria or hematuria, patient does not have any increasing leg edema, patient does have history suggestive of some sleep fragmentation t, but patient says that he has gained about 5-10 pounds in last 1 year time, patient does not have any history of any significant exposures, patient was an educator, patient says that 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 and he underwent a right middle lobe lobectomy at [facility] to that time, patient does feel tired, patient says he is a former smoker he quit about 14 years back, patient does not take any inhalers or nebulizer at this time, patient does not have any change in the ambient environment at this time, patient does not have any significant history of any recent travels or change of medications, patient says that he went to [facility] recently for a vacation and tomorrow he is going to [place], patient does not have any confusion or lethargy, no other pertinent review of system of concern    Past Medical History:   Diagnosis Date    Chronic atrial fibrillation ([address]     COPD (chronic obstructive pulmonary disease) [facility]     Decreased hearing of both ears     Diabetes mellitus ([facility]     GERD (gastroesophageal reflux disease)     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    Hyperlipidemia     Hypertension     Primary malignant neoplasm of skin of trunk 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       Past Surgical History:   Procedure Laterality Date    COLONOSCOPY  5/22/12    polyps    COLONOSCOPY  7/21/15    polyps    CYSTOSCOPY N/A 10/24/2019    CYSTOSCOPY AND CLOT EVACUATION performed by Angelo Reyes MD at [address] [place] [address] Bilateral 6/22/15    laparoscopic    LUNG REMOVAL, PARTIAL  2007    middle lobe R lung/histoplasmosis    TURP  2002    UPPER GASTROINTESTINAL ENDOSCOPY  1/26/2015    UPPER GASTROINTESTINAL ENDOSCOPY  2/15    dilated    UPPER GASTROINTESTINAL ENDOSCOPY  07/21/2017    dilatation, bx    UPPER GASTROINTESTINAL ENDOSCOPY  08/23/2017    dilated; Bx gastric.     VASECTOMY         No Known Allergies    Medication list was reviewed and updated as needed in Epic    Social History     Socioeconomic History    Marital status:      Spouse name: Not on file    Number of children: Not on file    Years of education: Not on file    Highest education level: Not on file   Occupational History    Not on file   Tobacco Use    Smoking status: Former     Packs/day: 1.00     Years: 30.00     Pack years: 30.00     Types: Cigarettes     Start date: 4/1/1971     Quit date: 1/1/2003     Years since quitting: 19.6    Smokeless tobacco: Never   Vaping Use    Vaping Use: Never used   Substance and Sexual Activity    Alcohol use: Yes     Alcohol/week: 0.0 standard drinks     Comment: 1/ week    Drug use: No    Sexual activity: Not on file   Other Topics Concern    Not on file   Social History Narrative    Not on file     Social Determinants of Health     Financial Resource Strain: Low Risk     Difficulty of Paying Living Expenses: Not hard at all   Food Insecurity: No Food Insecurity    Worried About Running Out of Food in the Last Year: Never true    Ran Out of Food in the Last Year: Never true   Transportation Needs: Not on file   Physical Activity: Not on file   Stress: Not on file   Social Connections: Not on file   Intimate Partner Violence: Not on file   Housing Stability: Not on file       Family History   Problem Relation Age of Onset    Cancer Brother         colon CA    Heart Disease Mother             ROS same as above     Physical Exam:  Blood pressure 132/70, pulse 61, temperature 98.1 °F (36.7 °C), temperature source Temporal, resp. rate 16, height 5' 8\" (1.727 m), weight 238 lb (108 kg), SpO2 97 %.'  Constitutional:  No acute distress. HENT:  Oropharynx is clear and moist. No thyromegaly. Mild nasal mucosal hyperemia; No oral thrush   Eyes:  Conjunctivae are normal. Pupils equal, round, and reactive to light. No scleral icterus. Neck: . No tracheal deviation present. No obvious thyroid mass. Neck diameter is increased  Cardiovascular: Normal rate, regular rhythm, normal heart sounds. No right ventricular heave. 1+ lower extremity edema. Pulmonary/Chest: No wheezes. No significant crackles when seen. Chest wall is not dull to percussion. No accessory muscle usage or stridor. Slightly increased prolonged expiration with breath sound intensity  Abdominal: Soft. Bowel sounds present. No distension or hernia. No tenderness. Musculoskeletal: No cyanosis. No clubbing. No obvious joint deformity. Lymphadenopathy: No cervical or supraclavicular adenopathy. Skin: Skin is warm and dry. No rash or nodules on the exposed extremities. Psychiatric: Normal mood and affect. Behavior is normal.  No anxiety. Neurologic: Alert, awake and oriented. PERRL. Speech fluent        Data:     PFT shows patient to have mild-to-moderate obstructive airway disease with some reactive disease in the small airways along with that patient has hyperinflation and also changes in the PFT parameters because of body habitus      ECHO- Summary   Technically difficult examination. Normal systolic function with an estimated ejection fraction of 55-60%. Mild concentric left ventricular hypertrophy. No regional wall motion abnormalities are seen. Grade I diastolic dysfunction with normal filing pressure. Patient's home study shows that patient has severe sleep apnea with AHI of 42.9/h along with that patient has nocturnal hypoxemia and patient saturation is dropping to as low as 78% and patient's saturation below 89% was for 90 minutes    ONE XRAY VIEW OF THE CHEST       9/29/2021 4:11 am       COMPARISON:   08/09/2021       HISTORY:   ORDERING SYSTEM PROVIDED HISTORY: chest pain   TECHNOLOGIST PROVIDED HISTORY:   Reason for exam:->chest pain   Reason for Exam: Chest Pain (woke up at 0230. no history )       FINDINGS:   The lungs are hypoinflated. Hazy opacities in the left lung base more than   the right. No significant pleural effusion is seen and no pneumothorax. The   cardiac silhouette is borderline. There is moderate pulmonary vascular   congestion. Calcified right mediastinal/hilar lymph nodes are suggested. No obvious fractures are identified. Right thoracotomy defect is stable. Impression   Moderate pulmonary vascular congestion. The hazy bibasilar opacities spare   the perihilar lung. Could be atypical pattern of edema or infectious.        Patient's compliance data shows that patient has used a CPAP machine on only 18 days over 30 and patient uses of CPAP machine more than 4 hours was 60%, patient's AHI has come down to 2/h    Assessment:    1. Shortness of breath        2. Obstructive sleep apnea      3. Diastolic dysfunction      4. Copd       5. H/O histoplasmosis      6.  Obesity (BMI 35.0-39.9 without comorbidity)              Plan:   Patient's clinical status and review of systems discussed  Patient was told about the clinical finding including auscultation and implications  Patient was told about the pathophysiology of the disease process and its modifying factors  Patient was told about the sleep study results which shows patient to have severe RIZWANA along with nocturnal hypoxemia   Patient's CPAP compliance data was reviewed and patient has not been compliant with the use of CPAP machine but the machine is efficacious and does not need any titration changes and patient was told the implication of that and patient was told that he needs to use it more often and also can use it when he is taking a nap in the daytime which was reinforced  Patient is not a candidate for inspire device at this time  Patient to continue with  Trelegy ellipta and was shown how to take it properly and was told to take one puff daily and patient was told to make sure that he rinses his mouth with water after use otherwise he can have hoarseness of voice or oral thrush  Patient was told to try taking the Trelegy Ellipta at nighttime rather than the daytime to see if it makes a difference in terms of slight decrease congestion in the daytime  Patient does not need any antibiotics or steroids from pulmonary standpoint of view but patient was told that he has increasing wheezing and requirement rescue inhaler is going up and to call   Patient was told to take saline nasal rinse   Patient was also told to try some salt and water gargles  Patient to take a sugar free candy/lozenges  Patient was also given albuterol inhaler 2 puffs every 6 on whenever necessary basis   Patient was told about dietary and lifestyle modifications  Patient needs to lose weight  Patient needs to keep himself warm for the perez  Patient has been put on Hyzaar which has HCTZ but patient continues to have significant leg edema and patient's PCP/cardiology decide if patient will benefit from a loop diuretic  Salt and fluid restrictions discussed  Patient needs to lose weight  A regular regimented exercise will help  Management of diabetes mellitus as per patient's PCP  Patient is up-to-date on the COVID-19 vaccine  Once again patient was reinforced about using the CPAP more which will help him in the long run

## 2022-08-23 NOTE — PATIENT INSTRUCTIONS
Remember to bring a list of pulmonary medications and any CPAP or BiPAP machines to your next appointment with the office. Please keep all of your future appointments scheduled by [facility] office. Out of respect for other patients and providers, you may be asked to reschedule your appointment if you arrive later than your scheduled appointment time. Appointments cancelled less than 24hrs in advance will be considered a no show. Patients with three missed appointments within 1 year or four missed appointments within 2 years can be dismissed from the practice. Please be aware that our physicians are required to work in the Intensive Care Unit at [facility].  Your appointment may need to be rescheduled if they are designated to work during your appointment time. You may receive a survey regarding the care you received during your visit. Your input is valuable to us. We encourage you to complete and return your survey. We hope you will choose us in the future for your healthcare needs. Pt instructed of all future appointment dates & times, including radiology, labs, procedures & referrals. If procedures were scheduled preparation instructions provided. Instructions on future appointments with [facility] were given.

## 2022-09-06 ENCOUNTER — HOSPITAL ENCOUNTER (OUTPATIENT)
Dept: ULTRASOUND IMAGING | Age: 82
Discharge: HOME | End: 2022-09-06
Payer: MEDICARE

## 2022-09-06 ENCOUNTER — HOSPITAL ENCOUNTER (OUTPATIENT)
Age: 82
Discharge: HOME | End: 2022-09-06
Payer: MEDICARE

## 2022-09-06 DIAGNOSIS — N28.1 RENAL CYST: ICD-10-CM

## 2022-09-06 PROCEDURE — 76770 US EXAM ABDO BACK WALL COMP: CPT

## 2022-09-13 RX ORDER — NITROGLYCERIN 0.4 MG/1
TABLET SUBLINGUAL
Qty: 25 TABLET | Refills: 3 | Status: SHIPPED | OUTPATIENT
Start: 2022-09-13

## 2022-09-29 DIAGNOSIS — I10 ESSENTIAL HYPERTENSION, BENIGN: ICD-10-CM

## 2022-09-29 RX ORDER — LOSARTAN POTASSIUM AND HYDROCHLOROTHIAZIDE 25; 100 MG/1; MG/1
TABLET ORAL
Qty: 90 TABLET | Refills: 1 | Status: SHIPPED | OUTPATIENT
Start: 2022-09-29

## 2022-09-29 NOTE — TELEPHONE ENCOUNTER
Last Office Visit  -  05/15/2022  Next Office Visit  -  n/a    Last Filled  -    Last UDS -    Contract -

## 2022-10-11 ENCOUNTER — TELEPHONE (OUTPATIENT)
Dept: ENT CLINIC | Age: 82
End: 2022-10-11

## 2022-10-11 NOTE — TELEPHONE ENCOUNTER
Received an email in regards to this patient from the [place] web site to se up an appointment. Call placed to patient to set up an appointment with Dr. Marychuy Seo. Patient did not answer, LVM asking patient to call back to set up an appointment.

## 2022-10-13 ENCOUNTER — TELEPHONE (OUTPATIENT)
Dept: ENT CLINIC | Age: 82
End: 2022-10-13

## 2022-10-19 NOTE — TELEPHONE ENCOUNTER
Call placed to patient to set up an appointment with Dr. Ralph Mora. Patient did not answer, LVM asking patient to call back to set up an appointment. This is the offices third attempt at calling the patient to schedule an appointment to discuss Inspire. The office will no longer reach out to schedule.

## 2022-10-20 DIAGNOSIS — E78.2 MIXED HYPERLIPIDEMIA: ICD-10-CM

## 2022-10-20 RX ORDER — ATORVASTATIN CALCIUM 10 MG/1
TABLET, FILM COATED ORAL
Qty: 90 TABLET | Refills: 1 | OUTPATIENT
Start: 2022-10-20

## 2022-10-20 RX ORDER — FUROSEMIDE 40 MG/1
TABLET ORAL
Qty: 30 TABLET | Refills: 5 | OUTPATIENT
Start: 2022-10-20

## 2022-10-25 DIAGNOSIS — E78.2 MIXED HYPERLIPIDEMIA: ICD-10-CM

## 2022-10-25 DIAGNOSIS — K21.9 GASTROESOPHAGEAL REFLUX DISEASE WITHOUT ESOPHAGITIS: ICD-10-CM

## 2022-10-25 RX ORDER — PANTOPRAZOLE SODIUM 40 MG/1
TABLET, DELAYED RELEASE ORAL
Qty: 90 TABLET | Refills: 1 | Status: SHIPPED | OUTPATIENT
Start: 2022-10-25

## 2022-10-25 RX ORDER — ATORVASTATIN CALCIUM 40 MG/1
TABLET, FILM COATED ORAL
Qty: 90 TABLET | Refills: 3 | Status: SHIPPED | OUTPATIENT
Start: 2022-10-25

## 2022-10-25 NOTE — TELEPHONE ENCOUNTER
Last Office Visit  -  8/15/22  Next Office Visit  -  10/31/22    Last Filled  -  5/2/22  Last UDS -    Contract -

## 2022-10-25 NOTE — TELEPHONE ENCOUNTER
Pt requesting refill  pantoprazole (PROTONIX) 40 MG tablet   Last Office Visit  -  8/15/22  Next Office Visit  -  10/31/22

## 2022-10-25 NOTE — TELEPHONE ENCOUNTER
LOV 03/31/2022  Upcoming appt 11/29/2022  LIPID 10/25/2021  LFT 10/25/2021    [facility]- do you want new labs ordered for this refill?

## 2022-10-28 ENCOUNTER — OFFICE VISIT (OUTPATIENT)
Dept: ENT CLINIC | Age: 82
End: 2022-10-28
Payer: MEDICARE

## 2022-10-28 ENCOUNTER — OFFICE VISIT (OUTPATIENT)
Dept: CARDIOLOGY CLINIC | Age: 82
End: 2022-10-28
Payer: MEDICARE

## 2022-10-28 VITALS
HEART RATE: 62 BPM | OXYGEN SATURATION: 93 % | HEIGHT: 68 IN | WEIGHT: 232.8 LBS | BODY MASS INDEX: 35.28 KG/M2 | DIASTOLIC BLOOD PRESSURE: 78 MMHG | SYSTOLIC BLOOD PRESSURE: 118 MMHG

## 2022-10-28 VITALS
SYSTOLIC BLOOD PRESSURE: 134 MMHG | HEIGHT: 68 IN | WEIGHT: 228 LBS | BODY MASS INDEX: 34.56 KG/M2 | RESPIRATION RATE: 16 BRPM | TEMPERATURE: 97.2 F | DIASTOLIC BLOOD PRESSURE: 80 MMHG | HEART RATE: 61 BPM

## 2022-10-28 DIAGNOSIS — G47.33 OSA (OBSTRUCTIVE SLEEP APNEA): Primary | ICD-10-CM

## 2022-10-28 DIAGNOSIS — R00.1 BRADYCARDIA: ICD-10-CM

## 2022-10-28 DIAGNOSIS — R53.83 EASY FATIGABILITY: ICD-10-CM

## 2022-10-28 DIAGNOSIS — I48.19 PERSISTENT ATRIAL FIBRILLATION (HCC): Primary | ICD-10-CM

## 2022-10-28 DIAGNOSIS — G47.33 OSA (OBSTRUCTIVE SLEEP APNEA): ICD-10-CM

## 2022-10-28 PROCEDURE — 1123F ACP DISCUSS/DSCN MKR DOCD: CPT | Performed by: INTERNAL MEDICINE

## 2022-10-28 PROCEDURE — 3074F SYST BP LT 130 MM HG: CPT | Performed by: OTOLARYNGOLOGY

## 2022-10-28 PROCEDURE — 99203 OFFICE O/P NEW LOW 30 MIN: CPT | Performed by: OTOLARYNGOLOGY

## 2022-10-28 PROCEDURE — 3078F DIAST BP <80 MM HG: CPT | Performed by: OTOLARYNGOLOGY

## 2022-10-28 PROCEDURE — 3078F DIAST BP <80 MM HG: CPT | Performed by: INTERNAL MEDICINE

## 2022-10-28 PROCEDURE — 99214 OFFICE O/P EST MOD 30 MIN: CPT | Performed by: INTERNAL MEDICINE

## 2022-10-28 PROCEDURE — 3074F SYST BP LT 130 MM HG: CPT | Performed by: INTERNAL MEDICINE

## 2022-10-28 PROCEDURE — 1123F ACP DISCUSS/DSCN MKR DOCD: CPT | Performed by: OTOLARYNGOLOGY

## 2022-10-28 ASSESSMENT — ENCOUNTER SYMPTOMS
FACIAL SWELLING: 0
SORE THROAT: 0
HEMATOCHEZIA: 0
SHORTNESS OF BREATH: 0
SINUS PRESSURE: 0
VOICE CHANGE: 0
EYE ITCHING: 0
APNEA: 0
COUGH: 0
TROUBLE SWALLOWING: 0
WHEEZING: 0
LEFT EYE: 0
STRIDOR: 0
SLEEP DISTURBANCES DUE TO BREATHING: 1
HEMATEMESIS: 0
SHORTNESS OF BREATH: 0
RIGHT EYE: 0

## 2022-10-28 NOTE — PROGRESS NOTES
[facility], [address], [address], [facility] Raymond Brown  P: [phone]       Patient     Won Gagnon  1940    ChiefComplaint     Chief Complaint   Patient presents with    New Patient     Patient is here today today to discuss the inspire. History of Present Illness     Kennedy Howell is an 71-year-old male here today for discussion of obstructive sleep apnea. Diagnosed with severe obstructive sleep apnea AHI 42 on sleep study last year. States since he was provided with the machine he has issues with frequent leaks which caused the machine to alarm disturbing his wife. Does find benefit from machine when he is able to get a good seal though this is infrequent. Has not tried alternative masks. 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    Hyperlipidemia     Hypertension     Primary malignant neoplasm of skin of trunk 04/21/2009       Past Surgical History     Past Surgical History:   Procedure Laterality Date    COLONOSCOPY  05/22/2012    polyps    COLONOSCOPY  07/21/2015    polyps    CYSTOSCOPY N/A 10/24/2019    CYSTOSCOPY AND CLOT EVACUATION performed by Noelle Iraheta MD at [address] Bilateral 06/22/2015    laparoscopic    LUNG REMOVAL, PARTIAL  2007    middle lobe R lung/histoplasmosis    TURP  2002    UPPER GASTROINTESTINAL ENDOSCOPY  01/26/2015    UPPER GASTROINTESTINAL ENDOSCOPY  02/2015    dilated    UPPER GASTROINTESTINAL ENDOSCOPY  07/21/2017    dilatation, bx    UPPER GASTROINTESTINAL ENDOSCOPY  08/23/2017    dilated; Bx gastric.     VASECTOMY         Family History     Family History   Problem Relation Age of Onset    Cancer Brother         colon CA    Heart Disease Mother        Social History     Social History     Tobacco Use    Smoking status: Former     Packs/day: 1.00     Years: 30.00     Pack years: 30.00     Types: Cigarettes     Start date: 1971     Quit date: 2003     Years since quittin.8    Smokeless tobacco: Never   Vaping Use    Vaping Use: Never used   Substance Use Topics    Alcohol use: Yes     Alcohol/week: 0.0 standard drinks     Comment: 1/ week    Drug use: No        Allergies     No Known Allergies    Medications     Current Outpatient Medications   Medication Sig Dispense Refill    pantoprazole (PROTONIX) 40 MG tablet TAKE ONE TABLET BY MOUTH DAILY 90 tablet 1    atorvastatin (LIPITOR) 40 MG tablet TAKE ONE TABLET BY MOUTH DAILY 90 tablet 3    apixaban (ELIQUIS) 5 MG TABS tablet TAKE ONE TABLET BY MOUTH TWICE A DAY 60 tablet 5    losartan-hydroCHLOROthiazide (HYZAAR) 100-25 MG per tablet TAKE ONE TABLET BY MOUTH DAILY 90 tablet 1    nitroGLYCERIN (NITROSTAT) 0.4 MG SL tablet DISSOLVE 1 TAB UNDER TONGUE FOR CHEST PAIN - IF PAIN REMAINS AFTER 5 MIN, CALL 911 AND REPEAT DOSE. MAX 3 TABS IN 15 MINUTES 25 tablet 3    ketoconazole (NIZORAL) 2 % cream Apply topically daily 30 g 3    furosemide (LASIX) 20 MG tablet Take 1 tablet by mouth in the morning. 60 tablet 3    fluticasone-umeclidin-vilant (TRELEGY ELLIPTA) 100-62.5-25 MCG/INH AEPB INHALE ONE PUFF BY MOUTH DAILY 1 each 5    metFORMIN (GLUCOPHAGE-XR) 500 mg extended release tablet TAKE TWO TABLETS BY MOUTH TWICE A DAY WITH MEALS 360 tablet 1    metoprolol succinate (TOPROL XL) 50 MG extended release tablet Take 1 tablet by mouth 2 times daily 180 tablet 3    Handicap Placard MISC by Does not apply route 1 each 0    therapeutic multivitamin-minerals (THERAGRAN-M) tablet Take 1 tablet by mouth daily. No current facility-administered medications for this visit. Review of Systems     Review of Systems   Constitutional:  Negative for appetite change, chills, fatigue, fever and unexpected weight change. HENT:  Negative for congestion, ear discharge, ear pain, facial swelling, hearing loss, nosebleeds, postnasal drip, sinus pressure, sneezing, sore throat, tinnitus, trouble swallowing and voice change. Eyes:  Negative for itching. Respiratory:  Negative for apnea, cough and shortness of breath. Endocrine: Negative for cold intolerance and heat intolerance. Musculoskeletal:  Negative for myalgias and neck pain. Skin:  Negative for rash. Allergic/Immunologic: Negative for environmental allergies. Neurological:  Negative for dizziness and headaches. Psychiatric/Behavioral:  Negative for confusion, decreased concentration and sleep disturbance. PhysicalExam     Vitals:    10/28/22 0945   BP: 134/80   Site: Left Upper Arm   Position: Sitting   Cuff Size: Large Adult   Pulse: 61   Resp: 16   Temp: 97.2 °F (36.2 °C)   TempSrc: Infrared   Weight: 228 lb (103.4 kg)   Height: 5' 8\" (1.727 m)       Physical Exam  Constitutional:       General: He is not in acute distress. Appearance: He is well-developed. HENT:      Head: Normocephalic and atraumatic. Right Ear: Tympanic membrane, ear canal and external ear normal. No drainage. No middle ear effusion. Tympanic membrane is not bulging. Tympanic membrane has normal mobility. Left Ear: Tympanic membrane, ear canal and external ear normal. No drainage. No middle ear effusion. Tympanic membrane is not bulging. Tympanic membrane has normal mobility. Nose: No mucosal edema or rhinorrhea. Mouth/Throat:      Lips: Pink. Mouth: Mucous membranes are moist.      Tongue: No lesions. Palate: No mass. Pharynx: Uvula midline. Eyes:      Pupils: Pupils are equal, round, and reactive to light. Neck:      Thyroid: No thyroid mass or thyromegaly. Trachea: Trachea and phonation normal.   Cardiovascular:      Pulses: Normal pulses.    Pulmonary:      Effort: Pulmonary effort is normal. No accessory muscle usage or respiratory distress. Breath sounds: No stridor. Musculoskeletal:      Cervical back: Full passive range of motion without pain. Lymphadenopathy:      Head:      Right side of head: No submental or submandibular adenopathy. Left side of head: No submental or submandibular adenopathy. Cervical: No cervical adenopathy. Right cervical: No superficial, deep or posterior cervical adenopathy. Left cervical: No superficial, deep or posterior cervical adenopathy. Skin:     General: Skin is warm and dry. Neurological:      Mental Status: He is alert and oriented to person, place, and time. Cranial Nerves: No cranial nerve deficit. Coordination: Coordination normal.      Gait: Gait normal.   Psychiatric:         Thought Content: Thought content normal.         Assessment and Plan     1. RIZWANA (obstructive sleep apnea)  -AHI 42 on sleep study last year, BMI 35 started on CPAP for greater than 3 months secondary to frequent leaks  -I encouraged the patient to reach out to his medical supply company as there are multiple mask options as this may solve the problem for him  -Explained the need for sleep endoscopy to determine if he is a candidate for inspire, he does wish to proceed with this  -Inspire implantation, risks and process briefly overview        Jessica Hanks DO  10/28/22      Portions of this note were dictated using Dragon.  There may be linguistic errors secondary to the use of this program.

## 2022-10-28 NOTE — LETTER
[facility]    Surgery Schedule Request Form: 10/28/22  Charito Cantu      DATE OF SURGERY: 12/20/2022                                                DISE    TIME OF SURGERY:  7:45 am            CONF #: ____________________       Patient Information:    Patient name: Jaguar Mckeon    YOB: 1940 Age/Sex:82 y.o./male    SS #:[id number]    Wt Readings from Last 1 Encounters:   10/28/22 232 lb 12.8 oz (105.6 kg)       Telephone Information:   Mobile [phone]     Home [phone]     Surgeon & Procedure Information:     Lead surgeon: Gi Lin Co-Surgeon: osvaldo  Phone: [phone] Fax: [phone]  PCP: Chavez Olivier MD    Diagnosis: obstructive sleep apnea  G47.33    Location: [address] [place] [address]    Procedure name/CPT: drug induced sleep endoscopy   21     Procedure length: 20 minutes Anesthesia: Endo Sedation    Special Equipment: no    Patient Status: SDS (OP)    COVID Vacs: yes     Primary Payor Plan: Jerrica Arrieta  Member ID: [id number]   Subscriber name: Cesar Mohr    [] Implement attached clinical orders for patient.       Electronically signed by Emmanuelle Sorensen DO on 10/28/2022 at 3:45 PM

## 2022-10-28 NOTE — PROGRESS NOTES
Assessment:     1. Atrial fibrillation: patient has paroxysmal atrial fibrillation in setting of treated obstructive sleep apnea. He has reported two episodes of atrial fibrillation in the last two years. Last episode was in August 2022 requiring an ED visit. Episodes remain sporadic. Being on both diltiazem and metoprolol caused resting bradycardia, fatigue and inability to exercise. There is no good reason to be on dual AV noman blockers at this point. We stopped diltiazem and so far the patient has done well. Escalation of therapy for atrial fibrillation can be considered if patient has more frequent episodes. I have encouraged him to be very compliant with sleep apnea treatment using his CPAP machine. He is now considering the \"Inspire\" device. I also urged him to exercise regularly with a goal of losing at least a few pounds. He does not consume significant amounts of caffeine or alcohol. Patient has a relatively normal heart by echocardiography with no significant valvular abnormalities and preserved LV systolic function. His left atrium is normal in size. I have had multiple discussions with the patient about issues related to atrial fibrillation (including etiology, disease progression patterns, stroke risk and rate control issues). Patient had her questions answered to his satisfaction. Patient has a [id number] score of at least 4 (age over 76, hypertension, diabetes mellitus). He is on apixaban 5 mg BID with no reported bleeding issues (but has easy bruising). There is no clear indication for aspirin in his case (no history of coronary artery disease or coronary stents). To avoid excess risk of bleeding, we stopped aspirin. 2. Obstructive sleep apnea: Previously noted to have severe obstructive sleep apnea and is quite compliant with CPAP therapy. He is now considering the \"Inspire\" device. Losing a few pounds at least would be very helpful in his case as well.     3. Hypertension: reasonable blood pressure control. Monitor with the discontinuation of diltiazem. 4. Diabetes mellitus: On oral therapy. Management as per primary physician. Plan:     Continue current cardiac medications as prescribed   If you have any atrial fibrillation episodes please contact our office  Follow up with me in 4 months or sooner if needed    Subjective:       Patient ID: Melania Grigsby is a 81 y.o. male. Chief Complaint:  Chief Complaint   Patient presents with    Follow-up     2 month    Atrial Fibrillation       HPI    Patient is a pleasant 81 y.o. male who presents for evaluation of atrial fibrillation. The patient has a past medical history including COPD, diabetes, hypertension, and atrial fibrillation. On 08/07/2022 the patient presented to the emergency room with complaints of exertional shortness of breath and palpitations. His ECG demonstrated atrial fibrillation (133 BPM). He was treated with IV diltiazem and converted to SR/SB spontaneously. Hospital Consult (EP Nurse Practitioner, 08/10/2022)  HPI/CC: Melania Grigsby was admitted on 8/7/2022 with shortness of breath. EKG showed rapid AF. He was started in IV Cardizem and spontaneously converted to SR/SB. Echo showed an EF of 55-60% and no WMA. Has also been treated for SONIDO. Rhythm has been SB/SR. Office Visit (EP clinic, 08/22/2022)  Today he presents for initial EP evaluation following hospital discharge. He states he presented to the hospital with a rapid heart beat and was found to have medication interactions as well. He states he has had this rhythm for around 2 years. He reports he has not had any hospital visits this year for atrial fibrillation. He reports he does have chest pain under his left breast in the morning. He reports he does feel fatigued throughout the day. He reports he feels as if his fatigue has increased since starting Diltiazem. He states his activity has decreased due to having no energy.  He reports he does have exertional shortness of breath. He reports he can tolerate ascending/descending a flight of stairs, but he must do so slowly. He reports he does have some lightheadedness and occasional issues with imbalance. He states he has edema in his legs and he has compression socks at home but he does not use them often. He reports he was diagnosed with sleep apnea around 1 year ago. He states he is compliant with wearing his CPAP machine and minimizes his time sleeping without it. He rarely uses alcohol. He reports he drinks one cup of coffee daily. Patient denies current dizziness or syncope. Patient is taking all cardiac medications as prescribed and tolerates them well. He denies any recent issues with bleeding or bruising. Office Visit ([address], 10/28/2022)  Today he presents for follow up. He states that he feels well since stopping aspirin and diltiazem. He reports he has not had any episodes since August. He has not noticed an increase in energy. He is able to tolerate walking a mile 4 days weekly, but the other days he is unable to due to neuropathy. He states that he is using his seep apnea machine regularly. He states that the machine does cause some sleep disturbances for himself and his wife. He states that he does have some swelling in his legs, but he is getting measured for compression stockings today. Patient denies current edema, chest pain, sob, palpitations, dizziness or syncope. Patient is taking all cardiac medications as prescribed and tolerates them well. He denies any recent issues with bleeding or bruising. Review of Systems  Review of Systems   Constitutional: Positive for malaise/fatigue. Negative for weight gain and weight loss. HENT:  Negative for nosebleeds and stridor. Eyes:  Negative for vision loss in left eye and vision loss in right eye. Cardiovascular:  Positive for leg swelling. Negative for chest pain, dyspnea on exertion and syncope. Respiratory:  Positive for sleep disturbances due to breathing. Negative for shortness of breath and wheezing. Hematologic/Lymphatic: Negative for bleeding problem. Does not bruise/bleed easily. Skin:  Negative for itching and rash. Musculoskeletal:  Positive for joint pain. Negative for joint swelling. Gastrointestinal:  Negative for hematemesis and hematochezia. Genitourinary:  Negative for dysuria and hematuria. Neurological:  Positive for numbness and paresthesias. Negative for dizziness and light-headedness. Psychiatric/Behavioral:  Negative for altered mental status. The patient is not nervous/anxious. Past Medical History:   Diagnosis Date    Chronic atrial fibrillation (HCC)     COPD (chronic obstructive pulmonary disease) (HCC)     Decreased hearing of both ears     Diabetes mellitus (HCC)     GERD (gastroesophageal reflux disease)     Histoplasmosis     lung resected    Hyperlipidemia     Hypertension     Primary malignant neoplasm of skin of trunk 2009         Social History     Socioeconomic History    Marital status:      Spouse name: Not on file    Number of children: Not on file    Years of education: Not on file    Highest education level: Not on file   Occupational History    Not on file   Tobacco Use    Smoking status: Former     Packs/day: 1.00     Years: 30.00     Pack years: 30.00     Types: Cigarettes     Start date: 1971     Quit date: 2003     Years since quittin.8    Smokeless tobacco: Never   Vaping Use    Vaping Use: Never used   Substance and Sexual Activity    Alcohol use:  Yes     Alcohol/week: 0.0 standard drinks     Comment: 1/ week    Drug use: No    Sexual activity: Not on file   Other Topics Concern    Not on file   Social History Narrative    Not on file     Social Determinants of Health     Financial Resource Strain: Low Risk     Difficulty of Paying Living Expenses: Not hard at all   Food Insecurity: No Food Insecurity Worried About Running Out of Food in the Last Year: Never true    Ran Out of Food in the Last Year: Never true   Transportation Needs: Not on file   Physical Activity: Not on file   Stress: Not on file   Social Connections: Not on file   Intimate Partner Violence: Not on file   Housing Stability: Not on file         Family History   Problem Relation Age of Onset    Cancer Brother         colon CA    Heart Disease Mother          Objective:     /78 (Site: Left Upper Arm)   Pulse 62   Ht 5' 8\" (1.727 m)   Wt 232 lb 12.8 oz (105.6 kg)   SpO2 93%   BMI 35.40 kg/m²     Physical Exam  Constitutional:       Appearance: Normal appearance. HENT:      Head: Normocephalic and atraumatic. Nose: Nose normal. No rhinorrhea. Eyes:      General: No scleral icterus. Conjunctiva/sclera: Conjunctivae normal.   Cardiovascular:      Rate and Rhythm: Normal rate and regular rhythm. Pulmonary:      Effort: Pulmonary effort is normal.      Breath sounds: Normal breath sounds. Abdominal:      General: There is no distension. Musculoskeletal:         General: Normal range of motion. Cervical back: Normal range of motion and neck supple. Skin:     General: Skin is warm and dry. Neurological:      General: No focal deficit present. Mental Status: He is alert and oriented to person, place, and time. Psychiatric:         Mood and Affect: Mood normal.         Behavior: Behavior normal.       ECG Interpretation: (Date: 08/22/2022)  Rhythm: Sinus Bradycardia  Rate: 56 BPM          ECG Interpretation:  (Date: 08/07/2022)  Rhythm: Atrial fibrillation  Rate: 133 BPM        Echocardiogram  (Date: 08/09/2022)  Summary  Technically difficult study due to body surface area and poor acoustic  window. Normal left ventricular systolic function with ejection fraction of 55-60%. No regional wall motion abnormalites are seen.   Left ventricular diastolic filling pressure is normal.  Compared to previous study left ventricle systolic function appears same as  echo on 9-. No thrombus noted. Stress Test (Date: 09/30/2021)  Summary   There is uniform isotope uptake at stress and rest. There is no evidence of  myocardial ischemia or scar. Normal myocardial wall motion and thickening  with hyperdynamic LV function. Post-stress LVEF is >70%. Current Medications     Current Outpatient Medications   Medication Sig Dispense Refill    pantoprazole (PROTONIX) 40 MG tablet TAKE ONE TABLET BY MOUTH DAILY 90 tablet 1    atorvastatin (LIPITOR) 40 MG tablet TAKE ONE TABLET BY MOUTH DAILY 90 tablet 3    apixaban (ELIQUIS) 5 MG TABS tablet TAKE ONE TABLET BY MOUTH TWICE A DAY 60 tablet 5    losartan-hydroCHLOROthiazide (HYZAAR) 100-25 MG per tablet TAKE ONE TABLET BY MOUTH DAILY 90 tablet 1    nitroGLYCERIN (NITROSTAT) 0.4 MG SL tablet DISSOLVE 1 TAB UNDER TONGUE FOR CHEST PAIN - IF PAIN REMAINS AFTER 5 MIN, CALL 911 AND REPEAT DOSE. MAX 3 TABS IN 15 MINUTES 25 tablet 3    ketoconazole (NIZORAL) 2 % cream Apply topically daily 30 g 3    furosemide (LASIX) 20 MG tablet Take 1 tablet by mouth in the morning. 60 tablet 3    fluticasone-umeclidin-vilant (TRELEGY ELLIPTA) 100-62.5-25 MCG/INH AEPB INHALE ONE PUFF BY MOUTH DAILY 1 each 5    metFORMIN (GLUCOPHAGE-XR) 500 mg extended release tablet TAKE TWO TABLETS BY MOUTH TWICE A DAY WITH MEALS 360 tablet 1    metoprolol succinate (TOPROL XL) 50 MG extended release tablet Take 1 tablet by mouth 2 times daily 180 tablet 3    Handicap Placard MISC by Does not apply route 1 each 0    therapeutic multivitamin-minerals (THERAGRAN-M) tablet Take 1 tablet by mouth daily. No current facility-administered medications for this visit.            Lab Review     Lab Results   Component Value Date/Time     08/15/2022 01:40 PM    K 4.7 08/15/2022 01:40 PM    K 4.4 08/08/2022 06:31 AM     08/15/2022 01:40 PM    CO2 25 08/15/2022 01:40 PM    BUN 11 08/15/2022 01:40 PM    CREATININE 1.3 08/15/2022 01:40 PM    GLUCOSE 143 08/15/2022 01:40 PM    CALCIUM 9.9 08/15/2022 01:40 PM        Lab Results   Component Value Date    WBC 9.1 08/08/2022    HGB 11.7 (L) 08/08/2022    HCT 35.3 (L) 08/08/2022    MCV 87.3 08/08/2022     08/08/2022       Lab Results   Component Value Date    TSH 1.57 08/07/2022       No results found for: BNP      I, Zac Quinones RN, am scribing for and in the presence of Dr. Lesta Lundborg.  10/28/22 10:36 AM   Zac Quinones RN

## 2022-10-28 NOTE — PATIENT INSTRUCTIONS
Plan:     Continue current cardiac medications as prescribed   If you have any atrial fibrillation episodes please contact our office  Follow up with me in 4 months or sooner if needed

## 2022-10-31 ENCOUNTER — TELEPHONE (OUTPATIENT)
Dept: ENT CLINIC | Age: 82
End: 2022-10-31

## 2022-10-31 ENCOUNTER — TELEPHONE (OUTPATIENT)
Dept: FAMILY MEDICINE CLINIC | Age: 82
End: 2022-10-31

## 2022-10-31 RX ORDER — AMLODIPINE BESYLATE 5 MG/1
5 TABLET ORAL DAILY
Qty: 30 TABLET | Refills: 0 | Status: SHIPPED | OUTPATIENT
Start: 2022-10-31 | End: 2022-11-14

## 2022-10-31 NOTE — TELEPHONE ENCOUNTER
Pharmacy requesting refill  amLODIPine (NORVASC) tablet 5 mg   [  Last Office Visit  -  8/15/22  Next Office Visit  -  11/9/22

## 2022-11-03 ENCOUNTER — TELEPHONE (OUTPATIENT)
Dept: ENT CLINIC | Age: 82
End: 2022-11-03

## 2022-11-09 ENCOUNTER — OFFICE VISIT (OUTPATIENT)
Dept: FAMILY MEDICINE CLINIC | Age: 82
End: 2022-11-09
Payer: MEDICARE

## 2022-11-09 VITALS
OXYGEN SATURATION: 94 % | HEIGHT: 68 IN | HEART RATE: 72 BPM | BODY MASS INDEX: 35.61 KG/M2 | SYSTOLIC BLOOD PRESSURE: 124 MMHG | WEIGHT: 235 LBS | DIASTOLIC BLOOD PRESSURE: 60 MMHG

## 2022-11-09 DIAGNOSIS — E78.2 MIXED HYPERLIPIDEMIA: ICD-10-CM

## 2022-11-09 DIAGNOSIS — G47.33 OSA (OBSTRUCTIVE SLEEP APNEA): ICD-10-CM

## 2022-11-09 DIAGNOSIS — E66.01 SEVERE OBESITY (BMI 35.0-39.9) WITH COMORBIDITY (HCC): ICD-10-CM

## 2022-11-09 DIAGNOSIS — D33.2 BENIGN NEOPLASM OF BRAIN, UNSPECIFIED BRAIN REGION (HCC): ICD-10-CM

## 2022-11-09 DIAGNOSIS — E11.9 CONTROLLED TYPE 2 DIABETES MELLITUS WITHOUT COMPLICATION, WITHOUT LONG-TERM CURRENT USE OF INSULIN (HCC): Primary | ICD-10-CM

## 2022-11-09 DIAGNOSIS — I10 ESSENTIAL HYPERTENSION, BENIGN: ICD-10-CM

## 2022-11-09 DIAGNOSIS — L60.0 INGROWN TOENAIL OF LEFT FOOT: ICD-10-CM

## 2022-11-09 LAB — HBA1C MFR BLD: 6.6 %

## 2022-11-09 PROCEDURE — 99214 OFFICE O/P EST MOD 30 MIN: CPT | Performed by: FAMILY MEDICINE

## 2022-11-09 PROCEDURE — 83036 HEMOGLOBIN GLYCOSYLATED A1C: CPT | Performed by: FAMILY MEDICINE

## 2022-11-09 PROCEDURE — 3074F SYST BP LT 130 MM HG: CPT | Performed by: FAMILY MEDICINE

## 2022-11-09 PROCEDURE — 3044F HG A1C LEVEL LT 7.0%: CPT | Performed by: FAMILY MEDICINE

## 2022-11-09 PROCEDURE — 3078F DIAST BP <80 MM HG: CPT | Performed by: FAMILY MEDICINE

## 2022-11-09 PROCEDURE — 1123F ACP DISCUSS/DSCN MKR DOCD: CPT | Performed by: FAMILY MEDICINE

## 2022-11-09 NOTE — PROGRESS NOTES
Bao García (:  1940) is a 81 y.o. male,Established patient, here for evaluation of the following chief complaint(s):  Diabetes and Other (Sore Left greater toe )         ASSESSMENT/PLAN:  1. Controlled type 2 diabetes mellitus without complication, without long-term current use of insulin ([facility])  -     POCT glycosylated hemoglobin (Hb A1C)    2. Ingrown toenail of left foot  Followup with Dr. Jennifer Landon from Podiatry  3. Severe obesity (BMI 35.0-39. 9) with comorbidity ([address]  4. Benign neoplasm of brain, unspecified brain region [facility])      No obvious change in size of the nonenhancing solid mass in the   left lateral ventricle compared to prior. 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 5. RIZWANA (obstructive sleep apnea)  Not tolerating cpap. Not sleeping well at night. Looking at other optiosn. No follow-ups on file. Subjective   SUBJECTIVE/OBJECTIVE:  Bao García is a 81 y.o. male. Patient presents with:  Diabetes  Other: Sore Left greater toe       81-year-old male who has a history of diabetes mellitus who presents today because he has a sore left greater toe. There is a an ingrown toenail. Patient notes that this has been getting more painful recently. Patient has not had any discharge or swelling in the area. Patient has been taking his medications regularly for his diabetes mellitus. Notes that he has no polyuria polyphagia or polydipsia. The patients PMH, surgical history, family history, medications, allergies were all reviewed and updated as appropriate today. Diabetes    Other      Review of Systems       Objective   Physical Exam  Vitals and nursing note reviewed. Constitutional:       Appearance: Normal appearance. He is well-developed. HENT:      Head: Normocephalic and atraumatic.       Right Ear: External ear normal.      Left Ear: External ear normal.      Nose: Nose normal.   Eyes:      Conjunctiva/sclera: Conjunctivae normal. Pupils: Pupils are equal, round, and reactive to light. Cardiovascular:      Rate and Rhythm: Normal rate and regular rhythm. Heart sounds: Normal heart sounds. No murmur heard. No friction rub. No gallop. Pulmonary:      Effort: Pulmonary effort is normal. No respiratory distress. Breath sounds: Normal breath sounds. No wheezing. Abdominal:      General: Bowel sounds are normal. There is no distension. Palpations: Abdomen is soft. Tenderness: There is no abdominal tenderness. Musculoskeletal:         General: No tenderness. Normal range of motion. Cervical back: Normal range of motion and neck supple. Skin:     General: Skin is warm and dry. Comments: Tenderness along greater toe   Neurological:      Mental Status: He is alert and oriented to person, place, and time. Deep Tendon Reflexes: Reflexes are normal and symmetric. Psychiatric:         Behavior: Behavior normal.         Thought Content: Thought content normal.         Judgment: Judgment normal.            An electronic signature was used to authenticate this note.     --Indira Duque MD

## 2022-11-14 RX ORDER — AMLODIPINE BESYLATE 5 MG/1
TABLET ORAL
Qty: 30 TABLET | Refills: 0 | Status: SHIPPED | OUTPATIENT
Start: 2022-11-14

## 2022-11-14 NOTE — TELEPHONE ENCOUNTER
Last Office Visit  -  11/09/22  Next Office Visit  -  11/18/22    Last Filled  -  10/31/22  Last UDS -    Contract -

## 2022-11-18 ENCOUNTER — TELEMEDICINE (OUTPATIENT)
Dept: FAMILY MEDICINE CLINIC | Age: 82
End: 2022-11-18
Payer: MEDICARE

## 2022-11-18 DIAGNOSIS — Z00.00 MEDICARE ANNUAL WELLNESS VISIT, SUBSEQUENT: Primary | ICD-10-CM

## 2022-11-18 PROCEDURE — 1123F ACP DISCUSS/DSCN MKR DOCD: CPT | Performed by: FAMILY MEDICINE

## 2022-11-18 PROCEDURE — G0439 PPPS, SUBSEQ VISIT: HCPCS | Performed by: FAMILY MEDICINE

## 2022-11-18 ASSESSMENT — PATIENT HEALTH QUESTIONNAIRE - PHQ9
SUM OF ALL RESPONSES TO PHQ9 QUESTIONS 1 & 2: 0
SUM OF ALL RESPONSES TO PHQ QUESTIONS 1-9: 0
1. LITTLE INTEREST OR PLEASURE IN DOING THINGS: 0
SUM OF ALL RESPONSES TO PHQ QUESTIONS 1-9: 0
SUM OF ALL RESPONSES TO PHQ QUESTIONS 1-9: 0
2. FEELING DOWN, DEPRESSED OR HOPELESS: 0
SUM OF ALL RESPONSES TO PHQ QUESTIONS 1-9: 0

## 2022-11-18 ASSESSMENT — LIFESTYLE VARIABLES
HOW OFTEN DO YOU HAVE A DRINK CONTAINING ALCOHOL: NEVER
HOW MANY STANDARD DRINKS CONTAINING ALCOHOL DO YOU HAVE ON A TYPICAL DAY: PATIENT DOES NOT DRINK

## 2022-11-18 NOTE — PROGRESS NOTES
Medicare Annual Wellness Visit    June Oms is here for Medicare AWV    Assessment & Plan   Medicare annual wellness visit, subsequent    Recommendations for Preventive Services Due: see orders and patient instructions/AVS.  Recommended screening schedule for the next 5-10 years is provided to the patient in written form: see Patient Instructions/AVS.     No follow-ups on file. Subjective       Patient's complete Health Risk Assessment and screening values have been reviewed and are found in Flowsheets. The following problems were reviewed today and where indicated follow up appointments were made and/or referrals ordered. Positive Risk Factor Screenings with Interventions:             General Health and ACP:  General  In general, how would you say your health is?: Very Good  In the past 7 days, have you experienced any of the following: New or Increased Pain, New or Increased Fatigue, Loneliness, Social Isolation, Stress or Anger?: No  Do you get the social and emotional support that you need?: Yes  Do you have a Living Will?: Yes    Advance Directives       Power of  Living Will ACP-Advance Directive ACP-Power of     Not on File Not on File Not on File Not on File        General Health Risk Interventions:  AD not on file, will request copy. Health Habits/Nutrition:  Physical Activity: Sufficiently Active    Days of Exercise per Week: 3 days    Minutes of Exercise per Session: 60 min     Have you lost any weight without trying in the past 3 months?: No     Have you seen the dentist within the past year?: (!) No  Health Habits/Nutrition Interventions:  Dental exam overdue:  patient encouraged to make appointment with his/her dentist             Objective      Patient-Reported Vitals  No data recorded          No Known Allergies  Prior to Visit Medications    Medication Sig Taking?  Authorizing Provider   amLODIPine (NORVASC) 5 MG tablet TAKE ONE TABLET BY MOUTH DAILY Yes Graciela Dupree MD   pantoprazole (PROTONIX) 40 MG tablet TAKE ONE TABLET BY MOUTH DAILY Yes Brie Jackson MD   atorvastatin (LIPITOR) 40 MG tablet TAKE ONE TABLET BY MOUTH DAILY Yes Wilbur Aguilar MD   apixaban (ELIQUIS) 5 MG TABS tablet TAKE ONE TABLET BY MOUTH TWICE A DAY Yes George Guardado MD   losartan-hydroCHLOROthiazide (HYZAAR) 100-25 MG per tablet TAKE ONE TABLET BY MOUTH DAILY Yes Brie Jackson MD   nitroGLYCERIN (NITROSTAT) 0.4 MG SL tablet DISSOLVE 1 TAB UNDER TONGUE FOR CHEST PAIN - IF PAIN REMAINS AFTER 5 MIN, CALL 911 AND REPEAT DOSE. MAX 3 TABS IN 15 MINUTES Yes Wilbur Aguilar MD   ketoconazole (NIZORAL) 2 % cream Apply topically daily Yes LUZ Bowman - CNP   furosemide (LASIX) 20 MG tablet Take 1 tablet by mouth in the morning. Yes Carman Goodell, MD   fluticasone-umeclidin-vilant ([place] Ap) 100-62.5-25 MCG/INH AEPB INHALE ONE PUFF BY MOUTH DAILY Yes Shalini Bocanegra MD   metFORMIN (GLUCOPHAGE-XR) 500 mg extended release tablet TAKE TWO TABLETS BY MOUTH TWICE A DAY WITH MEALS Yes Brie Jackson MD   metoprolol succinate (TOPROL XL) 50 MG extended release tablet Take 1 tablet by mouth 2 times daily Yes MD Afsaneh Perez MISC by Does not apply route Yes MOOSE [place]   therapeutic multivitamin-minerals [facility]) tablet Take 1 tablet by mouth daily. Yes Historical Provider, MD De La Paz (Including outside providers/suppliers regularly involved in providing care):   Patient Care Team:  Brie Jackson MD as PCP - General (Family Medicine)  Brie Jackson MD as P[facility] - [facility]  Quan Gonzalez MD as Surgeon (General Surgery)     Reviewed and updated this visit:  Tobacco  Allergies  Meds  Problems  Med Hx  Surg Hx  Soc Hx  Fam Hx            Kristin Biggs, was evaluated through a synchronous (real-time) audio-video encounter. The patient (or guardian if applicable) is aware that this is a billable service, which includes applicable co-pays. This Virtual Visit was conducted with patient's (and/or legal guardian's) consent. The visit was conducted pursuant to the emergency declaration under the [address] [place] [address] and the [employer] Act. Patient identification was verified, and a caregiver was present when appropriate. The patient was located at Home: [place] [address]. Provider was located at Home ([place] [address]): [place]. absent

## 2022-11-18 NOTE — PATIENT INSTRUCTIONS
Personalized Preventive Plan for Carmina Guerra - 11/18/2022  Medicare offers a range of preventive health benefits. Some of the tests and screenings are paid in full while other may be subject to a deductible, co-insurance, and/or copay. Some of these benefits include a comprehensive review of your medical history including lifestyle, illnesses that may run in your family, and various assessments and screenings as appropriate. After reviewing your medical record and screening and assessments performed today your provider may have ordered immunizations, labs, imaging, and/or referrals for you. A list of these orders (if applicable) as well as your Preventive Care list are included within your After Visit Summary for your review. Other Preventive Recommendations:    A preventive eye exam performed by an eye specialist is recommended every 1-2 years to screen for glaucoma; cataracts, macular degeneration, and other eye disorders. A preventive dental visit is recommended every 6 months. Try to get at least 150 minutes of exercise per week or 10,000 steps per day on a pedometer . Order or download the FREE \"Exercise & Physical Activity: Your Everyday Guide\" from [employer]. Call 3-372.643.7637 or search [employer] online. You need 6011-9783 mg of calcium and 3771-5554 IU of vitamin D per day. It is possible to meet your calcium requirement with diet alone, but a vitamin D supplement is usually necessary to meet this goal.  When exposed to the sun, use a sunscreen that protects against both UVA and UVB radiation with an SPF of 30 or greater. Reapply every 2 to 3 hours or after sweating, drying off with a towel, or swimming. Always wear a seat belt when traveling in a car. Always wear a helmet when riding a bicycle or motorcycle.

## 2022-11-29 ENCOUNTER — OFFICE VISIT (OUTPATIENT)
Dept: CARDIOLOGY CLINIC | Age: 82
End: 2022-11-29
Payer: MEDICARE

## 2022-11-29 ENCOUNTER — TELEPHONE (OUTPATIENT)
Dept: CARDIOLOGY CLINIC | Age: 82
End: 2022-11-29

## 2022-11-29 VITALS
HEART RATE: 84 BPM | SYSTOLIC BLOOD PRESSURE: 112 MMHG | HEIGHT: 68 IN | WEIGHT: 236 LBS | OXYGEN SATURATION: 97 % | BODY MASS INDEX: 35.77 KG/M2 | DIASTOLIC BLOOD PRESSURE: 64 MMHG

## 2022-11-29 DIAGNOSIS — I71.40 ABDOMINAL AORTIC ANEURYSM (AAA) WITHOUT RUPTURE, UNSPECIFIED PART: ICD-10-CM

## 2022-11-29 DIAGNOSIS — Z87.891 HISTORY OF TOBACCO ABUSE: ICD-10-CM

## 2022-11-29 DIAGNOSIS — I48.19 PERSISTENT ATRIAL FIBRILLATION (HCC): ICD-10-CM

## 2022-11-29 DIAGNOSIS — E78.2 MIXED HYPERLIPIDEMIA: Primary | ICD-10-CM

## 2022-11-29 DIAGNOSIS — I25.10 MILD CAD: ICD-10-CM

## 2022-11-29 DIAGNOSIS — Z79.899 MEDICATION MANAGEMENT: ICD-10-CM

## 2022-11-29 DIAGNOSIS — I10 ESSENTIAL HYPERTENSION, BENIGN: ICD-10-CM

## 2022-11-29 PROCEDURE — 3078F DIAST BP <80 MM HG: CPT | Performed by: INTERNAL MEDICINE

## 2022-11-29 PROCEDURE — 3074F SYST BP LT 130 MM HG: CPT | Performed by: INTERNAL MEDICINE

## 2022-11-29 PROCEDURE — 99214 OFFICE O/P EST MOD 30 MIN: CPT | Performed by: INTERNAL MEDICINE

## 2022-11-29 PROCEDURE — 1123F ACP DISCUSS/DSCN MKR DOCD: CPT | Performed by: INTERNAL MEDICINE

## 2022-11-29 RX ORDER — FUROSEMIDE 20 MG/1
20 TABLET ORAL DAILY
Qty: 60 TABLET | Refills: 3 | Status: SHIPPED | OUTPATIENT
Start: 2022-11-29

## 2022-11-29 RX ORDER — METOPROLOL SUCCINATE 50 MG/1
50 TABLET, EXTENDED RELEASE ORAL 2 TIMES DAILY
Qty: 180 TABLET | Refills: 3 | Status: SHIPPED | OUTPATIENT
Start: 2022-11-29

## 2022-11-29 NOTE — PATIENT INSTRUCTIONS
Plan:  1. Discussed monitor blood pressure at home ~ goal 130/ 80 or less  ~ Take 3-4 times a week in upper arm  ~ Keep a log  ~ Call office if above goal consistently for 1-2 weeks. 2. Continue current medication regimen. 3. Order fasting lipid panel ~ assess cholesterol. Discussed increasing lipitor based on results, will call you to discuss. 4.Follow up in June 2023.

## 2022-11-29 NOTE — TELEPHONE ENCOUNTER
Called and spoke to patient. Relayed order per [facility] for [facility]. Provided patient with central scheduling information, he VU. No further questions.

## 2022-11-29 NOTE — LETTER
Melania Grigsby  1940      [address]   Cardiac Follow Up    Referring Provider:  Bart Rankin MD     Chief Complaint   Patient presents with    6 Month Follow-Up    Atrial Fibrillation    Hypertension    Hyperlipidemia    Coronary Artery Disease          Subjective: Melania Grigsby is here for routine cardiac f/u. No complaints today      History of Present Illness:  Melania Grigsby is a 81 y.o. male who has PMH moderate NOCAD (med mgt without PCI), PAF dx 9/21 on eliquis (Dr. Abdirizak Nelson), HLD, HTN, GERD, COPD, RIZWANA on CPAP (started early 2022), and DM. Saw Dr. Janet Campos in 2018 for chest pain. ECHO and gissell nuc normal at the time. Noted gissell nuc 01/22/21 which showed apical/septal ischemia. Most recent LHC from 02/04/21 with Dr. Janet Campos showed mild-mod CAD without need for PCI. Admitted 09/29/21 with CP/dizziness. EKG 09/30/21 AF with RVR 132bpm. Started on Eliquis of new-onset afib. Most recent Gissell nuc 09/30/21 show negative ischemia or scarring hyperdynamic LVEF > 70%. Most recent 2 week MARIALUISA 10/1/21 showed AF/AFL burden of 3.71%, rate borderline controlled. Average heartrate 98 bpm. CTA Abdomen 10/30/21 showed stable 4-cm infrarenal AAA being followed by Dr. Brissa Mark   Since last [address] 08/07-08/10/22 with CP. EKG 08/07/22 showed AF with RVR, 133 BPM. He was started in IV Cardizem and spontaneously converted to SR/SB. Most recent Echo 08/09/22 EF 93-54%, normal diastolic function; no thrombus---no change from prior study 09/30/21. He was treated for SONIDO and COPD exacerbation. He followed with EP 08/22/22 Dr. Abdirizak Nelson. Most recent EKG 08/22/22 SB. He continues to take Eliquis 5 mg BID. Today he states he feels good but is concerned with his blood pressure being lower today. Patient currently denies any weight gain, edema, palpitations, chest pain, shortness of breath, dizziness, and syncope. He states he monitors his salt intake.  He did not bring medication list in and his son assist in administration. Weight today #236 lbs, down 2# (238# 03/2022)     Past Medical History:   has a past medical history of Chronic atrial fibrillation ([facility] [address] COPD (chronic obstructive pulmonary disease) ([facility] [address] Decreased hearing of both ears, Diabetes mellitus ([facility] [address] GERD (gastroesophageal reflux disease), Histoplasmosis, Hyperlipidemia, Hypertension, and Primary malignant neoplasm of skin of trunk. Surgical History:   has a past surgical history that includes Lung removal, partial (2007); Vasectomy; TURP (2002); Upper gastrointestinal endoscopy (01/26/2015); Upper gastrointestinal endoscopy (02/2015); Inguinal hernia repair (Bilateral, 06/22/2015); Colonoscopy (05/22/2012); Colonoscopy (07/21/2015); Upper gastrointestinal endoscopy (07/21/2017); Upper gastrointestinal endoscopy (08/23/2017); and Cystoscopy (N/A, 10/24/2019). Social History:   reports that he quit smoking about 2002. He has a 30.00 pack-year smoking history. He has never used smokeless tobacco. He reports current alcohol use. He reports that he does not use drugs. Family History:  family history includes Cancer in his brother; Heart Disease in his mother. Home Medications:  Prior to Admission medications    Medication Sig Start Date End Date Taking?  Authorizing Provider   amLODIPine (NORVASC) 5 MG tablet TAKE ONE TABLET BY MOUTH DAILY 11/14/22  Yes Huseyin De Souza MD   pantoprazole (PROTONIX) 40 MG tablet TAKE ONE TABLET BY MOUTH DAILY 10/25/22  Yes Huseyin De Souza MD   atorvastatin (LIPITOR) 40 MG tablet TAKE ONE TABLET BY MOUTH DAILY 10/25/22  Yes Peola Kawasaki, MD   apixaban (ELIQUIS) 5 MG TABS tablet TAKE ONE TABLET BY MOUTH TWICE A DAY 10/21/22  Yes Keyonna Maier MD   losartan-hydroCHLOROthiazide (HYZAAR) 100-25 MG per tablet TAKE ONE TABLET BY MOUTH DAILY 9/29/22  Yes Huseyin De Souza MD   nitroGLYCERIN (NITROSTAT) 0.4 MG SL tablet DISSOLVE 1 TAB UNDER TONGUE FOR CHEST PAIN - IF PAIN REMAINS AFTER 5 MIN, CALL 911 AND REPEAT DOSE. MAX 3 TABS IN 15 MINUTES 9/13/22  Yes Myrna Proctor MD   furosemide (LASIX) 20 MG tablet Take 1 tablet by mouth in the morning. 8/11/22  Yes Anil Smith MD   fluticasone-umeclidin-vilant (Barry Habermann) 100-62.5-25 MCG/INH AEPB INHALE ONE PUFF BY MOUTH DAILY 6/7/22  Yes Madie Mancilla MD   metFORMIN (GLUCOPHAGE-XR) 500 mg extended release tablet TAKE TWO TABLETS BY MOUTH TWICE A DAY WITH MEALS 5/31/22  Yes Leonid Barkley MD   metoprolol succinate (TOPROL XL) 50 MG extended release tablet Take 1 tablet by mouth 2 times daily 12/6/21  Yes Yumi Hahn MD   Handicap Placard MISC by Does not apply route 4/12/19  Yes MOOSE Mcnair   therapeutic multivitamin-minerals [facility]) tablet Take 1 tablet by mouth daily. Yes Historical Provider, MD   ketoconazole (NIZORAL) 2 % cream Apply topically daily  Patient not taking: Reported on 11/29/2022 8/15/22   LUZ Cotter - CNP        Allergies:  Patient has no known allergies. Review of Systems:   · Constitutional: there has been no unanticipated weight loss. There's been no change in energy level, sleep pattern, or activity level. · Eyes: No visual changes or diplopia. No scleral icterus. · ENT: No Headaches, hearing loss or vertigo. No mouth sores or sore throat. · Cardiovascular: Reviewed in HPI  · Respiratory: No cough or wheezing, no sputum production. No hematemesis. · Gastrointestinal: No abdominal pain, appetite loss, blood in stools. No change in bowel or bladder habits. · Genitourinary: No dysuria, trouble voiding, or hematuria. · Musculoskeletal:  No gait disturbance, weakness or joint complaints. · Integumentary: No rash or pruritis. · Neurological: No headache, diplopia, change in muscle strength, numbness or tingling. No change in gait, balance, coordination, mood, affect, memory, mentation, behavior. · Psychiatric: No anxiety, no depression. · Endocrine: No malaise, fatigue or temperature intolerance.  No excessive thirst, fluid intake, or urination. No tremor. · Hematologic/Lymphatic: No abnormal bruising or bleeding, blood clots or swollen lymph nodes. · Allergic/Immunologic: No nasal congestion or hives. Physical Examination:    Vitals:    11/29/22 0933   BP: 112/64   Pulse: 84   SpO2: 97%          Constitutional and General Appearance: NAD   Respiratory:  · Normal excursion and expansion without use of accessory muscles  · Resp Auscultation: Clear no crackles or wheezing. Cardiovascular:  · The apical impulses not displaced  · Heart tones are crisp and normal  · Cervical veins are not engorged  · The carotid upstroke is normal in amplitude and contour without delay or bruit  · Normal S1S2, No S3, No Murmur. RRR  · Peripheral pulses are symmetrical and full  · There is no clubbing, cyanosis of the extremities. 1+ pitting edema LLE, trace right LLE edema. · Femoral Arteries: 2+ and equal  · Pedal Pulses: 2+ and equal   Abdomen:  · No masses or tenderness  · Liver/Spleen: No Abnormalities Noted  Neurological/Psychiatric:  · Alert and oriented in all spheres  · Moves all extremities well  · Exhibits normal gait balance and coordination  · No abnormalities of mood, affect, memory, mentation, or behavior are noted  Skin:  · Skin: warm and dry. Stress test: 01/22/21  Summary Normal LVEF >60% Grossly normal wall motion  Apical/septal ischemia   Overall, this would be considered an abnormal, intermediate risk, study     [facility] 2/04/21 Findings:     1. Left main coronary artery was normal. It gave off the left anterior descending artery and left circumflex. 2. Left anterior descending artery has mild to moderate atherosclerotic disease. It was moderate in size. It gave off septal perforators and a moderate sized diagonal branch. The LAD covered the entire apex of the left ventricle. 3. Left circumflex has mild to moderate atherosclerotic disease. It was moderate in size.  There was a moderate sized obtuse marginal branch. 4. Right coronary artery has mild to moderate atherosclerotic disease. It was moderate in size and was the dominant artery. 5. Left ventriculogram showed normal LVEF at 55-60%. Wall motion was normal . There was no significant mitral valve or aortic valve disease noted. LVEDP was normal. There was no gradient noted across the aortic valve during pullback of the catheter. CONCLUSIONS:     1. Mild to moderate coronary artery disease with preserved LVEF     ASSESSMENT/RECOMMENDATIONS:     1. Risk Factor control  2.  Consider anti-anginal therapy with long acting nitrates    Lab Results   Component Value Date     08/15/2022    K 4.7 08/15/2022     08/15/2022    CO2 25 08/15/2022    BUN 11 08/15/2022    CREATININE 1.3 08/15/2022    GLUCOSE 143 (H) 08/15/2022    CALCIUM 9.9 08/15/2022    PROT 6.4 08/08/2022    LABALBU 3.9 08/10/2022    BILITOT 0.4 08/08/2022    ALKPHOS 86 08/08/2022    AST 12 (L) 08/08/2022    ALT 11 08/08/2022    LABGLOM 53 (A) 08/15/2022    GFRAA >60 08/15/2022    AGRATIO 1.4 08/08/2022    GLOB 2.7 10/01/2021       Lab Results   Component Value Date    WBC 9.1 08/08/2022    HGB 11.7 (L) 08/08/2022    HCT 35.3 (L) 08/08/2022    MCV 87.3 08/08/2022     08/08/2022        Lab Results   Component Value Date    CHOL 182 10/25/2021    CHOL 151 02/04/2021    CHOL 169 01/11/2021     Lab Results   Component Value Date    TRIG 129 10/25/2021    TRIG 115 02/04/2021    TRIG 151 (H) 01/11/2021     Lab Results   Component Value Date    HDL 40 10/25/2021    HDL 44 02/04/2021    HDL 45 01/11/2021     Lab Results   Component Value Date    LDLCALC 116 (H) 10/25/2021    LDLCALC 84 02/04/2021    LDLCALC 94 01/11/2021     Lab Results   Component Value Date    LABVLDL 26 10/25/2021    LABVLDL 23 02/04/2021    LABVLDL 30 01/11/2021     No results found for: CHOLBABITA     I have personally reviewed all labs including lipids 10/25/21    [id number] Score for Atrial Fibrillation Stroke Risk   Risk   Factors  Component Value   C CHF No 0   H HTN Yes 1   A2 Age >= 76 Yes,  (81 y.o.) 2   D DM Yes 1   S2 Prior Stroke/TIA No 0   V Vascular Disease No 0   A Age 74-69 No,  (81 y.o.) 0   Sc Sex male 0    [id number]  Score  4   Score last updated 10/12/21 9:12 AM EDT    Assessment:     1. HTN: Well controlled and will continue current medical regimen of hyzaar, norvasc, and Toprol XL. 2. CAD:  Mild-moderate NOCAD. Most recent [facility] 2/04/21 with Dr. Serg Toro showed mild to moderate CAD without need for PCI. Most recent [address] [facility] 09/30/21 show negative ischemia. I have offered long-acting nitrate (imdur) in past but he declined. He uses SL NTG PRN. There are no concerning symptoms for angina currently. 3.      HLD: I personally reviewed most recent lipids from 10/25/21 in Epic and d/w Mr. Livan Arrieta (see above). I increased lipitor 40mg daily. Need repeat labs and adjust accordingly (not certain why not done?)    4. DM: per PCP    5. PAF: Diagnosed 9/21. Admit 8/22 with rapid afib. Asymptomatic. Continue metoprolol XL 50mg BID regimen to help HR control. Continue eliquis 5mg BID for AC. Plan:  1. Discussed monitor blood pressure at home ~ goal 130/ 80 or less  ~ Take 3-4 times a week in upper arm  ~ Keep a log  ~ Call office if above goal consistently for 1-2 weeks. 2. Continue current medication regimen. 3. Order fasting lipid panel ~ assess cholesterol. Discussed increasing lipitor based on results, will call you to discuss. 4.Follow up in June 2023. Scribe's attestation: This note was scribed in the presence of Robin Nuñez by Sha Sr RN     I, Dr. Ritu Pimentel, personally performed the services described in this documentation, as scribed by the above signed scribe in my presence. It is both accurate and complete to my knowledge.  I agree with the details independently gathered by the clinical support staff, while the remaining scribed note accurately describes my personal service to the patient. Thank you for allowing me to participate in the care of this individual.    Cost of prescription medications and patient compliance have been reviewed with patient. All questions answered. Eugenie Garcia.  Meliza Pappas M.D., [facility]

## 2022-12-05 ENCOUNTER — HOSPITAL ENCOUNTER (OUTPATIENT)
Dept: ULTRASOUND IMAGING | Age: 82
Discharge: HOME | End: 2022-12-05
Payer: MEDICARE

## 2022-12-05 DIAGNOSIS — R60.0 LOCALIZED EDEMA: ICD-10-CM

## 2022-12-05 DIAGNOSIS — I25.10 MILD CAD: ICD-10-CM

## 2022-12-05 DIAGNOSIS — I10 ESSENTIAL HYPERTENSION: ICD-10-CM

## 2022-12-05 DIAGNOSIS — I71.40 ABDOMINAL AORTIC ANEURYSM (AAA) WITHOUT RUPTURE, UNSPECIFIED PART: ICD-10-CM

## 2022-12-05 DIAGNOSIS — R06.02 SHORTNESS OF BREATH: ICD-10-CM

## 2022-12-05 DIAGNOSIS — Z79.899 MEDICATION MANAGEMENT: ICD-10-CM

## 2022-12-05 DIAGNOSIS — E78.2 MIXED HYPERLIPIDEMIA: ICD-10-CM

## 2022-12-05 DIAGNOSIS — I48.91 ATRIAL FIBRILLATION WITH RVR (HCC): ICD-10-CM

## 2022-12-05 LAB
A/G RATIO: 1.6 (ref 1.1–2.2)
ALBUMIN SERPL-MCNC: 4.1 G/DL (ref 3.4–5)
ALP BLD-CCNC: 96 U/L (ref 40–129)
ALT SERPL-CCNC: 10 U/L (ref 10–40)
ANION GAP SERPL CALCULATED.3IONS-SCNC: 20 MMOL/L (ref 3–16)
AST SERPL-CCNC: 10 U/L (ref 15–37)
BILIRUB SERPL-MCNC: 0.6 MG/DL (ref 0–1)
BUN BLDV-MCNC: 19 MG/DL (ref 7–20)
CALCIUM SERPL-MCNC: 9.7 MG/DL (ref 8.3–10.6)
CHLORIDE BLD-SCNC: 103 MMOL/L (ref 99–110)
CHOLESTEROL, TOTAL: 159 MG/DL (ref 0–199)
CO2: 25 MMOL/L (ref 21–32)
CREAT SERPL-MCNC: 1.6 MG/DL (ref 0.8–1.3)
GFR SERPL CREATININE-BSD FRML MDRD: 43 ML/MIN/{1.73_M2}
GLUCOSE BLD-MCNC: 142 MG/DL (ref 70–99)
HDLC SERPL-MCNC: 42 MG/DL (ref 40–60)
LDL CHOLESTEROL CALCULATED: 80 MG/DL
POTASSIUM SERPL-SCNC: 4.6 MMOL/L (ref 3.5–5.1)
PRO-BNP: 155 PG/ML (ref 0–449)
SODIUM BLD-SCNC: 148 MMOL/L (ref 136–145)
TOTAL PROTEIN: 6.7 G/DL (ref 6.4–8.2)
TRIGL SERPL-MCNC: 186 MG/DL (ref 0–150)
VLDLC SERPL CALC-MCNC: 37 MG/DL

## 2022-12-05 PROCEDURE — 76775 US EXAM ABDO BACK WALL LIM: CPT

## 2022-12-07 DIAGNOSIS — Z79.899 MEDICATION MANAGEMENT: Primary | ICD-10-CM

## 2022-12-07 DIAGNOSIS — E78.2 MIXED HYPERLIPIDEMIA: ICD-10-CM

## 2022-12-07 RX ORDER — ATORVASTATIN CALCIUM 80 MG/1
80 TABLET, FILM COATED ORAL NIGHTLY
Qty: 90 TABLET | Refills: 1 | Status: SHIPPED | OUTPATIENT
Start: 2022-12-07

## 2022-12-07 NOTE — TELEPHONE ENCOUNTER
Yumi Hahn MD   12/6/2022  7:33 AM EST Back to Top      Lipids are improved. LDL 80 and prior 116 1 year ago. Better result but goal < 70. If he is willing we can increase lipitor from 40mg to 80mg daily and recheck FLP/LFT's in 2 months. I recommend trying but if he doesn't want to increase then cpm. Recheck BMP in 1 month to check Na which is mildly elevated and see if Cr remains steady. Thanks.        LVM for pt to call office to relay results

## 2022-12-07 NOTE — TELEPHONE ENCOUNTER
Pt called i. Pt stated that he has follow up questions about his instructions and lab work. Pt can be reached at [phone]. Please advise.

## 2022-12-12 ENCOUNTER — TELEPHONE (OUTPATIENT)
Dept: FAMILY MEDICINE CLINIC | Age: 82
End: 2022-12-12

## 2022-12-12 NOTE — TELEPHONE ENCOUNTER
Maru Roblero called to find out if Krunal Houser wanted the patient to take his   amLODIPine (NORVASC) 5 MG tablet  Because when he was in the hospital they discontinued this medication because of edema and swelling,  He just wants to make sure this is something Krunal Houser wants him to take.   Please advise  [phone]

## 2022-12-12 NOTE — PROGRESS NOTES
Giuliana Salgado    Age 81 y.o.    male    1940    N [id number]    12/20/2022  Arrival Time_____________  OR Time____________[address] [employer]     Procedure(s):  DRUG INDUCED SLEEP ENDOSCOPY                      Monitor Anesthesia Care    Surgeon(s):  Kyung Ellison, DO       Phone [phone] (home) [phone] (work)    [address]  Cell         Work  _____________________________________________________________________  _____________________________________________________________________  _____________________________________________________________________  _____________________________________________________________________  _____________________________________________________________________    PCP _____________________________ Phone_________________     H&P__________________Bringing      Chart            Epic   DOS      Called________  EKG__________________Bringing      Chart            Epic   DOS      Called________  LAB__________________ Bringing      Chart            Epic   DOS      Called________  Cardiac Clearance_______Bringing      Chart            Epic      DOS      Called________    Cardiologist________________________ Phone___________________________    ? Jain concerns / Waiver on Chart            [employer]________________  ? Pre-op Instructions Given [place]          _________________________________  ? Directions to Surgery Center                          _________________________________  ? Transportation Home_______________      __________________________________  ?  Crutches/Walker__________________        __________________________________    ________Pre-op Orders   _______Transcribed    _______Wt.  ________Pharmacy          _______SCD  ______VTE     ______TED Jasper Saige  _______  Surgery Consent    _______  Anesthesia Consent         COVID DATE______________LOCATION________________ RESULT__________

## 2022-12-15 NOTE — TELEPHONE ENCOUNTER
Spoke with patient and gave him dates that have labs rechecked and reiterated instructions. He LAKEISHA.

## 2022-12-16 NOTE — PROGRESS NOTES

## 2022-12-19 ENCOUNTER — ANESTHESIA EVENT (OUTPATIENT)
Dept: OPERATING ROOM | Age: 82
End: 2022-12-19
Payer: MEDICARE

## 2022-12-19 ENCOUNTER — TELEPHONE (OUTPATIENT)
Dept: ENT CLINIC | Age: 82
End: 2022-12-19

## 2022-12-20 ENCOUNTER — ANESTHESIA (OUTPATIENT)
Dept: OPERATING ROOM | Age: 82
End: 2022-12-20
Payer: MEDICARE

## 2022-12-20 ENCOUNTER — TELEPHONE (OUTPATIENT)
Dept: ENT CLINIC | Age: 82
End: 2022-12-20

## 2022-12-20 ENCOUNTER — HOSPITAL ENCOUNTER (OUTPATIENT)
Age: 82
Setting detail: OUTPATIENT SURGERY
Discharge: HOME | End: 2022-12-20
Attending: OTOLARYNGOLOGY | Admitting: OTOLARYNGOLOGY
Payer: MEDICARE

## 2022-12-20 VITALS
RESPIRATION RATE: 18 BRPM | SYSTOLIC BLOOD PRESSURE: 130 MMHG | DIASTOLIC BLOOD PRESSURE: 59 MMHG | WEIGHT: 230 LBS | OXYGEN SATURATION: 97 % | TEMPERATURE: 98.5 F | BODY MASS INDEX: 34.86 KG/M2 | HEART RATE: 60 BPM | HEIGHT: 68 IN

## 2022-12-20 LAB
GLUCOSE BLD-MCNC: 144 MG/DL (ref 70–99)
PERFORMED ON: ABNORMAL

## 2022-12-20 PROCEDURE — 2580000003 HC RX 258: Performed by: ANESTHESIOLOGY

## 2022-12-20 PROCEDURE — 42975 DISE EVAL SLP DO BRTH FLX DX: CPT | Performed by: OTOLARYNGOLOGY

## 2022-12-20 PROCEDURE — 6360000002 HC RX W HCPCS

## 2022-12-20 PROCEDURE — 2500000003 HC RX 250 WO HCPCS: Performed by: OTOLARYNGOLOGY

## 2022-12-20 PROCEDURE — 3700000000 HC ANESTHESIA ATTENDED CARE: Performed by: OTOLARYNGOLOGY

## 2022-12-20 PROCEDURE — 7100000011 HC PHASE II RECOVERY - ADDTL 15 MIN: Performed by: OTOLARYNGOLOGY

## 2022-12-20 PROCEDURE — 7100000010 HC PHASE II RECOVERY - FIRST 15 MIN: Performed by: OTOLARYNGOLOGY

## 2022-12-20 PROCEDURE — 3600000003 HC SURGERY LEVEL 3 BASE: Performed by: OTOLARYNGOLOGY

## 2022-12-20 PROCEDURE — 6370000000 HC RX 637 (ALT 250 FOR IP): Performed by: OTOLARYNGOLOGY

## 2022-12-20 PROCEDURE — 2709999900 HC NON-CHARGEABLE SUPPLY: Performed by: OTOLARYNGOLOGY

## 2022-12-20 RX ORDER — SODIUM CHLORIDE 9 MG/ML
INJECTION, SOLUTION INTRAVENOUS PRN
Status: DISCONTINUED | OUTPATIENT
Start: 2022-12-20 | End: 2022-12-20 | Stop reason: HOSPADM

## 2022-12-20 RX ORDER — SODIUM CHLORIDE 0.9 % (FLUSH) 0.9 %
5-40 SYRINGE (ML) INJECTION EVERY 12 HOURS SCHEDULED
Status: DISCONTINUED | OUTPATIENT
Start: 2022-12-20 | End: 2022-12-20 | Stop reason: HOSPADM

## 2022-12-20 RX ORDER — SODIUM CHLORIDE 0.9 % (FLUSH) 0.9 %
5-40 SYRINGE (ML) INJECTION PRN
Status: DISCONTINUED | OUTPATIENT
Start: 2022-12-20 | End: 2022-12-20 | Stop reason: HOSPADM

## 2022-12-20 RX ORDER — OXYCODONE HYDROCHLORIDE 5 MG/1
5 TABLET ORAL PRN
Status: CANCELLED | OUTPATIENT
Start: 2022-12-20 | End: 2022-12-20

## 2022-12-20 RX ORDER — OXYCODONE HYDROCHLORIDE 5 MG/1
10 TABLET ORAL PRN
Status: CANCELLED | OUTPATIENT
Start: 2022-12-20 | End: 2022-12-20

## 2022-12-20 RX ORDER — OXYMETAZOLINE HYDROCHLORIDE 0.05 G/100ML
SPRAY NASAL PRN
Status: DISCONTINUED | OUTPATIENT
Start: 2022-12-20 | End: 2022-12-20 | Stop reason: ALTCHOICE

## 2022-12-20 RX ORDER — DIPHENHYDRAMINE HYDROCHLORIDE 50 MG/ML
12.5 INJECTION INTRAMUSCULAR; INTRAVENOUS
Status: CANCELLED | OUTPATIENT
Start: 2022-12-20 | End: 2022-12-21

## 2022-12-20 RX ORDER — LABETALOL HYDROCHLORIDE 5 MG/ML
5 INJECTION, SOLUTION INTRAVENOUS EVERY 10 MIN PRN
Status: CANCELLED | OUTPATIENT
Start: 2022-12-20

## 2022-12-20 RX ORDER — SODIUM CHLORIDE, SODIUM LACTATE, POTASSIUM CHLORIDE, CALCIUM CHLORIDE 600; 310; 30; 20 MG/100ML; MG/100ML; MG/100ML; MG/100ML
INJECTION, SOLUTION INTRAVENOUS CONTINUOUS
Status: DISCONTINUED | OUTPATIENT
Start: 2022-12-20 | End: 2022-12-20 | Stop reason: HOSPADM

## 2022-12-20 RX ORDER — PROPOFOL 10 MG/ML
INJECTION, EMULSION INTRAVENOUS CONTINUOUS PRN
Status: DISCONTINUED | OUTPATIENT
Start: 2022-12-20 | End: 2022-12-20 | Stop reason: SDUPTHER

## 2022-12-20 RX ORDER — SODIUM CHLORIDE 0.9 % (FLUSH) 0.9 %
5-40 SYRINGE (ML) INJECTION PRN
Status: CANCELLED | OUTPATIENT
Start: 2022-12-20

## 2022-12-20 RX ORDER — LIDOCAINE HYDROCHLORIDE 40 MG/ML
INJECTION, SOLUTION RETROBULBAR; TOPICAL PRN
Status: DISCONTINUED | OUTPATIENT
Start: 2022-12-20 | End: 2022-12-20 | Stop reason: ALTCHOICE

## 2022-12-20 RX ORDER — PROPOFOL 10 MG/ML
INJECTION, EMULSION INTRAVENOUS PRN
Status: DISCONTINUED | OUTPATIENT
Start: 2022-12-20 | End: 2022-12-20 | Stop reason: SDUPTHER

## 2022-12-20 RX ORDER — LIDOCAINE HYDROCHLORIDE 10 MG/ML
1 INJECTION, SOLUTION EPIDURAL; INFILTRATION; INTRACAUDAL; PERINEURAL
Status: DISCONTINUED | OUTPATIENT
Start: 2022-12-20 | End: 2022-12-20 | Stop reason: HOSPADM

## 2022-12-20 RX ORDER — SODIUM CHLORIDE 0.9 % (FLUSH) 0.9 %
5-40 SYRINGE (ML) INJECTION EVERY 12 HOURS SCHEDULED
Status: CANCELLED | OUTPATIENT
Start: 2022-12-20

## 2022-12-20 RX ORDER — SODIUM CHLORIDE 9 MG/ML
INJECTION, SOLUTION INTRAVENOUS PRN
Status: CANCELLED | OUTPATIENT
Start: 2022-12-20

## 2022-12-20 RX ORDER — ONDANSETRON 2 MG/ML
4 INJECTION INTRAMUSCULAR; INTRAVENOUS
Status: CANCELLED | OUTPATIENT
Start: 2022-12-20

## 2022-12-20 RX ADMIN — PROPOFOL 10 MG: 10 INJECTION, EMULSION INTRAVENOUS at 07:30

## 2022-12-20 RX ADMIN — SODIUM CHLORIDE, POTASSIUM CHLORIDE, SODIUM LACTATE AND CALCIUM CHLORIDE: 600; 310; 30; 20 INJECTION, SOLUTION INTRAVENOUS at 06:43

## 2022-12-20 RX ADMIN — PROPOFOL 100 MCG/KG/MIN: 10 INJECTION, EMULSION INTRAVENOUS at 07:30

## 2022-12-20 ASSESSMENT — PAIN - FUNCTIONAL ASSESSMENT: PAIN_FUNCTIONAL_ASSESSMENT: 0-10

## 2022-12-20 ASSESSMENT — ENCOUNTER SYMPTOMS: SHORTNESS OF BREATH: 1

## 2022-12-20 NOTE — H&P
[facility], [address], [address], [facility] Raymond Brown  P: [phone]        Patient      Jesus Marcelo  1940     ChiefComplaint           Chief Complaint   Patient presents with    New Patient       Patient is here today today to discuss the inspire. History of Present Illness      Lotus Nelson is an 81-year-old male here today for discussion of obstructive sleep apnea. Diagnosed with severe obstructive sleep apnea AHI 42 on sleep study last year. States since he was provided with the machine he has issues with frequent leaks which caused the machine to alarm disturbing his wife. Does find benefit from machine when he is able to get a good seal though this is infrequent. Has not tried alternative masks. 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    Hyperlipidemia      Hypertension      Primary malignant neoplasm of skin of trunk 04/21/2009            Past Surgical History      Past Surgical History         Past Surgical History:   Procedure Laterality Date    COLONOSCOPY   05/22/2012     polyps    COLONOSCOPY   07/21/2015     polyps    CYSTOSCOPY N/A 10/24/2019     CYSTOSCOPY AND CLOT EVACUATION performed by Mendoza Mckeon MD at [address] [place] [address] Bilateral 06/22/2015     laparoscopic    LUNG REMOVAL, PARTIAL   2007     middle lobe R lung/histoplasmosis    TURP   2002    UPPER GASTROINTESTINAL ENDOSCOPY   01/26/2015    UPPER GASTROINTESTINAL ENDOSCOPY   02/2015     dilated    UPPER GASTROINTESTINAL ENDOSCOPY   07/21/2017     dilatation, bx    UPPER GASTROINTESTINAL ENDOSCOPY   08/23/2017     dilated; Bx gastric.     VASECTOMY                Family History      Family History         Family History   Problem Relation Age of Onset    Cancer Brother           colon CA    Heart Disease Mother              Social History      Social History            Tobacco Use    Smoking status: Former       Packs/day: 1.00       Years: 30.00       Pack years: 30.00       Types: Cigarettes       Start date: 1971       Quit date: 2003       Years since quittin.8    Smokeless tobacco: Never   Vaping Use    Vaping Use: Never used   Substance Use Topics    Alcohol use: Yes       Alcohol/week: 0.0 standard drinks       Comment: 1/ week    Drug use: No         Allergies      No Known Allergies     Medications      Current Facility-Administered Medications          Current Outpatient Medications   Medication Sig Dispense Refill    pantoprazole (PROTONIX) 40 MG tablet TAKE ONE TABLET BY MOUTH DAILY 90 tablet 1    atorvastatin (LIPITOR) 40 MG tablet TAKE ONE TABLET BY MOUTH DAILY 90 tablet 3    apixaban (ELIQUIS) 5 MG TABS tablet TAKE ONE TABLET BY MOUTH TWICE A DAY 60 tablet 5    losartan-hydroCHLOROthiazide (HYZAAR) 100-25 MG per tablet TAKE ONE TABLET BY MOUTH DAILY 90 tablet 1    nitroGLYCERIN (NITROSTAT) 0.4 MG SL tablet DISSOLVE 1 TAB UNDER TONGUE FOR CHEST PAIN - IF PAIN REMAINS AFTER 5 MIN, CALL 911 AND REPEAT DOSE. MAX 3 TABS IN 15 MINUTES 25 tablet 3    ketoconazole (NIZORAL) 2 % cream Apply topically daily 30 g 3    furosemide (LASIX) 20 MG tablet Take 1 tablet by mouth in the morning.  60 tablet 3    fluticasone-umeclidin-vilant (TRELEGY ELLIPTA) 100-62.5-25 MCG/INH AEPB INHALE ONE PUFF BY MOUTH DAILY 1 each 5    metFORMIN (GLUCOPHAGE-XR) 500 mg extended release tablet TAKE TWO TABLETS BY MOUTH TWICE A DAY WITH MEALS 360 tablet 1    metoprolol succinate (TOPROL XL) 50 MG extended release tablet Take 1 tablet by mouth 2 times daily 180 tablet 3    Handicap Placard MISC by Does not apply route 1 each 0    therapeutic multivitamin-minerals (THERAGRAN-M) tablet Take 1 tablet by mouth daily. No current facility-administered medications for this visit. Review of Systems      Review of Systems   Constitutional:  Negative for appetite change, chills, fatigue, fever and unexpected weight change. HENT:  Negative for congestion, ear discharge, ear pain, facial swelling, hearing loss, nosebleeds, postnasal drip, sinus pressure, sneezing, sore throat, tinnitus, trouble swallowing and voice change. Eyes:  Negative for itching. Respiratory:  Negative for apnea, cough and shortness of breath. Endocrine: Negative for cold intolerance and heat intolerance. Musculoskeletal:  Negative for myalgias and neck pain. Skin:  Negative for rash. Allergic/Immunologic: Negative for environmental allergies. Neurological:  Negative for dizziness and headaches. Psychiatric/Behavioral:  Negative for confusion, decreased concentration and sleep disturbance. PhysicalExam      Vitals       Vitals:     10/28/22 0945   BP: 134/80   Site: Left Upper Arm   Position: Sitting   Cuff Size: Large Adult   Pulse: 61   Resp: 16   Temp: 97.2 °F (36.2 °C)   TempSrc: Infrared   Weight: 228 lb (103.4 kg)   Height: 5' 8\" (1.727 m)            Physical Exam  Constitutional:       General: He is not in acute distress. Appearance: He is well-developed. HENT:      Head: Normocephalic and atraumatic. Right Ear: Tympanic membrane, ear canal and external ear normal. No drainage. No middle ear effusion. Tympanic membrane is not bulging. Tympanic membrane has normal mobility. Left Ear: Tympanic membrane, ear canal and external ear normal. No drainage. No middle ear effusion. Tympanic membrane is not bulging. Tympanic membrane has normal mobility. Nose: No mucosal edema or rhinorrhea. Mouth/Throat:      Lips: Pink. Mouth: Mucous membranes are moist.      Tongue: No lesions. Palate: No mass. Pharynx: Uvula midline.    Eyes:      Pupils: Pupils are equal, round, and reactive to light. Neck:      Thyroid: No thyroid mass or thyromegaly. Trachea: Trachea and phonation normal.   Cardiovascular:      Pulses: Normal pulses. Pulmonary:      Effort: Pulmonary effort is normal. No accessory muscle usage or respiratory distress. Breath sounds: No stridor. Musculoskeletal:      Cervical back: Full passive range of motion without pain. Lymphadenopathy:      Head:      Right side of head: No submental or submandibular adenopathy. Left side of head: No submental or submandibular adenopathy. Cervical: No cervical adenopathy. Right cervical: No superficial, deep or posterior cervical adenopathy. Left cervical: No superficial, deep or posterior cervical adenopathy. Skin:     General: Skin is warm and dry. Neurological:      Mental Status: He is alert and oriented to person, place, and time. Cranial Nerves: No cranial nerve deficit. Coordination: Coordination normal.      Gait: Gait normal.   Psychiatric:         Thought Content: Thought content normal.            Assessment and Plan      1.  RIZWANA (obstructive sleep apnea)  -AHI 42 on sleep study last year, BMI 35 started on CPAP for greater than 3 months secondary to frequent leaks  -I encouraged the patient to reach out to his medical supply company as there are multiple mask options as this may solve the problem for him  -Explained the need for sleep endoscopy to determine if he is a candidate for inspire, he does wish to proceed with this  -Inspire implantation, risks and process briefly overview           Xiang Howe DO

## 2022-12-20 NOTE — OP NOTE
[address] [place] [address]  OPERATIVE REPORT    Patient Name: Ash Eaton  YOB: 1940  Medical Record Number:  [id number]  Billing Number:  [id number]  Date of Procedure: 12/20/22  Time: 0730    Pre Operative Diagnoses: Obstructive Sleep Apnea  Post Operative Diagnoses:  Obstructive Sleep Apnea           Procedure:  1. Drug Induced Sleep endoscopy (23651)       Surgeon: Coco Gan DO    OR Staff/ Assistant:  Circulator: Anuradha Arceo RN  Surgical Assistant: Ashlyn Simon  Scrub Person First: Elias Rodriguez    Anesthesia:  Sedation     Findings:  1) anterior posterior collapse    Indications: This is a 81 y.o. male with history of moderate to severe symptomatic obstructive sleep apnea, who is intolerant unable to achieve benefit with positive pressure therapy, presents today for drug-induced sleep endoscopy to better characterize her locations and pattern of obstruction to predict appropriate medical and/or surgical options moving forward. Risks and benefits discussed with the patient including alternate treatment options, Informed consent was obtained, the patient elected to proceed with the planned procedure. DETAILS OF PROCEDURE(S):       The patient was brought to the operating room and was anesthetized via the standard drug-induced sleep endoscopy protocol. The propofol infusion rate was started at 100 MCG and increase gradually to level at which conditions that mimic sleep were gradually observed. With the patient unresponsive to verbal commands, but still with spontaneous respiration, sleep disordered breathing events and associated desaturations were clearly observed. Under these conditions, flexible endoscope was inserted to examine both sides of the nose as well as the pharynx. The nose was relatively unremarkable. The retropatellar space showed a more obliquely oriented palate.   A mild lateral wall component was noted, but the palatal collapse was primarily an anterior posterior fashion. More distally, mild lateral oropharyngeal wall component was noted, but again no complete lateral oropharyngeal collapse. In the hypopharynx, a very large, tongue base is observed in complete anterior-posterior retrolingual/retroepiglottic obstruction. In summary, there is no evidence of complete concentric palatal obstruction and does appear to be a candidate anatomically for hypoglossal nerve stimulation therapy. I was present for and performed the entire procedure. Estimated Blood Loss: 0mL                 Specimens: None           Complications: There were no complications.     Phil Puri DO        Electronically signed by Phil Puri DO on 12/20/2022 at 7:40 AM

## 2022-12-20 NOTE — DISCHARGE INSTRUCTIONS
Post Op DISE Instructions    General Anesthesia or Sedation   · Do not drive or operate heavy machinery for 24 hours. · Do not consume alcohol, tranquilizers, sleeping medications, or any non-prescribed medications for 24 hours. · Do not make important decisions or sign any important papers in the next 24 hours. · You should have someone with you tonight at home. · You may appear flushed for several hours after surgery   Activity   You are advised to go directly home from the hospital.  Resume light to normal activity today  Fluids and Diet   · Begin with clear liquids, bouillon, dry toast, soda crackers. If not nauseated, you may go to a regular diet when you desire. Greasy and spicy foods are not advised after anesthesia   Medications   · You may resume your daily prescription medication schedule   Follow up care   Call your surgeon if you have any problem that concerns you. After hours, you can reach your physician through his/her answering service. If you need IMMEDIATE ATTENTION, come to [address] [place] [address]. You are a candidate for inspire. Call the office to set up a visit to discuss next steps and details of implantation.

## 2022-12-20 NOTE — ANESTHESIA PRE PROCEDURE
Department of Anesthesiology  Preprocedure Note       Name:  Bonita Goel   Age:  81 y.o.  :  1940                                          MRN:  [id number]         Date:  2022      Surgeon: Mono Wang):  Kandi Ayala DO    Procedure: Procedure(s):  DRUG INDUCED SLEEP ENDOSCOPY    Medications prior to admission:   Prior to Admission medications    Medication Sig Start Date End Date Taking? Authorizing Provider   atorvastatin (LIPITOR) 80 MG tablet Take 1 tablet by mouth at bedtime 22   Adrianne Arroyo MD   furosemide (LASIX) 20 MG tablet Take 1 tablet by mouth daily 22   Adrianne Arroyo MD   metoprolol succinate (TOPROL XL) 50 MG extended release tablet Take 1 tablet by mouth 2 times daily 22   Adrianne Arroyo MD   amLODIPine (NORVASC) 5 MG tablet TAKE ONE TABLET BY MOUTH DAILY  Patient not taking: Reported on 2022   Brook Felton MD   pantoprazole (PROTONIX) 40 MG tablet TAKE ONE TABLET BY MOUTH DAILY 10/25/22   Brook Felton MD   apixaban (ELIQUIS) 5 MG TABS tablet TAKE ONE TABLET BY MOUTH TWICE A DAY 10/21/22   Adrianne Arroyo MD   losartan-hydroCHLOROthiazide (HYZAAR) 100-25 MG per tablet TAKE ONE TABLET BY MOUTH DAILY 22   Brook Felton MD   nitroGLYCERIN (NITROSTAT) 0.4 MG SL tablet DISSOLVE 1 TAB UNDER TONGUE FOR CHEST PAIN - IF PAIN REMAINS AFTER 5 MIN, CALL 911 AND REPEAT DOSE.  MAX 3 TABS IN 15 MINUTES 22   Christianne Thomas MD   ketoconazole (NIZORAL) 2 % cream Apply topically daily  Patient not taking: Reported on 2022 8/15/22   Tammy Gallo, APRN - CNP   fluticasone-umeclidin-vilant (Eliane Marquez) 100-62.5-25 MCG/INH AEPB INHALE ONE PUFF BY MOUTH DAILY 22   Rhys Linton MD   metFORMIN (GLUCOPHAGE-XR) 500 mg extended release tablet TAKE TWO TABLETS BY MOUTH TWICE A DAY WITH MEALS 22   Brook Felton MD   Handicap Placard MISC by Does not apply route 19   MOOSE Deng   therapeutic multivitamin-minerals [facility]) tablet Take 1 tablet by mouth daily.     Historical Provider, MD       Current medications:    Current Facility-Administered Medications   Medication Dose Route Frequency Provider Last Rate Last Admin    lidocaine PF 1 % injection 1 mL  1 mL IntraDERmal Once PRN Dylan Cheng MD        lactated ringers infusion   IntraVENous Continuous Dylan Cheng  mL/hr at 12/20/22 0643 New Bag at 12/20/22 0643    sodium chloride flush 0.9 % injection 5-40 mL  5-40 mL IntraVENous 2 times per day Dylan Cheng MD        sodium chloride flush 0.9 % injection 5-40 mL  5-40 mL IntraVENous PRN Dylan Cheng MD        0.9 % sodium chloride infusion   IntraVENous PRN Dylan Cheng MD           Allergies:  No Known Allergies    Problem List:    Patient Active Problem List   Diagnosis Code    GERD (gastroesophageal reflux disease) K21.9    Controlled type 2 diabetes mellitus without complication, without long-term current use of insulin ([facility] E11.9    Intractable migraine with aura G43.119    Chronic tension headache G44.229    Essential hypertension I10    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    Empyema of pleura ([address] J86.9    Mixed hyperlipidemia E78.2    Primary malignant neoplasm of skin of trunk C44.509    Dermatophytosis of nail B35.1    Basal cell carcinoma of left side of nose C44.311    Non morbid obesity due to excess calories E66.09    Shortness of breath R06.02    Suspected sleep apnea F60.430    Diastolic dysfunction L95.48    History of esophageal stricture Z87.19    H/O histoplasmosis Z86.19    Obesity (BMI 35.0-39.9 without comorbidity) E66.9    Pleural effusion J90    Simple chronic bronchitis (HCC) J41.0    Abdominal aortic aneurysm (AAA) without rupture I71.40    Hematuria R31.9    Acute urinary retention R33.8    Other chest pain R07.89    History of tobacco abuse Z87.891    Abnormal cardiovascular stress test R94.39    Mild CAD I25.10    RIZWANA (obstructive sleep apnea) G47.33    Chest pain R07.9    Persistent atrial fibrillation (HCC) I48.19    Atrial fibrillation with RVR (HCC) I48.91    COPD (chronic obstructive pulmonary disease) (HCC) J44.9    Localized edema R60.0    SONIDO (acute kidney injury) ([facility] N17.9       Past Medical History:        Diagnosis Date    Chronic atrial fibrillation (HCC)     COPD (chronic obstructive pulmonary disease) (HCC)     Decreased hearing of both ears     Diabetes mellitus ([facility]     GERD (gastroesophageal reflux disease)     Histoplasmosis     lung resected    Hyperlipidemia     Hypertension     Primary malignant neoplasm of skin of trunk 2009       Past Surgical History:        Procedure Laterality Date    COLONOSCOPY  2012    polyps    COLONOSCOPY  2015    polyps    CYSTOSCOPY N/A 10/24/2019    CYSTOSCOPY AND CLOT EVACUATION performed by Christin Hinkle MD at [address] Bilateral 2015    laparoscopic    LUNG REMOVAL, PARTIAL  2007    middle lobe R lung/histoplasmosis    TURP  2002    UPPER GASTROINTESTINAL ENDOSCOPY  2015    UPPER GASTROINTESTINAL ENDOSCOPY  2015    dilated    UPPER GASTROINTESTINAL ENDOSCOPY  2017    dilatation, bx    UPPER GASTROINTESTINAL ENDOSCOPY  2017    dilated; Bx gastric.  VASECTOMY         Social History:    Social History     Tobacco Use    Smoking status: Former     Packs/day: 1.00     Years: 30.00     Pack years: 30.00     Types: Cigarettes     Start date: 1971     Quit date: 2003     Years since quittin.9    Smokeless tobacco: Never   Substance Use Topics    Alcohol use:  Yes                                Counseling given: Not Answered      Vital Signs (Current):   Vitals:    22 1136 22 0621   BP:  (!) 153/72   Pulse:  75   Resp:  16   Temp:  97.4 °F (36.3 °C)   TempSrc:  Temporal   SpO2:  96%   Weight: 230 lb (104.3 kg) Height: 5' 8\" (1.727 m)                                               BP Readings from Last 3 Encounters:   12/20/22 (!) 153/72   11/29/22 112/64   11/09/22 124/60       NPO Status: Time of last liquid consumption: 2230                        Time of last solid consumption: 2230                        Date of last liquid consumption: 12/19/22                        Date of last solid food consumption: 12/19/22    BMI:   Wt Readings from Last 3 Encounters:   12/16/22 230 lb (104.3 kg)   11/29/22 236 lb (107 kg)   11/09/22 235 lb (106.6 kg)     Body mass index is 34.97 kg/m². CBC:   Lab Results   Component Value Date/Time    WBC 9.1 08/08/2022 06:31 AM    RBC 4.04 08/08/2022 06:31 AM    HGB 11.7 08/08/2022 06:31 AM    HCT 35.3 08/08/2022 06:31 AM    MCV 87.3 08/08/2022 06:31 AM    RDW 14.8 08/08/2022 06:31 AM     08/08/2022 06:31 AM       CMP:   Lab Results   Component Value Date/Time     12/05/2022 12:33 PM    K 4.6 12/05/2022 12:33 PM    K 4.4 08/08/2022 06:31 AM     12/05/2022 12:33 PM    CO2 25 12/05/2022 12:33 PM    BUN 19 12/05/2022 12:33 PM    CREATININE 1.6 12/05/2022 12:33 PM    GFRAA >60 08/15/2022 01:40 PM    GFRAA >60 03/12/2013 12:33 AM    AGRATIO 1.6 12/05/2022 12:33 PM    LABGLOM 43 12/05/2022 12:33 PM    GLUCOSE 142 12/05/2022 12:33 PM    PROT 6.7 12/05/2022 12:33 PM    PROT 6.6 09/10/2012 10:03 AM    CALCIUM 9.7 12/05/2022 12:33 PM    BILITOT 0.6 12/05/2022 12:33 PM    ALKPHOS 96 12/05/2022 12:33 PM    AST 10 12/05/2022 12:33 PM    ALT 10 12/05/2022 12:33 PM       POC Tests:   Recent Labs     12/20/22  0622   POCGLU 144*       Coags:   Lab Results   Component Value Date/Time    PROTIME 10.2 09/29/2021 03:40 AM    INR 0.91 09/29/2021 03:40 AM    APTT 24.0 09/29/2021 03:40 AM       HCG (If Applicable): No results found for: PREGTESTUR, PREGSERUM, HCG, HCGQUANT     ABGs: No results found for: PHART, PO2ART, NXY7OFL, BXO8TUC, BEART, I0TKVSHF     Type & Screen (If Applicable):   No results found for: Harish Gould    Drug/Infectious Status (If Applicable):  No results found for: HIV, HEPCAB    COVID-19 Screening (If Applicable):   Lab Results   Component Value Date/Time    COVID19 Not Detected 08/08/2022 12:30 PM    COVID19 Not Detected 01/04/2022 04:22 PM    COVID19 NOT DETECTED 01/29/2021 10:15 AM           Anesthesia Evaluation  Patient summary reviewed no history of anesthetic complications:   Airway: Mallampati: III  TM distance: >3 FB   Neck ROM: full  Mouth opening: > = 3 FB   Dental:    (+) upper dentures and lower dentures      Pulmonary:normal exam  breath sounds clear to auscultation  (+) COPD:  shortness of breath:  sleep apnea:      (-) asthma                           Cardiovascular:  Exercise tolerance: good (>4 METS),   (+) hypertension:, CAD: non-obstructive, dysrhythmias: atrial fibrillation,     (-)  angina and  CHEEK      Rhythm: regular  Rate: normal                    Neuro/Psych:   (+) headaches:,    (-) seizures and TIA           GI/Hepatic/Renal:   (+) GERD: well controlled,      (-) liver disease and no renal disease       Endo/Other:    (+) Diabetes, . Abdominal:             Vascular: negative vascular ROS. Other Findings:           Anesthesia Plan      MAC     ASA 3     (I discussed with the patient the risks and benefits of PIV, anesthesia, IV Narcotics, PACU. All questions were answered the patient agrees with the plan and wishes to proceed)  Induction: intravenous.                             Demar Sanchez MD   12/20/2022

## 2022-12-20 NOTE — BRIEF OP NOTE
Brief Postoperative Note      Patient: Won Gagnon  YOB: 1940  MRN: [id number]    Date of Procedure: 12/20/2022    Pre-Op Diagnosis: Obstructive sleep apnea [G47.33]    Post-Op Diagnosis: Same       Procedure(s):  DRUG INDUCED SLEEP ENDOSCOPY    Surgeon(s):  Sandra Jiménez DO    Assistant:  Surgical Assistant: Luisito Knox    Anesthesia: Monitor Anesthesia Care    Estimated Blood Loss (mL): Minimal    Complications: None    Specimens:   * No specimens in log *    Implants:  * No implants in log *      Drains: * No LDAs found *    Findings: anterior posterior collapse    Electronically signed by Sandra Jiménez DO on 12/20/2022 at 7:40 AM

## 2022-12-20 NOTE — ANESTHESIA POSTPROCEDURE EVALUATION
Department of Anesthesiology  Postprocedure Note    Patient: Docia Essex  MRN: [id number]  YOB: 1940  Date of evaluation: 12/20/2022      Procedure Summary     Date: 12/20/22 Room / Location: [address]    Anesthesia Start: 4758 Anesthesia Stop: 5848    Procedure: DRUG INDUCED SLEEP ENDOSCOPY (Throat) Diagnosis:       Obstructive sleep apnea      (Obstructive sleep apnea [G47.33])    Surgeons: Xiang Howe DO Responsible Provider: Cristina Mosquera MD    Anesthesia Type: MAC ASA Status: 3          Anesthesia Type: No value filed.     Orestes Phase I: Orestes Score: 10    Orestes Phase II: Orestes Score: 10      Anesthesia Post Evaluation    Comments: Postoperative Anesthesia Note    Name:    Docia Essex  MRN:      [id number]    Patient Vitals in the past 12 hrs:  12/20/22 0804, BP:(!) 130/59, Pulse:60, Resp:18, SpO2:97 %  12/20/22 0800, BP:108/70, Temp:98.5 °F (36.9 °C), Temp src:Temporal, Pulse:66, Resp:18, SpO2:96 %  12/20/22 0752, BP:117/63, Pulse:61, Resp:18, SpO2:97 %  12/20/22 0747, BP:(!) 107/57, Pulse:64, Resp:18, SpO2:97 %  12/20/22 0742, BP:(!) 111/56, Temp:98.5 °F (36.9 °C), Temp src:Temporal, Pulse:62, Resp:18, SpO2:97 %  12/20/22 0621, BP:(!) 153/72, Temp:97.4 °F (36.3 °C), Temp src:Temporal, Pulse:75, Resp:16, SpO2:96 %     LABS:    CBC  Lab Results       Component                Value               Date/Time                  WBC                      9.1                 08/08/2022 06:31 AM        HGB                      11.7 (L)            08/08/2022 06:31 AM        HCT                      35.3 (L)            08/08/2022 06:31 AM        PLT                      428                 08/08/2022 06:31 AM   RENAL  Lab Results       Component                Value               Date/Time                  NA                       148 (H)             12/05/2022 12:33 PM        K                        4.6                 12/05/2022 12:33 PM        K 4.4                 08/08/2022 06:31 AM        CL                       103                 12/05/2022 12:33 PM        CO2                      25                  12/05/2022 12:33 PM        BUN                      19                  12/05/2022 12:33 PM        CREATININE               1.6 (H)             12/05/2022 12:33 PM        GLUCOSE                  142 (H)             12/05/2022 12:33 PM   COAGS  Lab Results       Component                Value               Date/Time                  PROTIME                  10.2                09/29/2021 03:40 AM        INR                      0.91                09/29/2021 03:40 AM        APTT                     24.0 (L)            09/29/2021 03:40 AM     Intake & Output:  @64VMWD@    Nausea & Vomiting:  No    Level of Consciousness:  Awake    Pain Assessment:  Adequate analgesia    Anesthesia Complications:  No apparent anesthetic complications    SUMMARY      Vital signs stable  OK to discharge from Stage I post anesthesia care.   Care transferred from Anesthesiology department on discharge from perioperative area

## 2022-12-22 ENCOUNTER — TELEPHONE (OUTPATIENT)
Dept: ENT CLINIC | Age: 82
End: 2022-12-22

## 2022-12-22 NOTE — TELEPHONE ENCOUNTER
Patient had DISE 12/20, calling to let  know he has a cough and cold and would like to know if that is normal after procedure  and if there is something he can take to help with symptoms?     Patient call back [phone]    Has follow up 12/30 @0344

## 2022-12-22 NOTE — TELEPHONE ENCOUNTER
Call placed to this patient, reached voicemail. Left detailed message with instructions per Dr. Ernestine Bowen.

## 2022-12-27 DIAGNOSIS — E11.65 UNCONTROLLED TYPE 2 DIABETES MELLITUS WITH HYPERGLYCEMIA (HCC): ICD-10-CM

## 2022-12-27 RX ORDER — METFORMIN HYDROCHLORIDE 500 MG/1
TABLET, EXTENDED RELEASE ORAL
Qty: 360 TABLET | Refills: 1 | Status: SHIPPED | OUTPATIENT
Start: 2022-12-27

## 2022-12-27 NOTE — TELEPHONE ENCOUNTER
LOV 11/18/2022    Future Appointments   Date Time Provider Rommel Tosin   12/30/2022 10:45 AM Porfirio Avelar [facility]   2/9/2023  1:30 PM Lito Vaughn MD [facility]   2/9/2023  2:20 PM Genesis Burns MD AND PULM [facility]   3/6/2023  9:45 AM MD Luke Balderas [facility]   6/20/2023 12:45 PM MD Luke Fuentes [facility]

## 2022-12-30 ENCOUNTER — TELEPHONE (OUTPATIENT)
Dept: ENT CLINIC | Age: 82
End: 2022-12-30

## 2022-12-30 ENCOUNTER — OFFICE VISIT (OUTPATIENT)
Dept: ENT CLINIC | Age: 82
End: 2022-12-30
Payer: MEDICARE

## 2022-12-30 ENCOUNTER — TELEPHONE (OUTPATIENT)
Dept: FAMILY MEDICINE CLINIC | Age: 82
End: 2022-12-30

## 2022-12-30 ENCOUNTER — TELEPHONE (OUTPATIENT)
Dept: PULMONOLOGY | Age: 82
End: 2022-12-30

## 2022-12-30 VITALS
HEART RATE: 67 BPM | SYSTOLIC BLOOD PRESSURE: 117 MMHG | BODY MASS INDEX: 34.86 KG/M2 | DIASTOLIC BLOOD PRESSURE: 71 MMHG | RESPIRATION RATE: 16 BRPM | WEIGHT: 230 LBS | TEMPERATURE: 97.7 F | HEIGHT: 68 IN

## 2022-12-30 DIAGNOSIS — G47.33 OSA (OBSTRUCTIVE SLEEP APNEA): Primary | ICD-10-CM

## 2022-12-30 PROCEDURE — 99213 OFFICE O/P EST LOW 20 MIN: CPT | Performed by: OTOLARYNGOLOGY

## 2022-12-30 PROCEDURE — 1123F ACP DISCUSS/DSCN MKR DOCD: CPT | Performed by: OTOLARYNGOLOGY

## 2022-12-30 PROCEDURE — 3078F DIAST BP <80 MM HG: CPT | Performed by: OTOLARYNGOLOGY

## 2022-12-30 PROCEDURE — 3074F SYST BP LT 130 MM HG: CPT | Performed by: OTOLARYNGOLOGY

## 2022-12-30 ASSESSMENT — ENCOUNTER SYMPTOMS
COUGH: 0
SINUS PRESSURE: 0
APNEA: 0
SORE THROAT: 0
TROUBLE SWALLOWING: 0
SHORTNESS OF BREATH: 0
VOICE CHANGE: 0
EYE ITCHING: 0
FACIAL SWELLING: 0

## 2022-12-30 NOTE — PROGRESS NOTES
[facility], [address], [facility] Raymond Brown  P: [phone]       Patient     Karl Bill  1940    ChiefComplaint     Chief Complaint   Patient presents with    Follow-up     Patient is here for a follow up after his DISE. Patient states that he currently has a cold. History of Present Illness     Azael Cotter is an 60-year-old male here today with his wife for follow-up regarding obstructive sleep apnea and inspire. Uncomplicated course after sleep endoscopy. 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, PARTIAL  2007    middle lobe R lung/histoplasmosis    TURP  2002    UPPER GASTROINTESTINAL ENDOSCOPY  01/26/2015    UPPER GASTROINTESTINAL ENDOSCOPY  02/2015    dilated    UPPER GASTROINTESTINAL ENDOSCOPY  07/21/2017    dilatation, bx    UPPER GASTROINTESTINAL ENDOSCOPY  08/23/2017    dilated; Bx gastric.     VASECTOMY         Family History     Family History   Problem Relation Age of Onset    Heart Disease Mother     No Known Problems Father     Cancer Brother         colon CA       Social History     Social History     Tobacco Use    Smoking status: Former     Packs/day: 1.00 Years: 30.00     Pack years: 30.00     Types: Cigarettes     Start date: 1971     Quit date: 2003     Years since quittin.0    Smokeless tobacco: Never   Vaping Use    Vaping Use: Never used   Substance Use Topics    Alcohol use: Yes    Drug use: No        Allergies     No Known Allergies    Medications     Current Outpatient Medications   Medication Sig Dispense Refill    metFORMIN (GLUCOPHAGE-XR) 500 MG extended release tablet TAKE TWO TABLETS BY MOUTH TWICE A DAY WITH MEALS 360 tablet 1    atorvastatin (LIPITOR) 80 MG tablet Take 1 tablet by mouth at bedtime 90 tablet 1    furosemide (LASIX) 20 MG tablet Take 1 tablet by mouth daily 60 tablet 3    metoprolol succinate (TOPROL XL) 50 MG extended release tablet Take 1 tablet by mouth 2 times daily 180 tablet 3    pantoprazole (PROTONIX) 40 MG tablet TAKE ONE TABLET BY MOUTH DAILY 90 tablet 1    apixaban (ELIQUIS) 5 MG TABS tablet TAKE ONE TABLET BY MOUTH TWICE A DAY 60 tablet 5    losartan-hydroCHLOROthiazide (HYZAAR) 100-25 MG per tablet TAKE ONE TABLET BY MOUTH DAILY 90 tablet 1    nitroGLYCERIN (NITROSTAT) 0.4 MG SL tablet DISSOLVE 1 TAB UNDER TONGUE FOR CHEST PAIN - IF PAIN REMAINS AFTER 5 MIN, CALL 911 AND REPEAT DOSE. MAX 3 TABS IN 15 MINUTES 25 tablet 3    ketoconazole (NIZORAL) 2 % cream Apply topically daily 30 g 3    fluticasone-umeclidin-vilant (TRELEGY ELLIPTA) 100-62.5-25 MCG/INH AEPB INHALE ONE PUFF BY MOUTH DAILY 1 each 5    Handicap Placard MISC by Does not apply route 1 each 0    therapeutic multivitamin-minerals (THERAGRAN-M) tablet Take 1 tablet by mouth daily. amLODIPine (NORVASC) 5 MG tablet TAKE ONE TABLET BY MOUTH DAILY (Patient not taking: No sig reported) 30 tablet 0     No current facility-administered medications for this visit. Review of Systems     Review of Systems   Constitutional:  Negative for appetite change, chills, fatigue, fever and unexpected weight change.    HENT:  Negative for congestion, ear discharge, ear pain, facial swelling, hearing loss, nosebleeds, postnasal drip, sinus pressure, sneezing, sore throat, tinnitus, trouble swallowing and voice change. Eyes:  Negative for itching. Respiratory:  Negative for apnea, cough and shortness of breath. Endocrine: Negative for cold intolerance and heat intolerance. Musculoskeletal:  Negative for myalgias and neck pain. Skin:  Negative for rash. Allergic/Immunologic: Negative for environmental allergies. Neurological:  Negative for dizziness and headaches. Psychiatric/Behavioral:  Negative for confusion, decreased concentration and sleep disturbance. PhysicalExam     Vitals:    12/30/22 1038   BP: 117/71   Site: Right Lower Arm   Position: Sitting   Cuff Size: Medium Adult   Pulse: 67   Resp: 16   Temp: 97.7 °F (36.5 °C)   TempSrc: Infrared   Weight: 230 lb (104.3 kg)   Height: 5' 8\" (1.727 m)     Constitutional:       Appearance: Normal appearance. HENT:      Head: Normocephalic. Nose: Nose normal.   Eyes:      Extraocular Movements: Extraocular movements intact. Neck:      Musculoskeletal: Normal range of motion. Neurological:      General: No focal deficit present. Mental Status: She is alert and oriented to person, place, and time. Psychiatric:         Mood and Affect: Mood normal.         Behavior: Behavior normal.         Thought Content: Thought content normal.       Assessment and Plan     1. RIZWANA (obstructive sleep apnea)  -Patient has received insurance for inspire placement  -Inspire procedure discussed in detail were specifically discussed risk of general anesthesia, postoperative infection requiring explantation, pneumothorax, nerve weakness resulting in speech and chewing deficit and delay in activation. All questions answered. Patient wishes to proceed with procedure as we schedule at his [address], DO  12/30/22      Portions of this note were dictated using Dragon.  There may be linguistic errors secondary to the use of this program.

## 2022-12-30 NOTE — TELEPHONE ENCOUNTER
Refill request for fluticasone-umeclidin-vilant ([facility]) 100-62.5-25 MCG/INH AEPB [[id number]]   medication. Name of [address] [place] [address] [id number] Roberth Bacon 7[address] [facility] Maribel Cota [phone]       Last visit - 8.23.22     Pending visit - 2.9.23    Last refill -November 2022                Additional Comments Brionna told patient they have requested refill and no response from us. He is currently out of this medication. Please call patient when this is sent.

## 2023-01-04 ENCOUNTER — TELEMEDICINE (OUTPATIENT)
Dept: FAMILY MEDICINE CLINIC | Age: 83
End: 2023-01-04
Payer: MEDICARE

## 2023-01-04 DIAGNOSIS — B96.89 ACUTE BACTERIAL SINUSITIS: Primary | ICD-10-CM

## 2023-01-04 DIAGNOSIS — J01.90 ACUTE BACTERIAL SINUSITIS: Primary | ICD-10-CM

## 2023-01-04 PROCEDURE — 1123F ACP DISCUSS/DSCN MKR DOCD: CPT | Performed by: REGISTERED NURSE

## 2023-01-04 PROCEDURE — 99213 OFFICE O/P EST LOW 20 MIN: CPT | Performed by: REGISTERED NURSE

## 2023-01-04 RX ORDER — AMOXICILLIN AND CLAVULANATE POTASSIUM 875; 125 MG/1; MG/1
1 TABLET, FILM COATED ORAL 2 TIMES DAILY
Qty: 20 TABLET | Refills: 0 | Status: SHIPPED | OUTPATIENT
Start: 2023-01-04 | End: 2023-01-14

## 2023-01-04 RX ORDER — FLUTICASONE PROPIONATE 50 MCG
2 SPRAY, SUSPENSION (ML) NASAL DAILY
Qty: 16 G | Refills: 0 | Status: SHIPPED | OUTPATIENT
Start: 2023-01-04

## 2023-01-04 ASSESSMENT — ENCOUNTER SYMPTOMS
SORE THROAT: 0
GASTROINTESTINAL NEGATIVE: 1
RHINORRHEA: 1
SINUS PRESSURE: 0
SINUS PAIN: 0
SHORTNESS OF BREATH: 0
COUGH: 1

## 2023-01-04 NOTE — PROGRESS NOTES
2023    TELEHEALTH EVALUATION -- Audio/Visual ([facility])    HPI:    Nighat Ann (:  1940) has requested an audio/video evaluation for the following concern(s):    He is here for a tele-health visit. He has been coughing a lot and had congestion for the past 2 weeks. He has a lot of nasal discharge for the past week. He is taking a cough medication. No fevers. No shortness or breath or pain. Review of Systems   Constitutional:  Negative for chills, diaphoresis, fatigue and fever. HENT:  Positive for congestion, postnasal drip and rhinorrhea. Negative for sinus pressure, sinus pain and sore throat. Eyes:  Negative for visual disturbance. Respiratory:  Positive for cough. Negative for shortness of breath. Cardiovascular: Negative. Gastrointestinal: Negative. Genitourinary: Negative. Musculoskeletal: Negative. Neurological:  Negative for dizziness, light-headedness and headaches. Psychiatric/Behavioral: Negative. Prior to Visit Medications    Medication Sig Taking?  Authorizing Provider   amoxicillin-clavulanate (AUGMENTIN) 875-125 MG per tablet Take 1 tablet by mouth 2 times daily for 10 days Yes LUZ Chavis CNP   fluticasone (FLONASE) 50 MCG/ACT nasal spray 2 sprays by Each Nostril route daily Yes LUZ Chavis CNP   fluticasone-umeclidin-vilant (TRELEGY ELLIPTA) 100-62.5-25 MCG/ACT AEPB inhaler Inhale 1 puff into the lungs daily Yes Nestor Mendez MD   metFORMIN (GLUCOPHAGE-XR) 500 MG extended release tablet TAKE TWO TABLETS BY MOUTH TWICE A DAY WITH MEALS Yes Nikolai Mirza MD   atorvastatin (LIPITOR) 80 MG tablet Take 1 tablet by mouth at bedtime Yes Db Arvizu MD   furosemide (LASIX) 20 MG tablet Take 1 tablet by mouth daily Yes Db Arvizu MD   metoprolol succinate (TOPROL XL) 50 MG extended release tablet Take 1 tablet by mouth 2 times daily Yes Db Arvizu MD   amLODIPine (NORVASC) 5 MG tablet TAKE ONE TABLET BY MOUTH DAILY Yes Marquita Hodges MD   pantoprazole (PROTONIX) 40 MG tablet TAKE ONE TABLET BY MOUTH DAILY Yes Marquita Hodges MD   apixaban (ELIQUIS) 5 MG TABS tablet TAKE ONE TABLET BY MOUTH TWICE A DAY Yes Marlyn Lin MD   losartan-hydroCHLOROthiazide (HYZAAR) 100-25 MG per tablet TAKE ONE TABLET BY MOUTH DAILY Yes Marquita Hodges MD   nitroGLYCERIN (NITROSTAT) 0.4 MG SL tablet DISSOLVE 1 TAB UNDER TONGUE FOR CHEST PAIN - IF PAIN REMAINS AFTER 5 MIN, CALL 911 AND REPEAT DOSE. MAX 3 TABS IN 15 MINUTES Yes Adrienne Mccormack MD   ketoconazole (NIZORAL) 2 % cream Apply topically daily Yes LUZ Ibrahim - CNP   Handicap Placard MISC by Does not apply route Yes MOOSE Hernandez   therapeutic multivitamin-minerals [facility]) tablet Take 1 tablet by mouth daily.  Yes Historical Provider, MD       Social History     Tobacco Use    Smoking status: Former     Packs/day: 1.00     Years: 30.00     Pack years: 30.00     Types: Cigarettes     Start date: 1971     Quit date: 2003     Years since quittin.0    Smokeless tobacco: Never   Vaping Use    Vaping Use: Never used   Substance Use Topics    Alcohol use: Yes    Drug use: No        No Known Allergies,   Past Medical History:   Diagnosis Date    Chronic atrial fibrillation (HCC)     COPD (chronic obstructive pulmonary disease) (HCC)     Decreased hearing of both ears     Diabetes mellitus ([address]     GERD (gastroesophageal reflux disease)     Histoplasmosis     lung resected    Hyperlipidemia     Hypertension     Primary malignant neoplasm of skin of trunk 2009       PHYSICAL EXAMINATION:  [ INSTRUCTIONS:  \"[x]\" Indicates a positive item  \"[]\" Indicates a negative item  -- DELETE ALL ITEMS NOT EXAMINED]    Constitutional: [x] Appears well-developed and well-nourished [x] No apparent distress      [] Abnormal-   Mental status  [x] Alert and awake  [x] Oriented to person/place/time [x]Able to follow commands Eyes:  EOM    [x]  Normal  [] Abnormal-  Sclera  [x]  Normal  [] Abnormal -         Discharge [x]  None visible  [] Abnormal -    HENT:   [x] Normocephalic, atraumatic. [] Abnormal   [x] Mouth/Throat: Mucous membranes are moist.     External Ears [x] Normal  [] Abnormal-     Neck: [x] No visualized mass     Pulmonary/Chest: [x] Respiratory effort normal.  [x] No visualized signs of difficulty breathing or respiratory distress        [] Abnormal-      Musculoskeletal:   [x] Normal gait with no signs of ataxia         [x] Normal range of motion of neck        [] Abnormal-       Neurological:        [x] No Facial Asymmetry (Cranial nerve 7 motor function) (limited exam to video visit)          [x] No gaze palsy        [] Abnormal-         Skin:        [x] No significant exanthematous lesions or discoloration noted on facial skin         [] Abnormal-            Psychiatric:       [x] Normal Affect [x] No Hallucinations        [] Abnormal-     Other pertinent observable physical exam findings- none    ASSESSMENT/PLAN:    1. Acute bacterial sinusitis  Symptoms consistent with sinusitis, symptoms for about two weeks. Will start on Augmentin. Recommend flonase daily. Continue OTC cough medication. Rest and fluids. Call if no improvement or for worsening symptoms. - amoxicillin-clavulanate (AUGMENTIN) 875-125 MG per tablet; Take 1 tablet by mouth 2 times daily for 10 days  Dispense: 20 tablet; Refill: 0  - fluticasone (FLONASE) 50 MCG/ACT nasal spray; 2 sprays by Each Nostril route daily  Dispense: 16 g; Refill: 0    Return if symptoms worsen or fail to improve. Kristen Vanessa is a 81 y.o. male being evaluated by a Virtual Visit (video visit) encounter to address concerns as mentioned above. A caregiver was present when appropriate.  Due to this being a TeleHealth encounter (During ERYCH-06 public health emergency), evaluation of the following organ systems was limited: Vitals/Constitutional/EENT/Resp/CV/GI//MS/Neuro/Skin/Heme-Lymph-Imm. Pursuant to the emergency declaration under the [address] [place] [address], [facility] waiver authority and the [address] and [employer] Act, this Virtual Visit was conducted with patient's (and/or legal guardian's) consent, to reduce the patient's risk of exposure to COVID-19 and provide necessary medical care. The patient (and/or legal guardian) has also been advised to contact this office for worsening conditions or problems, and seek emergency medical treatment and/or call 911 if deemed necessary. Services were provided through a video synchronous discussion virtually to substitute for in-person clinic visit. Patient and provider were located at their individual homes. --LUZ Lacy - CNP on 1/4/2023 at 2:38 PM    An electronic signature was used to authenticate this note. This chart was generated using the [address] [place] [address] 19[address]  [employer] system. I created this record but it may contain dictation errors due to the limitation of the software. This is a telehealth visit. Verbal consent to participate in video visit was obtained. Pursuant to the emergency declaration under the [address] [place] [address], [facility] authority and the [employer] and [employer] Act, this Virtual Visit was conducted, with patient's consent, to reduce the patient's risk of exposure to COVID-19 and provide continuity of care for an established/new patient. Services were provided through a video synchronous discussion virtually to substitute for in-person clinic visit.  I discussed with the patient the nature of our telehealth visits via interactive/real-time audio/video that:   - I would evaluate the patient and recommend diagnostics and treatments based on my assessment   - Our sessions are not being recorded and that personal health information is protected   - Our team would provide follow up care in person if/when the patient needs it.

## 2023-01-09 ENCOUNTER — TELEPHONE (OUTPATIENT)
Dept: ENT CLINIC | Age: 83
End: 2023-01-09

## 2023-01-11 DIAGNOSIS — I48.91 ATRIAL FIBRILLATION WITH RVR (HCC): ICD-10-CM

## 2023-01-11 DIAGNOSIS — E78.2 MIXED HYPERLIPIDEMIA: ICD-10-CM

## 2023-01-11 DIAGNOSIS — I25.10 MILD CAD: ICD-10-CM

## 2023-01-11 DIAGNOSIS — R60.0 LOCALIZED EDEMA: ICD-10-CM

## 2023-01-11 DIAGNOSIS — I10 ESSENTIAL HYPERTENSION: ICD-10-CM

## 2023-01-11 DIAGNOSIS — Z79.899 MEDICATION MANAGEMENT: ICD-10-CM

## 2023-01-11 LAB
CHOLESTEROL, TOTAL: 128 MG/DL (ref 0–199)
HDLC SERPL-MCNC: 32 MG/DL (ref 40–60)
LDL CHOLESTEROL CALCULATED: 48 MG/DL
TRIGL SERPL-MCNC: 241 MG/DL (ref 0–150)
VLDLC SERPL CALC-MCNC: 48 MG/DL

## 2023-01-12 ENCOUNTER — TELEPHONE (OUTPATIENT)
Dept: CARDIOLOGY CLINIC | Age: 83
End: 2023-01-12

## 2023-01-12 LAB
ALBUMIN SERPL-MCNC: 3.8 G/DL (ref 3.4–5)
ALP BLD-CCNC: 81 U/L (ref 40–129)
ALT SERPL-CCNC: 14 U/L (ref 10–40)
AST SERPL-CCNC: 15 U/L (ref 15–37)
BILIRUB SERPL-MCNC: 0.3 MG/DL (ref 0–1)
BILIRUBIN DIRECT: <0.2 MG/DL (ref 0–0.3)
BILIRUBIN, INDIRECT: NORMAL MG/DL (ref 0–1)
TOTAL PROTEIN: 6.4 G/DL (ref 6.4–8.2)

## 2023-01-12 NOTE — TELEPHONE ENCOUNTER
Lo Tellez MD   1/12/2023  7:27 AM EST       Good liver tests. Lipids good except for . Need 175 or less. Watch diet as best possible.  cpm       Lvm for pt to call office to relay message

## 2023-01-13 ENCOUNTER — TELEPHONE (OUTPATIENT)
Dept: PULMONOLOGY | Age: 83
End: 2023-01-13

## 2023-01-13 NOTE — PROGRESS NOTES
Kenia Petite    Age 81 y.o.    male    1940    MRN [id number]    1/23/2023  Arrival Time_____________  OR Time____________[address]     Procedure(s):  INSPIRE DEVICE PLACEMENT                      General    Surgeon(s):  Sancho Silva, DO  Brittney Hick, DO       Phone [phone] (home) [phone] (work)    [address] [facility]  Cell         Work  _____________________________________________________________________  _____________________________________________________________________  _____________________________________________________________________  _____________________________________________________________________  _____________________________________________________________________    PCP _____________________________ Phone_________________     H&P__________________Bringing      Chart            Epic   DOS      Called________  EKG__________________Bringing      Chart            Epic   DOS      Called________  LAB__________________ Bringing      Chart            Epic   DOS      Called________  Cardiac Clearance_______Bringing      Chart            Epic      DOS      Called________    Cardiologist________________________ Phone___________________________    ? Restorationist concerns / Waiver on Chart            [employer]________________  ? Pre-op Instructions Given [place]          _________________________________  ? Directions to Surgery Center                          _________________________________  ? Transportation Home_______________      __________________________________  ?  Crutches/Walker__________________        __________________________________    ________Pre-op Orders   _______Transcribed    _______Wt.  ________Pharmacy          _______SCD  ______VTE     ______[facility]  _______  Surgery Consent    _______  Anesthesia Consent         COVID DATE______________LOCATION________________ RESULT__________

## 2023-01-13 NOTE — TELEPHONE ENCOUNTER
Pt is scheduled for INSPIRE sx on 1/23/23. Activation:  LM asking pt to call back. I am going to try and schedule activation for 3/2/23 if available.

## 2023-01-16 NOTE — PROGRESS NOTES

## 2023-01-19 ENCOUNTER — OFFICE VISIT (OUTPATIENT)
Dept: FAMILY MEDICINE CLINIC | Age: 83
End: 2023-01-19

## 2023-01-19 ENCOUNTER — TELEPHONE (OUTPATIENT)
Dept: CARDIOLOGY CLINIC | Age: 83
End: 2023-01-19

## 2023-01-19 VITALS
DIASTOLIC BLOOD PRESSURE: 72 MMHG | OXYGEN SATURATION: 93 % | HEART RATE: 72 BPM | WEIGHT: 230 LBS | HEIGHT: 68 IN | BODY MASS INDEX: 34.86 KG/M2 | SYSTOLIC BLOOD PRESSURE: 134 MMHG

## 2023-01-19 DIAGNOSIS — G47.33 OSA (OBSTRUCTIVE SLEEP APNEA): ICD-10-CM

## 2023-01-19 DIAGNOSIS — E78.2 MIXED HYPERLIPIDEMIA: ICD-10-CM

## 2023-01-19 DIAGNOSIS — Z01.818 PREOP EXAMINATION: ICD-10-CM

## 2023-01-19 DIAGNOSIS — Z01.818 PREOP EXAMINATION: Primary | ICD-10-CM

## 2023-01-19 DIAGNOSIS — Z79.899 MEDICATION MANAGEMENT: ICD-10-CM

## 2023-01-19 LAB
A/G RATIO: 1.4 (ref 1.1–2.2)
ALBUMIN SERPL-MCNC: 3.9 G/DL (ref 3.4–5)
ALP BLD-CCNC: 89 U/L (ref 40–129)
ALT SERPL-CCNC: 10 U/L (ref 10–40)
ANION GAP SERPL CALCULATED.3IONS-SCNC: 14 MMOL/L (ref 3–16)
AST SERPL-CCNC: 11 U/L (ref 15–37)
BASOPHILS ABSOLUTE: 0.1 K/UL (ref 0–0.2)
BASOPHILS RELATIVE PERCENT: 0.7 %
BILIRUB SERPL-MCNC: 0.5 MG/DL (ref 0–1)
BUN BLDV-MCNC: 13 MG/DL (ref 7–20)
CALCIUM SERPL-MCNC: 9.3 MG/DL (ref 8.3–10.6)
CHLORIDE BLD-SCNC: 100 MMOL/L (ref 99–110)
CHOLESTEROL, TOTAL: 139 MG/DL (ref 0–199)
CO2: 26 MMOL/L (ref 21–32)
CREAT SERPL-MCNC: 1.4 MG/DL (ref 0.8–1.3)
EOSINOPHILS ABSOLUTE: 0.2 K/UL (ref 0–0.6)
EOSINOPHILS RELATIVE PERCENT: 2.6 %
GFR SERPL CREATININE-BSD FRML MDRD: 50 ML/MIN/{1.73_M2}
GLUCOSE BLD-MCNC: 141 MG/DL (ref 70–99)
HCT VFR BLD CALC: 40.2 % (ref 40.5–52.5)
HDLC SERPL-MCNC: 37 MG/DL (ref 40–60)
HEMOGLOBIN: 12.6 G/DL (ref 13.5–17.5)
LDL CHOLESTEROL CALCULATED: 65 MG/DL
LYMPHOCYTES ABSOLUTE: 2.3 K/UL (ref 1–5.1)
LYMPHOCYTES RELATIVE PERCENT: 23.9 %
MCH RBC QN AUTO: 26.9 PG (ref 26–34)
MCHC RBC AUTO-ENTMCNC: 31.3 G/DL (ref 31–36)
MCV RBC AUTO: 85.9 FL (ref 80–100)
MONOCYTES ABSOLUTE: 0.9 K/UL (ref 0–1.3)
MONOCYTES RELATIVE PERCENT: 9 %
NEUTROPHILS ABSOLUTE: 6.1 K/UL (ref 1.7–7.7)
NEUTROPHILS RELATIVE PERCENT: 63.8 %
PDW BLD-RTO: 16.3 % (ref 12.4–15.4)
PLATELET # BLD: 503 K/UL (ref 135–450)
PMV BLD AUTO: 7.9 FL (ref 5–10.5)
POTASSIUM SERPL-SCNC: 4.8 MMOL/L (ref 3.5–5.1)
RBC # BLD: 4.68 M/UL (ref 4.2–5.9)
SODIUM BLD-SCNC: 140 MMOL/L (ref 136–145)
TOTAL PROTEIN: 6.7 G/DL (ref 6.4–8.2)
TRIGL SERPL-MCNC: 183 MG/DL (ref 0–150)
VLDLC SERPL CALC-MCNC: 37 MG/DL
WBC # BLD: 9.6 K/UL (ref 4–11)

## 2023-01-19 ASSESSMENT — PATIENT HEALTH QUESTIONNAIRE - PHQ9
2. FEELING DOWN, DEPRESSED OR HOPELESS: 0
SUM OF ALL RESPONSES TO PHQ QUESTIONS 1-9: 0
SUM OF ALL RESPONSES TO PHQ9 QUESTIONS 1 & 2: 0
SUM OF ALL RESPONSES TO PHQ QUESTIONS 1-9: 0
1. LITTLE INTEREST OR PLEASURE IN DOING THINGS: 0

## 2023-01-19 NOTE — H&P (VIEW-ONLY)
[facility]  [phone]  Fax: [phone]   Pre-operative History and Physical      DIAGNOSIS:  Obstructive sleep Apnea    PROCEDURE:  Inspire Device Placement    History Obtained From:  patient    HISTORY OF PRESENT ILLNESS:    The patient is a 81 y.o. male with significant past medical history of A-fib, HTN, CAD, GERD, RIZWANA, COPD, HLD, migraines. I am seeing this patient for preop consultation for Dr. Gabriela Canada. 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, PARTIAL  2007    middle lobe R lung/histoplasmosis    TURP  2002    UPPER GASTROINTESTINAL ENDOSCOPY  01/26/2015    UPPER GASTROINTESTINAL ENDOSCOPY  02/2015    dilated    UPPER GASTROINTESTINAL ENDOSCOPY  07/21/2017    dilatation, bx    UPPER GASTROINTESTINAL ENDOSCOPY  08/23/2017    dilated; Bx gastric.     VASECTOMY       Current Outpatient Medications   Medication Sig Dispense Refill    fluticasone (FLONASE) 50 MCG/ACT nasal spray 2 sprays by Each Nostril route daily (Patient taking differently: 2 sprays by Each Nostril route as needed) 16 g 0    fluticasone-umeclidin-vilant (TRELEGY ELLIPTA) 100-62.5-25 MCG/ACT AEPB inhaler Inhale 1 puff into the lungs daily 1 each 5    metFORMIN (GLUCOPHAGE-XR) 500 MG extended release tablet TAKE TWO TABLETS BY MOUTH TWICE A DAY WITH MEALS 360 tablet 1    atorvastatin (LIPITOR) 80 MG tablet Take 1 tablet by mouth at bedtime 90 tablet 1    furosemide (LASIX) 20 MG tablet Take 1 tablet by mouth daily 60 tablet 3    metoprolol succinate (TOPROL XL) 50 MG extended release tablet Take 1 tablet by mouth 2 times daily 180 tablet 3    pantoprazole (PROTONIX) 40 MG tablet TAKE ONE TABLET BY MOUTH DAILY 90 tablet 1    apixaban (ELIQUIS) 5 MG TABS tablet TAKE ONE TABLET BY MOUTH TWICE A DAY 60 tablet 5    losartan-hydroCHLOROthiazide (HYZAAR) 100-25 MG per tablet TAKE ONE TABLET BY MOUTH DAILY 90 tablet 1    nitroGLYCERIN (NITROSTAT) 0.4 MG SL tablet DISSOLVE 1 TAB UNDER TONGUE FOR CHEST PAIN - IF PAIN REMAINS AFTER 5 MIN, CALL 911 AND REPEAT DOSE. MAX 3 TABS IN 15 MINUTES 25 tablet 3    ketoconazole (NIZORAL) 2 % cream Apply topically daily (Patient taking differently: as needed Apply topically daily prn) 30 g 3    Handicap Placard MISC by Does not apply route 1 each 0    therapeutic multivitamin-minerals (THERAGRAN-M) tablet Take 1 tablet by mouth daily. No current facility-administered medications for this visit. Allergies:  Patient has no known allergies. History of allergic reaction to anesthesia:  No     Social History     Tobacco Use   Smoking Status Former    Packs/day: 1.00    Years: 30.00    Pack years: 30.00    Types: Cigarettes    Start date: 1971    Quit date: 2003    Years since quittin.0   Smokeless Tobacco Never     The patient states he drinks 0-1 per week. Family History   Problem Relation Age of Onset    Heart Disease Mother     No Known Problems Father     Cancer Brother         colon CA       REVIEW OF SYSTEMS:    CONSTITUTIONAL:  negative for  fevers, chills, sweats, and fatigue  EYES:  negative  HEENT:  negative  RESPIRATORY:  Positive for dry cough for 3 weeks but reports a chronic cough and occasional wheezing due to COPD.  Negative for cough with sputum, and dyspnea  CARDIOVASCULAR:  negative for  chest pain, dyspnea, palpitations  GASTROINTESTINAL:  negative for nausea, vomiting, change in bowel habits, diarrhea, and constipation  GENITOURINARY:  negative  INTEGUMENT/BREAST:  negative  HEMATOLOGIC/LYMPHATIC:  negative  ALLERGIC/IMMUNOLOGIC:  negative  ENDOCRINE:  negative  MUSCULOSKELETAL:  negative  NEUROLOGICAL:  negative    PHYSICAL EXAM:      /72 (Site: Right Upper Arm, Position: Sitting, Cuff Size: Large Adult)   Ht 5' 8\" (1.727 m)   Wt 230 lb (104.3 kg)   BMI 34.97 kg/m²     CONSTITUTIONAL:  awake, alert, cooperative, no apparent distress, and appears stated age    Eyes:  Lids and lashes normal, pupils equal, round and reactive to light, extra ocular muscles intact, sclera clear, conjunctiva normal    Head/ENT:  Normocephalic, without obvious abnormality, atramatic, sinuses nontender on palpation, external ears without lesions, oral pharynx with moist mucus membranes, tonsils without erythema or exudates, gums normal and good dentition. Neck:  Supple, symmetrical, trachea midline, no adenopathy, thyroid symmetric, not enlarged and no tenderness, skin normal    Heart:  Normal apical impulse, regular rate and rhythm, normal S1 and S2, no S3 or S4, and no murmur noted    Lungs:  No increased work of breathing, good air exchange, clear to auscultation bilaterally, no crackles. Wheezing present- not new for patient, COPD. Abdomen:  No scars, normal bowel sounds, soft, non-distended, non-tender, no masses palpated, no hepatosplenomegally    Extremities:  No clubbing, cyanosis, or edema    NEUROLOGIC:  Awake, alert, oriented to name, place and time. Cranial nerves II-XII are grossly intact. Motor is 5 out of 5 bilaterally. DATA:  EKG:  Date:  1/19/23  I have reviewed EKG with the following interpretation:  Impression:  Sinus rhythm- no evidence of acute ischemia. Reviewed with Dr. Rommel Valdivia. ASSESSMENT AND PLAN:    1. Patient is a 81 y.o. male with above specified procedure planned on 1/23/23 with Dr. Ricky Allan at [facility] . He will require clearance from his cardiologist.  2. Stop ASA/NSAIDs medications 7-10 days prior to procedure.   3.Patient is cleared for surgery once he receives clearance from his cardiologist.  4.Preop has been faxed to Dr. Dimas Odell office    Marino Boswell, APRN - [place] [address]  [phone]

## 2023-01-19 NOTE — PROGRESS NOTES
[facility]  [phone]  Fax: [phone]   Pre-operative History and Physical      DIAGNOSIS:  Obstructive sleep Apnea    PROCEDURE:  Inspire Device Placement    History Obtained From:  patient    HISTORY OF PRESENT ILLNESS:    The patient is a 81 y.o. male with significant past medical history of A-fib, HTN, CAD, GERD, RIZWANA, COPD, HLD, migraines. I am seeing this patient for preop consultation for Dr. Mike Verma. 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, PARTIAL  2007    middle lobe R lung/histoplasmosis    TURP  2002    UPPER GASTROINTESTINAL ENDOSCOPY  01/26/2015    UPPER GASTROINTESTINAL ENDOSCOPY  02/2015    dilated    UPPER GASTROINTESTINAL ENDOSCOPY  07/21/2017    dilatation, bx    UPPER GASTROINTESTINAL ENDOSCOPY  08/23/2017    dilated; Bx gastric.     VASECTOMY       Current Outpatient Medications   Medication Sig Dispense Refill    fluticasone (FLONASE) 50 MCG/ACT nasal spray 2 sprays by Each Nostril route daily (Patient taking differently: 2 sprays by Each Nostril route as needed) 16 g 0    fluticasone-umeclidin-vilant (TRELEGY ELLIPTA) 100-62.5-25 MCG/ACT AEPB inhaler Inhale 1 puff into the lungs daily 1 each 5    metFORMIN (GLUCOPHAGE-XR) 500 MG extended release tablet TAKE TWO TABLETS BY MOUTH TWICE A DAY WITH MEALS 360 tablet 1    atorvastatin (LIPITOR) 80 MG tablet Take 1 tablet by mouth at bedtime 90 tablet 1    furosemide (LASIX) 20 MG tablet Take 1 tablet by mouth daily 60 tablet 3    metoprolol succinate (TOPROL XL) 50 MG extended release tablet Take 1 tablet by mouth 2 times daily 180 tablet 3    pantoprazole (PROTONIX) 40 MG tablet TAKE ONE TABLET BY MOUTH DAILY 90 tablet 1    apixaban (ELIQUIS) 5 MG TABS tablet TAKE ONE TABLET BY MOUTH TWICE A DAY 60 tablet 5    losartan-hydroCHLOROthiazide (HYZAAR) 100-25 MG per tablet TAKE ONE TABLET BY MOUTH DAILY 90 tablet 1    nitroGLYCERIN (NITROSTAT) 0.4 MG SL tablet DISSOLVE 1 TAB UNDER TONGUE FOR CHEST PAIN - IF PAIN REMAINS AFTER 5 MIN, CALL 911 AND REPEAT DOSE. MAX 3 TABS IN 15 MINUTES 25 tablet 3    ketoconazole (NIZORAL) 2 % cream Apply topically daily (Patient taking differently: as needed Apply topically daily prn) 30 g 3    Handicap Placard MISC by Does not apply route 1 each 0    therapeutic multivitamin-minerals (THERAGRAN-M) tablet Take 1 tablet by mouth daily. No current facility-administered medications for this visit. Allergies:  Patient has no known allergies. History of allergic reaction to anesthesia:  No     Social History     Tobacco Use   Smoking Status Former    Packs/day: 1.00    Years: 30.00    Pack years: 30.00    Types: Cigarettes    Start date: 1971    Quit date: 2003    Years since quittin.0   Smokeless Tobacco Never     The patient states he drinks 0-1 per week. Family History   Problem Relation Age of Onset    Heart Disease Mother     No Known Problems Father     Cancer Brother         colon CA       REVIEW OF SYSTEMS:    CONSTITUTIONAL:  negative for  fevers, chills, sweats, and fatigue  EYES:  negative  HEENT:  negative  RESPIRATORY:  Positive for dry cough for 3 weeks but reports a chronic cough and occasional wheezing due to COPD.  Negative for cough with sputum, and dyspnea  CARDIOVASCULAR:  negative for  chest pain, dyspnea, palpitations  GASTROINTESTINAL:  negative for nausea, vomiting, change in bowel habits, diarrhea, and constipation  GENITOURINARY:  negative  INTEGUMENT/BREAST:  negative  HEMATOLOGIC/LYMPHATIC:  negative  ALLERGIC/IMMUNOLOGIC:  negative  ENDOCRINE:  negative  MUSCULOSKELETAL:  negative  NEUROLOGICAL:  negative    PHYSICAL EXAM:      /72 (Site: Right Upper Arm, Position: Sitting, Cuff Size: Large Adult)   Ht 5' 8\" (1.727 m)   Wt 230 lb (104.3 kg)   BMI 34.97 kg/m²     CONSTITUTIONAL:  awake, alert, cooperative, no apparent distress, and appears stated age    Eyes:  Lids and lashes normal, pupils equal, round and reactive to light, extra ocular muscles intact, sclera clear, conjunctiva normal    Head/ENT:  Normocephalic, without obvious abnormality, atramatic, sinuses nontender on palpation, external ears without lesions, oral pharynx with moist mucus membranes, tonsils without erythema or exudates, gums normal and good dentition. Neck:  Supple, symmetrical, trachea midline, no adenopathy, thyroid symmetric, not enlarged and no tenderness, skin normal    Heart:  Normal apical impulse, regular rate and rhythm, normal S1 and S2, no S3 or S4, and no murmur noted    Lungs:  No increased work of breathing, good air exchange, clear to auscultation bilaterally, no crackles. Wheezing present- not new for patient, COPD. Abdomen:  No scars, normal bowel sounds, soft, non-distended, non-tender, no masses palpated, no hepatosplenomegally    Extremities:  No clubbing, cyanosis, or edema    NEUROLOGIC:  Awake, alert, oriented to name, place and time. Cranial nerves II-XII are grossly intact. Motor is 5 out of 5 bilaterally. DATA:  EKG:  Date:  1/19/23  I have reviewed EKG with the following interpretation:  Impression:  Sinus rhythm- no evidence of acute ischemia. Reviewed with Dr. Misha Magana. ASSESSMENT AND PLAN:    1. Patient is a 81 y.o. male with above specified procedure planned on 1/23/23 with Dr. Mana Meza at [facility] . He will require clearance from his cardiologist.  2. Stop ASA/NSAIDs medications 7-10 days prior to procedure.   3.Patient is cleared for surgery once he receives clearance from his cardiologist.  4.Preop has been faxed to Dr. Gregorio Sultana office    Ford Nunez, APRN - CNP    [address] [place] [address]  [phone]

## 2023-01-19 NOTE — TELEPHONE ENCOUNTER
1/19/22-- [address] asking for call back from [facility] office, they have EKG results that Luis Armando Miller asked for.   Ph. [phone] contact name Bobby

## 2023-01-19 NOTE — TELEPHONE ENCOUNTER
Vonda Calle' office called and is requesting [facility] to review pts EKG before pt has procedure done on Monday, please advise

## 2023-01-19 NOTE — TELEPHONE ENCOUNTER
Per [facility]:     The overall quality of the recording is poor. However, looks like sinus rhythm. No concerning issues. Attempted to relay message to [facility]' medical staff. No one available to take call at the time of call. Left message with call center, [facility]' medical staff to return call with any questions.

## 2023-01-20 ENCOUNTER — TELEPHONE (OUTPATIENT)
Dept: ENT CLINIC | Age: 83
End: 2023-01-20

## 2023-01-20 ENCOUNTER — TELEPHONE (OUTPATIENT)
Dept: CARDIOLOGY CLINIC | Age: 83
End: 2023-01-20

## 2023-01-20 ENCOUNTER — ANESTHESIA EVENT (OUTPATIENT)
Dept: OPERATING ROOM | Age: 83
End: 2023-01-20
Payer: MEDICARE

## 2023-01-20 ENCOUNTER — PREP FOR PROCEDURE (OUTPATIENT)
Dept: ENT CLINIC | Age: 83
End: 2023-01-20

## 2023-01-20 RX ORDER — SODIUM CHLORIDE 0.9 % (FLUSH) 0.9 %
5-40 SYRINGE (ML) INJECTION EVERY 12 HOURS SCHEDULED
Status: CANCELLED | OUTPATIENT
Start: 2023-01-20

## 2023-01-20 RX ORDER — SODIUM CHLORIDE 0.9 % (FLUSH) 0.9 %
5-40 SYRINGE (ML) INJECTION PRN
Status: CANCELLED | OUTPATIENT
Start: 2023-01-20

## 2023-01-20 RX ORDER — SODIUM CHLORIDE 9 MG/ML
INJECTION, SOLUTION INTRAVENOUS PRN
Status: CANCELLED | OUTPATIENT
Start: 2023-01-20

## 2023-01-20 NOTE — TELEPHONE ENCOUNTER
----- Message from Beerket Nguyen MD sent at 1/20/2023  7:35 AM EST -----  Good lipids. TG decreased.  cpm

## 2023-01-20 NOTE — TELEPHONE ENCOUNTER
----- Message from Maria A Bowden MD sent at 1/20/2023  7:37 AM EST -----  Stable kidney function. Good liver and blood count.

## 2023-01-23 ENCOUNTER — APPOINTMENT (OUTPATIENT)
Dept: GENERAL RADIOLOGY | Age: 83
End: 2023-01-23
Attending: OTOLARYNGOLOGY
Payer: MEDICARE

## 2023-01-23 ENCOUNTER — HOSPITAL ENCOUNTER (OUTPATIENT)
Age: 83
Setting detail: OUTPATIENT SURGERY
Discharge: HOME | End: 2023-01-23
Attending: OTOLARYNGOLOGY | Admitting: OTOLARYNGOLOGY
Payer: MEDICARE

## 2023-01-23 ENCOUNTER — ANESTHESIA (OUTPATIENT)
Dept: OPERATING ROOM | Age: 83
End: 2023-01-23
Payer: MEDICARE

## 2023-01-23 VITALS
RESPIRATION RATE: 25 BRPM | HEART RATE: 75 BPM | DIASTOLIC BLOOD PRESSURE: 71 MMHG | TEMPERATURE: 97 F | HEIGHT: 68 IN | OXYGEN SATURATION: 94 % | WEIGHT: 230 LBS | BODY MASS INDEX: 34.86 KG/M2 | SYSTOLIC BLOOD PRESSURE: 138 MMHG

## 2023-01-23 DIAGNOSIS — G89.18 POST-OP PAIN: Primary | ICD-10-CM

## 2023-01-23 LAB
GLUCOSE BLD-MCNC: 100 MG/DL (ref 70–99)
GLUCOSE BLD-MCNC: 157 MG/DL (ref 70–99)
PERFORMED ON: ABNORMAL
PERFORMED ON: ABNORMAL

## 2023-01-23 PROCEDURE — 6360000002 HC RX W HCPCS: Performed by: NURSE ANESTHETIST, CERTIFIED REGISTERED

## 2023-01-23 PROCEDURE — 3600000004 HC SURGERY LEVEL 4 BASE: Performed by: OTOLARYNGOLOGY

## 2023-01-23 PROCEDURE — C1767 GENERATOR, NEURO NON-RECHARG: HCPCS | Performed by: OTOLARYNGOLOGY

## 2023-01-23 PROCEDURE — 7100000001 HC PACU RECOVERY - ADDTL 15 MIN: Performed by: OTOLARYNGOLOGY

## 2023-01-23 PROCEDURE — 2500000003 HC RX 250 WO HCPCS: Performed by: NURSE ANESTHETIST, CERTIFIED REGISTERED

## 2023-01-23 PROCEDURE — 2580000003 HC RX 258: Performed by: OTOLARYNGOLOGY

## 2023-01-23 PROCEDURE — A4217 STERILE WATER/SALINE, 500 ML: HCPCS | Performed by: OTOLARYNGOLOGY

## 2023-01-23 PROCEDURE — 7100000000 HC PACU RECOVERY - FIRST 15 MIN: Performed by: OTOLARYNGOLOGY

## 2023-01-23 PROCEDURE — 3700000000 HC ANESTHESIA ATTENDED CARE: Performed by: OTOLARYNGOLOGY

## 2023-01-23 PROCEDURE — 2580000003 HC RX 258: Performed by: ANESTHESIOLOGY

## 2023-01-23 PROCEDURE — 3600000014 HC SURGERY LEVEL 4 ADDTL 15MIN: Performed by: OTOLARYNGOLOGY

## 2023-01-23 PROCEDURE — 70360 X-RAY EXAM OF NECK: CPT

## 2023-01-23 PROCEDURE — 2720000010 HC SURG SUPPLY STERILE: Performed by: OTOLARYNGOLOGY

## 2023-01-23 PROCEDURE — C1778 LEAD, NEUROSTIMULATOR: HCPCS | Performed by: OTOLARYNGOLOGY

## 2023-01-23 PROCEDURE — 2709999900 HC NON-CHARGEABLE SUPPLY: Performed by: OTOLARYNGOLOGY

## 2023-01-23 PROCEDURE — 71045 X-RAY EXAM CHEST 1 VIEW: CPT

## 2023-01-23 PROCEDURE — 3700000001 HC ADD 15 MINUTES (ANESTHESIA): Performed by: OTOLARYNGOLOGY

## 2023-01-23 PROCEDURE — 2500000003 HC RX 250 WO HCPCS: Performed by: OTOLARYNGOLOGY

## 2023-01-23 PROCEDURE — 7100000010 HC PHASE II RECOVERY - FIRST 15 MIN: Performed by: OTOLARYNGOLOGY

## 2023-01-23 PROCEDURE — 7100000011 HC PHASE II RECOVERY - ADDTL 15 MIN: Performed by: OTOLARYNGOLOGY

## 2023-01-23 PROCEDURE — 64582 OPN MPLTJ HPGLSL NSTM ARY PG: CPT | Performed by: OTOLARYNGOLOGY

## 2023-01-23 DEVICE — LEAD SENSING RESPIRATORY INSPIRE: Type: IMPLANTABLE DEVICE | Site: CHEST | Status: FUNCTIONAL

## 2023-01-23 DEVICE — GENERATOR PULSE IMPLANTABLE INSPIRE: Type: IMPLANTABLE DEVICE | Site: CHEST | Status: FUNCTIONAL

## 2023-01-23 DEVICE — LEAD STIMULATION UPPER AIRWAY INSPIRE: Type: IMPLANTABLE DEVICE | Site: CHEST | Status: FUNCTIONAL

## 2023-01-23 RX ORDER — IPRATROPIUM BROMIDE AND ALBUTEROL SULFATE 2.5; .5 MG/3ML; MG/3ML
1 SOLUTION RESPIRATORY (INHALATION)
Status: DISCONTINUED | OUTPATIENT
Start: 2023-01-23 | End: 2023-01-23 | Stop reason: HOSPADM

## 2023-01-23 RX ORDER — SODIUM CHLORIDE 9 MG/ML
INJECTION, SOLUTION INTRAVENOUS PRN
Status: DISCONTINUED | OUTPATIENT
Start: 2023-01-23 | End: 2023-01-23 | Stop reason: HOSPADM

## 2023-01-23 RX ORDER — CEFAZOLIN SODIUM 1 G/3ML
INJECTION, POWDER, FOR SOLUTION INTRAMUSCULAR; INTRAVENOUS PRN
Status: DISCONTINUED | OUTPATIENT
Start: 2023-01-23 | End: 2023-01-23 | Stop reason: SDUPTHER

## 2023-01-23 RX ORDER — CEPHALEXIN 500 MG/1
500 CAPSULE ORAL 3 TIMES DAILY
Qty: 15 CAPSULE | Refills: 0 | Status: SHIPPED | OUTPATIENT
Start: 2023-01-23 | End: 2023-01-28

## 2023-01-23 RX ORDER — SODIUM CHLORIDE, SODIUM LACTATE, POTASSIUM CHLORIDE, CALCIUM CHLORIDE 600; 310; 30; 20 MG/100ML; MG/100ML; MG/100ML; MG/100ML
INJECTION, SOLUTION INTRAVENOUS CONTINUOUS
Status: DISCONTINUED | OUTPATIENT
Start: 2023-01-23 | End: 2023-01-23 | Stop reason: HOSPADM

## 2023-01-23 RX ORDER — SODIUM CHLORIDE 0.9 % (FLUSH) 0.9 %
5-40 SYRINGE (ML) INJECTION PRN
Status: DISCONTINUED | OUTPATIENT
Start: 2023-01-23 | End: 2023-01-23 | Stop reason: HOSPADM

## 2023-01-23 RX ORDER — HYDROCODONE BITARTRATE AND ACETAMINOPHEN 5; 325 MG/1; MG/1
1 TABLET ORAL EVERY 4 HOURS PRN
Qty: 10 TABLET | Refills: 0 | Status: SHIPPED | OUTPATIENT
Start: 2023-01-23 | End: 2023-01-26

## 2023-01-23 RX ORDER — HYDRALAZINE HYDROCHLORIDE 20 MG/ML
10 INJECTION INTRAMUSCULAR; INTRAVENOUS
Status: DISCONTINUED | OUTPATIENT
Start: 2023-01-23 | End: 2023-01-23 | Stop reason: HOSPADM

## 2023-01-23 RX ORDER — SODIUM CHLORIDE 0.9 % (FLUSH) 0.9 %
5-40 SYRINGE (ML) INJECTION EVERY 12 HOURS SCHEDULED
Status: DISCONTINUED | OUTPATIENT
Start: 2023-01-23 | End: 2023-01-23 | Stop reason: HOSPADM

## 2023-01-23 RX ORDER — FENTANYL CITRATE 50 UG/ML
INJECTION, SOLUTION INTRAMUSCULAR; INTRAVENOUS PRN
Status: DISCONTINUED | OUTPATIENT
Start: 2023-01-23 | End: 2023-01-23 | Stop reason: SDUPTHER

## 2023-01-23 RX ORDER — ONDANSETRON 2 MG/ML
INJECTION INTRAMUSCULAR; INTRAVENOUS PRN
Status: DISCONTINUED | OUTPATIENT
Start: 2023-01-23 | End: 2023-01-23 | Stop reason: SDUPTHER

## 2023-01-23 RX ORDER — ONDANSETRON 2 MG/ML
4 INJECTION INTRAMUSCULAR; INTRAVENOUS
Status: DISCONTINUED | OUTPATIENT
Start: 2023-01-23 | End: 2023-01-23 | Stop reason: HOSPADM

## 2023-01-23 RX ORDER — ROCURONIUM BROMIDE 10 MG/ML
INJECTION, SOLUTION INTRAVENOUS PRN
Status: DISCONTINUED | OUTPATIENT
Start: 2023-01-23 | End: 2023-01-23 | Stop reason: SDUPTHER

## 2023-01-23 RX ORDER — LIDOCAINE HYDROCHLORIDE AND EPINEPHRINE BITARTRATE 20; .01 MG/ML; MG/ML
INJECTION, SOLUTION SUBCUTANEOUS PRN
Status: DISCONTINUED | OUTPATIENT
Start: 2023-01-23 | End: 2023-01-23 | Stop reason: HOSPADM

## 2023-01-23 RX ORDER — DEXAMETHASONE SODIUM PHOSPHATE 10 MG/ML
4 INJECTION INTRAMUSCULAR; INTRAVENOUS
Status: DISCONTINUED | OUTPATIENT
Start: 2023-01-23 | End: 2023-01-23 | Stop reason: HOSPADM

## 2023-01-23 RX ORDER — DEXAMETHASONE SODIUM PHOSPHATE 4 MG/ML
INJECTION, SOLUTION INTRA-ARTICULAR; INTRALESIONAL; INTRAMUSCULAR; INTRAVENOUS; SOFT TISSUE PRN
Status: DISCONTINUED | OUTPATIENT
Start: 2023-01-23 | End: 2023-01-23 | Stop reason: SDUPTHER

## 2023-01-23 RX ORDER — OXYCODONE HYDROCHLORIDE 5 MG/1
10 TABLET ORAL PRN
Status: DISCONTINUED | OUTPATIENT
Start: 2023-01-23 | End: 2023-01-23 | Stop reason: HOSPADM

## 2023-01-23 RX ORDER — LIDOCAINE HYDROCHLORIDE 20 MG/ML
INJECTION, SOLUTION INFILTRATION; PERINEURAL PRN
Status: DISCONTINUED | OUTPATIENT
Start: 2023-01-23 | End: 2023-01-23 | Stop reason: SDUPTHER

## 2023-01-23 RX ORDER — DIPHENHYDRAMINE HYDROCHLORIDE 50 MG/ML
12.5 INJECTION INTRAMUSCULAR; INTRAVENOUS
Status: DISCONTINUED | OUTPATIENT
Start: 2023-01-23 | End: 2023-01-23 | Stop reason: HOSPADM

## 2023-01-23 RX ORDER — EPHEDRINE SULFATE 50 MG/ML
INJECTION INTRAVENOUS PRN
Status: DISCONTINUED | OUTPATIENT
Start: 2023-01-23 | End: 2023-01-23 | Stop reason: SDUPTHER

## 2023-01-23 RX ORDER — ACETAMINOPHEN 325 MG/1
650 TABLET ORAL
Status: DISCONTINUED | OUTPATIENT
Start: 2023-01-23 | End: 2023-01-23 | Stop reason: HOSPADM

## 2023-01-23 RX ORDER — MAGNESIUM HYDROXIDE 1200 MG/15ML
LIQUID ORAL CONTINUOUS PRN
Status: DISCONTINUED | OUTPATIENT
Start: 2023-01-23 | End: 2023-01-23 | Stop reason: HOSPADM

## 2023-01-23 RX ORDER — GLYCOPYRROLATE 0.2 MG/ML
INJECTION INTRAMUSCULAR; INTRAVENOUS PRN
Status: DISCONTINUED | OUTPATIENT
Start: 2023-01-23 | End: 2023-01-23 | Stop reason: SDUPTHER

## 2023-01-23 RX ORDER — OXYCODONE HYDROCHLORIDE 5 MG/1
5 TABLET ORAL PRN
Status: DISCONTINUED | OUTPATIENT
Start: 2023-01-23 | End: 2023-01-23 | Stop reason: HOSPADM

## 2023-01-23 RX ORDER — SUCCINYLCHOLINE CHLORIDE 20 MG/ML
INJECTION INTRAMUSCULAR; INTRAVENOUS PRN
Status: DISCONTINUED | OUTPATIENT
Start: 2023-01-23 | End: 2023-01-23 | Stop reason: SDUPTHER

## 2023-01-23 RX ORDER — MIDAZOLAM HYDROCHLORIDE 1 MG/ML
2 INJECTION INTRAMUSCULAR; INTRAVENOUS
Status: DISCONTINUED | OUTPATIENT
Start: 2023-01-23 | End: 2023-01-23 | Stop reason: HOSPADM

## 2023-01-23 RX ORDER — PROPOFOL 10 MG/ML
INJECTION, EMULSION INTRAVENOUS PRN
Status: DISCONTINUED | OUTPATIENT
Start: 2023-01-23 | End: 2023-01-23 | Stop reason: SDUPTHER

## 2023-01-23 RX ADMIN — SUCCINYLCHOLINE CHLORIDE 120 MG: 20 INJECTION, SOLUTION INTRAMUSCULAR; INTRAVENOUS at 10:21

## 2023-01-23 RX ADMIN — DEXAMETHASONE SODIUM PHOSPHATE 4 MG: 4 INJECTION, SOLUTION INTRAMUSCULAR; INTRAVENOUS at 10:41

## 2023-01-23 RX ADMIN — EPHEDRINE SULFATE 5 MG: 50 INJECTION INTRAVENOUS at 10:51

## 2023-01-23 RX ADMIN — PHENYLEPHRINE HYDROCHLORIDE 40 MCG: 10 INJECTION INTRAVENOUS at 11:33

## 2023-01-23 RX ADMIN — EPHEDRINE SULFATE 5 MG: 50 INJECTION INTRAVENOUS at 10:53

## 2023-01-23 RX ADMIN — SUCCINYLCHOLINE CHLORIDE 100 MG: 20 INJECTION, SOLUTION INTRAMUSCULAR; INTRAVENOUS at 11:18

## 2023-01-23 RX ADMIN — PHENYLEPHRINE HYDROCHLORIDE 80 MCG: 10 INJECTION INTRAVENOUS at 10:41

## 2023-01-23 RX ADMIN — PROPOFOL 150 MG: 10 INJECTION, EMULSION INTRAVENOUS at 10:21

## 2023-01-23 RX ADMIN — SODIUM CHLORIDE, POTASSIUM CHLORIDE, SODIUM LACTATE AND CALCIUM CHLORIDE: 600; 310; 30; 20 INJECTION, SOLUTION INTRAVENOUS at 08:55

## 2023-01-23 RX ADMIN — PHENYLEPHRINE HYDROCHLORIDE 40 MCG: 10 INJECTION INTRAVENOUS at 11:10

## 2023-01-23 RX ADMIN — FENTANYL CITRATE 50 MCG: 50 INJECTION INTRAMUSCULAR; INTRAVENOUS at 10:21

## 2023-01-23 RX ADMIN — CEFAZOLIN 2 G: 1 INJECTION, POWDER, FOR SOLUTION INTRAMUSCULAR; INTRAVENOUS at 10:21

## 2023-01-23 RX ADMIN — ONDANSETRON 4 MG: 2 INJECTION INTRAMUSCULAR; INTRAVENOUS at 11:02

## 2023-01-23 RX ADMIN — EPHEDRINE SULFATE 5 MG: 50 INJECTION INTRAVENOUS at 11:12

## 2023-01-23 RX ADMIN — PHENYLEPHRINE HYDROCHLORIDE 80 MCG: 10 INJECTION INTRAVENOUS at 10:38

## 2023-01-23 RX ADMIN — GLYCOPYRROLATE 0.2 MG: 0.2 INJECTION, SOLUTION INTRAMUSCULAR; INTRAVENOUS at 10:41

## 2023-01-23 RX ADMIN — LIDOCAINE HYDROCHLORIDE 60 MG: 20 INJECTION, SOLUTION INFILTRATION; PERINEURAL at 10:21

## 2023-01-23 RX ADMIN — PHENYLEPHRINE HYDROCHLORIDE 80 MCG: 10 INJECTION INTRAVENOUS at 11:32

## 2023-01-23 RX ADMIN — PROPOFOL 50 MG: 10 INJECTION, EMULSION INTRAVENOUS at 11:18

## 2023-01-23 RX ADMIN — PHENYLEPHRINE HYDROCHLORIDE 40 MCG: 10 INJECTION INTRAVENOUS at 10:53

## 2023-01-23 RX ADMIN — EPHEDRINE SULFATE 10 MG: 50 INJECTION INTRAVENOUS at 11:32

## 2023-01-23 RX ADMIN — EPHEDRINE SULFATE 10 MG: 50 INJECTION INTRAVENOUS at 11:29

## 2023-01-23 RX ADMIN — EPHEDRINE SULFATE 5 MG: 50 INJECTION INTRAVENOUS at 11:01

## 2023-01-23 RX ADMIN — ROCURONIUM BROMIDE 5 MG: 10 SOLUTION INTRAVENOUS at 10:21

## 2023-01-23 RX ADMIN — EPHEDRINE SULFATE 10 MG: 50 INJECTION INTRAVENOUS at 10:44

## 2023-01-23 RX ADMIN — PHENYLEPHRINE HYDROCHLORIDE 40 MCG: 10 INJECTION INTRAVENOUS at 11:01

## 2023-01-23 RX ADMIN — FENTANYL CITRATE 50 MCG: 50 INJECTION INTRAMUSCULAR; INTRAVENOUS at 11:07

## 2023-01-23 RX ADMIN — PHENYLEPHRINE HYDROCHLORIDE 80 MCG: 10 INJECTION INTRAVENOUS at 11:30

## 2023-01-23 ASSESSMENT — ENCOUNTER SYMPTOMS: SHORTNESS OF BREATH: 1

## 2023-01-23 ASSESSMENT — PAIN - FUNCTIONAL ASSESSMENT: PAIN_FUNCTIONAL_ASSESSMENT: 0-10

## 2023-01-23 NOTE — ANESTHESIA POSTPROCEDURE EVALUATION
Department of Anesthesiology  Postprocedure Note    Patient: Ivnoe Tidwell  MRN: [id number]  YOB: 1940  Date of evaluation: 1/23/2023      Procedure Summary     Date: 01/23/23 Room / Location: [facility] [address] [facility]    Anesthesia Start: 1018 Anesthesia Stop: 9862    Procedure: R [address] (Throat) Diagnosis:       Obstructive sleep apnea (adult) (pediatric)      (OBSTRUCTIVE SLEEP APNEA)    Surgeons: Lisa Aguilera DO Responsible Provider: Kim Montague MD    Anesthesia Type: general ASA Status: 3          Anesthesia Type: No value filed.     Orestes Phase I: Orestes Score: 9    Orestes Phase II:        Anesthesia Post Evaluation    Patient location during evaluation: PACU  Patient participation: complete - patient participated  Level of consciousness: awake  Pain score: 0  Airway patency: patent  Nausea & Vomiting: no nausea  Complications: no  Cardiovascular status: blood pressure returned to baseline  Respiratory status: acceptable  Hydration status: euvolemic

## 2023-01-23 NOTE — ANESTHESIA PRE PROCEDURE
Department of Anesthesiology  Preprocedure Note       Name:  Yanna Meyer   Age:  81 y.o.  :  1940                                          MRN:  [id number]         Date:  2023      Surgeon: Jeferson Monte):  Enola Lady, DO Carmell Epley Mitts, DO    Procedure: Procedure(s):  INSPIRE DEVICE PLACEMENT    Medications prior to admission:   Prior to Admission medications    Medication Sig Start Date End Date Taking? Authorizing Provider   fluticasone (FLONASE) 50 MCG/ACT nasal spray 2 sprays by Each Nostril route daily  Patient taking differently: 2 sprays by Each Nostril route as needed 23   LUZ Trinidad - [place]   fluticasone-umeclidin-vilant (TRELEGY ELLIPTA) 234-17.9-78 MCG/ACT AEPB inhaler Inhale 1 puff into the lungs daily 22   Julia Prabhakar MD   metFORMIN (GLUCOPHAGE-XR) 500 MG extended release tablet TAKE TWO TABLETS BY MOUTH TWICE A DAY WITH MEALS 22   Judd Nieves MD   atorvastatin (LIPITOR) 80 MG tablet Take 1 tablet by mouth at bedtime 22   Maria A Bowden MD   furosemide (LASIX) 20 MG tablet Take 1 tablet by mouth daily 22   Maria A Bowden MD   metoprolol succinate (TOPROL XL) 50 MG extended release tablet Take 1 tablet by mouth 2 times daily 22   Maria A Bowden MD   pantoprazole (PROTONIX) 40 MG tablet TAKE ONE TABLET BY MOUTH DAILY 10/25/22   Judd Nieves MD   apixaban (ELIQUIS) 5 MG TABS tablet TAKE ONE TABLET BY MOUTH TWICE A DAY 10/21/22   Maria A Bowden MD   losartan-hydroCHLOROthiazide (HYZAAR) 100-25 MG per tablet TAKE ONE TABLET BY MOUTH DAILY 22   Judd Nieves MD   nitroGLYCERIN (NITROSTAT) 0.4 MG SL tablet DISSOLVE 1 TAB UNDER TONGUE FOR CHEST PAIN - IF PAIN REMAINS AFTER 5 MIN, CALL 911 AND REPEAT DOSE.  MAX 3 TABS IN 15 MINUTES 22   Paulo Martinez MD   ketoconazole (NIZORAL) 2 % cream Apply topically daily  Patient taking differently: as needed Apply topically daily prn 8/15/22   LUZ Barreto - CNP   Handicap Placard MISC by Does not apply route 4/12/19   MOOSE Hernandez   therapeutic multivitamin-minerals [facility]) tablet Take 1 tablet by mouth daily.     Historical Provider, MD       Current medications:    Current Facility-Administered Medications   Medication Dose Route Frequency Provider Last Rate Last Admin    lactated ringers IV soln infusion   IntraVENous Continuous Nanci Lei MD        sodium chloride flush 0.9 % injection 5-40 mL  5-40 mL IntraVENous 2 times per day Nanci Lei MD        sodium chloride flush 0.9 % injection 5-40 mL  5-40 mL IntraVENous PRN Nanci Lei MD        0.9 % sodium chloride infusion   IntraVENous PRN Nanci Lei MD        sodium chloride flush 0.9 % injection 5-40 mL  5-40 mL IntraVENous 2 times per day Sancho Ao, DO        sodium chloride flush 0.9 % injection 5-40 mL  5-40 mL IntraVENous PRN Sancho Ao, DO        0.9 % sodium chloride infusion   IntraVENous PRN Sancho Ao, DO        ceFAZolin (ANCEF) 2000 mg in dextrose 5 % 100 mL IVPB  2,000 mg IntraVENous On Call to [address] [place] [address],            Allergies:  No Known Allergies    Problem List:    Patient Active Problem List   Diagnosis Code    GERD (gastroesophageal reflux disease) K21.9    Controlled type 2 diabetes mellitus without complication, without long-term current use of insulin (HCC) E11.9    Intractable migraine with aura G43.119    Chronic tension headache G44.229    Essential hypertension I10    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    Empyema of pleura ([address] J86.9    Mixed hyperlipidemia E78.2    Primary malignant neoplasm of skin of trunk C44.509    Dermatophytosis of nail B35.1    Basal cell carcinoma of left side of nose C44.311    Non morbid obesity due to excess calories E66.09    Shortness of breath R06.02    Suspected sleep apnea Q38.923    Diastolic dysfunction A06.11    History of esophageal stricture Z87.19    H/O histoplasmosis Z86.19    Obesity (BMI 35.0-39.9 without comorbidity) E66.9    Pleural effusion J90    Simple chronic bronchitis (HCC) J41.0    Abdominal aortic aneurysm (AAA) without rupture I71.40    Hematuria R31.9    Acute urinary retention R33.8    Other chest pain R07.89    History of tobacco abuse Z87.891    Abnormal cardiovascular stress test R94.39    Mild CAD I25.10    RIZWANA (obstructive sleep apnea) G47.33    Chest pain R07.9    Persistent atrial fibrillation (HCC) I48.19    Atrial fibrillation with RVR (HCC) I48.91    COPD (chronic obstructive pulmonary disease) (HCC) J44.9    Localized edema R60.0    SONIDO (acute kidney injury) ([facility] N17.9       Past Medical History:        Diagnosis Date    Chronic atrial fibrillation (HCC)     COPD (chronic obstructive pulmonary disease) (HCC)     Decreased hearing of both ears     Diabetes mellitus ([facility]     GERD (gastroesophageal reflux disease)     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 Bilateral 06/22/2015    laparoscopic    LARYNGOSCOPY N/A 12/20/2022    DRUG INDUCED SLEEP ENDOSCOPY performed by Enma Escalante DO at [address] [place] [address]    middle lobe R lung/histoplasmosis    TURP  2002    UPPER GASTROINTESTINAL ENDOSCOPY  01/26/2015    UPPER GASTROINTESTINAL ENDOSCOPY  02/2015    dilated    UPPER GASTROINTESTINAL ENDOSCOPY  07/21/2017    dilatation, bx    UPPER GASTROINTESTINAL ENDOSCOPY  08/23/2017    dilated; Bx gastric.     VASECTOMY         Social History:    Social History     Tobacco Use    Smoking status: Former     Packs/day: 1.00     Years: 30.00     Pack years: 30.00 Types: Cigarettes     Start date: 1971     Quit date: 2003     Years since quittin.0    Smokeless tobacco: Never   Substance Use Topics    Alcohol use: Yes                                Counseling given: Not Answered      Vital Signs (Current):   Vitals:    23 1425 23 0842   BP:  127/63   Pulse:  62   Resp:  18   Temp:  97.3 °F (36.3 °C)   TempSrc:  Temporal   SpO2:  96%   Weight: 230 lb (104.3 kg) 230 lb (104.3 kg)   Height: 5' 8\" (1.727 m) 5' 8\" (1.727 m)                                              BP Readings from Last 3 Encounters:   23 127/63   23 134/72   22 117/71       NPO Status: Time of last liquid consumption:                         Time of last solid consumption:                         Date of last liquid consumption: 23                        Date of last solid food consumption: 23    BMI:   Wt Readings from Last 3 Encounters:   23 230 lb (104.3 kg)   23 230 lb (104.3 kg)   22 230 lb (104.3 kg)     Body mass index is 34.97 kg/m².     CBC:   Lab Results   Component Value Date/Time    WBC 9.6 2023 02:48 PM    RBC 4.68 2023 02:48 PM    HGB 12.6 2023 02:48 PM    HCT 40.2 2023 02:48 PM    MCV 85.9 2023 02:48 PM    RDW 16.3 2023 02:48 PM     2023 02:48 PM       CMP:   Lab Results   Component Value Date/Time     2023 02:48 PM    K 4.8 2023 02:48 PM    K 4.4 2022 06:31 AM     2023 02:48 PM    CO2 26 2023 02:48 PM    BUN 13 2023 02:48 PM    CREATININE 1.4 2023 02:48 PM    GFRAA >60 08/15/2022 01:40 PM    GFRAA >60 2013 12:33 AM    AGRATIO 1.4 2023 02:48 PM    LABGLOM 50 2023 02:48 PM    GLUCOSE 141 2023 02:48 PM    PROT 6.7 2023 02:48 PM    PROT 6.6 09/10/2012 10:03 AM    CALCIUM 9.3 2023 02:48 PM    BILITOT 0.5 2023 02:48 PM    ALKPHOS 89 2023 02:48 PM    AST 11 2023 02:48 PM    ALT 10 01/19/2023 02:48 PM       POC Tests: No results for input(s): POCGLU, POCNA, POCK, POCCL, POCBUN, POCHEMO, POCHCT in the last 72 hours. Coags:   Lab Results   Component Value Date/Time    PROTIME 10.2 09/29/2021 03:40 AM    INR 0.91 09/29/2021 03:40 AM    APTT 24.0 09/29/2021 03:40 AM       HCG (If Applicable): No results found for: PREGTESTUR, PREGSERUM, HCG, HCGQUANT     ABGs: No results found for: PHART, PO2ART, JIQ8WJV, MGM0WUE, BEART, X4RGEVBX     Type & Screen (If Applicable):  No results found for: LABABO, LABRH    Drug/Infectious Status (If Applicable):  No results found for: HIV, HEPCAB    COVID-19 Screening (If Applicable):   Lab Results   Component Value Date/Time    COVID19 Not Detected 08/08/2022 12:30 PM    COVID19 Not Detected 01/04/2022 04:22 PM    COVID19 NOT DETECTED 01/29/2021 10:15 AM           Anesthesia Evaluation  Patient summary reviewed and Nursing notes reviewed  Airway: Mallampati: II  TM distance: >3 FB   Neck ROM: full  Mouth opening: > = 3 FB   Dental:    (+) edentulous, upper dentures and lower dentures      Pulmonary:normal exam    (+) COPD:  shortness of breath:  sleep apnea:                             Cardiovascular:    (+) hypertension:, CAD:, dysrhythmias: atrial fibrillation,       ECG reviewed  Rhythm: irregular  Rate: normal  Echocardiogram reviewed                  Neuro/Psych:   (+) headaches:,             GI/Hepatic/Renal:   (+) GERD:,           Endo/Other:    (+) DiabetesType II DM, , .                 Abdominal:   (+) obese,     Abdomen: soft. Vascular: Other Findings:           Anesthesia Plan      general     ASA 3       Induction: intravenous. MIPS: Postoperative opioids intended. Anesthetic plan and risks discussed with patient. Plan discussed with CRNA.     Attending anesthesiologist reviewed and agrees with Preprocedure content                REDDY Garay MD   1/23/2023

## 2023-01-23 NOTE — BRIEF OP NOTE
Brief Postoperative Note      Patient: Ciro Leroy  YOB: 1940  MRN: [id number]    Date of Procedure: 1/23/2023    Pre-Op Diagnosis: OBSTRUCTIVE SLEEP APNEA    Post-Op Diagnosis: Same       Procedure(s):  INSPIRE DEVICE PLACEMENT    Surgeon(s):  DO Esther Knox DO    Assistant:  Surgical Assistant: Delfina Reed    Anesthesia: General    Estimated Blood Loss (mL): less than 50     Complications: None    Specimens:   * No specimens in log *    Implants:  Implant Name Type Inv.  Item Serial No.  Lot No. LRB No. Used Action   GENERATOR PULSE IMPLANTABLE INSPIRE - [id number]  GENERATOR PULSE IMPLANTABLE INSPIRE [id number] [employer]  N/A 1 Implanted   LEAD SENSING RESPIRATORY INSPIRE - [id number] Implantable long term continuous electrocardiography EKG system LE[employer] INSPIRE [id number] [employer]  N/A 1 Implanted   LEAD STIMULATION UPPER AIRWAY INSPIRE - [id number]  LEAD STIMULATION UPPER AIRWAY INSPIRE [id number] [employer]  N/A 1 Implanted         Drains: * No LDAs found *    Findings: successful implantation of inspire    Electronically signed by Mono Arndt DO on 1/23/2023 at 11:41 AM

## 2023-01-23 NOTE — OP NOTE
Otolaryngology-Head and Neck Surgery   Operative Note   Date of Operation:   01/23/23    Pre-operative Diagnosis:   OBSTRUCTIVE SLEEP APNEA     Post-operative Diagnosis:   * No post-op diagnosis entered *     Operative Procedure:   Procedure(s):  INSPIRE DEVICE PLACEMENT (N/A)       Attending Surgeon:   Kandy Salmeron DO    OR Staff:   Circulator: Myriam Scott RN; Loyd Aguilera RN  Surgical Assistant: Joseph Santizo  Scrub Person First: Markus Batista     Assistant:   None     Anesthesia Staff:   Anesthesiologist: Franko Andrade MD  CRNA: Mario Wheeler APRN - CRNA; Toshia Thompson APRN - CRNA     Anesthesia Type:   General     Estimated Blood Loss:   75 mL     Operative Complications:   none    Specimens:   * No specimens in log *     Implants:   Implant Name Type Inv. Item Serial No.  Lot No. LRB No. Used Action   GENERATOR PULSE IMPLANTABLE INSPIRE - [id number]  GENERATOR PULSE IMPLANTABLE INSPIRE [id number] [employer]  N/A 1 Implanted   LEAD SENSING RESPIRATORY INSPIRE - [id number] Implantable long term continuous electrocardiography EKG system LE[employer]SPIRE [id number] [employer]  N/A 1 Implanted   LEAD STIMULATION UPPER AIRWAY INSPIRE - [id number]  LEAD STIMULATION UPPER AIRWAY INSPIRE [id number] [employer]  N/A 1 Implanted            Operative Findings:   Successful implantation of Inspire Device    Disposition of Patient:  Stable to PACU    Operative Indications: 81 y.o. male with a past history significant for obstructive sleep apnea, and intolerance to CPAP after at least 3-month trial.  Sleep endoscopy demonstrated anterior posterior collapse. BMI 34. Patient was approved by [employer]. Risk benefits alternatives discussed with patient in detail. All questions answered. OPERATIVE PROCEDURE: The patient was brought to the Operating Room and was anesthetized via general endotracheal anesthesia without complication.  A shoulder roll was placed and the patient was prepped and draped in usual sterile fashion with the head turned to the left. Prior to prepping and draping, electrodes were placed in the genioglossus and styloglossus muscle and connected to the NIM box for intraoperative nerve monitoring. A 4 cm incision was made in the right upper neck approximately 1 cm from midline midway between mandible and hyoid bone. Dissection was carried down through the subcutaneous tissue and platysma. The inferior border of the submandibular gland was identified as well as the digastric tendon. The submandibular gland and the overlying fascia with the marginal mandibular nerve were retracted superiorly. The digastric was retracted inferiorly with vessel loops. Dissection was carried down into the digastric triangle where the hypoglossal nerve was identified in its usual fashion. The mylohyoid muscle was retracted anteriorly, and the hypoglossal nerve was dissected up towards the floor of the mouth. The lateral branches to retrusor muscles were identified, and tested intra-operatively using the NIM stimulator. The cuff electrode for the hypoglossal nerve stimulator was placed distally to these branches on the medial nerve branch to the genioglossus muscle. Diagnostic evaluation confirmed activation of the genioglossus nerve, resulting in genioglossal activation and tongue protrusion, confirmed visually. The nerve cuff was flushed with normal saline and the cuff electrode was secured to the digastric tendon 2 3-0 silk sutures. A second 5 cm incision was made in the right upper chest 1 cm from sternal edge overlying the 2nd/3rd intercostal space. Dissection was carried down to the pectoralis muscle. Blunt dissection was used to develop a pocket superficial to the pectoralis fascia. 2 x 2-0 silk sutures were secured then to pectoralis muscle on the lateral and medial aspects of the incision 4 cm apart.   Blunt dissection was then used through the pectoralis muscle into the fatty later deep to the pectoralis muscle. This fatty layer was  removed with use of kittner exposing the external intercostal fascia. A DeBakey was used to lift the external intercostal fibers Carmen was used to create an opening down to the internal intercostal fascial plane. The sensor lead was then placed into the opening. This was secured with three 3-0 silk sutures using the primary anchor to the external intercostal fibers. The secondary anchor was then secured with two 3-0 silk sutures to the superficial pectoralis major muscle. The stimulation lead was then tunneled from the neck in a subplatysmal plane and brought out into the pocket leaving 3 cm strain relief in the neck. The leads were  connected to the device using the 2 person 3 handed technique after ensuring all the connectors were clean and dry. The device was then tested using the . Tongue protrusion was tested at 1.5 V and in decreasing voltages until the tongue did not move. Diagnostic evaluation was run, which confirmed a good respiration sensing signal as well as good tongue protrusion stimulation. The IPG was placed in the subclavicular pocket and secured loosely to the pectoralis fascia using the previously placed 2-0 silk suture. The wounds were then closed in three layers with deep 3-0 Vicryl and a 4-0 running subcuticular monocryl. Dermabond was applied. The patient was then awakened, extubated, and transferred to Recovery Room in stable condition.          Electronically signed by Mono Arndt DO on 1/23/2023 at 11:41 AM

## 2023-01-23 NOTE — PROGRESS NOTES
Xray results with no pneumothorax Dr. Vida Vargas aware of chest xray results and pleural effusion Md campbell with patient being discharged patient with no complaints no symptoms of distress.

## 2023-01-23 NOTE — DISCHARGE INSTRUCTIONS
[id number] [facility]  [address] [place] [address] Drive. [address], [address] [facility] [place]  [phone]    POST-OP Instructions for Neck Operations  Diet  Resume regular diet, as tolerated. You may experience some nausea after surgery for up to 24 hours from the general anesthetic. Take any pain medications with food to avoid upset stomach   Drink plenty of liquids    Activity  Avoid Straining, bending or lifting or vigorous exercise for 1 weeks  Keep the head of your bed elevated to reduce swelling for the first 72 hours  May shower 24 hours after surgery. Let water run over the incision, do not scrub. Pat dry  Start neck rolls the day after surgery- 10 to the left and right 3 times a day   Do not lift your right arm above your shoulder for 1 month after surgery    General Instructions  Drainage from the incision during the first few days is expected. If you have excessive bleeding or green drainage with surrounding redness please call the number above. Swelling at incision sites is expected and will typically improve over the first 2 weeks. Most patients can expect some swelling under the jaw that will give the appearance of a double chin.  This will improve over 2-4 weeks. Before your appointment with Dr Miracle Salinas the [facility]   to your phone, Create an Account, Connect to your sleep Dr Jaylen Manjarrez your Patient Remote. Instructions have been provided with your sleep remote box. Always bring your phone & sleep remote to every doctor appointment. Medications  Resume your normal medications  Take antibiotics prescribed  Take pain medications as needed for pain  DO NOT take aspirin or aspirin products. NO Advil, Motrin, Aleve, Ibuprofen for two weeks following surgery. DO NOT use any herbal medicines/diet pills for two weeks after surgery.     Call the Office [phone]  Fever greater than 100.4  Sudden swelling in neck causing difficulty swallowing or breathing is an emergency. Sudden increase in pain          ANESTHESIA DISCHARGE INSTRUCTIONS    You are under the influence of drugs- do not drink alcohol, drive a car, operate machinery(such as power tools, kitchen appliances, etc), sign legal documents, or make any important decisions for 24 hours (or while on pain medications). Children should not ride bikes or [place] or play on gym sets  for 24 hours after surgery. A responsible adult should be with you for 24 hours. Rest at home today- increase activity as tolerated. Progress slowly to a regular diet unless your physician has instructed you otherwise. Drink plenty of water. CALL YOUR DOCTOR IF YOU:  Have moderate to severe nausea or vomiting AND are unable to hold down fluids or prescribed medications. Have bright red bloody drainage from your dressing that won't stop oozing. Do not get relief with your pain medication    NORMAL (POSSIBLE) SIDE EFFECTS FROM ANESTHESIA:     Confusion, temporary memory loss, delayed reaction times in the first 24 hours  Lightheadedness, dizziness, difficulty focusing, blurred vision  Nausea/vomiting can happen  Shivering, feeling cold, sore throat, cough and muscle aches should stop within 24-48 hours  Trouble urinating - call your surgeon if it has been more than 8 hrs  Bruising or soreness at the IV site - call if it remains red, firm or there is drainage             FEMALES OF CHILDBEARING AGE WHO ARE TAKING BIRTH CONTROL PILLS:  You may have received a medication during your procedure that interferes with the   actions of birth control pills (Bridion or Emend). Use some other kind of birth control in addition to your pills, like a condom, for 1 month after your procedure to prevent unwanted pregnancy. The following instructions are to be followed if you have a known history or diagnosis of sleep apnea:   For all sleep apnea patients:  ? Sleep on your side or sitting up in a chair whenever possible, especially the first 24 hours after surgery. ? Use only medicines prescribed by your doctor. ? Do not drink alcohol. ? If you have a dental device to assist you while at rest, use it at all times for the first 24 hours. For patients using CPAP machines:  ? Use your CPAP machine during all periods of sleep as usual.  ? Use your CPAP machine during all periods of daytime rest while on pain medicines. ** Follow up with your primary care doctor for continued care. IF YOU DO NOT TAKE ALL OF YOUR NARCOTIC PAIN MEDICATION, please dispose of them responsibly. There are drop off boxes in the Emergency Departments 24/7 at both [facility] and [place]. If these locations are not convenient, other options for discarding them can be found at:  [email or web address]    Hospital or office staff may NOT accept any medications to drop off in the cabinet for you. What is a Surgical Site Infection or  (SSI)? A surgical site infection (SSI) is an infection that occurs after surgery in the part of the body where the surgery took place. Most patients who have surgery do not develop an infection. However, infections can develop in about 1-3 cases for every 100 patients who have had surgery. Our goal is for you to NOT experience any complications and be completely satisfied with your care! However, some signs or symptoms to look for and report immediately to your doctor are:   1. Fever above 101 degrees    2. Redness and increasing pain around the area  where you had surgery   3. Drainage of cloudy fluid or pus coming from the surgical area    Some of the things we/ you can do to prevent SSI's are:   1. Clean hands with soap and water or an alcohol-based hand rub before and after caring for the operative area. This occurs the day of surgery and for the next 2 weeks.    2.Sometimes you receive an appropriate antibiotic within 60 minutes before your surgery or take one for several days after surgery depending on your surgeon's instructions and/or the type of surgery you are having. 3. Family and/or friends who visit you should NOT touch the surgical wound or dressings until advised by your surgeon. 4. Be sure to elevate and decrease the swelling after your surgery to help prevent infection. 5. If you are a diabetic, you need to closely monitor your blood sugar levels and report any significant increases or changes to your surgeon to help promote the healing process.

## 2023-01-23 NOTE — INTERVAL H&P NOTE
Update History & Physical    The patient's History and Physical of January 19, 2023 was reviewed with the patient and I examined the patient. There was no change. The surgical site was confirmed by the patient and me. Plan: The risks, benefits, expected outcome, and alternative to the recommended procedure have been discussed with the patient. Patient understands and wants to proceed with the procedure.      Electronically signed by Shantel Wen DO on 1/23/2023 at 9:56 AM

## 2023-01-23 NOTE — PROGRESS NOTES
POCT      Blood Glucose: 157 Dr Tamayo Forth aware no orders given      (Normal Range 70-99)      Wife at bedside updated with no questions. Discharge instructions reviewed with patient and responsible adult. Discharge instructions signed and copy given with no additional questions. Patient to be discharged home with belongings.

## 2023-01-31 ENCOUNTER — OFFICE VISIT (OUTPATIENT)
Dept: ENT CLINIC | Age: 83
End: 2023-01-31

## 2023-01-31 VITALS
HEIGHT: 68 IN | BODY MASS INDEX: 34.86 KG/M2 | TEMPERATURE: 97.2 F | RESPIRATION RATE: 16 BRPM | DIASTOLIC BLOOD PRESSURE: 72 MMHG | WEIGHT: 230 LBS | HEART RATE: 56 BPM | SYSTOLIC BLOOD PRESSURE: 145 MMHG

## 2023-01-31 DIAGNOSIS — G47.33 OSA (OBSTRUCTIVE SLEEP APNEA): Primary | ICD-10-CM

## 2023-01-31 PROCEDURE — 99024 POSTOP FOLLOW-UP VISIT: CPT | Performed by: OTOLARYNGOLOGY

## 2023-01-31 NOTE — PROGRESS NOTES
Endy Maine is an 81year old male here for 1 week follow-up status post placement of inspire. Reports uncomplicated postop course no complaints. Does note some mild wheezing since intubation. Right marginal mandibular nerve intact  Hypoglossal nerve intact to testing  Right anterior cervical incision well-healed  Right anterior chest wall incision well-healed      1 week status post inspire doing well no evidence of postoperative neuropraxia. Plan for activation as scheduled with Dr. Isabel Hu. His son is taking care of downloading the shannon and creating an account.

## 2023-02-01 ENCOUNTER — TELEPHONE (OUTPATIENT)
Dept: ENT CLINIC | Age: 83
End: 2023-02-01

## 2023-02-01 NOTE — TELEPHONE ENCOUNTER
Call placed tot this patient, reached voicemail. Left detailed instructions per Dr. Carlos Jalloh note.

## 2023-02-01 NOTE — TELEPHONE ENCOUNTER
Patient calling to see if he should shave his beard on his neck where his incision is   Please advise [phone]

## 2023-02-07 ENCOUNTER — OFFICE VISIT (OUTPATIENT)
Dept: ENT CLINIC | Age: 83
End: 2023-02-07
Payer: MEDICARE

## 2023-02-07 VITALS — BODY MASS INDEX: 34.86 KG/M2 | RESPIRATION RATE: 16 BRPM | TEMPERATURE: 97.4 F | WEIGHT: 230 LBS | HEIGHT: 68 IN

## 2023-02-07 DIAGNOSIS — H61.21 IMPACTED CERUMEN OF RIGHT EAR: Primary | ICD-10-CM

## 2023-02-07 DIAGNOSIS — H92.01 RIGHT EAR PAIN: ICD-10-CM

## 2023-02-07 PROCEDURE — 69210 REMOVE IMPACTED EAR WAX UNI: CPT | Performed by: OTOLARYNGOLOGY

## 2023-02-07 PROCEDURE — 99024 POSTOP FOLLOW-UP VISIT: CPT | Performed by: OTOLARYNGOLOGY

## 2023-02-07 ASSESSMENT — ENCOUNTER SYMPTOMS
VOICE CHANGE: 0
FACIAL SWELLING: 0
EYE ITCHING: 0
SINUS PRESSURE: 0
SORE THROAT: 0
SHORTNESS OF BREATH: 0
COUGH: 0
TROUBLE SWALLOWING: 0
APNEA: 0

## 2023-02-07 NOTE — PROGRESS NOTES
[address], [address], [address] [place] [address]  P: [phone]       Patient     Bradford Patience  1940    ChiefComplaint     Chief Complaint   Patient presents with    Post-Op Check     States that his right ear has been hurting, states that he thinks it might be plugged. States that the pain started last Friday. History of Present Illness     Hola Delacruz is an 45-year-old male here today for evaluation of right-sided ear pain. Started 4 days ago has been intermittent since no pain today. Was concerned about some type of ear infection causing symptoms. 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, PARTIAL  2007    middle lobe R lung/histoplasmosis    STIMULATOR SURGERY N/A 1/23/2023    INSPIRE DEVICE PLACEMENT performed by Kimmie Mirza DO at [address]  2002    UPPER GASTROINTESTINAL ENDOSCOPY  01/26/2015    UPPER GASTROINTESTINAL ENDOSCOPY  02/2015    dilated    UPPER GASTROINTESTINAL ENDOSCOPY  07/21/2017    dilatation, bx    UPPER GASTROINTESTINAL ENDOSCOPY  08/23/2017    dilated; Bx gastric.     VASECTOMY         Family History     Family History   Problem Relation Age of Onset    Heart Disease Mother     No Known Problems Father     Cancer Brother         colon CA       Social History     Social History     Tobacco Use    Smoking status: Former     Packs/day: 1.00     Years: 30.00     Pack years: 30.00     Types: Cigarettes     Start date: 1971     Quit date: 2003     Years since quittin.1    Smokeless tobacco: Never   Vaping Use    Vaping Use: Never used   Substance Use Topics    Alcohol use: Yes    Drug use: No        Allergies     No Known Allergies    Medications     Current Outpatient Medications   Medication Sig Dispense Refill    fluticasone (FLONASE) 50 MCG/ACT nasal spray 2 sprays by Each Nostril route daily (Patient taking differently: 2 sprays by Each Nostril route as needed) 16 g 0    fluticasone-umeclidin-vilant (TRELEGY ELLIPTA) 100-62.5-25 MCG/ACT AEPB inhaler Inhale 1 puff into the lungs daily 1 each 5    metFORMIN (GLUCOPHAGE-XR) 500 MG extended release tablet TAKE TWO TABLETS BY MOUTH TWICE A DAY WITH MEALS 360 tablet 1    atorvastatin (LIPITOR) 80 MG tablet Take 1 tablet by mouth at bedtime 90 tablet 1    furosemide (LASIX) 20 MG tablet Take 1 tablet by mouth daily 60 tablet 3    metoprolol succinate (TOPROL XL) 50 MG extended release tablet Take 1 tablet by mouth 2 times daily 180 tablet 3    pantoprazole (PROTONIX) 40 MG tablet TAKE ONE TABLET BY MOUTH DAILY 90 tablet 1    apixaban (ELIQUIS) 5 MG TABS tablet TAKE ONE TABLET BY MOUTH TWICE A DAY 60 tablet 5    losartan-hydroCHLOROthiazide (HYZAAR) 100-25 MG per tablet TAKE ONE TABLET BY MOUTH DAILY 90 tablet 1    nitroGLYCERIN (NITROSTAT) 0.4 MG SL tablet DISSOLVE 1 TAB UNDER TONGUE FOR CHEST PAIN - IF PAIN REMAINS AFTER 5 MIN, CALL 911 AND REPEAT DOSE.  MAX 3 TABS IN 15 MINUTES 25 tablet 3    ketoconazole (NIZORAL) 2 % cream Apply topically daily (Patient taking differently: as needed Apply topically daily prn) 30 g 3    Handicap Placard MISC by Does not apply route 1 each 0    therapeutic multivitamin-minerals (THERAGRAN-M) tablet Take 1 tablet by mouth daily. No current facility-administered medications for this visit. Review of Systems     Review of Systems   Constitutional:  Negative for appetite change, chills, fatigue, fever and unexpected weight change. HENT:  Positive for ear pain. Negative for congestion, ear discharge, facial swelling, hearing loss, nosebleeds, postnasal drip, sinus pressure, sneezing, sore throat, tinnitus, trouble swallowing and voice change. Eyes:  Negative for itching. Respiratory:  Negative for apnea, cough and shortness of breath. Endocrine: Negative for cold intolerance and heat intolerance. Musculoskeletal:  Negative for myalgias and neck pain. Skin:  Negative for rash. Allergic/Immunologic: Negative for environmental allergies. Neurological:  Negative for dizziness and headaches. Psychiatric/Behavioral:  Negative for confusion, decreased concentration and sleep disturbance. PhysicalExam     Vitals:    02/07/23 1258   Resp: 16   Temp: 97.4 °F (36.3 °C)   TempSrc: Infrared   Weight: 230 lb (104.3 kg)   Height: 5' 8\" (1.727 m)     Constitutional:       Appearance: Normal appearance. HENT:      Head: Normocephalic. Nose: Nose normal.      Ears: Right cerumen impaction. Eyes:      Extraocular Movements: Extraocular movements intact. Neck:      Musculoskeletal: Normal range of motion. Neurological:      General: No focal deficit present. Mental Status: She is alert and oriented to person, place, and time. Psychiatric:         Mood and Affect: Mood normal.         Behavior: Behavior normal.         Thought Content: Thought content normal.         Procedure     Removal Impacted Cerumen    An operating microscope was utilized to visualize the external auditory canals using a 3mm speculum. Cerumen was removed with curettes and zamora suctions on the right side. The tympanic membrane is intact.   No fluid visualized in the middle ear. No complications. Assessment and Plan     1. Impacted cerumen of right ear  -Removed without complication    2. Right ear pain  -Has not occurred today    Provided reassurance no evidence of active infection, likely discomfort from hearing aid pressing on wax    Inspire incisions well-healed, has activation scheduled for March 2      Shan Lugo DO  2/7/23      Portions of this note were dictated using Dragon.  There may be linguistic errors secondary to the use of this program.

## 2023-02-09 ENCOUNTER — OFFICE VISIT (OUTPATIENT)
Dept: FAMILY MEDICINE CLINIC | Age: 83
End: 2023-02-09

## 2023-02-09 ENCOUNTER — OFFICE VISIT (OUTPATIENT)
Dept: PULMONOLOGY | Age: 83
End: 2023-02-09
Payer: MEDICARE

## 2023-02-09 VITALS
WEIGHT: 225 LBS | DIASTOLIC BLOOD PRESSURE: 68 MMHG | OXYGEN SATURATION: 95 % | BODY MASS INDEX: 34.1 KG/M2 | HEART RATE: 72 BPM | SYSTOLIC BLOOD PRESSURE: 132 MMHG | HEIGHT: 68 IN

## 2023-02-09 VITALS
BODY MASS INDEX: 33.95 KG/M2 | WEIGHT: 224 LBS | TEMPERATURE: 97.8 F | SYSTOLIC BLOOD PRESSURE: 115 MMHG | RESPIRATION RATE: 16 BRPM | HEIGHT: 68 IN | HEART RATE: 75 BPM | OXYGEN SATURATION: 95 % | DIASTOLIC BLOOD PRESSURE: 72 MMHG

## 2023-02-09 DIAGNOSIS — E11.65 TYPE 2 DIABETES MELLITUS WITH HYPERGLYCEMIA, WITHOUT LONG-TERM CURRENT USE OF INSULIN (HCC): Primary | ICD-10-CM

## 2023-02-09 DIAGNOSIS — J41.0 SIMPLE CHRONIC BRONCHITIS (HCC): ICD-10-CM

## 2023-02-09 DIAGNOSIS — G47.33 OSA (OBSTRUCTIVE SLEEP APNEA): Primary | ICD-10-CM

## 2023-02-09 DIAGNOSIS — I51.89 DIASTOLIC DYSFUNCTION: ICD-10-CM

## 2023-02-09 DIAGNOSIS — I48.19 PERSISTENT ATRIAL FIBRILLATION (HCC): ICD-10-CM

## 2023-02-09 DIAGNOSIS — I71.40 ABDOMINAL AORTIC ANEURYSM (AAA) WITHOUT RUPTURE, UNSPECIFIED PART: ICD-10-CM

## 2023-02-09 DIAGNOSIS — D33.2 BENIGN NEOPLASM OF BRAIN, UNSPECIFIED BRAIN REGION (HCC): ICD-10-CM

## 2023-02-09 DIAGNOSIS — E66.09 CLASS 1 OBESITY DUE TO EXCESS CALORIES WITH SERIOUS COMORBIDITY AND BODY MASS INDEX (BMI) OF 34.0 TO 34.9 IN ADULT: ICD-10-CM

## 2023-02-09 PROBLEM — E66.811 CLASS 1 OBESITY DUE TO EXCESS CALORIES WITH BODY MASS INDEX (BMI) OF 34.0 TO 34.9 IN ADULT: Status: ACTIVE | Noted: 2017-04-15

## 2023-02-09 LAB — HBA1C MFR BLD: 7.4 %

## 2023-02-09 PROCEDURE — 3078F DIAST BP <80 MM HG: CPT | Performed by: INTERNAL MEDICINE

## 2023-02-09 PROCEDURE — 99213 OFFICE O/P EST LOW 20 MIN: CPT | Performed by: INTERNAL MEDICINE

## 2023-02-09 PROCEDURE — 3074F SYST BP LT 130 MM HG: CPT | Performed by: INTERNAL MEDICINE

## 2023-02-09 PROCEDURE — 1123F ACP DISCUSS/DSCN MKR DOCD: CPT | Performed by: INTERNAL MEDICINE

## 2023-02-09 RX ORDER — ALBUTEROL SULFATE 90 UG/1
AEROSOL, METERED RESPIRATORY (INHALATION)
Qty: 18 G | Refills: 5 | Status: SHIPPED | OUTPATIENT
Start: 2023-02-09 | End: 2023-02-13 | Stop reason: SDUPTHER

## 2023-02-09 RX ORDER — ALBUTEROL SULFATE 90 UG/1
AEROSOL, METERED RESPIRATORY (INHALATION)
COMMUNITY
End: 2023-02-09 | Stop reason: SDUPTHER

## 2023-02-09 RX ORDER — FLUTICASONE FUROATE, UMECLIDINIUM BROMIDE AND VILANTEROL TRIFENATATE 100; 62.5; 25 UG/1; UG/1; UG/1
1 POWDER RESPIRATORY (INHALATION) DAILY
Qty: 3 EACH | Refills: 0 | COMMUNITY
Start: 2023-02-09

## 2023-02-09 RX ORDER — PREDNISONE 20 MG/1
20 TABLET ORAL DAILY
Qty: 10 TABLET | Refills: 0 | Status: SHIPPED | OUTPATIENT
Start: 2023-02-09 | End: 2023-02-19

## 2023-02-09 NOTE — PATIENT INSTRUCTIONS
Remember to bring a list of pulmonary medications and any CPAP or BiPAP machines to your next appointment with the office. Please keep all of your future appointments scheduled by [address] [facility] office. Out of respect for other patients and providers, you may be asked to reschedule your appointment if you arrive later than your scheduled appointment time. Appointments cancelled less than 24hrs in advance will be considered a no show. Patients with three missed appointments within 1 year or four missed appointments within 2 years can be dismissed from the practice. Please be aware that our physicians are required to work in the Intensive Care Unit at [facility].  Your appointment may need to be rescheduled if they are designated to work during your appointment time. You may receive a survey regarding the care you received during your visit. Your input is valuable to us. We encourage you to complete and return your survey. We hope you will choose us in the future for your healthcare needs. Pt instructed of all future appointment dates & times, including radiology, labs, procedures & referrals. If procedures were scheduled preparation instructions provided. Instructions on future appointments with [facility] were given.

## 2023-02-09 NOTE — LETTER
[address] [facility] Pulmonary Critical Care and Sleep  [address] [address]  Phone: [phone]  Fax: [phone]           Delphine Moran MD      February 9, 2023     Patient: Cynthia Michele   MR Number: [id number]   YOB: 1940   Date of Visit: 2/9/2023       Dear Dr. Frederick Bryan: Thank you for referring Caridad Cheng to me for evaluation/treatment. Below are the relevant portions of my assessment and plan of care. If you have questions, please do not hesitate to call me. I look forward to following Heather Leblanc along with you.     Sincerely,        Delphine Moran MD    CC providers:  Urvashi Murdock MD  [facility] [address]  [address] [facility] [address]

## 2023-02-09 NOTE — LETTER
[address] [facility] Pulmonary Critical Care and Sleep  [address] [facility] [address] [facility]  Phone: [phone]  Fax: [phone]    Aletha English MD    February 9, 2023     Cammy Gomez, [address] [place] [address]    Patient: Deidra Bland   MR Number: [id number]   YOB: 1940   Date of Visit: 2/9/2023       Dear Cammy Gomez: Thank you for referring Stacie Gaytan to me for evaluation/treatment. Below are the relevant portions of my assessment and plan of care. If you have questions, please do not hesitate to call me. I look forward to following Fuad Seen along with you.     Sincerely,      Aletha English MD

## 2023-02-09 NOTE — LETTER
[address] [facility] Pulmonary Critical Care and Sleep  [address] [facility] [address] [facility]  Phone: [phone]  Fax: [phone]    Tracee Flynn MD    February 9, 2023     Tammy Ramesh, [address] [place] [address]    Patient: Theresa Dickinson   MR Number: [id number]   YOB: 1940   Date of Visit: 2/9/2023       Dear Tammy Ramesh: Thank you for referring Leonard Merlin to me for evaluation/treatment. Below are the relevant portions of my assessment and plan of care. If you have questions, please do not hesitate to call me. I look forward to following Kalen Guerrero along with you.     Sincerely,      Tracee Flynn MD

## 2023-02-09 NOTE — PROGRESS NOTES
MA Communication:   The following orders are received by verbal communication from Shane Bartlett MD    Orders include:    6 month f/u  Given samples of Trelegy 100mcg

## 2023-02-09 NOTE — PROGRESS NOTES
Demetrio Hillman (:  1940) is a 82 y.o. male,Established patient, here for evaluation of the following chief complaint(s):  Diabetes and Follow-up         ASSESSMENT/PLAN:  1. Type 2 diabetes mellitus with hyperglycemia, without long-term current use of insulin (HCC)  -     POCT glycosylated hemoglobin (Hb A1C)  Stable, continue current meds  Hemoglobin A1C   Date Value Ref Range Status   2023 7.4 % Final       2. Abdominal aortic aneurysm (AAA) without rupture, unspecified part  Stable, monitor.  3. Simple chronic bronchitis (HCC)  Currently not active.  4. Persistent atrial fibrillation (HCC)  Rate controlled.,   5. Benign neoplasm of brain, unspecified brain region (HCC)  Stable.    No follow-ups on file.         Subjective   SUBJECTIVE/OBJECTIVE:  Demetrio Hillman is a 82 y.o. male. Patient presents with:  Diabetes  Follow-up      83 yo male with the following issues:  1. Type 2 diabetes mellitus with hyperglycemia, without long-term current use of insulin (HCC)  Diabetes has been well controlled. Notes he tolerates medication and denies side effects.  Notes no polyuria, polydipsia, and polyphagia.    2. Abdominal aortic aneurysm (AAA) without rupture, unspecified part  Patient has this followed. Has been stable.  No abdominal pain    3. Simple chronic bronchitis (HCC)  Breathing has been normal.  No current cough or expectoration. No shortness of breath.    4. Persistent atrial fibrillation (HCC)  STable, anticoagulated. No chest pain, shorteness of breath, palpitations.    5. Benign neoplasm of brain, unspecified brain region (HCC)  No headaches, no focal neurologic deficits.        The patients PMH, surgical history, family history, medications, allergies were all reviewed and updated as appropriate today.        Review of Systems       Objective   Physical Exam  Vitals and nursing note reviewed.   Constitutional:       Appearance: He is well-developed.   HENT:      Head: Normocephalic and  atraumatic. Right Ear: External ear normal.      Left Ear: External ear normal.      Nose: Nose normal.   Eyes:      Conjunctiva/sclera: Conjunctivae normal.      Pupils: Pupils are equal, round, and reactive to light. Cardiovascular:      Rate and Rhythm: Normal rate. Rhythm irregular. Heart sounds: Normal heart sounds. No murmur heard. No friction rub. No gallop. Pulmonary:      Effort: Pulmonary effort is normal. No respiratory distress. Breath sounds: Normal breath sounds. No wheezing. Abdominal:      General: Bowel sounds are normal. There is no distension. Palpations: Abdomen is soft. Tenderness: There is no abdominal tenderness. Musculoskeletal:         General: No tenderness. Normal range of motion. Cervical back: Normal range of motion and neck supple. Skin:     General: Skin is warm and dry. Neurological:      Mental Status: He is alert and oriented to person, place, and time. Deep Tendon Reflexes: Reflexes are normal and symmetric. Psychiatric:         Behavior: Behavior normal.         Thought Content: Thought content normal.         Judgment: Judgment normal.              An electronic signature was used to authenticate this note.     --Brook Felton MD

## 2023-02-10 ENCOUNTER — TELEPHONE (OUTPATIENT)
Dept: FAMILY MEDICINE CLINIC | Age: 83
End: 2023-02-10

## 2023-02-10 NOTE — PROGRESS NOTES
Chief Complaint-Pulmonary follow up to discuss the clinical status and options    Patient states that he was evaluated by Dr. Lina Glass and patient apparently underwent inspire device placement about 2 weeks back, patient is awaiting activation, patient states that he has some wheezing, patient also states that he has nasal congestion at night, patient does have some shortness of breath off-and-on, no chest pain, patient does not have any significant fever or chills, no abdominal pain nausea vomiting, patient does have leg edema for which he uses compression stockings and also patient takes water pill, patient does not have any other pertinent review of system of concern    Previous HPIPatient has come back to the office for a pulmonary evaluation, patient states that he has some cough off-and-on which is not significant, patient also has some phlegm, patient does not have any increasing nasal congestion, patient does have occasional wheezing which is not significant, patient states that he has some left-sided chest pain more so with exercise, patient does not have any significant fever or chills, no increasing abdominal pain or nausea or vomiting, patient states that he will legs are not as strong as before, patient does have some leg swelling, patient has been trying to use his CPAP on regular basis, patient does not have that much of sleep fragmentation now, patient was also inquiring about inspire device, patient does not have any other pertinent review of system of concern    Patient was subjected to a home sleep study and was told her to follow-up but apparently patient was recently hospitalized because of chest pain and was found to have A. fib with RVR along with that patient was also found to be in acute pulmonary edema and patient was treated symptomatically and specifically in the hospital and was just discharged and patient has come to the office subsequently, patient states that he has been put on a Holter monitor which he is getting at this time, patient states that he underwent an ultrasound of the heart at that time which was okay, patient does have some coughing without any expectoration, patient does not have any significant fever or chills, patient does not have any increasing wheezing, patient does not have any significant pleuritic chest pain, patient on questioning further does not have any significant shortness of breath per se, patient does not have any increasing reflux symptoms, no sinus congestion, patient does have leg edema, patient states that she he is not sure that he got any water pill at home which was given to him at the time of discharge, patient does feel tired and having less energy, patient does not have any other pertinent review of system of concern    Patient has come to the office for a pulmonary follow-up, patient states that his spouse is concerned about his coughing which is more than usual but patient does not think that it is more than usual, patient states that along with the coughing he does not have any significant expectoration or any increased wheezing, patient does not have any significant rhinorrhea nasal congestion sinus congestion, patient does not wear hearing aids and sometimes there is a wax buildup but does not have any ear pain or tinnitus, patient does not have any significant chest pain palpitation diaphoresis, no fever no chills, patient does not have any change in the ambient environment, patient does not have any abdominal discomfort nausea vomiting, patient does have peripheral neuropathy because of type 2 diabetes mellitus, patient states that his hemoglobin A1c is 7.3 which partly is because of his eating habits not not being optimal and also being nonactive, patient does not have any new medications, patient does not have any change in the ambient environment, patient does not have any sick contacts, patient does feel tired and sleepy in the daytime and has some sleep fragmentation, patient does not have any epistaxis hemoptysis hematochezia melena, patient states that along with Trelegy Ellipta he has to use his rescue inhaler on average of twice a week in which he does at nighttime with improvement, patient does not have any other pertinent review of system of concern    A virtual pulmonary visit was done for the patient for his clinical status, patient states that he is clinically stable at this time patient states that he is doing very well with the Trelegy Ellipta, patient states that he takes pro-air at nighttime just to decrease any congestion at nighttime as a habit, patient does not have any significant rhinorrhea or nasal congestion or any epistaxis, patient does not have any significant otalgia or ear discharge, patient states that once  twice a day he has a deep cough without any expectoration but if patient is not concerned about that, patient states that he does not have any significant increasing shortness of breath or wheezing, patient does not have any pleuritic chest pain, no fever no chills, no palpitation or diaphoresis, patient does not have any significant abdominal pain nausea vomiting, patient does not have any increasing reflux symptoms but patient takes medication for that patient does not have any significant epistaxis or hemoptysis, patient states that he has some soft stools but not diarrhea, patient is not concerned about on the consistency of the stools, patient states that in the last few months time he has lost about 11 to 12 pounds, patient does not have any increasing sleep fragmentation, patient does not have any confusion lethargy, patient does not have any change in the ambient environment, patient does not have any new medications of concern, patient's blood sugars are under control, patient does not have any other pertinent review of system of concern    :Patient has come to the office for pulmonary follow-up, patient says that from pulmonary standpoint of view he is stable, patient states that since that time he has started taking Trelegy Ellipta he does not have any significant cough or expectoration or shortness of breath, patient states that once in a while he takes albuterol inhaler at nighttime to decrease some congestion in the daytime, patient takes his Trelegy Ellipta in the daytime at this time, patient does not have any increasing nasal congestion, patient on questioning further states that his hoarseness of voice is not significant, patient does not have any chest pain or palpitations, patient states that recently he had come to the ER because of urine retention and hematuria and was subjected to cystoscopy and however large blood clot was removed but patient was told that he does not have any apparent pathology of concern, mom patient does not have any abdominal pain nausea vomiting, patient does not have any significant increasing leg edema, patient states that he had gone to his PCPs office and patient was told to have a PSA and the results to be sent to his urologist, patient does not have any other pertinent review of system of concern     Patient has come for pulmonary evaluation;patient states that he is more less doing OK with Trelegy but before his requirements for the rescue inhaler was minimal  but lately for last few weeks time he has noticed that he was having more chest congestion along with that patient was having increased use of albuterol inhaler which is still continuing but it is better than last week, patient has cough with mucoid expectoration without any significant purulence, patient does not have any significant fever or chills, no pleuritic chest pain patient does not have any chest pain, patient does not have any increasing nasal congestion, patient does not have any epistaxis, bleeding or hemoptysis, patient does not have any fever or chills, patient does not have any significant abdominal discomfort and nausea vomiting, patient does not complain of any increasing leg edema, patient may still has some sleep fragmentation, patient does not have any confusion or lethargy, no other pertinent review of system of concern       Patient is a 62-year-old male who was upper to the office for pulmonary consult because of increasing shortness of breath  Patient says that he has been having increasing shortness of breath.   For at least the last 1 year time and patient had a cardiac workup done which was negative for any cardiac etiology for the same; patient says that on a flat surface at his own pace he can walk about 3-4 blocks without any shortness of breath but if he has to go on little better inclined order has to climb stairs he has profound shortness of breath, along with the shortness of breath he has some cough without any phlegm, patient also has occasional wheezing, patient does not have any significant chest pain along with that no palpitations or diaphoresis, no fever no chills, patient denies any increasing headaches or blurring of vision, patient does not have any otalgia or ear discharge, patient does not have any tinnitus, patient on questioning further states that he does not have any profound or rhinorrhea or nasal congestion or sinus congestion, patient does not have any significant sore throat, no difficulty in swallowing per se but patient says that his food gets stuck in the mid chest at times and patient has history of esophageal strictures and has had a ballooning of the strictures multiple times in the last one was last fall, patient does not have any pleuritic chest pain, no fever no chills, patient's appetite is maintained, patient does not have any abdominal pain and nausea or vomiting, patient is takes medications for reflux symptoms, patient does not have any altered bowel habits, patient does not have any epistaxis, gum bleeding or hemoptysis, patient does not have any significant hematochezia or melena, patient does not have any dysuria or hematuria, patient does not have any increasing leg edema, patient does have history suggestive of some sleep fragmentation t, but patient says that he has gained about 5-10 pounds in last 1 year time, patient does not have any history of any significant exposures, patient was an educator, patient says that 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 and he underwent a right middle lobe lobectomy at [facility] to that time, patient does feel tired, patient says he is a former smoker he quit about 14 years back, patient does not take any inhalers or nebulizer at this time, patient does not have any change in the ambient environment at this time, patient does not have any significant history of any recent travels or change of medications, patient says that he went to [facility] recently for a vacation and tomorrow he is going to [place], patient does not have any confusion or lethargy, no other pertinent review of system of concern    Past Medical History:   Diagnosis Date    Chronic atrial fibrillation ([address]     COPD (chronic obstructive pulmonary disease) ([address]     Decreased hearing of both ears     Diabetes mellitus ([address]     GERD (gastroesophageal reflux disease)     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    Hyperlipidemia     Hypertension     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, PARTIAL  2007    middle lobe R lung/histoplasmosis    STIMULATOR SURGERY N/A 1/23/2023    INSPIRE DEVICE PLACEMENT performed by Nixon Leon DO at [address]      UPPER GASTROINTESTINAL ENDOSCOPY  2015    UPPER GASTROINTESTINAL ENDOSCOPY  2015    dilated    UPPER GASTROINTESTINAL ENDOSCOPY  2017    dilatation, bx    UPPER GASTROINTESTINAL ENDOSCOPY  2017    dilated; Bx gastric. VASECTOMY         No Known Allergies    Medication list was reviewed and updated as needed in [facility]    Social History     Socioeconomic History    Marital status:      Spouse name: Not on file    Number of children: Not on file    Years of education: Not on file    Highest education level: Not on file   Occupational History    Not on file   Tobacco Use    Smoking status: Former     Packs/day: 1.00     Years: 30.00     Pack years: 30.00     Types: Cigarettes     Start date: 1971     Quit date: 2003     Years since quittin.1    Smokeless tobacco: Never   Vaping Use    Vaping Use: Never used   Substance and Sexual Activity    Alcohol use: Yes    Drug use: No    Sexual activity: Not on file   Other Topics Concern    Not on file   Social History Narrative    Not on file     Social Determinants of Health     Financial Resource Strain: Not on file   Food Insecurity: Not on file   Transportation Needs: Not on file   Physical Activity: Sufficiently Active    Days of Exercise per Week: 3 days    Minutes of Exercise per Session: 60 min   Stress: Not on file   Social Connections: Not on file   Intimate Partner Violence: Not on file   Housing Stability: Not on file       Family History   Problem Relation Age of Onset    Heart Disease Mother     No Known Problems Father     Cancer Brother         colon CA            ROS same as above     Physical Exam:  Blood pressure 115/72, pulse 75, temperature 97.8 °F (36.6 °C), temperature source Temporal, resp. rate 16, height 5' 8\" (1.727 m), weight 224 lb (101.6 kg), SpO2 95 %.'  Constitutional:  No acute distress. HENT:  Oropharynx is clear and moist. No thyromegaly. Mild nasal mucosal hyperemia; No oral thrush   Eyes:  Conjunctivae are normal. Pupils equal, round, and reactive to light. No scleral icterus. Neck: . No tracheal deviation present. No obvious thyroid mass. Neck diameter is increased  Cardiovascular: Normal rate, regular rhythm, normal heart sounds. No right ventricular heave. 1+ lower extremity edema. Pulmonary/Chest: Bilateral wheezes. No significant crackles when seen. Chest wall is not dull to percussion. No accessory muscle usage or stridor. Slightly increased prolonged expiration with breath sound intensity  Abdominal: Soft. Bowel sounds present. No distension or hernia. No tenderness. Musculoskeletal: No cyanosis. No clubbing. No obvious joint deformity. Lymphadenopathy: No cervical or supraclavicular adenopathy. Skin: Skin is warm and dry. No rash or nodules on the exposed extremities. Psychiatric: Normal mood and affect. Behavior is normal.  No anxiety. Neurologic: Alert, awake and oriented. PERRL. Speech fluent        Data:     PFT shows patient to have mild-to-moderate obstructive airway disease with some reactive disease in the small airways along with that patient has hyperinflation and also changes in the PFT parameters because of body habitus      ECHO- Summary   Technically difficult examination. Normal systolic function with an estimated ejection fraction of 55-60%. Mild concentric left ventricular hypertrophy. No regional wall motion abnormalities are seen. Grade I diastolic dysfunction with normal filing pressure.      Patient's home study shows that patient has severe sleep apnea with AHI of 42.9/h along with that patient has nocturnal hypoxemia and patient saturation is dropping to as low as 78% and patient's saturation below 89% was for 90 minutes    ONE XRAY VIEW OF THE CHEST       9/29/2021 4:11 am       COMPARISON:   08/09/2021       HISTORY:   ORDERING SYSTEM PROVIDED HISTORY: chest pain   TECHNOLOGIST PROVIDED HISTORY:   Reason for exam:->chest pain   Reason for Exam: Chest Pain (woke up at 0230. no history )       FINDINGS:   The lungs are hypoinflated. Hazy opacities in the left lung base more than   the right. No significant pleural effusion is seen and no pneumothorax. The   cardiac silhouette is borderline. There is moderate pulmonary vascular   congestion. Calcified right mediastinal/hilar lymph nodes are suggested. No obvious fractures are identified. Right thoracotomy defect is stable. Impression   Moderate pulmonary vascular congestion. The hazy bibasilar opacities spare   the perihilar lung. Could be atypical pattern of edema or infectious. Patient's compliance data shows that patient has used a CPAP machine on only 18 days over 30 and patient uses of CPAP machine more than 4 hours was 60%, patient's AHI has come down to 2/h    Assessment:    1. Shortness of breath/bronchospasm        2. Obstructive sleep apnea      3. Diastolic dysfunction      4. Copd       5. H/O histoplasmosis      6.  Obesity (BMI 35.0-39.9 without comorbidity)              Plan:   Patient's clinical status and review of systems discussed  Patient was told about the clinical finding including auscultation and implications  Patient was told about the pathophysiology of the disease process and its modifying factors  Patient had lost weight and patient apparently was intolerant to CPAP and was evaluated by Dr. Cristina Nieves and inspire device was placed and is awaiting activation  Patient to continue with  [place] and was shown how to take it properly and was told to take one puff daily and patient was told to make sure that he rinses his mouth with water after use otherwise he can have hoarseness of voice or oral thrush  Patient was told to try taking the Trelegy Ellipta at nighttime rather than the daytime to see if it makes a difference in terms of slight decrease congestion in the daytime  Patient does having acute wheezing at this time and patient was given some prednisone  Patient to take care of his sugars otherwise he will have hyperglycemia   Patient was told to take saline nasal rinse   Patient was also told to try some salt and water gargles  Patient to take a sugar free candy/lozenges  Patient was also given albuterol inhaler 2 puffs every 6 on whenever necessary basis   Patient was told about dietary and lifestyle modifications  Patient needs to lose weight  Diuretics as per patient's primary care provider/cardiology  Salt and fluid restrictions discussed  Patient needs to lose weight  A regular regimented exercise will help  Management of diabetes mellitus as per patient's PCP

## 2023-02-10 NOTE — TELEPHONE ENCOUNTER
[address] [place] [address] called in needing instructions on medication listed below    albuterol sulfate HFA (PROVENTIL;VENTOLIN;PROAIR) 108 (90 Base) MCG/ACT inhaler      Please advise    Aubrey Crain at [facility]  [phone]

## 2023-02-13 RX ORDER — ALBUTEROL SULFATE 90 UG/1
2 AEROSOL, METERED RESPIRATORY (INHALATION) EVERY 6 HOURS PRN
Qty: 18 G | Refills: 5 | Status: SHIPPED | OUTPATIENT
Start: 2023-02-13

## 2023-02-14 DIAGNOSIS — Z79.899 MEDICATION MANAGEMENT: Primary | ICD-10-CM

## 2023-02-15 ENCOUNTER — TELEPHONE (OUTPATIENT)
Dept: CARDIOLOGY CLINIC | Age: 83
End: 2023-02-15

## 2023-02-15 NOTE — TELEPHONE ENCOUNTER
----- Message from Júnior Villela MD sent at 2/15/2023  8:07 AM EST -----  Numbers are good. LDL 78 and want < 70. His LDL was 65 and 48 prior. Watch diet as best he can and keep taking 80mg lipitor daily.

## 2023-03-02 ENCOUNTER — PROCEDURE VISIT (OUTPATIENT)
Dept: PULMONOLOGY | Age: 83
End: 2023-03-02

## 2023-03-02 DIAGNOSIS — E66.2 CLASS 1 OBESITY WITH ALVEOLAR HYPOVENTILATION WITHOUT SERIOUS COMORBIDITY WITH BODY MASS INDEX (BMI) OF 34.0 TO 34.9 IN ADULT (HCC): ICD-10-CM

## 2023-03-02 DIAGNOSIS — G47.33 OSA (OBSTRUCTIVE SLEEP APNEA): Primary | ICD-10-CM

## 2023-03-02 ASSESSMENT — ENCOUNTER SYMPTOMS
GASTROINTESTINAL NEGATIVE: 1
EYES NEGATIVE: 1
RESPIRATORY NEGATIVE: 1
ALLERGIC/IMMUNOLOGIC NEGATIVE: 1

## 2023-03-02 NOTE — PATIENT INSTRUCTIONS
ASSESSMENT/PLAN:   Diagnosis Orders   1. RIZWANA (obstructive sleep apnea)        2. 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)          Obesity  Weight at the time of the sleep study initially was 239, now down to 224      Obstructive sleep apnea  Sleep study done to qualify for inspire done 8/31/2021      Patient had a trial of AutoPap however could not tolerate the CPAP machine and pressures and masks and therefore was looking for an alternative to help with his severe sleep apnea      Seen by Dr. Charlotte Jacinto ENT    Drug-induced sleep endoscopy  Date of Procedure: 12/20/22  Time: 0730     Pre Operative Diagnoses: Obstructive Sleep Apnea  Post Operative Diagnoses:  Obstructive Sleep Apnea           Procedure:  1. 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 In summary, there is no evidence of complete concentric palatal obstruction and does appear to be a candidate anatomically for hypoglossal nerve stimulation therapy. Inspire implantation done  Date of Operation:   01/23/23     Pre-operative Diagnosis:   OBSTRUCTIVE SLEEP APNEA      Post-operative Diagnosis:   * No post-op diagnosis entered *      Operative Procedure:   Procedure(s):  INSPIRE DEVICE PLACEMENT (N/A)         Attending Surgeon:   Abdirizak Mazariegos DO         Patient comes for follow-up on 3/2/2023 for inspire activation  Neurostimulator Reprogramming  Approximately 20 minutes was spent analyzing the airway and programming the device. Sensing voltage:  0.5 volts  Starting amplitude: 0.6 volts with the default [+ - +] electrode configuration.   Starting Range:  Lower Limit: 0.6 volts  Upper Limit: 1.6 volts            Rate: Freq 33 hz  And 90 pulse width  Amplitude 0.6    Start delay 30 minutes  Pause delay 15 minutes  Duration 8 hours      Continue to increase the level up by 1 level point every 7 days, adjust to comfort, if it feels uncomfortable drop it back by 1 level. The patient will be called about 2 weeks from now in regards to how the inspire is working and if there is any questions. Remember to bring a list of pulmonary medications and any CPAP or BiPAP machines to your next appointment with the office. Please keep all of your future appointments scheduled by [facility] office. Out of respect for other patients and providers, you may be asked to reschedule your appointment if you arrive later than your scheduled appointment time. Appointments cancelled less than 24hrs in advance will be considered a no show. Patients with three missed appointments within 1 year or four missed appointments within 2 years can be dismissed from the practice. Please be aware that our physicians are required to work in the Intensive Care Unit at [facility].  Your appointment may need to be rescheduled if they are designated to work during your appointment time. You may receive a survey regarding the care you received during your visit. Your input is valuable to us. We encourage you to complete and return your survey. We hope you will choose us in the future for your healthcare needs. Pt instructed of all future appointment dates & times, including radiology, labs, procedures & referrals. If procedures were scheduled preparation instructions provided. Instructions on future appointments with [facility] Pulmonary were given.

## 2023-03-02 NOTE — LETTER
March 2, 2023       Cata Rodney YOB: 1940   [facility] [id number] Date of Visit:  3/2/2023       3/2/23        Cata Rodney      I have seen this patient in the office today and wanted to communicate my findings and recommendations. Patient Instructions         ASSESSMENT/PLAN:   Diagnosis Orders   1. RIZWANA (obstructive sleep apnea)        2. 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 Thony Hayes ENT    Drug-induced sleep endoscopy  Date of Procedure: 12/20/22  Time: 0730     Pre Operative Diagnoses: Obstructive Sleep Apnea  Post Operative Diagnoses:  Obstructive Sleep Apnea           Procedure:  1. 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 In summary, there is no evidence of complete concentric palatal obstruction and does appear to be a candidate anatomically for hypoglossal nerve stimulation therapy.         Inspire implantation done  Date of Operation:   01/23/23     Pre-operative Diagnosis:   OBSTRUCTIVE SLEEP APNEA      Post-operative Diagnosis:   * No post-op diagnosis entered *      Operative Procedure:   Procedure(s):  INSPIRE DEVICE PLACEMENT (N/A)         Attending Surgeon:   Edward Schroeder DO         Patient comes for follow-up on 3/2/2023 for inspire activation  Neurostimulator Reprogramming  Approximately 20 minutes was spent analyzing the airway and programming the device. Sensing voltage:  0.5 volts  Starting amplitude: 0.6 volts with the default [+ - +] electrode configuration. Starting Range:  · Lower Limit: 0.6 volts  · Upper Limit: 1.6 volts            Rate: Freq 33 hz  And 90 pulse width  Amplitude 0.6    Start delay 30 minutes  Pause delay 15 minutes  Duration 8 hours      Continue to increase the level up by 1 level point every 7 days, adjust to comfort, if it feels uncomfortable drop it back by 1 level. The patient will be called about 2 weeks from now in regards to how the inspire is working and if there is any questions.                      Thank you for allowing me to assist in the care of the MD Mary Carver MD

## 2023-03-02 NOTE — PROGRESS NOTES
Brett Dunn (: 1940 ) is a 81 y.o. male here for an evaluation of No chief complaint on file. ASSESSMENT/PLAN:   Diagnosis Orders   1. RIZWANA (obstructive sleep apnea)        2. 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 Cristina Nieves ENT    Drug-induced sleep endoscopy  Date of Procedure: 22  Time: 730     Pre Operative Diagnoses: Obstructive Sleep Apnea  Post Operative Diagnoses:  Obstructive Sleep Apnea           Procedure:  1. 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 In summary, there is no evidence of complete concentric palatal obstruction and does appear to be a candidate anatomically for hypoglossal nerve stimulation therapy. Inspire implantation done  Date of Operation:   23     Pre-operative Diagnosis:   OBSTRUCTIVE SLEEP APNEA      Post-operative Diagnosis:   * No post-op diagnosis entered *      Operative Procedure:   Procedure(s):  INSPIRE DEVICE PLACEMENT (N/A)         Attending Surgeon:   Jerel Leigh DO         Patient comes for follow-up on 3/2/2023 for inspire activation  Neurostimulator Reprogramming  Approximately 20 minutes was spent analyzing the airway and programming the device. Sensing voltage:  0.5 volts  Starting amplitude: 0.6 volts with the default [+ - +] electrode configuration.   Starting Range:  Lower Limit: 0.6 volts  Upper Limit: 1.6 volts            Rate: Freq 33 hz  And 90 pulse width  Amplitude 0.6    Start delay 30 minutes  Pause delay 15 minutes  Duration 8 hours      Continue to increase the level up by 1 level point every 7 days, adjust to comfort, if it feels uncomfortable drop it back by 1 level. The patient will be called about 2 weeks from now in regards to how the inspire is working and if there is any questions. No follow-ups on file. SUBJECTIVE/OBJECTIVE:    This is a transfer of care from Dr. Sasha Dan to me for inspire      Patient had seen Dr. Cristina Nieves and evaluated for inspire in December 2022  Patient had drug-induced sleep endoscopy  Was qualified by this  Then had the inspire implantation done in January 2023      No issues after the implantation  Healing has gone well  Now here for activation        Review of Systems   Constitutional: Negative. HENT: Negative. Eyes: Negative. Respiratory: Negative. Cardiovascular: Negative. Gastrointestinal: Negative. Endocrine: Negative. Genitourinary: Negative. Musculoskeletal: Negative. Skin: Negative. Allergic/Immunologic: Negative. Neurological: Negative. Hematological: Negative. Psychiatric/Behavioral: Negative. There were no vitals filed for this visit. Physical Exam  Vitals and nursing note reviewed. Constitutional:       General: He is not in acute distress. Appearance: Normal appearance. He is not ill-appearing. HENT:      Head: Normocephalic and atraumatic. Right Ear: External ear normal.      Left Ear: External ear normal.      Nose: Nose normal.      Mouth/Throat:      Mouth: Mucous membranes are moist.      Pharynx: Oropharynx is clear. Comments: Mallampati 3  Incision site for inspire at the right lower jaw has a clean and well-healed scar  Eyes:      General: No scleral icterus. Extraocular Movements: Extraocular movements intact.       Conjunctiva/sclera: Conjunctivae normal.      Pupils: Pupils are equal, round, and reactive to light. Cardiovascular:      Rate and Rhythm: Normal rate and regular rhythm. Pulses: Normal pulses. Heart sounds: Normal heart sounds. No murmur heard. No friction rub. Pulmonary:      Effort: No respiratory distress. Breath sounds: No stridor. No wheezing or rales. Comments: Inspire incision site at the right chest wall has a clean and well-healed scar  Abdominal:      General: Abdomen is flat. Bowel sounds are normal. There is no distension. Tenderness: There is no abdominal tenderness. There is no guarding. Musculoskeletal:         General: No swelling or tenderness. Normal range of motion. Cervical back: Normal range of motion and neck supple. No rigidity. Skin:     General: Skin is warm and dry. Coloration: Skin is not jaundiced. Neurological:      General: No focal deficit present. Mental Status: He is alert and oriented to person, place, and time. Mental status is at baseline. Cranial Nerves: No cranial nerve deficit. Sensory: No sensory deficit. Motor: No weakness. Gait: Gait normal.   Psychiatric:         Mood and Affect: Mood normal.         Thought Content:  Thought content normal.         Judgment: Judgment normal.            Reji Esparza MD

## 2023-03-02 NOTE — PROGRESS NOTES
MA Communication:   The following orders are received by verbal communication from Jun Webb MD    Orders include:  6 wk fu scheduled 4/6/23       2 wk fu call 3/16/23

## 2023-03-03 ENCOUNTER — TELEPHONE (OUTPATIENT)
Dept: PULMONOLOGY | Age: 83
End: 2023-03-03

## 2023-03-03 NOTE — TELEPHONE ENCOUNTER
Cori Dalton would not come on last night please call [facility] today [facility]

## 2023-03-06 ENCOUNTER — OFFICE VISIT (OUTPATIENT)
Dept: CARDIOLOGY CLINIC | Age: 83
End: 2023-03-06
Payer: MEDICARE

## 2023-03-06 ENCOUNTER — TELEPHONE (OUTPATIENT)
Dept: CARDIOLOGY CLINIC | Age: 83
End: 2023-03-06

## 2023-03-06 VITALS
HEIGHT: 68 IN | DIASTOLIC BLOOD PRESSURE: 84 MMHG | SYSTOLIC BLOOD PRESSURE: 138 MMHG | OXYGEN SATURATION: 96 % | BODY MASS INDEX: 34.34 KG/M2 | HEART RATE: 70 BPM | WEIGHT: 226.6 LBS

## 2023-03-06 DIAGNOSIS — I48.19 PERSISTENT ATRIAL FIBRILLATION (HCC): ICD-10-CM

## 2023-03-06 DIAGNOSIS — I48.0 PAF (PAROXYSMAL ATRIAL FIBRILLATION) (HCC): Primary | ICD-10-CM

## 2023-03-06 DIAGNOSIS — R53.83 EASY FATIGABILITY: ICD-10-CM

## 2023-03-06 DIAGNOSIS — I10 PRIMARY HYPERTENSION: ICD-10-CM

## 2023-03-06 DIAGNOSIS — G47.33 OSA (OBSTRUCTIVE SLEEP APNEA): ICD-10-CM

## 2023-03-06 PROCEDURE — 93000 ELECTROCARDIOGRAM COMPLETE: CPT | Performed by: INTERNAL MEDICINE

## 2023-03-06 PROCEDURE — 1123F ACP DISCUSS/DSCN MKR DOCD: CPT | Performed by: INTERNAL MEDICINE

## 2023-03-06 PROCEDURE — 99214 OFFICE O/P EST MOD 30 MIN: CPT | Performed by: INTERNAL MEDICINE

## 2023-03-06 PROCEDURE — 3079F DIAST BP 80-89 MM HG: CPT | Performed by: INTERNAL MEDICINE

## 2023-03-06 PROCEDURE — 3075F SYST BP GE 130 - 139MM HG: CPT | Performed by: INTERNAL MEDICINE

## 2023-03-06 ASSESSMENT — ENCOUNTER SYMPTOMS
LEFT EYE: 0
SLEEP DISTURBANCES DUE TO BREATHING: 0
HEMATOCHEZIA: 0
HEMATEMESIS: 0
WHEEZING: 0
STRIDOR: 0
RIGHT EYE: 0
SHORTNESS OF BREATH: 0

## 2023-03-06 NOTE — PROGRESS NOTES
Assessment:     1. Atrial fibrillation: patient has paroxysmal atrial fibrillation in setting of treated obstructive sleep apnea. He previously reported two episodes of atrial fibrillation (over prior two years). Last episode was in August 2022 requiring an ED visit. Episodes remain sporadic. Being on both diltiazem and metoprolol caused resting bradycardia, fatigue and inability to exercise. There was no good reason to be on dual AV noman blockers. We stopped diltiazem and so far the patient has done well. Escalation of therapy for atrial fibrillation can be considered if patient has more frequent episodes. Given issues with easy fatigue, will arrange for a 2-week event monitor to investigate if he has asymptomatic episodes of atrial fibrillation and/or issues with bradycardia. I have encouraged him to be very compliant with sleep apnea treatment. He is now status post \"Inspire\" device and seeing how it affects him (only done a few days ago). I also urged him to exercise regularly with a goal of losing at least a few pounds. He does not consume significant amounts of caffeine or alcohol. Patient has a relatively normal heart by echocardiography with no significant valvular abnormalities and preserved LV systolic function. His left atrium is normal in size. I have had multiple discussions with the patient about issues related to atrial fibrillation (including etiology, disease progression patterns, stroke risk and rate control issues). Patient had her questions answered to his satisfaction. Patient has a [id number] score of at least 4 (age over 76, hypertension, diabetes mellitus). He is on apixaban 5 mg BID with no reported bleeding issues (but has easy bruising). There is no clear indication for aspirin in his case (no history of coronary artery disease or coronary stents). To avoid excess risk of bleeding, we stopped aspirin.      2. Obstructive sleep apnea: Previously noted to have severe obstructive sleep apnea and was quite compliant with CPAP therapy.  He now had the \"Inspire\" device implanted.     3. Hypertension: reasonable blood pressure control. Monitor clinically    4. Diabetes mellitus: On oral therapy.  Management as per primary physician.    Plan:     Do not take Nitroglycerin for episodes of rapid heart rate  Cardiac event monitor for two weeks to assess AF burden  Continue current cardiac medications as prescribed   Follow up with me in 3 months     Subjective:     Patient ID: Demetrio Hillman is a 82 y.o. male.    Chief Complaint:  Chief Complaint   Patient presents with    Follow-up    Atrial Fibrillation     HPI    Patient is a pleasant 82 y.o. male who presents for evaluation of atrial fibrillation. The patient has a past medical history including COPD, diabetes, hypertension, and atrial fibrillation. On 08/07/2022 the patient presented to the emergency room with complaints of exertional shortness of breath and palpitations. His ECG demonstrated atrial fibrillation (133 BPM). He was treated with IV diltiazem and converted to SR/SB spontaneously.     Hospital Consult (EP Nurse Practitioner, 08/10/2022):  HPI/CC: Demetrio Hillman was admitted on 8/7/2022 with shortness of breath. EKG showed rapid AF. He was started in IV Cardizem and spontaneously converted to SR/SB. Echo showed an EF of 55-60% and no WMA. Has also been treated for SONIDO. Rhythm has been SB/SR.     Office Visit (EP clinic, 08/22/2022):  Today he presents for initial EP evaluation following hospital discharge. He states he presented to the hospital with a rapid heart beat and was found to have medication interactions as well. He states he has had this rhythm for around 2 years. He reports he has not had any hospital visits this year for atrial fibrillation. He reports he does have chest pain under his left breast in the morning. He reports he does feel fatigued throughout the day. He reports he feels as if his  fatigue has increased since starting Diltiazem. He states his activity has decreased due to having no energy. He reports he does have exertional shortness of breath. He reports he can tolerate ascending/descending a flight of stairs, but he must do so slowly. He reports he does have some lightheadedness and occasional issues with imbalance. He states he has edema in his legs and he has compression socks at home but he does not use them often. He reports he was diagnosed with sleep apnea around 1 year ago. He states he is compliant with wearing his CPAP machine and minimizes his time sleeping without it. He rarely uses alcohol. He reports he drinks one cup of coffee daily. Patient denies current dizziness or syncope. Patient is taking all cardiac medications as prescribed and tolerates them well. He denies any recent issues with bleeding or bruising. Office Visit ([address], 10/28/2022): Today he presents for follow up. He states that he feels well since stopping aspirin and diltiazem. He reports he has not had any episodes since August. He has not noticed an increase in energy. He is able to tolerate walking a mile 4 days weekly, but the other days he is unable to due to neuropathy. He states that he is using his seep apnea machine regularly. He states that the machine does cause some sleep disturbances for himself and his wife. He states that he does have some swelling in his legs, but he is getting measured for compression stockings today. Patient denies current edema, chest pain, sob, palpitations, dizziness or syncope. Patient is taking all cardiac medications as prescribed and tolerates them well. He denies any recent issues with bleeding or bruising. Interval History since last office visit: On 01/23/2023 he underwent placement of an Inspire device for obstructive sleep apnea. Office Visit ([address], 03/06/2023):  He presents today for follow up. He reports that he is feeling well.  He reports feeling occasional episodes of palpitations. He reports that he has been using his Inspire device for 3 - 4 nights and he is tolerating this well. He states that his wife has not noticed him snoring. He reports feeling fatigued. He reports dyspnea on exertion with walking far distances. He reports that he wears compression stockings to limit leg swelling. Patient denies current edema, chest pain, dizziness or syncope. Patient is taking all cardiac medications as prescribed and tolerates them well. He denies any recent issues with bleeding or bruising. Review of Systems  Review of Systems   Constitutional: Positive for malaise/fatigue. Negative for weight gain and weight loss. HENT:  Negative for nosebleeds and stridor. Eyes:  Negative for vision loss in left eye and vision loss in right eye. Cardiovascular:  Positive for dyspnea on exertion and palpitations. Negative for chest pain, leg swelling and syncope. Respiratory:  Negative for shortness of breath, sleep disturbances due to breathing and wheezing. Hematologic/Lymphatic: Negative for bleeding problem. Does not bruise/bleed easily. Skin:  Negative for itching and rash. Musculoskeletal:  Positive for joint pain. Negative for joint swelling. Gastrointestinal:  Negative for hematemesis and hematochezia. Genitourinary:  Negative for dysuria and hematuria. Neurological:  Negative for dizziness, light-headedness, numbness and paresthesias. Psychiatric/Behavioral:  Negative for altered mental status. The patient is not nervous/anxious.         Past Medical History:   Diagnosis Date    Chronic atrial fibrillation (HCC)     COPD (chronic obstructive pulmonary disease) (HCC)     Decreased hearing of both ears     Diabetes mellitus (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         Social History Socioeconomic History    Marital status:      Spouse name: Not on file    Number of children: Not on file    Years of education: Not on file    Highest education level: Not on file   Occupational History    Not on file   Tobacco Use    Smoking status: Former     Packs/day: 1.00     Years: 30.00     Pack years: 30.00     Types: Cigarettes     Start date: 1971     Quit date: 2003     Years since quittin.1    Smokeless tobacco: Never   Vaping Use    Vaping Use: Never used   Substance and Sexual Activity    Alcohol use: Yes    Drug use: No    Sexual activity: Not on file   Other Topics Concern    Not on file   Social History Narrative    Not on file     Social Determinants of Health     Financial Resource Strain: Not on file   Food Insecurity: Not on file   Transportation Needs: Not on file   Physical Activity: Sufficiently Active    Days of Exercise per Week: 3 days    Minutes of Exercise per Session: 60 min   Stress: Not on file   Social Connections: Not on file   Intimate Partner Violence: Not on file   Housing Stability: Not on file         Family History   Problem Relation Age of Onset    Heart Disease Mother     No Known Problems Father     Cancer Brother         colon CA         Objective:     /84   Pulse 70   Ht 5' 8\" (1.727 m)   Wt 226 lb 9.6 oz (102.8 kg)   SpO2 96%   BMI 34.45 kg/m²     Physical Exam  Constitutional:       Appearance: Normal appearance. HENT:      Head: Normocephalic and atraumatic. Nose: Nose normal. No rhinorrhea. Eyes:      General: No scleral icterus. Conjunctiva/sclera: Conjunctivae normal.   Cardiovascular:      Rate and Rhythm: Normal rate and regular rhythm. Pulmonary:      Effort: Pulmonary effort is normal.      Breath sounds: Normal breath sounds. Abdominal:      General: There is no distension. Musculoskeletal:         General: Normal range of motion. Cervical back: Normal range of motion and neck supple.    Skin: General: Skin is warm and dry. Neurological:      General: No focal deficit present. Mental Status: He is alert and oriented to person, place, and time. Psychiatric:         Mood and Affect: Mood normal.         Behavior: Behavior normal.     ECG Interpretation:  (Date: 03/06/2023)  Rhythm: Sinus rhythm  Rate: 60 BPM  PAC's / PVC's present: None  Conduction abnormalities: Normal AV conduction  Axis: normal    ECG Interpretation: (Date: 08/22/2022)  Rhythm: Sinus Bradycardia  Rate: 56 BPM          ECG Interpretation:  (Date: 08/07/2022)  Rhythm: Atrial fibrillation  Rate: 133 BPM        Echocardiogram  (Date: 08/09/2022)  Summary  Technically difficult study due to body surface area and poor acoustic  window. Normal left ventricular systolic function with ejection fraction of 55-60%. No regional wall motion abnormalites are seen. Left ventricular diastolic filling pressure is normal.  Compared to previous study left ventricle systolic function appears same as  echo on 9-. No thrombus noted. Stress Test (Date: 09/30/2021)  Summary   There is uniform isotope uptake at stress and rest. There is no evidence of  myocardial ischemia or scar. Normal myocardial wall motion and thickening  with hyperdynamic LV function. Post-stress LVEF is >70%.        Current Medications     Current Outpatient Medications   Medication Sig Dispense Refill    albuterol sulfate HFA (PROVENTIL;VENTOLIN;PROAIR) 108 (90 Base) MCG/ACT inhaler Inhale 2 puffs into the lungs every 6 hours as needed for Wheezing ProAir HFA 90 mcg/actuation aerosol inhaler 18 g 5    fluticasone-umeclidin-vilant (TRELEGY ELLIPTA) 100-62.5-25 MCG/ACT AEPB inhaler Inhale 1 puff into the lungs daily EL36 ex 7/24 3 each 0    fluticasone (FLONASE) 50 MCG/ACT nasal spray 2 sprays by Each Nostril route daily (Patient taking differently: 2 sprays by Each Nostril route as needed) 16 g 0    fluticasone-umeclidin-vilant (TRELEGY ELLIPTA) 100-62.5-25 MCG/ACT AEPB inhaler Inhale 1 puff into the lungs daily 1 each 5    metFORMIN (GLUCOPHAGE-XR) 500 MG extended release tablet TAKE TWO TABLETS BY MOUTH TWICE A DAY WITH MEALS 360 tablet 1    atorvastatin (LIPITOR) 80 MG tablet Take 1 tablet by mouth at bedtime 90 tablet 1    furosemide (LASIX) 20 MG tablet Take 1 tablet by mouth daily 60 tablet 3    metoprolol succinate (TOPROL XL) 50 MG extended release tablet Take 1 tablet by mouth 2 times daily 180 tablet 3    pantoprazole (PROTONIX) 40 MG tablet TAKE ONE TABLET BY MOUTH DAILY 90 tablet 1    apixaban (ELIQUIS) 5 MG TABS tablet TAKE ONE TABLET BY MOUTH TWICE A DAY 60 tablet 5    losartan-hydroCHLOROthiazide (HYZAAR) 100-25 MG per tablet TAKE ONE TABLET BY MOUTH DAILY 90 tablet 1    nitroGLYCERIN (NITROSTAT) 0.4 MG SL tablet DISSOLVE 1 TAB UNDER TONGUE FOR CHEST PAIN - IF PAIN REMAINS AFTER 5 MIN, CALL 911 AND REPEAT DOSE. MAX 3 TABS IN 15 MINUTES 25 tablet 3    ketoconazole (NIZORAL) 2 % cream Apply topically daily (Patient taking differently: as needed Apply topically daily prn) 30 g 3    Handicap Placard MISC by Does not apply route 1 each 0    therapeutic multivitamin-minerals (THERAGRAN-M) tablet Take 1 tablet by mouth daily. No current facility-administered medications for this visit.            Lab Review     Lab Results   Component Value Date/Time     01/19/2023 02:48 PM    K 4.8 01/19/2023 02:48 PM    K 4.4 08/08/2022 06:31 AM     01/19/2023 02:48 PM    CO2 26 01/19/2023 02:48 PM    BUN 13 01/19/2023 02:48 PM    CREATININE 1.4 01/19/2023 02:48 PM    GLUCOSE 141 01/19/2023 02:48 PM    CALCIUM 9.3 01/19/2023 02:48 PM        Lab Results   Component Value Date    WBC 9.6 01/19/2023    HGB 12.6 (L) 01/19/2023    HCT 40.2 (L) 01/19/2023    MCV 85.9 01/19/2023     (H) 01/19/2023       Lab Results   Component Value Date    TSH 1.57 08/07/2022       No results found for: BNP    I, Lenward Blonder, RN, am scribing for and in the presence of Dr. Arciniega Bile Josemanuel.  03/06/23 9:53 AM   Viridiana Lawler RN

## 2023-03-06 NOTE — PATIENT INSTRUCTIONS
Plan:     Do not take Nitroglycerin for episodes of rapid heart rate  Cardiac event monitor for two weeks to assess AF burden  Continue current cardiac medications as prescribed   Follow up with me in 3 months

## 2023-03-06 NOTE — TELEPHONE ENCOUNTER
Monitor placed by Henrique [address]  Length of monitor 2 week  Monitor ordered by [address] successful prior to pt leaving office?  Yes

## 2023-03-13 ENCOUNTER — TELEPHONE (OUTPATIENT)
Dept: CARDIOLOGY CLINIC | Age: 83
End: 2023-03-13

## 2023-03-13 NOTE — TELEPHONE ENCOUNTER
Patient needed guidance connecting new patch. Walked him through the steps, patch connected, no issues.

## 2023-03-16 ENCOUNTER — TELEPHONE (OUTPATIENT)
Dept: PULMONOLOGY | Age: 83
End: 2023-03-16

## 2023-03-28 DIAGNOSIS — I10 ESSENTIAL HYPERTENSION, BENIGN: ICD-10-CM

## 2023-03-28 RX ORDER — LOSARTAN POTASSIUM AND HYDROCHLOROTHIAZIDE 25; 100 MG/1; MG/1
TABLET ORAL
Qty: 90 TABLET | Refills: 1 | Status: SHIPPED | OUTPATIENT
Start: 2023-03-28

## 2023-03-31 PROCEDURE — 93272 ECG/REVIEW INTERPRET ONLY: CPT | Performed by: INTERNAL MEDICINE

## 2023-04-06 ENCOUNTER — OFFICE VISIT (OUTPATIENT)
Dept: PULMONOLOGY | Age: 83
End: 2023-04-06
Payer: MEDICARE

## 2023-04-06 DIAGNOSIS — E66.2 CLASS 1 OBESITY WITH ALVEOLAR HYPOVENTILATION WITHOUT SERIOUS COMORBIDITY WITH BODY MASS INDEX (BMI) OF 34.0 TO 34.9 IN ADULT (HCC): ICD-10-CM

## 2023-04-06 DIAGNOSIS — G47.33 OSA (OBSTRUCTIVE SLEEP APNEA): Primary | ICD-10-CM

## 2023-04-06 PROCEDURE — 95976 ALYS SMPL CN NPGT PRGRMG: CPT | Performed by: INTERNAL MEDICINE

## 2023-04-06 PROCEDURE — 1123F ACP DISCUSS/DSCN MKR DOCD: CPT | Performed by: INTERNAL MEDICINE

## 2023-04-06 PROCEDURE — 99215 OFFICE O/P EST HI 40 MIN: CPT | Performed by: INTERNAL MEDICINE

## 2023-04-06 ASSESSMENT — ENCOUNTER SYMPTOMS
RESPIRATORY NEGATIVE: 1
ALLERGIC/IMMUNOLOGIC NEGATIVE: 1
EYES NEGATIVE: 1
GASTROINTESTINAL NEGATIVE: 1

## 2023-04-06 NOTE — PATIENT INSTRUCTIONS
minutes  Duration 8 hours      Continue to increase the level up by 1 level point every 7 days, adjust to comfort, if it feels uncomfortable drop it back by 1 level. The patient will be called about 2 weeks from now in regards to how the inspire is working and if there is any questions. Follow-up appointment on 4/6/2023      Neurostimulator Reprogramming  Approximately 20 minutes was spent analyzing the airway and programming the device. Incoming Amplitude: 0.9 volts with the default [+ - +] electrode configuration. Incoming Control Range:  Lower Limit: 0.6 volts  Upper Limit: 1.6 volts         Rate: Freq 33 hz  And 90 pulse width  Amplitude 0.9    Start delay  30 minutes  Pause delay 15 minutes  Duration 8  hours      Time usage: 40 Hours per week      Outgoing Control Range:  Waking up after 4 hours  Snoring less  Will continue with the increasing the level  Now level 3, at 0.9 volts      Increase use of inspire, keep adjusting the level up to 1 level every  7 days      RTC in 4- 5 weeks.

## 2023-04-06 NOTE — PROGRESS NOTES
MA Communication:   The following orders are received by verbal communication from Eliane Presley MD    Orders include:  5 wk fu scheduled 5/11/23
volts            Rate: Freq 33 hz  And 90 pulse width  Amplitude 0.6    Start delay 30 minutes  Pause delay 15 minutes  Duration 8 hours      Continue to increase the level up by 1 level point every 7 days, adjust to comfort, if it feels uncomfortable drop it back by 1 level. The patient will be called about 2 weeks from now in regards to how the inspire is working and if there is any questions. Follow-up appointment on 4/6/2023      Neurostimulator Reprogramming  Approximately 20 minutes was spent analyzing the airway and programming the device. Incoming Amplitude: 0.9 volts with the default [+ - +] electrode configuration. Incoming Control Range:  Lower Limit: 0.6 volts  Upper Limit: 1.6 volts         Rate: Freq 33 hz  And 90 pulse width  Amplitude 0.9    Start delay  30 minutes  Pause delay 15 minutes  Duration 8  hours      Time usage: 40 Hours per week      Outgoing Control Range:  Waking up after 4 hours  Snoring less  Will continue with the increasing the level  Now level 3, at 0.9 volts      Increase use of inspire, keep adjusting the level up to 1 level every  7 days      RTC in 4- 5 weeks. No follow-ups on file. SUBJECTIVE/OBJECTIVE:    This is a transfer of care from Dr. Pan Pascal to me for inspire      Patient had seen Dr. Tanya Roman and evaluated for inspire in December 2022  Patient had drug-induced sleep endoscopy  Was qualified by this  Then had the inspire implantation done in January 2023      No issues after the implantation  Healing has gone well  Now here for activation        Review of Systems   Constitutional: Negative. HENT: Negative. Eyes: Negative. Respiratory: Negative. Cardiovascular: Negative. Gastrointestinal: Negative. Endocrine: Negative. Genitourinary: Negative. Musculoskeletal: Negative. Skin: Negative. Allergic/Immunologic: Negative. Neurological: Negative. Hematological: Negative. Psychiatric/Behavioral: Negative.          There

## 2023-04-06 NOTE — LETTER
4/6/23        Tamar Oms      I have seen this patient in the office today and wanted to communicate my findings and recommendations. Patient Instructions     ASSESSMENT/PLAN:   Diagnosis Orders   1. RIZWANA (obstructive sleep apnea)        2. 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 Ana Moulton ENT    Drug-induced sleep endoscopy  Date of Procedure: 12/20/22  Time: 0730     Pre Operative Diagnoses: Obstructive Sleep Apnea  Post Operative Diagnoses:  Obstructive Sleep Apnea           Procedure:  1. 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 In summary, there is no evidence of complete concentric palatal obstruction and does appear to be a candidate anatomically for hypoglossal nerve stimulation therapy. Inspire implantation done  Date of Operation:   01/23/23     Pre-operative Diagnosis:   OBSTRUCTIVE SLEEP APNEA      Post-operative Diagnosis:   * No post-op diagnosis entered *      Operative Procedure:   Procedure(s):  INSPIRE DEVICE PLACEMENT (N/A)         Attending Surgeon:   Annette Iqbal DO         Patient comes for follow-up on 3/2/2023 for inspire activation  Neurostimulator Reprogramming  Approximately 20 minutes was spent analyzing the airway and programming the device.   Sensing voltage:  0.5 volts  Starting amplitude: 0.6 volts with the default [+ - +] electrode

## 2023-04-06 NOTE — LETTER
April 6, 2023      [place] [address]      Patient: Meliton Krueger   MR Number: [id number]   YOB: 1940   Date of Visit: 4/6/2023       Dear Scarlett Gomez: Thank you for referring Gita Barotn to me for evaluation/treatment. Below are the relevant portions of my assessment and plan of care. 4/6/23        Meliton Krueger      I have seen this patient in the office today and wanted to communicate my findings and recommendations. Patient Instructions     ASSESSMENT/PLAN:   Diagnosis Orders   1. RIZWANA (obstructive sleep apnea)        2. 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 Bronson Blanchard ENT    Drug-induced sleep endoscopy  Date of Procedure: 12/20/22  Time: 0730     Pre Operative Diagnoses: Obstructive Sleep Apnea  Post Operative Diagnoses:  Obstructive Sleep Apnea           Procedure:  1. 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 In summary, there is no evidence of complete concentric palatal obstruction and does appear to be a candidate anatomically for hypoglossal nerve stimulation therapy.         Inspire implantation done  Date of Operation:   01/23/23     Pre-operative Diagnosis:   OBSTRUCTIVE SLEEP APNEA      Post-operative Diagnosis:   * No post-op diagnosis entered *      Operative Procedure:   Procedure(s):  INSPIRE DEVICE

## 2023-04-07 ENCOUNTER — TELEPHONE (OUTPATIENT)
Dept: CARDIOLOGY CLINIC | Age: 83
End: 2023-04-07

## 2023-04-21 DIAGNOSIS — K21.9 GASTROESOPHAGEAL REFLUX DISEASE WITHOUT ESOPHAGITIS: ICD-10-CM

## 2023-04-21 RX ORDER — PANTOPRAZOLE SODIUM 40 MG/1
TABLET, DELAYED RELEASE ORAL
Qty: 90 TABLET | Refills: 1 | Status: SHIPPED | OUTPATIENT
Start: 2023-04-21

## 2023-05-11 ENCOUNTER — OFFICE VISIT (OUTPATIENT)
Dept: PULMONOLOGY | Age: 83
End: 2023-05-11

## 2023-05-11 DIAGNOSIS — E66.2 CLASS 1 OBESITY WITH ALVEOLAR HYPOVENTILATION WITHOUT SERIOUS COMORBIDITY WITH BODY MASS INDEX (BMI) OF 34.0 TO 34.9 IN ADULT (HCC): ICD-10-CM

## 2023-05-11 DIAGNOSIS — Z45.42 ENCOUNTER FOR ADJUSTMENT AND MANAGEMENT OF NEUROSTIMULATOR: ICD-10-CM

## 2023-05-11 DIAGNOSIS — G47.33 OSA (OBSTRUCTIVE SLEEP APNEA): Primary | ICD-10-CM

## 2023-05-11 NOTE — PATIENT INSTRUCTIONS
ASSESSMENT/PLAN:   Diagnosis Orders   1. RIZWANA (obstructive sleep apnea)  Baseline Diagnostic Sleep Study      2. Encounter for adjustment and management of neurostimulator  Baseline Diagnostic Sleep Study      3. 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)  Baseline Diagnostic Sleep Study        Obesity  Weight at the time of the sleep study initially was 239, now down to 224      Obstructive sleep apnea  Sleep study done to qualify for inspire done 8/31/2021      Patient had a trial of AutoPap however could not tolerate the CPAP machine and pressures and masks and therefore was looking for an alternative to help with his severe sleep apnea      Seen by Dr. Leon Ramires ENT    Drug-induced sleep endoscopy  Date of Procedure: 12/20/22  Time: 0730     Pre Operative Diagnoses: Obstructive Sleep Apnea  Post Operative Diagnoses:  Obstructive Sleep Apnea           Procedure:  1. 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 In summary, there is no evidence of complete concentric palatal obstruction and does appear to be a candidate anatomically for hypoglossal nerve stimulation therapy. Inspire implantation done  Date of Operation:   01/23/23     Pre-operative Diagnosis:   OBSTRUCTIVE SLEEP APNEA      Post-operative Diagnosis:   * No post-op diagnosis entered *      Operative Procedure:   Procedure(s):  INSPIRE DEVICE PLACEMENT (N/A)         Attending Surgeon:   Kentrell Pena DO         Patient comes for follow-up on 3/2/2023 for inspire activation  Neurostimulator Reprogramming  Approximately 20 minutes was spent analyzing the airway and programming the device.   Sensing voltage:  0.5 volts  Starting amplitude: 0.6 volts with the default [+ - +] electrode

## 2023-05-11 NOTE — PROGRESS NOTES
Brandon Walsh (: 1940 ) is a 81 y.o. male here for an evaluation of   Chief Complaint   Patient presents with    Sleep Apnea     INSPIRE fu           ASSESSMENT/PLAN:   Diagnosis Orders   1. RIZWANA (obstructive sleep apnea)  Baseline Diagnostic Sleep Study      2. Encounter for adjustment and management of neurostimulator  Baseline Diagnostic Sleep Study      3. Class 1 obesity with alveolar hypoventilation without serious comorbidity with body mass index (BMI) of 34.0 to 34.9 in adult [facility])  Baseline Diagnostic Sleep Study        Obesity  Weight at the time of the sleep study initially was 239, now down to 224      Obstructive sleep apnea  Sleep study done to qualify for inspire done 2021      Patient had a trial of AutoPap however could not tolerate the CPAP machine and pressures and masks and therefore was looking for an alternative to help with his severe sleep apnea      Seen by Dr. Ti Gao ENT    Drug-induced sleep endoscopy  Date of Procedure: 22  Time: 0730     Pre Operative Diagnoses: Obstructive Sleep Apnea  Post Operative Diagnoses:  Obstructive Sleep Apnea           Procedure:  1. 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 In summary, there is no evidence of complete concentric palatal obstruction and does appear to be a candidate anatomically for hypoglossal nerve stimulation therapy.         Inspire implantation done  Date of Operation:   23     Pre-operative Diagnosis:   OBSTRUCTIVE SLEEP APNEA      Post-operative Diagnosis:   * No post-op diagnosis entered *      Operative Procedure:   Procedure(s):  INSPIRE DEVICE PLACEMENT (N/A)         Attending Surgeon:   Anastasia Smith DO         Patient comes for follow-up on 3/2/2023 for inspire activation  Neurostimulator Reprogramming  Approximately 20 minutes was spent analyzing

## 2023-05-11 NOTE — PROGRESS NOTES
MA Communication:   The following orders are received by verbal communication from Richard Rosado MD    Orders include:  INSPIRE PSG (sleep center to call)       FU after PSG (pt to call me)

## 2023-05-15 ENCOUNTER — TELEPHONE (OUTPATIENT)
Dept: PULMONOLOGY | Age: 83
End: 2023-05-15

## 2023-05-15 NOTE — TELEPHONE ENCOUNTER
OCTAVIO pt.  He was confused because he had not heard from the sleep center yet for the INSPIRE PSG. I told him that once they had dates from the [facility] [address], they would call him to schedule the testing. I told pt if he has not heard from them by Friday, to call me and let me know. Pt verbalized understanding.

## 2023-05-15 NOTE — TELEPHONE ENCOUNTER
Patient calling stating he had a visit with Dr. Abhay Martin last week for [facility] and is suppose to have a test to check on his heartbeat, and his sleep. He is confused about the tests and would like a call from someone. Please call patient and advise.

## 2023-05-16 DIAGNOSIS — I48.19 PERSISTENT ATRIAL FIBRILLATION (HCC): ICD-10-CM

## 2023-05-18 ENCOUNTER — OFFICE VISIT (OUTPATIENT)
Dept: FAMILY MEDICINE CLINIC | Age: 83
End: 2023-05-18
Payer: MEDICARE

## 2023-05-18 VITALS
DIASTOLIC BLOOD PRESSURE: 82 MMHG | SYSTOLIC BLOOD PRESSURE: 140 MMHG | HEIGHT: 68 IN | BODY MASS INDEX: 34.71 KG/M2 | WEIGHT: 229 LBS | HEART RATE: 64 BPM | OXYGEN SATURATION: 94 %

## 2023-05-18 DIAGNOSIS — J41.0 SIMPLE CHRONIC BRONCHITIS (HCC): ICD-10-CM

## 2023-05-18 DIAGNOSIS — E11.65 TYPE 2 DIABETES MELLITUS WITH HYPERGLYCEMIA, WITHOUT LONG-TERM CURRENT USE OF INSULIN (HCC): Primary | ICD-10-CM

## 2023-05-18 DIAGNOSIS — I10 ESSENTIAL HYPERTENSION, BENIGN: ICD-10-CM

## 2023-05-18 DIAGNOSIS — E11.65 TYPE 2 DIABETES MELLITUS WITH HYPERGLYCEMIA, WITHOUT LONG-TERM CURRENT USE OF INSULIN (HCC): ICD-10-CM

## 2023-05-18 DIAGNOSIS — G47.33 OSA (OBSTRUCTIVE SLEEP APNEA): ICD-10-CM

## 2023-05-18 LAB
ALBUMIN SERPL-MCNC: 4 G/DL (ref 3.4–5)
ALBUMIN/GLOB SERPL: 1.7 {RATIO} (ref 1.1–2.2)
ALP SERPL-CCNC: 86 U/L (ref 40–129)
ALT SERPL-CCNC: 9 U/L (ref 10–40)
ANION GAP SERPL CALCULATED.3IONS-SCNC: 10 MMOL/L (ref 3–16)
AST SERPL-CCNC: 11 U/L (ref 15–37)
BILIRUB SERPL-MCNC: 0.5 MG/DL (ref 0–1)
BUN SERPL-MCNC: 16 MG/DL (ref 7–20)
CALCIUM SERPL-MCNC: 9.1 MG/DL (ref 8.3–10.6)
CHLORIDE SERPL-SCNC: 103 MMOL/L (ref 99–110)
CO2 SERPL-SCNC: 29 MMOL/L (ref 21–32)
CREAT SERPL-MCNC: 1.3 MG/DL (ref 0.8–1.3)
GFR SERPLBLD CREATININE-BSD FMLA CKD-EPI: 55 ML/MIN/{1.73_M2}
GLUCOSE SERPL-MCNC: 143 MG/DL (ref 70–99)
POTASSIUM SERPL-SCNC: 4.3 MMOL/L (ref 3.5–5.1)
PROT SERPL-MCNC: 6.4 G/DL (ref 6.4–8.2)
SODIUM SERPL-SCNC: 142 MMOL/L (ref 136–145)

## 2023-05-18 PROCEDURE — 99214 OFFICE O/P EST MOD 30 MIN: CPT | Performed by: FAMILY MEDICINE

## 2023-05-18 PROCEDURE — 1123F ACP DISCUSS/DSCN MKR DOCD: CPT | Performed by: FAMILY MEDICINE

## 2023-05-18 PROCEDURE — 3051F HG A1C>EQUAL 7.0%<8.0%: CPT | Performed by: FAMILY MEDICINE

## 2023-05-18 PROCEDURE — 3078F DIAST BP <80 MM HG: CPT | Performed by: FAMILY MEDICINE

## 2023-05-18 PROCEDURE — 3074F SYST BP LT 130 MM HG: CPT | Performed by: FAMILY MEDICINE

## 2023-05-19 LAB
EST. AVERAGE GLUCOSE BLD GHB EST-MCNC: 177.2 MG/DL
HBA1C MFR BLD: 7.8 %

## 2023-06-02 DIAGNOSIS — E78.2 MIXED HYPERLIPIDEMIA: ICD-10-CM

## 2023-06-02 RX ORDER — ATORVASTATIN CALCIUM 80 MG/1
TABLET, FILM COATED ORAL
Qty: 90 TABLET | Refills: 3 | Status: SHIPPED | OUTPATIENT
Start: 2023-06-02

## 2023-06-05 ENCOUNTER — OFFICE VISIT (OUTPATIENT)
Dept: CARDIOLOGY CLINIC | Age: 83
End: 2023-06-05
Payer: MEDICARE

## 2023-06-05 VITALS
OXYGEN SATURATION: 97 % | SYSTOLIC BLOOD PRESSURE: 136 MMHG | DIASTOLIC BLOOD PRESSURE: 82 MMHG | HEART RATE: 60 BPM | BODY MASS INDEX: 34.46 KG/M2 | WEIGHT: 227.4 LBS | HEIGHT: 68 IN

## 2023-06-05 DIAGNOSIS — I48.0 PAF (PAROXYSMAL ATRIAL FIBRILLATION) (HCC): Primary | ICD-10-CM

## 2023-06-05 DIAGNOSIS — I48.19 PERSISTENT ATRIAL FIBRILLATION (HCC): ICD-10-CM

## 2023-06-05 DIAGNOSIS — R42 ORTHOSTATIC LIGHTHEADEDNESS: ICD-10-CM

## 2023-06-05 DIAGNOSIS — I10 PRIMARY HYPERTENSION: ICD-10-CM

## 2023-06-05 DIAGNOSIS — G47.33 OSA (OBSTRUCTIVE SLEEP APNEA): ICD-10-CM

## 2023-06-05 PROCEDURE — 3075F SYST BP GE 130 - 139MM HG: CPT | Performed by: INTERNAL MEDICINE

## 2023-06-05 PROCEDURE — 3079F DIAST BP 80-89 MM HG: CPT | Performed by: INTERNAL MEDICINE

## 2023-06-05 PROCEDURE — 1123F ACP DISCUSS/DSCN MKR DOCD: CPT | Performed by: INTERNAL MEDICINE

## 2023-06-05 PROCEDURE — 93000 ELECTROCARDIOGRAM COMPLETE: CPT | Performed by: INTERNAL MEDICINE

## 2023-06-05 PROCEDURE — 99214 OFFICE O/P EST MOD 30 MIN: CPT | Performed by: INTERNAL MEDICINE

## 2023-06-05 ASSESSMENT — ENCOUNTER SYMPTOMS
SHORTNESS OF BREATH: 0
RIGHT EYE: 0
LEFT EYE: 0
STRIDOR: 0
SLEEP DISTURBANCES DUE TO BREATHING: 0
WHEEZING: 0
HEMATEMESIS: 0
HEMATOCHEZIA: 0

## 2023-06-05 NOTE — PROGRESS NOTES
control. Monitor clinically    4. Diabetes mellitus: On oral therapy. Management as per primary physician. Plan:     Use caution with walking and other activities while taking anticoagulation. Continue taking current cardiac medications as prescribed. Follow up with me in 5 months. Subjective:     Patient ID: Shawn Benitez is a 81 y.o. male. Chief Complaint:  Chief Complaint   Patient presents with    3 Month Follow-Up    Atrial Fibrillation     HPI    Patient is a pleasant 81 y.o. male who presents for evaluation of atrial fibrillation. The patient has a past medical history including COPD, diabetes, hypertension, and atrial fibrillation. On 08/07/2022 the patient presented to the emergency room with complaints of exertional shortness of breath and palpitations. His ECG demonstrated atrial fibrillation (133 BPM). He was treated with IV diltiazem and converted to SR/SB spontaneously. Hospital Consult (EP Nurse Practitioner, 08/10/2022):  HPI/CC: Shawn Benitez was admitted on 8/7/2022 with shortness of breath. EKG showed rapid AF. He was started in IV Cardizem and spontaneously converted to SR/SB. Echo showed an EF of 55-60% and no WMA. Has also been treated for SONIDO. Rhythm has been SB/SR. Office Visit (EP clinic, 08/22/2022): Today he presents for initial EP evaluation following hospital discharge. He states he presented to the hospital with a rapid heart beat and was found to have medication interactions as well. He states he has had this rhythm for around 2 years. He reports he has not had any hospital visits this year for atrial fibrillation. He reports he does have chest pain under his left breast in the morning. He reports he does feel fatigued throughout the day. He reports he feels as if his fatigue has increased since starting Diltiazem. He states his activity has decreased due to having no energy. He reports he does have exertional shortness of breath.  He reports he can tolerate

## 2023-06-05 NOTE — PATIENT INSTRUCTIONS
Plan:     Use caution with walking and other activities while taking anticoagulation. Continue taking current cardiac medications as prescribed. Follow up with me in 5 months.

## 2023-06-14 ENCOUNTER — PROCEDURE VISIT (OUTPATIENT)
Dept: FAMILY MEDICINE CLINIC | Age: 83
End: 2023-06-14
Payer: MEDICARE

## 2023-06-14 VITALS
WEIGHT: 228 LBS | HEART RATE: 63 BPM | DIASTOLIC BLOOD PRESSURE: 66 MMHG | BODY MASS INDEX: 34.56 KG/M2 | OXYGEN SATURATION: 94 % | HEIGHT: 68 IN | SYSTOLIC BLOOD PRESSURE: 138 MMHG

## 2023-06-14 DIAGNOSIS — L57.0 AK (ACTINIC KERATOSIS): Primary | ICD-10-CM

## 2023-06-14 PROCEDURE — 17000 DESTRUCT PREMALG LESION: CPT | Performed by: FAMILY MEDICINE

## 2023-06-14 PROCEDURE — 17003 DESTRUCT PREMALG LES 2-14: CPT | Performed by: FAMILY MEDICINE

## 2023-06-19 ENCOUNTER — HOSPITAL ENCOUNTER (OUTPATIENT)
Dept: SLEEP CENTER | Age: 83
Discharge: HOME | End: 2023-06-21
Payer: MEDICARE

## 2023-06-19 DIAGNOSIS — G47.33 OSA (OBSTRUCTIVE SLEEP APNEA): ICD-10-CM

## 2023-06-19 DIAGNOSIS — Z45.42 ENCOUNTER FOR ADJUSTMENT AND MANAGEMENT OF NEUROSTIMULATOR: ICD-10-CM

## 2023-06-19 DIAGNOSIS — E66.2 CLASS 1 OBESITY WITH ALVEOLAR HYPOVENTILATION WITHOUT SERIOUS COMORBIDITY WITH BODY MASS INDEX (BMI) OF 34.0 TO 34.9 IN ADULT (HCC): ICD-10-CM

## 2023-06-19 PROCEDURE — 95810 POLYSOM 6/> YRS 4/> PARAM: CPT

## 2023-06-20 ENCOUNTER — OFFICE VISIT (OUTPATIENT)
Dept: CARDIOLOGY CLINIC | Age: 83
End: 2023-06-20
Payer: MEDICARE

## 2023-06-20 VITALS
WEIGHT: 230 LBS | BODY MASS INDEX: 34.86 KG/M2 | OXYGEN SATURATION: 95 % | SYSTOLIC BLOOD PRESSURE: 106 MMHG | HEIGHT: 68 IN | HEART RATE: 62 BPM | DIASTOLIC BLOOD PRESSURE: 65 MMHG

## 2023-06-20 DIAGNOSIS — Z87.891 HISTORY OF TOBACCO ABUSE: ICD-10-CM

## 2023-06-20 DIAGNOSIS — Z79.899 MEDICATION MANAGEMENT: Primary | ICD-10-CM

## 2023-06-20 PROCEDURE — 3078F DIAST BP <80 MM HG: CPT | Performed by: INTERNAL MEDICINE

## 2023-06-20 PROCEDURE — 1123F ACP DISCUSS/DSCN MKR DOCD: CPT | Performed by: INTERNAL MEDICINE

## 2023-06-20 PROCEDURE — 99214 OFFICE O/P EST MOD 30 MIN: CPT | Performed by: INTERNAL MEDICINE

## 2023-06-20 PROCEDURE — 3074F SYST BP LT 130 MM HG: CPT | Performed by: INTERNAL MEDICINE

## 2023-06-20 RX ORDER — PREDNISONE 20 MG/1
TABLET ORAL
COMMUNITY

## 2023-06-20 NOTE — PROGRESS NOTES
[address]   Cardiac Follow Up    Referring Provider:  Caty Gan MD     Chief Complaint   Patient presents with    Follow-up    Hyperlipidemia    Atrial Fibrillation    Hypertension    Coronary Artery Disease          Subjective: Devon Gomez is here for routine cardiac f/u. Today c/o ongoing SOB on exertion. History of Present Illness:  Devon Gomez is a 81 y.o. male who has PMH moderate NOCAD (med mgt without PCI), PAF dx 9/21 on eliquis (Dr. Juancho Frank), HLD, HTN, GERD, COPD, RIZWANA on CPAP (started early 2022), s/p Inspire 1/23, and DM. Saw Dr. Cheryl Perez in 2018 for chest pain. ECHO and gissell nuc normal.   Noted gissell nuc 01/22/21 with apical/septal ischemia. [facility] 2/04/21 with Dr. Cheryl Perez showed mild-mod CAD without need for PCI. Admitted 09/29/21 with CP/dizziness. EKG 09/30/21 AF with RVR 132bpm. Started on Eliquis of new-onset afib. Gissell nuc 09/30/21 show negative ischemia or scarring hyperdynamic LVEF > 70%. 2 week MARIALUISA 10/1/21 showed AF/AFL burden of 3.71%, rate borderline controlled. Average heartrate 98 bpm. CTA Abdomen 10/30/21 showed stable 4-cm infrarenal AAA being followed by Dr. Alfonzo Reynolds   Since last [address] 08/07-08/10/22 with CP. EKG 08/07/22 showed AF with RVR, 133 BPM. Started on IV Cardizem and spontaneously converted to SR/SB. Echo 08/09/22 EF 92-29%, normal diastolic function; no thrombus---no change from prior study 09/30/21. He was treated for SONIDO and COPD exacerbation. He followed with EP 08/22/22 Dr. Juancho Frank  and taken off diltiazem. He continues to take Eliquis 5 mg BID. Since last OV s/p Inspire device implantation for sleep apnea on 01/23/23. Wore MARIALUISA 03/06/2023 to 03/20/2023 predominately SR average HR of 66 (). PAC burden 0.95%, PVC burden 0.67%. Today he states he is doing well. Patient currently denies any weight gain, edema, palpitations, chest pain, dizziness, and syncope. Reports with exertion he feels heart beating and has baseline CHEEK.  He had a

## 2023-06-20 NOTE — PATIENT INSTRUCTIONS
Plan: 1. I recommend that the patient continue their currently prescribed medications. Their drug modifiable risk factors appear to be well controlled. I will continue to address the need/dosing of medications in future visits. 2. Educated to monitor diet ~ Eat whole foods and avoid processed foods. Include a wide variety of vegetables and fruits. Limit red and processed meat. Limit full fat dairy products. Eat a few portions of fish per week. Avoid packages snacks. Add nuts, seeds, and legumes. 3. Discussed Repatha or Leqvio cholesterol management therapy optimal medication therapy  4. Repeat CMP, CBC, TSH,  fasting lipid panel in 6 months  5. EKG reviewed from 06/05/23  6.  Follow up in 6 months

## 2023-06-22 NOTE — TELEPHONE ENCOUNTER
Please check with patient as I thought he wanted to diet and recheck FLP in 6 months and then if LDL not under 70 he would start Repatha. I did not think we were were starting now.

## 2023-06-26 DIAGNOSIS — E11.65 UNCONTROLLED TYPE 2 DIABETES MELLITUS WITH HYPERGLYCEMIA (HCC): ICD-10-CM

## 2023-06-27 ENCOUNTER — PROCEDURE VISIT (OUTPATIENT)
Dept: FAMILY MEDICINE CLINIC | Age: 83
End: 2023-06-27
Payer: MEDICARE

## 2023-06-27 VITALS
OXYGEN SATURATION: 93 % | SYSTOLIC BLOOD PRESSURE: 138 MMHG | DIASTOLIC BLOOD PRESSURE: 68 MMHG | WEIGHT: 229 LBS | HEIGHT: 68 IN | HEART RATE: 58 BPM | BODY MASS INDEX: 34.71 KG/M2

## 2023-06-27 DIAGNOSIS — L57.0 AK (ACTINIC KERATOSIS): Primary | ICD-10-CM

## 2023-06-27 PROCEDURE — 17004 DESTROY PREMAL LESIONS 15/>: CPT | Performed by: FAMILY MEDICINE

## 2023-06-27 RX ORDER — METFORMIN HYDROCHLORIDE 500 MG/1
TABLET, EXTENDED RELEASE ORAL
Qty: 360 TABLET | Refills: 1 | Status: SHIPPED | OUTPATIENT
Start: 2023-06-27

## 2023-07-04 NOTE — PROGRESS NOTES
Subjective:       Rubio Macedo is a 81 y.o. male who presents for new evaluation and treatment of actinic keratosis. New lesions have developed with the following symptoms: increasing thickness. Previous treatment for prior lesions has been cryosurgery. Past history of skin cancer: actinic keratosis. Other skin problems: no.    Patient's medications, allergies, past medical, surgical, social and family histories were reviewed and updated as appropriate. Review of Systems  Pertinent items are noted in HPI. Objective:      Physical Exam   Skin: Raised erythematous scaly circumscribed area with gray/white keratotic scale present on the neck, scalp      Assessment:      Actinic Keratosis of neck, scalp      Plan:      1. Cryosurgery explained to the patient and then performed with Liquid Nitrogen via CRY-AC Spray unit to 15 lesions. Post op course explained. 2. Fluoruricil treatment not indicated at this time. 3. Continue sun protective measures and avoidance. 4. Observe closely for skin damage/changes and contact us if worrisome changes occur. 5. Verbal patient instruction given. 6. Follow up as needed for acute illness.

## 2023-07-31 DIAGNOSIS — I10 ESSENTIAL HYPERTENSION, BENIGN: ICD-10-CM

## 2023-08-01 ENCOUNTER — OFFICE VISIT (OUTPATIENT)
Dept: PULMONOLOGY | Age: 83
End: 2023-08-01
Payer: MEDICARE

## 2023-08-01 DIAGNOSIS — G47.33 OSA (OBSTRUCTIVE SLEEP APNEA): Primary | ICD-10-CM

## 2023-08-01 DIAGNOSIS — Z96.82 S/P INSERTION OF HYPOGLOSSAL NERVE STIMULATOR: ICD-10-CM

## 2023-08-01 PROCEDURE — 95976 ALYS SMPL CN NPGT PRGRMG: CPT | Performed by: INTERNAL MEDICINE

## 2023-08-01 PROCEDURE — 1123F ACP DISCUSS/DSCN MKR DOCD: CPT | Performed by: INTERNAL MEDICINE

## 2023-08-01 PROCEDURE — 99215 OFFICE O/P EST HI 40 MIN: CPT | Performed by: INTERNAL MEDICINE

## 2023-08-01 RX ORDER — FUROSEMIDE 20 MG/1
TABLET ORAL
Qty: 90 TABLET | Refills: 3 | Status: SHIPPED | OUTPATIENT
Start: 2023-08-01

## 2023-08-01 ASSESSMENT — SLEEP AND FATIGUE QUESTIONNAIRES
HOW LIKELY ARE YOU TO NOD OFF OR FALL ASLEEP WHEN YOU ARE A PASSENGER IN A CAR FOR AN HOUR WITHOUT A BREAK: 2
HOW LIKELY ARE YOU TO NOD OFF OR FALL ASLEEP WHILE LYING DOWN TO REST IN THE AFTERNOON WHEN CIRCUMSTANCES PERMIT: 2
HOW LIKELY ARE YOU TO NOD OFF OR FALL ASLEEP WHILE SITTING AND READING: 2
HOW LIKELY ARE YOU TO NOD OFF OR FALL ASLEEP WHILE SITTING AND TALKING TO SOMEONE: 0
HOW LIKELY ARE YOU TO NOD OFF OR FALL ASLEEP IN A CAR, WHILE STOPPED FOR A FEW MINUTES IN TRAFFIC: 0
HOW LIKELY ARE YOU TO NOD OFF OR FALL ASLEEP WHILE WATCHING TV: 2
HOW LIKELY ARE YOU TO NOD OFF OR FALL ASLEEP WHILE SITTING INACTIVE IN A PUBLIC PLACE: 1
ESS TOTAL SCORE: 11
HOW LIKELY ARE YOU TO NOD OFF OR FALL ASLEEP WHILE SITTING QUIETLY AFTER LUNCH WITHOUT ALCOHOL: 2

## 2023-08-01 ASSESSMENT — ENCOUNTER SYMPTOMS
ALLERGIC/IMMUNOLOGIC NEGATIVE: 1
EYES NEGATIVE: 1
RESPIRATORY NEGATIVE: 1
GASTROINTESTINAL NEGATIVE: 1

## 2023-08-01 NOTE — PATIENT INSTRUCTIONS
ASSESSMENT/PLAN:   Diagnosis Orders   1. RIZWANA (obstructive sleep apnea)        2. S/P insertion of hypoglossal nerve stimulator          Obesity  Weight at the time of the sleep study initially was 239, now down to 224      Obstructive sleep apnea  Sleep study done to qualify for inspire done 8/31/2021      Patient had a trial of AutoPap however could not tolerate the CPAP machine and pressures and masks and therefore was looking for an alternative to help with his severe sleep apnea      Seen by Dr. Clifford Segovia ENT    Drug-induced sleep endoscopy  Date of Procedure: 12/20/22  Time: 0730     Pre Operative Diagnoses: Obstructive Sleep Apnea  Post Operative Diagnoses:  Obstructive Sleep Apnea           Procedure:  1. 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 In summary, there is no evidence of complete concentric palatal obstruction and does appear to be a candidate anatomically for hypoglossal nerve stimulation therapy. Inspire implantation done  Date of Operation:   01/23/23     Pre-operative Diagnosis:   OBSTRUCTIVE SLEEP APNEA      Post-operative Diagnosis:   * No post-op diagnosis entered *      Operative Procedure:   Procedure(s):  INSPIRE DEVICE PLACEMENT (N/A)         Attending Surgeon:   Madina Burnett DO         Patient comes for follow-up on 3/2/2023 for inspire activation  Neurostimulator Reprogramming  Approximately 20 minutes was spent analyzing the airway and programming the device. Sensing voltage:  0.5 volts  Starting amplitude: 0.6 volts with the default [+ - +] electrode configuration.   Starting Range:  Lower Limit: 0.6 volts  Upper Limit: 1.6 volts            Rate: Freq 33 hz  And 90 pulse width  Amplitude 0.6    Start delay 30 minutes  Pause delay 15 minutes  Duration 8 hours      Continue to increase the level up by 1 level point

## 2023-08-01 NOTE — PROGRESS NOTES
MA Communication:   The following orders are received by verbal communication from Rome Quintana MD    Orders include:  V: 1.1; R: 0.9-1.5; L3; 30/15/8; H: 60; ESS 11       6 mo fu scheduled 2/6/24 @ 1:30 PM
Limit: 1.9 volts         Rate: Freq 33 hz  And 90 pulse width  Amplitude 1.1    Start delay  30 minutes  Pause delay 15 minutes  Duration 8  hours      Time usage: 60 Hours per week      Outgoing Control Range:  Lower Limit: 0.9 volts  Upper Limit: 1.5 volts    We will slowly try to increase if necessary  His level looked good at 1.1, 1.2 and 1.3 which would equate to levels 3, 4 and 5        RTC after 6 months      No follow-ups on file. SUBJECTIVE/OBJECTIVE:    This is a transfer of care from Dr. Christel Roblero to me for inspire      Patient had seen Dr. Kirby Esquivel and evaluated for inspire in December 2022  Patient had drug-induced sleep endoscopy  Was qualified by this  Then had the inspire implantation done in January 2023      No issues after the implantation  Healing has gone well  Now here for activation      Since the inspire titration  Patient doing well  No chest pains  Sleeping well  No nausea vomiting  No jaw pain          Review of Systems   Constitutional: Negative. HENT: Negative. Eyes: Negative. Respiratory: Negative. Cardiovascular: Negative. Gastrointestinal: Negative. Endocrine: Negative. Genitourinary: Negative. Musculoskeletal: Negative. Skin: Negative. Allergic/Immunologic: Negative. Neurological: Negative. Hematological: Negative. Psychiatric/Behavioral: Negative. There were no vitals filed for this visit. Physical Exam  Vitals and nursing note reviewed. Constitutional:       General: He is not in acute distress. Appearance: Normal appearance. He is not ill-appearing. HENT:      Head: Normocephalic and atraumatic. Right Ear: External ear normal.      Left Ear: External ear normal.      Nose: Nose normal.      Mouth/Throat:      Mouth: Mucous membranes are moist.      Pharynx: Oropharynx is clear.       Comments: Mallampati 3  Incision site for inspire at the right lower jaw has a clean and well-healed scar  Eyes:      General: No

## 2023-08-21 RX ORDER — FLUTICASONE FUROATE, UMECLIDINIUM BROMIDE AND VILANTEROL TRIFENATATE 100; 62.5; 25 UG/1; UG/1; UG/1
POWDER RESPIRATORY (INHALATION)
Qty: 1 EACH | Refills: 5 | Status: SHIPPED | OUTPATIENT
Start: 2023-08-21

## 2023-09-21 ENCOUNTER — TELEPHONE (OUTPATIENT)
Dept: FAMILY MEDICINE CLINIC | Age: 83
End: 2023-09-21

## 2023-09-21 DIAGNOSIS — I10 ESSENTIAL HYPERTENSION, BENIGN: ICD-10-CM

## 2023-09-21 RX ORDER — LOSARTAN POTASSIUM AND HYDROCHLOROTHIAZIDE 25; 100 MG/1; MG/1
1 TABLET ORAL DAILY
Qty: 90 TABLET | Refills: 1 | Status: SHIPPED | OUTPATIENT
Start: 2023-09-21

## 2023-09-26 NOTE — TELEPHONE ENCOUNTER
Last Office Visit  -  06/27/2023  Next Office Visit  -  09/27/2023    Last Filled  -    Last UDS -    Contract -

## 2023-09-27 ENCOUNTER — OFFICE VISIT (OUTPATIENT)
Dept: FAMILY MEDICINE CLINIC | Age: 83
End: 2023-09-27
Payer: MEDICARE

## 2023-09-27 VITALS
OXYGEN SATURATION: 93 % | DIASTOLIC BLOOD PRESSURE: 60 MMHG | WEIGHT: 229 LBS | BODY MASS INDEX: 34.71 KG/M2 | HEART RATE: 62 BPM | HEIGHT: 68 IN | SYSTOLIC BLOOD PRESSURE: 138 MMHG

## 2023-09-27 DIAGNOSIS — L57.0 AK (ACTINIC KERATOSIS): ICD-10-CM

## 2023-09-27 DIAGNOSIS — E11.65 TYPE 2 DIABETES MELLITUS WITH HYPERGLYCEMIA, WITHOUT LONG-TERM CURRENT USE OF INSULIN (HCC): Primary | ICD-10-CM

## 2023-09-27 DIAGNOSIS — I10 ESSENTIAL HYPERTENSION, BENIGN: ICD-10-CM

## 2023-09-27 LAB — HBA1C MFR BLD: 7.3 %

## 2023-09-27 PROCEDURE — 99214 OFFICE O/P EST MOD 30 MIN: CPT | Performed by: FAMILY MEDICINE

## 2023-09-27 PROCEDURE — 1123F ACP DISCUSS/DSCN MKR DOCD: CPT | Performed by: FAMILY MEDICINE

## 2023-09-27 PROCEDURE — 17000 DESTRUCT PREMALG LESION: CPT | Performed by: FAMILY MEDICINE

## 2023-09-27 PROCEDURE — 17004 DESTROY PREMAL LESIONS 15/>: CPT | Performed by: FAMILY MEDICINE

## 2023-09-27 PROCEDURE — 3078F DIAST BP <80 MM HG: CPT | Performed by: FAMILY MEDICINE

## 2023-09-27 PROCEDURE — 17003 DESTRUCT PREMALG LES 2-14: CPT | Performed by: FAMILY MEDICINE

## 2023-09-27 PROCEDURE — 83036 HEMOGLOBIN GLYCOSYLATED A1C: CPT | Performed by: FAMILY MEDICINE

## 2023-09-27 PROCEDURE — 3051F HG A1C>EQUAL 7.0%<8.0%: CPT | Performed by: FAMILY MEDICINE

## 2023-09-27 PROCEDURE — 3074F SYST BP LT 130 MM HG: CPT | Performed by: FAMILY MEDICINE

## 2023-09-27 RX ORDER — KETOCONAZOLE 20 MG/G
CREAM TOPICAL
Qty: 30 G | Refills: 3 | Status: SHIPPED | OUTPATIENT
Start: 2023-09-27

## 2023-10-16 DIAGNOSIS — K21.9 GASTROESOPHAGEAL REFLUX DISEASE WITHOUT ESOPHAGITIS: ICD-10-CM

## 2023-10-16 RX ORDER — PANTOPRAZOLE SODIUM 40 MG/1
40 TABLET, DELAYED RELEASE ORAL DAILY
Qty: 90 TABLET | Refills: 1 | Status: SHIPPED | OUTPATIENT
Start: 2023-10-16

## 2023-10-16 NOTE — TELEPHONE ENCOUNTER
Last Office Visit  -  9/27/23  Next Office Visit  -  12/28/23    Last Filled  -    Last UDS -    Contract -

## 2023-11-03 ASSESSMENT — ENCOUNTER SYMPTOMS
RIGHT EYE: 0
HEMATEMESIS: 0
SLEEP DISTURBANCES DUE TO BREATHING: 0
SHORTNESS OF BREATH: 0
HEMATOCHEZIA: 0
STRIDOR: 0
LEFT EYE: 0
WHEEZING: 0

## 2023-11-03 NOTE — PROGRESS NOTES
Assessment:     1. Atrial fibrillation: patient has paroxysmal atrial fibrillation in setting of treated obstructive sleep apnea. He previously reported two episodes of atrial fibrillation (over a two year period). Last episode was in August 2022 leading to an ED visit. Episodes remain sporadic (none recently). Being on both diltiazem and metoprolol caused resting bradycardia, fatigue and inability to exercise. There was no good reason to be on dual AV noman blockers. We stopped diltiazem and so far the patient has done well. Escalation of therapy for atrial fibrillation can be considered if patient has more frequent episodes. Given issues with easy fatigue, we arranged for a 2-week event monitor to investigate if he has asymptomatic episodes of atrial fibrillation and/or issues with bradycardia. No issues noted (only short atrial runs). I have encouraged him to be very compliant with sleep apnea treatment and to follow up with sleep medicine. He is now status post \"Inspire\" device. I also urged him to exercise regularly with a goal of losing at least a few pounds. He does not consume significant amounts of caffeine or alcohol. Patient has a relatively normal heart by echocardiography with no significant valvular abnormalities and preserved LV systolic function. His left atrium is normal in size. Patient has a [id number] score of at least 4 (age over 76, hypertension, diabetes mellitus). He is on apixaban 5 mg BID with no reported bleeding issues (but has easy bruising). There is no clear indication for aspirin in his case (no history of coronary artery disease or coronary stents). To avoid excess risk of bleeding, we stopped aspirin. 2. Obstructive sleep apnea: previously noted to have severe obstructive sleep apnea and was quite compliant with CPAP therapy. He had the \"Inspire\" device implanted. Doing well overall.      3. Hypertension: reasonable blood pressure control

## 2023-11-06 ENCOUNTER — OFFICE VISIT (OUTPATIENT)
Dept: CARDIOLOGY CLINIC | Age: 83
End: 2023-11-06
Payer: MEDICARE

## 2023-11-06 VITALS
HEART RATE: 60 BPM | OXYGEN SATURATION: 91 % | SYSTOLIC BLOOD PRESSURE: 142 MMHG | BODY MASS INDEX: 34.56 KG/M2 | DIASTOLIC BLOOD PRESSURE: 80 MMHG | WEIGHT: 228 LBS | HEIGHT: 68 IN

## 2023-11-06 DIAGNOSIS — G47.33 OSA (OBSTRUCTIVE SLEEP APNEA): ICD-10-CM

## 2023-11-06 DIAGNOSIS — I10 ESSENTIAL HYPERTENSION, BENIGN: ICD-10-CM

## 2023-11-06 DIAGNOSIS — R53.83 EASY FATIGABILITY: ICD-10-CM

## 2023-11-06 DIAGNOSIS — E66.9 OBESITY (BMI 30.0-34.9): ICD-10-CM

## 2023-11-06 DIAGNOSIS — I48.0 PAF (PAROXYSMAL ATRIAL FIBRILLATION) (HCC): Primary | ICD-10-CM

## 2023-11-06 DIAGNOSIS — I48.19 PERSISTENT ATRIAL FIBRILLATION (HCC): ICD-10-CM

## 2023-11-06 PROCEDURE — 93000 ELECTROCARDIOGRAM COMPLETE: CPT | Performed by: INTERNAL MEDICINE

## 2023-11-06 PROCEDURE — 3079F DIAST BP 80-89 MM HG: CPT | Performed by: INTERNAL MEDICINE

## 2023-11-06 PROCEDURE — 99214 OFFICE O/P EST MOD 30 MIN: CPT | Performed by: INTERNAL MEDICINE

## 2023-11-06 PROCEDURE — 1123F ACP DISCUSS/DSCN MKR DOCD: CPT | Performed by: INTERNAL MEDICINE

## 2023-11-06 PROCEDURE — 3077F SYST BP >= 140 MM HG: CPT | Performed by: INTERNAL MEDICINE

## 2023-11-06 RX ORDER — METOPROLOL SUCCINATE 50 MG/1
50 TABLET, EXTENDED RELEASE ORAL 2 TIMES DAILY
Qty: 180 TABLET | Refills: 3 | Status: SHIPPED | OUTPATIENT
Start: 2023-11-06

## 2023-11-06 RX ORDER — AMLODIPINE BESYLATE 5 MG/1
TABLET ORAL
COMMUNITY

## 2023-11-06 NOTE — PATIENT INSTRUCTIONS
Plan:     Use caution with walking and other activities while taking anticoagulation. Continue taking current cardiac medications as prescribed. Follow up with me in 12 months.

## 2023-12-26 ENCOUNTER — TELEPHONE (OUTPATIENT)
Dept: CARDIOLOGY CLINIC | Age: 83
End: 2023-12-26

## 2023-12-26 DIAGNOSIS — I10 ESSENTIAL HYPERTENSION, BENIGN: ICD-10-CM

## 2023-12-26 DIAGNOSIS — I48.19 PERSISTENT ATRIAL FIBRILLATION (HCC): ICD-10-CM

## 2023-12-26 NOTE — TELEPHONE ENCOUNTER
----- Message from Ifeanyi Fairbanks MD sent at 12/26/2023 10:35 AM EST -----  Please notify patient that their lab results are stable  Cont current tx plan

## 2023-12-28 ENCOUNTER — OFFICE VISIT (OUTPATIENT)
Dept: FAMILY MEDICINE CLINIC | Age: 83
End: 2023-12-28

## 2023-12-28 VITALS
HEART RATE: 72 BPM | HEIGHT: 67 IN | SYSTOLIC BLOOD PRESSURE: 110 MMHG | WEIGHT: 228.2 LBS | BODY MASS INDEX: 35.82 KG/M2 | OXYGEN SATURATION: 96 % | DIASTOLIC BLOOD PRESSURE: 70 MMHG

## 2023-12-28 DIAGNOSIS — L57.0 AK (ACTINIC KERATOSIS): ICD-10-CM

## 2023-12-28 DIAGNOSIS — E11.65 TYPE 2 DIABETES MELLITUS WITH HYPERGLYCEMIA, WITHOUT LONG-TERM CURRENT USE OF INSULIN (HCC): Primary | ICD-10-CM

## 2023-12-28 LAB — HBA1C MFR BLD: 7.4 %

## 2023-12-28 ASSESSMENT — PATIENT HEALTH QUESTIONNAIRE - PHQ9
1. LITTLE INTEREST OR PLEASURE IN DOING THINGS: 0
SUM OF ALL RESPONSES TO PHQ9 QUESTIONS 1 & 2: 0
2. FEELING DOWN, DEPRESSED OR HOPELESS: 0
SUM OF ALL RESPONSES TO PHQ QUESTIONS 1-9: 0

## 2024-01-02 DIAGNOSIS — E11.65 UNCONTROLLED TYPE 2 DIABETES MELLITUS WITH HYPERGLYCEMIA (HCC): ICD-10-CM

## 2024-01-02 RX ORDER — METFORMIN HYDROCHLORIDE 500 MG/1
1000 TABLET, EXTENDED RELEASE ORAL 2 TIMES DAILY WITH MEALS
Qty: 360 TABLET | Refills: 1 | Status: SHIPPED | OUTPATIENT
Start: 2024-01-02

## 2024-01-02 NOTE — TELEPHONE ENCOUNTER
Pharmacy requesting refill    metFORMIN (GLUCOPHAGE-XR) 500 MG extended release tablet     [facility] [id number]

## 2024-01-02 NOTE — TELEPHONE ENCOUNTER
Last Office Visit  -  12/28/2023  Next Office Visit  -  03/28/2024    Last Filled  -    Last UDS -    Contract -

## 2024-01-16 DIAGNOSIS — I10 ESSENTIAL HYPERTENSION, BENIGN: ICD-10-CM

## 2024-01-16 RX ORDER — LOSARTAN POTASSIUM AND HYDROCHLOROTHIAZIDE 25; 100 MG/1; MG/1
1 TABLET ORAL DAILY
Qty: 90 TABLET | Refills: 1 | Status: SHIPPED | OUTPATIENT
Start: 2024-01-16

## 2024-01-16 NOTE — TELEPHONE ENCOUNTER
Last Office Visit  -  12/28/23  Next Office Visit  -  3/28/24    Last Filled  -  9/21/23  Last UDS -    Contract -

## 2024-02-06 ENCOUNTER — OFFICE VISIT (OUTPATIENT)
Dept: PULMONOLOGY | Age: 84
End: 2024-02-06
Payer: MEDICARE

## 2024-02-06 DIAGNOSIS — E66.01 SEVERE OBESITY (BMI 35.0-39.9) WITH COMORBIDITY (HCC): ICD-10-CM

## 2024-02-06 DIAGNOSIS — Z96.82 S/P INSERTION OF HYPOGLOSSAL NERVE STIMULATOR: ICD-10-CM

## 2024-02-06 DIAGNOSIS — G47.33 OSA (OBSTRUCTIVE SLEEP APNEA): Primary | ICD-10-CM

## 2024-02-06 DIAGNOSIS — G47.00 INSOMNIA, UNSPECIFIED TYPE: ICD-10-CM

## 2024-02-06 PROCEDURE — 95976 ALYS SMPL CN NPGT PRGRMG: CPT | Performed by: INTERNAL MEDICINE

## 2024-02-06 PROCEDURE — 99214 OFFICE O/P EST MOD 30 MIN: CPT | Performed by: INTERNAL MEDICINE

## 2024-02-06 PROCEDURE — 1123F ACP DISCUSS/DSCN MKR DOCD: CPT | Performed by: INTERNAL MEDICINE

## 2024-02-06 ASSESSMENT — ENCOUNTER SYMPTOMS
EYES NEGATIVE: 1
ALLERGIC/IMMUNOLOGIC NEGATIVE: 1
GASTROINTESTINAL NEGATIVE: 1
RESPIRATORY NEGATIVE: 1

## 2024-02-06 NOTE — PROGRESS NOTES
Demetrio Hillman (: 1940 ) is a 83 y.o. male here for an evaluation of   Chief Complaint   Patient presents with    Follow-up     Inspire 6 month fu             ASSESSMENT/PLAN:   Diagnosis Orders   1. RIZWANA (obstructive sleep apnea)        2. Severe obesity (BMI 35.0-39.9) with comorbidity (HCC)        3. S/P insertion of hypoglossal nerve stimulator        4. Insomnia, unspecified type          Obesity  Weight at the time of the sleep study initially was 239, now down to 224 to      Obstructive sleep apnea  Sleep study done to qualify for inspire done 2021      Patient had a trial of AutoPap however could not tolerate the CPAP machine and pressures and masks and therefore was looking for an alternative to help with his severe sleep apnea      Seen by Dr. Leger ENT    Drug-induced sleep endoscopy  Date of Procedure: 22  Time: 0730     Pre Operative Diagnoses: Obstructive Sleep Apnea  Post Operative Diagnoses:  Obstructive Sleep Apnea           Procedure:  1. Drug Induced Sleep endoscopy (79879)       Surgeon: Shanel Leger DO   In the hypopharynx, a very large, tongue base is observed in complete anterior-posterior retrolingual/retroepiglottic obstruction.  In summary, there is no evidence of complete concentric palatal obstruction and does appear to be a candidate anatomically for hypoglossal nerve stimulation therapy.        Inspire implantation done  Date of Operation:   23     Pre-operative Diagnosis:   OBSTRUCTIVE SLEEP APNEA      Post-operative Diagnosis:   * No post-op diagnosis entered *      Operative Procedure:   Procedure(s):  INSPIRE DEVICE PLACEMENT (N/A)         Attending Surgeon:   Shanel Leger DO         Patient comes for follow-up on 3/2/2023 for inspire activation  Neurostimulator Reprogramming  Approximately 20 minutes was spent analyzing the airway and programming the device.  Sensing voltage:  0.5 volts  Starting amplitude: 0.6 volts with the default [+ - +] electrode

## 2024-02-06 NOTE — PATIENT INSTRUCTIONS
ASSESSMENT/PLAN:   Diagnosis Orders   1. RIZWANA (obstructive sleep apnea)        2. Severe obesity (BMI 35.0-39.9) with comorbidity (HCC)        3. S/P insertion of hypoglossal nerve stimulator        4. Insomnia, unspecified type          Obesity  Weight at the time of the sleep study initially was 239, now down to 224 to      Obstructive sleep apnea  Sleep study done to qualify for inspire done 8/31/2021      Patient had a trial of AutoPap however could not tolerate the CPAP machine and pressures and masks and therefore was looking for an alternative to help with his severe sleep apnea      Seen by Dr. Leger ENT    Drug-induced sleep endoscopy  Date of Procedure: 12/20/22  Time: 0730     Pre Operative Diagnoses: Obstructive Sleep Apnea  Post Operative Diagnoses:  Obstructive Sleep Apnea           Procedure:  1. Drug Induced Sleep endoscopy (68873)       Surgeon: Shanel Leger DO   In the hypopharynx, a very large, tongue base is observed in complete anterior-posterior retrolingual/retroepiglottic obstruction.  In summary, there is no evidence of complete concentric palatal obstruction and does appear to be a candidate anatomically for hypoglossal nerve stimulation therapy.        Inspire implantation done  Date of Operation:   01/23/23     Pre-operative Diagnosis:   OBSTRUCTIVE SLEEP APNEA      Post-operative Diagnosis:   * No post-op diagnosis entered *      Operative Procedure:   Procedure(s):  INSPIRE DEVICE PLACEMENT (N/A)         Attending Surgeon:   Shanel Leger DO         Patient comes for follow-up on 3/2/2023 for inspire activation  Neurostimulator Reprogramming  Approximately 20 minutes was spent analyzing the airway and programming the device.  Sensing voltage:  0.5 volts  Starting amplitude: 0.6 volts with the default [+ - +] electrode configuration.  Starting Range:  Lower Limit: 0.6 volts  Upper Limit: 1.6 volts            Rate: Freq 33 hz  And 90 pulse width  Amplitude 0.6    Start delay 30

## 2024-02-08 ENCOUNTER — TELEPHONE (OUTPATIENT)
Dept: FAMILY MEDICINE CLINIC | Age: 84
End: 2024-02-08

## 2024-02-08 NOTE — TELEPHONE ENCOUNTER
Pt stopped in the office asking for a renewal on his handicap placards. Pt stated he needs 2 placards because he has 2 vehicles. Please call pt when ready for pickup.  Pt also wanted to leave an updated med list. Placed in provider folder.

## 2024-02-14 NOTE — TELEPHONE ENCOUNTER
Pharmacy requesting refill    fluticasone-umeclidin-vilant (TRELEGY ELLIPTA) 100-62.5-25 MCG/ACT AEPB inhaler     [facility] [phone]

## 2024-04-04 ENCOUNTER — OFFICE VISIT (OUTPATIENT)
Dept: FAMILY MEDICINE CLINIC | Age: 84
End: 2024-04-04

## 2024-04-04 VITALS
DIASTOLIC BLOOD PRESSURE: 72 MMHG | BODY MASS INDEX: 35.16 KG/M2 | HEIGHT: 67 IN | SYSTOLIC BLOOD PRESSURE: 132 MMHG | WEIGHT: 224 LBS | OXYGEN SATURATION: 91 % | HEART RATE: 66 BPM

## 2024-04-04 DIAGNOSIS — G47.30 SLEEP APNEA, UNSPECIFIED TYPE: ICD-10-CM

## 2024-04-04 DIAGNOSIS — E11.65 TYPE 2 DIABETES MELLITUS WITH HYPERGLYCEMIA, WITHOUT LONG-TERM CURRENT USE OF INSULIN (HCC): Primary | ICD-10-CM

## 2024-04-04 DIAGNOSIS — I48.0 PAF (PAROXYSMAL ATRIAL FIBRILLATION) (HCC): ICD-10-CM

## 2024-04-04 DIAGNOSIS — J41.0 SIMPLE CHRONIC BRONCHITIS (HCC): ICD-10-CM

## 2024-04-04 DIAGNOSIS — E11.59 TYPE 2 DIABETES MELLITUS WITH OTHER CIRCULATORY COMPLICATIONS (HCC): ICD-10-CM

## 2024-04-04 LAB — HBA1C MFR BLD: 7.7 %

## 2024-04-04 SDOH — ECONOMIC STABILITY: INCOME INSECURITY: HOW HARD IS IT FOR YOU TO PAY FOR THE VERY BASICS LIKE FOOD, HOUSING, MEDICAL CARE, AND HEATING?: NOT VERY HARD

## 2024-04-04 SDOH — ECONOMIC STABILITY: FOOD INSECURITY: WITHIN THE PAST 12 MONTHS, YOU WORRIED THAT YOUR FOOD WOULD RUN OUT BEFORE YOU GOT MONEY TO BUY MORE.: NEVER TRUE

## 2024-04-04 SDOH — ECONOMIC STABILITY: HOUSING INSECURITY
IN THE LAST 12 MONTHS, WAS THERE A TIME WHEN YOU DID NOT HAVE A STEADY PLACE TO SLEEP OR SLEPT IN A SHELTER (INCLUDING NOW)?: NO

## 2024-04-04 SDOH — ECONOMIC STABILITY: FOOD INSECURITY: WITHIN THE PAST 12 MONTHS, THE FOOD YOU BOUGHT JUST DIDN'T LAST AND YOU DIDN'T HAVE MONEY TO GET MORE.: NEVER TRUE

## 2024-04-04 ASSESSMENT — PATIENT HEALTH QUESTIONNAIRE - PHQ9
1. LITTLE INTEREST OR PLEASURE IN DOING THINGS: NOT AT ALL
SUM OF ALL RESPONSES TO PHQ QUESTIONS 1-9: 0
2. FEELING DOWN, DEPRESSED OR HOPELESS: NOT AT ALL
SUM OF ALL RESPONSES TO PHQ QUESTIONS 1-9: 0
SUM OF ALL RESPONSES TO PHQ QUESTIONS 1-9: 0
SUM OF ALL RESPONSES TO PHQ9 QUESTIONS 1 & 2: 0
SUM OF ALL RESPONSES TO PHQ QUESTIONS 1-9: 0

## 2024-04-04 NOTE — PROGRESS NOTES
heard.     No friction rub. No gallop.   Pulmonary:      Effort: Pulmonary effort is normal. No respiratory distress.      Breath sounds: Normal breath sounds. No wheezing.   Abdominal:      General: Bowel sounds are normal. There is no distension.      Palpations: Abdomen is soft.      Tenderness: There is no abdominal tenderness.   Musculoskeletal:         General: No tenderness. Normal range of motion.      Cervical back: Normal range of motion and neck supple.   Skin:     General: Skin is warm and dry.   Neurological:      Mental Status: He is alert and oriented to person, place, and time.      Deep Tendon Reflexes: Reflexes are normal and symmetric.   Psychiatric:         Behavior: Behavior normal.         Thought Content: Thought content normal.         Judgment: Judgment normal.                  An electronic signature was used to authenticate this note.    --Tu Restrepo MD

## 2024-04-12 DIAGNOSIS — K21.9 GASTROESOPHAGEAL REFLUX DISEASE WITHOUT ESOPHAGITIS: ICD-10-CM

## 2024-04-12 RX ORDER — PANTOPRAZOLE SODIUM 40 MG/1
40 TABLET, DELAYED RELEASE ORAL DAILY
Qty: 90 TABLET | Refills: 1 | Status: SHIPPED | OUTPATIENT
Start: 2024-04-12

## 2024-04-12 NOTE — TELEPHONE ENCOUNTER
Last Office Visit  -  4/4/24  Next Office Visit  -  8/6/24    Last Filled  -  10/16/23  Last UDS -    Contract -

## 2024-04-12 NOTE — TELEPHONE ENCOUNTER
[facility] is requesting a rx for     Pantoprazole Sodium 40 MG Oral Tablet Delayed Release   Last OV 4/4/2024      Next OV 8/6/2024

## 2024-04-14 PROBLEM — E11.59 TYPE 2 DIABETES MELLITUS WITH OTHER CIRCULATORY COMPLICATIONS (HCC): Status: ACTIVE | Noted: 2023-02-09

## 2024-04-23 ENCOUNTER — OFFICE VISIT (OUTPATIENT)
Dept: PULMONOLOGY | Age: 84
End: 2024-04-23
Payer: MEDICARE

## 2024-04-23 DIAGNOSIS — E66.01 SEVERE OBESITY (BMI 35.0-39.9) WITH COMORBIDITY (HCC): ICD-10-CM

## 2024-04-23 DIAGNOSIS — G47.33 OSA (OBSTRUCTIVE SLEEP APNEA): Primary | ICD-10-CM

## 2024-04-23 DIAGNOSIS — Z96.82 S/P INSERTION OF HYPOGLOSSAL NERVE STIMULATOR: ICD-10-CM

## 2024-04-23 DIAGNOSIS — G47.00 INSOMNIA, UNSPECIFIED TYPE: ICD-10-CM

## 2024-04-23 PROCEDURE — 99214 OFFICE O/P EST MOD 30 MIN: CPT | Performed by: INTERNAL MEDICINE

## 2024-04-23 PROCEDURE — 95976 ALYS SMPL CN NPGT PRGRMG: CPT | Performed by: INTERNAL MEDICINE

## 2024-04-23 PROCEDURE — 1123F ACP DISCUSS/DSCN MKR DOCD: CPT | Performed by: INTERNAL MEDICINE

## 2024-04-23 ASSESSMENT — ENCOUNTER SYMPTOMS
ALLERGIC/IMMUNOLOGIC NEGATIVE: 1
GASTROINTESTINAL NEGATIVE: 1
EYES NEGATIVE: 1
RESPIRATORY NEGATIVE: 1

## 2024-04-23 NOTE — PROGRESS NOTES
Demetrio Hillman (: 1940 ) is a 84 y.o. male here for an evaluation of   No chief complaint on file.            ASSESSMENT/PLAN:   Diagnosis Orders   1. RIZWANA (obstructive sleep apnea)        2. S/P insertion of hypoglossal nerve stimulator        3. Insomnia, unspecified type        4. Severe obesity (BMI 35.0-39.9) with comorbidity (HCC)          Obesity  Weight at the time of the sleep study initially was 239, now down to 224 to      Obstructive sleep apnea  Sleep study done to qualify for inspire done 2021      Patient had a trial of AutoPap however could not tolerate the CPAP machine and pressures and masks and therefore was looking for an alternative to help with his severe sleep apnea      Seen by Dr. Leger ENT    Drug-induced sleep endoscopy  Date of Procedure: 22  Time: 07     Pre Operative Diagnoses: Obstructive Sleep Apnea  Post Operative Diagnoses:  Obstructive Sleep Apnea           Procedure:  1. 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  In summary, there is no evidence of complete concentric palatal obstruction and does appear to be a candidate anatomically for hypoglossal nerve stimulation therapy.        Inspire implantation done  Date of Operation:   23     Pre-operative Diagnosis:   OBSTRUCTIVE SLEEP APNEA      Post-operative Diagnosis:   * No post-op diagnosis entered *      Operative Procedure:   Procedure(s):  INSPIRE DEVICE PLACEMENT (N/A)         Attending Surgeon:   Shanel Leger DO         Patient comes for follow-up on 3/2/2023 for inspire activation  Neurostimulator Reprogramming  Approximately 20 minutes was spent analyzing the airway and programming the device.  Sensing voltage:  0.5 volts  Starting amplitude: 0.6 volts with the default [+ - +] electrode configuration.  Starting Range:  Lower Limit: 0.6

## 2024-04-23 NOTE — PATIENT INSTRUCTIONS
1.9 volts         Rate: Freq 33 hz  And 90 pulse width  Amplitude 1.1    Start delay  30 minutes  Pause delay 15 minutes  Duration 8  hours      Time usage: 60 Hours per week      Outgoing Control Range:  Lower Limit: 0.9 volts  Upper Limit: 1.5 volts    We will slowly try to increase if necessary  His level looked good at 1.1, 1.2 and 1.3 which would equate to levels 3, 4 and 5        Follow-up on 2/6/2024    Neurostimulator Reprogramming  Approximately 20 minutes was spent analyzing the airway and programming the device.  Incoming Amplitude: 1.2 volts with the default [+ - +] electrode configuration.  Incoming Control Range:  Lower Limit: 0.9 volts  Upper Limit: 1.5 volts         Rate: Freq 33 hz  And 90 pulse width  Amplitude 1.2, level 4    Start delay  30 minutes  Pause delay 15 minutes  Duration 8  hours      Time usage: 58 Hours per week      Outgoing plan:  Will go ahead and maintain the current voltage, and level 4  The biggest issue is the patient wakes up after 4 hours and keeps the inspire on without using the pause button or turning it off and turning it back on again      F/u on 4/23/24    Neurostimulator Reprogramming  Approximately 20 minutes was spent analyzing the airway and programming the device.  Incoming Amplitude: 1.2 volts with the default [+ - +] electrode configuration.  Incoming Control Range:  Lower Limit: 0.9 volts  Upper Limit: 1.5 volts         Rate: Freq 33 hz  And 90 pulse width  Amplitude 1.2, level 4    Start delay  30 minutes  Pause delay 15 minutes  Duration 8  hours      Time usage: 58 Hours per week      Outgoing Control Range:  Overall doing well  Will continue with the current level of 4.        Insomnia  More so resolved at this time  He does have some early morning awakenings but no issues of daytime fatigue  Does not cause problems with getting up and getting to appointments or taking care of ADL  Will not need any medications at this time for insomnia      RTC after 6

## 2024-05-26 DIAGNOSIS — E78.2 MIXED HYPERLIPIDEMIA: ICD-10-CM

## 2024-05-28 RX ORDER — ATORVASTATIN CALCIUM 80 MG/1
80 TABLET, FILM COATED ORAL NIGHTLY
Qty: 90 TABLET | Refills: 3 | Status: SHIPPED | OUTPATIENT
Start: 2024-05-28

## 2024-05-28 NOTE — TELEPHONE ENCOUNTER
Approved. Inspire letter please.   Ty Refill request for the following medication Metoprolol Succinate 25mg take 1/2 tablet daily.  Refilled per protocol.    Preferred Pharmacy:  [facility]  Last Visit Date- 04/26/2023 verified via [employer].  Future Appointment Scheduled - 07/29/2024

## 2024-06-04 ENCOUNTER — OFFICE VISIT (OUTPATIENT)
Dept: CARDIOLOGY CLINIC | Age: 84
End: 2024-06-04
Payer: MEDICARE

## 2024-06-04 VITALS
HEIGHT: 67 IN | BODY MASS INDEX: 35 KG/M2 | SYSTOLIC BLOOD PRESSURE: 128 MMHG | DIASTOLIC BLOOD PRESSURE: 68 MMHG | HEART RATE: 58 BPM | OXYGEN SATURATION: 98 % | WEIGHT: 223 LBS

## 2024-06-04 DIAGNOSIS — F17.201 MILD TOBACCO ABUSE IN SUSTAINED REMISSION: Primary | ICD-10-CM

## 2024-06-04 PROCEDURE — 99214 OFFICE O/P EST MOD 30 MIN: CPT | Performed by: INTERNAL MEDICINE

## 2024-06-04 PROCEDURE — 3078F DIAST BP <80 MM HG: CPT | Performed by: INTERNAL MEDICINE

## 2024-06-04 PROCEDURE — 3074F SYST BP LT 130 MM HG: CPT | Performed by: INTERNAL MEDICINE

## 2024-06-04 PROCEDURE — 1123F ACP DISCUSS/DSCN MKR DOCD: CPT | Performed by: INTERNAL MEDICINE

## 2024-06-04 NOTE — PATIENT INSTRUCTIONS
Plan:  No change in medications today.  Continue current regimen   2.   When you have your labwork drawn with Dr. Restrepo call our office.    3.   No cardiac testing warranted at this time  4.   Follow up in 6-9 months

## 2024-06-04 NOTE — PROGRESS NOTES
[facility]   Cardiac Follow Up    Referring Provider:  Basilia Christian MD     Chief Complaint   Patient presents with    6 Month Follow-Up    Atrial Fibrillation    Hypertension    Hyperlipidemia    Coronary Artery Disease          Subjective: Chetna Estevez is here for routine cardiac f/u. No complaints today      History of Present Illness:  Chetna Estevez is a 81 y.o. male who has PMH moderate NOCAD (med mgt without PCI), PAF dx 9/21 on eliquis (Dr. Edyta Huang), HLD, HTN, GERD, COPD, RIZWANA on CPAP (started early 2022), and DM. Saw Dr. Margareth Rodriguez in 2018 for chest pain. ECHO and gissell nuc normal at the time. Noted gissell nuc 01/22/21 which showed apical/septal ischemia. Most recent LHC from 02/04/21 with Dr. Margareth Rodriguez showed mild-mod CAD without need for PCI. Admitted 09/29/21 with CP/dizziness. EKG 09/30/21 AF with RVR 132bpm. Started on Eliquis of new-onset afib. Most recent Gissell nuc 09/30/21 show negative ischemia or scarring hyperdynamic LVEF > 70%. Most recent 2 week MARIALUISA 10/1/21 showed AF/AFL burden of 3.71%, rate borderline controlled. Average heartrate 98 bpm. CTA Abdomen 10/30/21 showed stable 4-cm infrarenal AAA being followed by Dr. Gi Ellis   Since last [address] 08/07-08/10/22 with CP. EKG 08/07/22 showed AF with RVR, 133 BPM. He was started in IV Cardizem and spontaneously converted to SR/SB. Most recent Echo 08/09/22 EF 63-76%, normal diastolic function; no thrombus---no change from prior study 09/30/21. He was treated for SONIDO and COPD exacerbation. He followed with EP 08/22/22 Dr. Edyta Huang. Most recent EKG 08/22/22 SB. He continues to take Eliquis 5 mg BID. Today he states he feels good but is concerned with his blood pressure being lower today. Patient currently denies any weight gain, edema, palpitations, chest pain, shortness of breath, dizziness, and syncope. He states he monitors his salt intake. He did not bring medication list in and his son assist in administration.      Weight today #236 lbs, down 2# (238# 03/2022)     Past Medical History:   has a past medical history of Chronic atrial fibrillation ([facility] [address] COPD (chronic obstructive pulmonary disease) ([facility] [address] Decreased hearing of both ears, Diabetes mellitus ([facility] [address] GERD (gastroesophageal reflux disease), Histoplasmosis, Hyperlipidemia, Hypertension, and Primary malignant neoplasm of skin of trunk. Surgical History:   has a past surgical history that includes Lung removal, partial (2007); Vasectomy; TURP (2002); Upper gastrointestinal endoscopy (01/26/2015); Upper gastrointestinal endoscopy (02/2015); Inguinal hernia repair (Bilateral, 06/22/2015); Colonoscopy (05/22/2012); Colonoscopy (07/21/2015); Upper gastrointestinal endoscopy (07/21/2017); Upper gastrointestinal endoscopy (08/23/2017); and Cystoscopy (N/A, 10/24/2019). Social History:   reports that he quit smoking about 2002. He has a 30.00 pack-year smoking history. He has never used smokeless tobacco. He reports current alcohol use. He reports that he does not use drugs. Family History:  family history includes Cancer in his brother; Heart Disease in his mother. Home Medications:  Prior to Admission medications    Medication Sig Start Date End Date Taking? Authorizing Provider   amLODIPine (NORVASC) 5 MG tablet TAKE ONE TABLET BY MOUTH DAILY 11/14/22  Yes Celina Packer MD   pantoprazole (PROTONIX) 40 MG tablet TAKE ONE TABLET BY MOUTH DAILY 10/25/22  Yes Celina Packer MD   atorvastatin (LIPITOR) 40 MG tablet TAKE ONE TABLET BY MOUTH DAILY 10/25/22  Yes Alpesh Arango MD   apixaban (ELIQUIS) 5 MG TABS tablet TAKE ONE TABLET BY MOUTH TWICE A DAY 10/21/22  Yes Lorraine Ramon MD   losartan-hydroCHLOROthiazide (HYZAAR) 100-25 MG per tablet TAKE ONE TABLET BY MOUTH DAILY 9/29/22  Yes Celina Packer MD   nitroGLYCERIN (NITROSTAT) 0.4 MG SL tablet DISSOLVE 1 TAB UNDER TONGUE FOR CHEST PAIN - IF PAIN REMAINS AFTER 5 MIN, CALL 911 AND REPEAT DOSE.  MAX 3 TABS IN 15 MINUTES ambulance 9/13/22  Yes Gwen Newell MD   furosemide (LASIX) 20 MG tablet Take 1 tablet by mouth in the morning. 8/11/22  Yes Justin Mortimer, MD   fluticasone-umeclidin-vilant (Murtaza Gell) 100-62.5-25 MCG/INH AEPB INHALE ONE PUFF BY MOUTH DAILY 6/7/22  Yes Rodney Don MD   metFORMIN (GLUCOPHAGE-XR) 500 mg extended release tablet TAKE TWO TABLETS BY MOUTH TWICE A DAY WITH MEALS 5/31/22  Yes Polly Sutherland MD   metoprolol succinate (TOPROL XL) 50 MG extended release tablet Take 1 tablet by mouth 2 times daily 12/6/21  Yes Deepak Condon MD   Handicap Placard MISC by Does not apply route 4/12/19  Yes MOOSE Gaviria   therapeutic multivitamin-minerals [facility]) tablet Take 1 tablet by mouth daily. Yes Historical Provider, MD   ketoconazole (NIZORAL) 2 % cream Apply topically daily  Patient not taking: Reported on 11/29/2022 8/15/22   Ana Rosa Lopez, APRN - CNP        Allergies:  Patient has no known allergies. Review of Systems:   Constitutional: there has been no unanticipated weight loss. There's been no change in energy level, sleep pattern, or activity level. Eyes: No visual changes or diplopia. No scleral icterus. ENT: No Headaches, hearing loss or vertigo. No mouth sores or sore throat. Cardiovascular: Reviewed in HPI  Respiratory: No cough or wheezing, no sputum production. No hematemesis. Gastrointestinal: No abdominal pain, appetite loss, blood in stools. No change in bowel or bladder habits. Genitourinary: No dysuria, trouble voiding, or hematuria. Musculoskeletal:  No gait disturbance, weakness or joint complaints. Integumentary: No rash or pruritis. Neurological: No headache, diplopia, change in muscle strength, numbness or tingling. No change in gait, balance, coordination, mood, affect, memory, mentation, behavior. Psychiatric: No anxiety, no depression. Endocrine: No malaise, fatigue or temperature intolerance. No excessive thirst, fluid intake, or urination.  No tremor. Hematologic/Lymphatic: No abnormal bruising or bleeding, blood clots or swollen lymph nodes. Allergic/Immunologic: No nasal congestion or hives. Physical Examination:    Vitals:    11/29/22 0933   BP: 112/64   Pulse: 84   SpO2: 97%          Constitutional and General Appearance: NAD   Respiratory:  Normal excursion and expansion without use of accessory muscles  Resp Auscultation: Clear no crackles or wheezing. Cardiovascular: The apical impulses not displaced  Heart tones are crisp and normal  Cervical veins are not engorged  The carotid upstroke is normal in amplitude and contour without delay or bruit  Normal S1S2, No S3, No Murmur. RRR  Peripheral pulses are symmetrical and full  There is no clubbing, cyanosis of the extremities. 1+ pitting edema LLE, trace right LLE edema. Femoral Arteries: 2+ and equal  Pedal Pulses: 2+ and equal   Abdomen:  No masses or tenderness  Liver/Spleen: No Abnormalities Noted  Neurological/Psychiatric:  Alert and oriented in all spheres  Moves all extremities well  Exhibits normal gait balance and coordination  No abnormalities of mood, affect, memory, mentation, or behavior are noted  Skin:  Skin: warm and dry. Stress test: 01/22/21  Summary Normal LVEF >60% Grossly normal wall motion  Apical/septal ischemia   Overall, this would be considered an abnormal, intermediate risk, study     [facility] 2/04/21 Findings:     1. Left main coronary artery was normal. It gave off the left anterior descending artery and left circumflex. 2. Left anterior descending artery has mild to moderate atherosclerotic disease. It was moderate in size. It gave off septal perforators and a moderate sized diagonal branch. The LAD covered the entire apex of the left ventricle. 3. Left circumflex has mild to moderate atherosclerotic disease. It was moderate in size. There was a moderate sized obtuse marginal branch.      4. Right coronary artery has mild to moderate atherosclerotic disease. It was moderate in size and was the dominant artery. 5. Left ventriculogram showed normal LVEF at 55-60%. Wall motion was normal . There was no significant mitral valve or aortic valve disease noted. LVEDP was normal. There was no gradient noted across the aortic valve during pullback of the catheter. CONCLUSIONS:     1. Mild to moderate coronary artery disease with preserved LVEF     ASSESSMENT/RECOMMENDATIONS:     1. Risk Factor control  2.  Consider anti-anginal therapy with long acting nitrates    Lab Results   Component Value Date     08/15/2022    K 4.7 08/15/2022     08/15/2022    CO2 25 08/15/2022    BUN 11 08/15/2022    CREATININE 1.3 08/15/2022    GLUCOSE 143 (H) 08/15/2022    CALCIUM 9.9 08/15/2022    PROT 6.4 08/08/2022    LABALBU 3.9 08/10/2022    BILITOT 0.4 08/08/2022    ALKPHOS 86 08/08/2022    AST 12 (L) 08/08/2022    ALT 11 08/08/2022    LABGLOM 53 (A) 08/15/2022    GFRAA >60 08/15/2022    AGRATIO 1.4 08/08/2022    GLOB 2.7 10/01/2021       Lab Results   Component Value Date    WBC 9.1 08/08/2022    HGB 11.7 (L) 08/08/2022    HCT 35.3 (L) 08/08/2022    MCV 87.3 08/08/2022     08/08/2022        Lab Results   Component Value Date    CHOL 182 10/25/2021    CHOL 151 02/04/2021    CHOL 169 01/11/2021     Lab Results   Component Value Date    TRIG 129 10/25/2021    TRIG 115 02/04/2021    TRIG 151 (H) 01/11/2021     Lab Results   Component Value Date    HDL 40 10/25/2021    HDL 44 02/04/2021    HDL 45 01/11/2021     Lab Results   Component Value Date    LDLCALC 116 (H) 10/25/2021    LDLCALC 84 02/04/2021    LDLCALC 94 01/11/2021     Lab Results   Component Value Date    LABVLDL 26 10/25/2021    LABVLDL 23 02/04/2021    LABVLDL 30 01/11/2021     No results found for: CHOLBABITA     I have personally reviewed all labs including lipids 10/25/21    [id number] Score for Atrial Fibrillation Stroke Risk   Risk   Factors  Component Value   C CHF No 0   H HTN Yes 1   A2 Age >= 75 Yes,  (81 y.o.) 2   D DM Yes 1   S2 Prior Stroke/TIA No 0   V Vascular Disease No 0   A Age 74-69 No,  (81 y.o.) 0   Sc Sex male 0    [id number]  Score  4   Score last updated 10/12/21 9:12 AM EDT    Assessment:     1. HTN: Well controlled and will continue current medical regimen of hyzaar, norvasc, and Toprol XL. 2. CAD:  Mild-moderate NOCAD. Most recent [address] [facility] 2/04/21 with Dr. Thalia Schmitt showed mild to moderate CAD without need for PCI. Most recent [address] 09/30/21 show negative ischemia. I have offered long-acting nitrate (imdur) in past but he declined. He uses SL NTG PRN. There are no concerning symptoms for angina currently. 3.      HLD: I personally reviewed most recent lipids from 10/25/21 in Epic and d/w Mr. Catalina Nicole (see above). I increased lipitor 40mg daily. Need repeat labs and adjust accordingly (not certain why not done?)    4. DM: per PCP    5. PAF: Diagnosed 9/21. Admit 8/22 with rapid afib. Asymptomatic. Continue metoprolol XL 50mg BID regimen to help HR control. Continue eliquis 5mg BID for AC. Plan:  1. Discussed monitor blood pressure at home ~ goal 130/ 80 or less  ~ Take 3-4 times a week in upper arm  ~ Keep a log  ~ Call office if above goal consistently for 1-2 weeks. 2. Continue current medication regimen. 3. Order fasting lipid panel ~ assess cholesterol. Discussed increasing lipitor based on results, will call you to discuss. 4.Follow up in June 2023. Scribe's attestation: This note was scribed in the presence of Marky Daniels by Carola Donovan RN     I, Dr. Gery Closs, personally performed the services described in this documentation, as scribed by the above signed scribe in my presence. It is both accurate and complete to my knowledge. I agree with the details independently gathered by the clinical support staff, while the remaining scribed note accurately describes my personal service to the patient.     Thank you for allowing me to participate in the care of this individual.    Cost of prescription medications and patient compliance have been reviewed with patient. All questions answered. Aditya Kaur.  Jamal Angulo M.D., [facility]

## 2024-06-04 NOTE — PROGRESS NOTES
[facility]   Cardiac Follow Up    Referring Provider:  O'Tu Wayne MD     No chief complaint on file.         Subjective: Demetrio Hillman is here for routine cardiac f/u. No complaints today    History of Present Illness:  Demetrio Hillman is a 84 y.o. male who has PMH moderate NOCAD (med mgt without PCI), PAF dx 9/21 on eliquis (Dr. Abernathy), HLD, HTN, infrarenal AAA (4cm), GERD, COPD, RIZWANA s/p Inspire 1/23 (prior on CPAP), and DM. Saw Dr. Robbins in 2018 for chest pain. ECHO and gissell nuc normal.   Gissell nuc 01/22/21 with apical/septal ischemia. LHC 2/04/21 with Dr. Robbins mild-mod CAD without need for PCI. Admitted 9/29/21 with CP/dizziness. EKG 9/30/21 AF RVR 132bpm. Started on Eliquis for new-onset afib. Gissell nuc 9/30/21 negative; LVEF > 70%.     2 week MARIALUISA 10/1/21 showed AF/AFL burden of 3.71%, rate borderline controlled. Average heartrate 98 bpm. Abd US 12/22 with 4.1cm fusiform mid-abdominal aortic aneurysm (no change abd CTA 10/21); followed by Dr. Aubrey Beckham   Admitted [facility] 08/07-08/10/22 with CP. EKG showed afib RVR. Started on IV Cardizem and converted NSR. Echo 8/09/22 EF 55-60%, normal diastolic function; no thrombus---no change 9/21. Treated for SONIDO and COPD exacerbation. He followed with EP 08/22/22 Dr. Abernathy who d/c'd diltiazem. He continues to take Eliquis 5 mg BID.    MARIALUISA 3/06/2023 to 03/20/2023 predominately SR average HR of 66 (). PAC burden 0.95%, PVC burden 0.67%.  EKG 11/6/23 showed SR, 1st degree AVB; PAC, rate 60 bpm.   Today he presents with his wife.  He is taking his medications as prescribed.  He states most of his medications are affordable.  He plans to talk to his PCP regarding his Jardiance due to the cost being over $200 a month.  Patient denies chest pain, sob, palpitations, dizziness or syncope.  He is wearing his YUNG hose at visit today.     Weight today 223 lbs  (down 4 lbs since 12/23)     Past Medical History:   has a past medical history of Chronic atrial

## 2024-06-28 DIAGNOSIS — E11.65 UNCONTROLLED TYPE 2 DIABETES MELLITUS WITH HYPERGLYCEMIA (HCC): ICD-10-CM

## 2024-06-28 RX ORDER — METFORMIN HYDROCHLORIDE 500 MG/1
1000 TABLET, EXTENDED RELEASE ORAL 2 TIMES DAILY WITH MEALS
Qty: 360 TABLET | Refills: 1 | Status: SHIPPED | OUTPATIENT
Start: 2024-06-28

## 2024-06-28 NOTE — TELEPHONE ENCOUNTER
Last Office Visit  -  04/04/2024  Next Office Visit  -  08/06/2024    Last Filled  -    Last UDS -    Contract -

## 2024-07-23 DIAGNOSIS — I10 ESSENTIAL HYPERTENSION, BENIGN: ICD-10-CM

## 2024-07-23 RX ORDER — FUROSEMIDE 20 MG/1
20 TABLET ORAL DAILY
Qty: 90 TABLET | Refills: 1 | Status: SHIPPED | OUTPATIENT
Start: 2024-07-23

## 2024-08-06 ENCOUNTER — OFFICE VISIT (OUTPATIENT)
Dept: FAMILY MEDICINE CLINIC | Age: 84
End: 2024-08-06
Payer: MEDICARE

## 2024-08-06 VITALS
DIASTOLIC BLOOD PRESSURE: 68 MMHG | HEIGHT: 67 IN | WEIGHT: 225 LBS | BODY MASS INDEX: 35.31 KG/M2 | OXYGEN SATURATION: 96 % | HEART RATE: 61 BPM | SYSTOLIC BLOOD PRESSURE: 134 MMHG

## 2024-08-06 DIAGNOSIS — Z00.00 MEDICARE ANNUAL WELLNESS VISIT, SUBSEQUENT: ICD-10-CM

## 2024-08-06 DIAGNOSIS — D33.2 BENIGN NEOPLASM OF BRAIN, UNSPECIFIED BRAIN REGION (HCC): ICD-10-CM

## 2024-08-06 DIAGNOSIS — Z12.5 SCREENING FOR MALIGNANT NEOPLASM OF PROSTATE: ICD-10-CM

## 2024-08-06 DIAGNOSIS — I71.40 ABDOMINAL AORTIC ANEURYSM (AAA) WITHOUT RUPTURE, UNSPECIFIED PART (HCC): ICD-10-CM

## 2024-08-06 DIAGNOSIS — E11.59 TYPE 2 DIABETES MELLITUS WITH OTHER CIRCULATORY COMPLICATIONS (HCC): ICD-10-CM

## 2024-08-06 DIAGNOSIS — E11.59 TYPE 2 DIABETES MELLITUS WITH OTHER CIRCULATORY COMPLICATIONS (HCC): Primary | ICD-10-CM

## 2024-08-06 LAB — HBA1C MFR BLD: 7.2 %

## 2024-08-06 PROCEDURE — 3075F SYST BP GE 130 - 139MM HG: CPT | Performed by: FAMILY MEDICINE

## 2024-08-06 PROCEDURE — G0439 PPPS, SUBSEQ VISIT: HCPCS | Performed by: FAMILY MEDICINE

## 2024-08-06 PROCEDURE — 83036 HEMOGLOBIN GLYCOSYLATED A1C: CPT | Performed by: FAMILY MEDICINE

## 2024-08-06 PROCEDURE — 3051F HG A1C>EQUAL 7.0%<8.0%: CPT | Performed by: FAMILY MEDICINE

## 2024-08-06 PROCEDURE — 1123F ACP DISCUSS/DSCN MKR DOCD: CPT | Performed by: FAMILY MEDICINE

## 2024-08-06 PROCEDURE — 3078F DIAST BP <80 MM HG: CPT | Performed by: FAMILY MEDICINE

## 2024-08-06 ASSESSMENT — PATIENT HEALTH QUESTIONNAIRE - PHQ9
SUM OF ALL RESPONSES TO PHQ QUESTIONS 1-9: 0
SUM OF ALL RESPONSES TO PHQ9 QUESTIONS 1 & 2: 0
2. FEELING DOWN, DEPRESSED OR HOPELESS: NOT AT ALL
SUM OF ALL RESPONSES TO PHQ QUESTIONS 1-9: 0
1. LITTLE INTEREST OR PLEASURE IN DOING THINGS: NOT AT ALL

## 2024-08-06 ASSESSMENT — LIFESTYLE VARIABLES
HOW OFTEN DO YOU HAVE A DRINK CONTAINING ALCOHOL: MONTHLY OR LESS
HOW MANY STANDARD DRINKS CONTAINING ALCOHOL DO YOU HAVE ON A TYPICAL DAY: 1 OR 2

## 2024-08-07 LAB
ALBUMIN SERPL-MCNC: 4 G/DL (ref 3.4–5)
ALBUMIN/GLOB SERPL: 1.5 {RATIO} (ref 1.1–2.2)
ALP SERPL-CCNC: 100 U/L (ref 40–129)
ALT SERPL-CCNC: 6 U/L (ref 10–40)
ANION GAP SERPL CALCULATED.3IONS-SCNC: 17 MMOL/L (ref 3–16)
AST SERPL-CCNC: 9 U/L (ref 15–37)
BILIRUB SERPL-MCNC: 0.4 MG/DL (ref 0–1)
BUN SERPL-MCNC: 17 MG/DL (ref 7–20)
CALCIUM SERPL-MCNC: 9.3 MG/DL (ref 8.3–10.6)
CHLORIDE SERPL-SCNC: 99 MMOL/L (ref 99–110)
CHOLEST SERPL-MCNC: 142 MG/DL (ref 0–199)
CO2 SERPL-SCNC: 24 MMOL/L (ref 21–32)
CREAT SERPL-MCNC: 1.5 MG/DL (ref 0.8–1.3)
GFR SERPLBLD CREATININE-BSD FMLA CKD-EPI: 46 ML/MIN/{1.73_M2}
GLUCOSE SERPL-MCNC: 122 MG/DL (ref 70–99)
HDLC SERPL-MCNC: 42 MG/DL (ref 40–60)
LDL CHOLESTEROL: 73 MG/DL
POTASSIUM SERPL-SCNC: 4.5 MMOL/L (ref 3.5–5.1)
PROT SERPL-MCNC: 6.7 G/DL (ref 6.4–8.2)
PSA SERPL DL<=0.01 NG/ML-MCNC: 4.43 NG/ML (ref 0–4)
SODIUM SERPL-SCNC: 140 MMOL/L (ref 136–145)
TRIGL SERPL-MCNC: 133 MG/DL (ref 0–150)
VLDLC SERPL CALC-MCNC: 27 MG/DL

## 2024-08-09 RX ORDER — FLUTICASONE FUROATE, UMECLIDINIUM BROMIDE AND VILANTEROL TRIFENATATE 100; 62.5; 25 UG/1; UG/1; UG/1
POWDER RESPIRATORY (INHALATION)
Qty: 60 EACH | Refills: 3 | Status: SHIPPED | OUTPATIENT
Start: 2024-08-09

## 2024-08-09 NOTE — TELEPHONE ENCOUNTER
Last Office Visit  -  08/06/2024  Next Office Visit  -  11/06/2024    Last Filled  -    Last UDS -    Contract -

## 2024-09-25 ENCOUNTER — TELEPHONE (OUTPATIENT)
Dept: FAMILY MEDICINE CLINIC | Age: 84
End: 2024-09-25

## 2024-09-25 NOTE — TELEPHONE ENCOUNTER
Patient called, his apixaban (ELIQUIS) 5 MG TABS tablet  is over $400 for a three month supply for him and his wife, and they can't afford that. He would like to know if there is a different medication that he can take that would take the place of that medication that would be cheaper. Please advise. It is time for his refill.

## 2024-09-25 NOTE — TELEPHONE ENCOUNTER
Options include Eliquis, Xarelto, Coumadin.    Eliquis and Xarelto are namebrand and tend to be more expensive.  Coumadin is much more affordable, but you have to going for regular visits to the Coumadin clinic.    If patients are on Medicare, sometimes the prices of these medications jumps up when the patient is in the donut hole in the have to spend a certain amount of money to get to the other end and increased coverage.

## 2024-10-05 DIAGNOSIS — K21.9 GASTROESOPHAGEAL REFLUX DISEASE WITHOUT ESOPHAGITIS: ICD-10-CM

## 2024-10-07 RX ORDER — PANTOPRAZOLE SODIUM 40 MG/1
40 TABLET, DELAYED RELEASE ORAL DAILY
Qty: 90 TABLET | Refills: 1 | Status: SHIPPED | OUTPATIENT
Start: 2024-10-07

## 2024-10-22 DIAGNOSIS — I10 ESSENTIAL HYPERTENSION, BENIGN: ICD-10-CM

## 2024-10-22 RX ORDER — LOSARTAN POTASSIUM AND HYDROCHLOROTHIAZIDE 25; 100 MG/1; MG/1
1 TABLET ORAL DAILY
Qty: 90 TABLET | Refills: 1 | Status: SHIPPED | OUTPATIENT
Start: 2024-10-22

## 2024-10-22 NOTE — TELEPHONE ENCOUNTER
Patient is requesting a rx for    losartan-hydroCHLOROthiazide (HYZAAR) 100-25 MG per tablet.    Last seen 08/06/2024  Next visit 11/06/2024

## 2024-11-01 NOTE — PROGRESS NOTES
Assessment:     1. Atrial fibrillation: patient has paroxysmal atrial fibrillation in setting of treated obstructive sleep apnea. He previously reported two episodes of atrial fibrillation (over a two year period). Last episode was in August 2022 leading to an ED visit.     Episodes have remained sporadic (none recently). Being on both diltiazem and metoprolol caused resting bradycardia, fatigue and inability to exercise.  There was no good reason to be on dual AV noman blockers.  We stopped diltiazem and so far the patient has done well.  Escalation of therapy for atrial fibrillation can be considered if patient has more frequent episodes.      Given issues with easy fatigue, we arranged for a 2-week event monitor to investigate if he has asymptomatic episodes of atrial fibrillation and/or issues with bradycardia. No issues noted (only short atrial runs).     He now has further fatigue and dizziness issues (on beta-blocker therapy). Concern because of presence of ectopic atrial rhythm on ECG today (likely indicating presence of sinus node dysfunction). Will arrange for a 2-week event monitor to assess for possible sinus node dysfunction/chronotropic incompetence.     He also has some orthopnea symptoms. Will repeat echocardiogram done 2 years ago.     I have encouraged him to be very compliant with sleep apnea treatment and to follow up with sleep medicine. He is now status post \"Inspire\" device.     I also urged him to exercise regularly with a goal of losing at least a few pounds.  He does not consume significant amounts of caffeine or alcohol.    Patient has a relatively normal heart by echocardiography with no significant valvular abnormalities and preserved LV systolic function.  His left atrium is normal in size.    Patient has a [id number] score of at least 4 (age over 75, hypertension, diabetes mellitus). He is on apixaban 5 mg BID with no reported bleeding issues (but has easy bruising).     There is no

## 2024-11-04 ENCOUNTER — TELEPHONE (OUTPATIENT)
Dept: CARDIOLOGY CLINIC | Age: 84
End: 2024-11-04

## 2024-11-04 ENCOUNTER — ANCILLARY PROCEDURE (OUTPATIENT)
Dept: CARDIOLOGY CLINIC | Age: 84
End: 2024-11-04
Payer: MEDICARE

## 2024-11-04 ENCOUNTER — OFFICE VISIT (OUTPATIENT)
Dept: CARDIOLOGY CLINIC | Age: 84
End: 2024-11-04

## 2024-11-04 VITALS
WEIGHT: 223.58 LBS | HEART RATE: 58 BPM | BODY MASS INDEX: 35.09 KG/M2 | SYSTOLIC BLOOD PRESSURE: 132 MMHG | OXYGEN SATURATION: 97 % | HEIGHT: 67 IN | DIASTOLIC BLOOD PRESSURE: 80 MMHG

## 2024-11-04 DIAGNOSIS — I48.0 PAROXYSMAL ATRIAL FIBRILLATION (HCC): Primary | ICD-10-CM

## 2024-11-04 DIAGNOSIS — R00.1 SYMPTOMATIC BRADYCARDIA: ICD-10-CM

## 2024-11-04 DIAGNOSIS — E66.01 SEVERE OBESITY: ICD-10-CM

## 2024-11-04 DIAGNOSIS — I48.19 PERSISTENT ATRIAL FIBRILLATION (HCC): ICD-10-CM

## 2024-11-04 DIAGNOSIS — I48.0 PAROXYSMAL ATRIAL FIBRILLATION (HCC): ICD-10-CM

## 2024-11-04 DIAGNOSIS — I10 PRIMARY HYPERTENSION: ICD-10-CM

## 2024-11-04 DIAGNOSIS — G47.33 OSA (OBSTRUCTIVE SLEEP APNEA): ICD-10-CM

## 2024-11-04 DIAGNOSIS — R53.83 EASY FATIGABILITY: ICD-10-CM

## 2024-11-04 ASSESSMENT — ENCOUNTER SYMPTOMS: ORTHOPNEA: 1

## 2024-11-04 NOTE — TELEPHONE ENCOUNTER
Pt called with questions about his appt today. Pt would like to know if he should be feeling sob since wearing the monitor. He also has questions about his echo that is not yet scheduled. Please advise

## 2024-11-04 NOTE — TELEPHONE ENCOUNTER
I spoke with the patient and addressed his questions regarding the monitor and obtaining his echo. He V/U. He will call central scheduling to get this arranged.

## 2024-11-04 NOTE — TELEPHONE ENCOUNTER
Monitor placed by AL  Monitor company [employer]  Length of monitor 14 DAY   Monitor ordered by [employer]  Serial number [id number]  Patch ID [id number]  Activation successful prior to pt leaving office? Yes

## 2024-11-04 NOTE — PATIENT INSTRUCTIONS
Plan:     Cardiac event monitor for 2 weeks to assess bradycardia.   Echocardiogram to reassess heart function and structure.   Continue taking current cardiac medications as prescribed.   Follow up with me in 1 month.     Your provider has ordered testing for further evaluation.  An order/prescription has been included in your paper work.   To schedule outpatient testing, contact Central Scheduling by calling [address] [place] [address] ([phone]).  
5

## 2024-11-05 ENCOUNTER — TELEPHONE (OUTPATIENT)
Dept: CARDIOLOGY CLINIC | Age: 84
End: 2024-11-05

## 2024-11-05 ENCOUNTER — HOSPITAL ENCOUNTER (OUTPATIENT)
Dept: CARDIOLOGY | Age: 84
Discharge: HOME | End: 2024-11-07
Attending: INTERNAL MEDICINE
Payer: MEDICARE

## 2024-11-05 VITALS
SYSTOLIC BLOOD PRESSURE: 132 MMHG | BODY MASS INDEX: 35 KG/M2 | DIASTOLIC BLOOD PRESSURE: 80 MMHG | HEIGHT: 67 IN | WEIGHT: 223 LBS

## 2024-11-05 DIAGNOSIS — I48.0 PAROXYSMAL ATRIAL FIBRILLATION (HCC): ICD-10-CM

## 2024-11-05 LAB
ECHO AO ASC DIAM: 3.9 CM
ECHO AO ASCENDING AORTA INDEX: 1.84 CM/M2
ECHO AO ROOT DIAM: 3.4 CM
ECHO AO ROOT INDEX: 1.6 CM/M2
ECHO AV CUSP MM: 2.1 CM
ECHO AV PEAK GRADIENT: 8 MMHG
ECHO AV PEAK GRADIENT: 8 MMHG
ECHO AV PEAK VELOCITY: 1.5 M/S
ECHO AV VELOCITY RATIO: 0.67
ECHO BSA: 2.19 M2
ECHO LA AREA 2C: 18.5 CM2
ECHO LA AREA 4C: 20.3 CM2
ECHO LA DIAMETER INDEX: 1.6 CM/M2
ECHO LA DIAMETER: 3.4 CM
ECHO LA MAJOR AXIS: 5.8 CM
ECHO LA MINOR AXIS: 5.6 CM
ECHO LA TO AORTIC ROOT RATIO: 1
ECHO LA VOL BP: 53 ML (ref 18–58)
ECHO LA VOL MOD A2C: 49 ML (ref 18–58)
ECHO LA VOL MOD A4C: 57 ML (ref 18–58)
ECHO LA VOL/BSA BIPLANE: 25 ML/M2 (ref 16–34)
ECHO LA VOLUME INDEX MOD A2C: 23 ML/M2 (ref 16–34)
ECHO LA VOLUME INDEX MOD A4C: 27 ML/M2 (ref 16–34)
ECHO LV E' LATERAL VELOCITY: 9.9 CM/S
ECHO LV E' SEPTAL VELOCITY: 10.5 CM/S
ECHO LV EF PHYSICIAN: 58 %
ECHO LV FRACTIONAL SHORTENING: 30 % (ref 28–44)
ECHO LV GLOBAL LONGITUDINAL STRAIN (GLS): -16.1 %
ECHO LV GLOBAL LONGITUDINAL STRAIN (GLS): -16.1 %
ECHO LV GLOBAL LONGITUDINAL STRAIN (GLS): -16.2 %
ECHO LV GLOBAL LONGITUDINAL STRAIN (GLS): -16.3 %
ECHO LV INTERNAL DIMENSION DIASTOLE INDEX: 2.36 CM/M2
ECHO LV INTERNAL DIMENSION DIASTOLIC: 5 CM (ref 4.2–5.9)
ECHO LV INTERNAL DIMENSION SYSTOLIC INDEX: 1.65 CM/M2
ECHO LV INTERNAL DIMENSION SYSTOLIC: 3.5 CM
ECHO LV ISOVOLUMETRIC RELAXATION TIME (IVRT): 71 MS
ECHO LV IVSD: 1.1 CM (ref 0.6–1)
ECHO LV MASS 2D: 233.7 G (ref 88–224)
ECHO LV MASS INDEX 2D: 110.3 G/M2 (ref 49–115)
ECHO LV POSTERIOR WALL DIASTOLIC: 1.3 CM (ref 0.6–1)
ECHO LV RELATIVE WALL THICKNESS RATIO: 0.52
ECHO LVOT PEAK GRADIENT: 4 MMHG
ECHO LVOT PEAK VELOCITY: 1 M/S
ECHO MV A VELOCITY: 0.83 M/S
ECHO MV E DECELERATION TIME (DT): 211 MS
ECHO MV E VELOCITY: 0.82 M/S
ECHO MV E/A RATIO: 0.99
ECHO MV E/E' LATERAL: 8.28
ECHO MV E/E' RATIO (AVERAGED): 8.05
ECHO MV E/E' SEPTAL: 7.81
ECHO RA AREA 4C: 17 CM2
ECHO RA END SYSTOLIC VOLUME APICAL 4 CHAMBER INDEX BSA: 20 ML/M2
ECHO RA VOLUME: 43 ML
ECHO RV FREE WALL PEAK S': 13.2 CM/S
ECHO RV TAPSE: 2.7 CM (ref 1.7–?)

## 2024-11-05 PROCEDURE — 93306 TTE W/DOPPLER COMPLETE: CPT

## 2024-11-06 ENCOUNTER — OFFICE VISIT (OUTPATIENT)
Dept: FAMILY MEDICINE CLINIC | Age: 84
End: 2024-11-06

## 2024-11-06 VITALS
WEIGHT: 222 LBS | HEIGHT: 67 IN | HEART RATE: 74 BPM | DIASTOLIC BLOOD PRESSURE: 56 MMHG | BODY MASS INDEX: 34.84 KG/M2 | SYSTOLIC BLOOD PRESSURE: 130 MMHG | OXYGEN SATURATION: 91 %

## 2024-11-06 DIAGNOSIS — L30.9 DERMATITIS: ICD-10-CM

## 2024-11-06 DIAGNOSIS — I10 ESSENTIAL HYPERTENSION, BENIGN: ICD-10-CM

## 2024-11-06 DIAGNOSIS — B36.9 FUNGAL DERMATITIS: Primary | ICD-10-CM

## 2024-11-06 DIAGNOSIS — E11.59 TYPE 2 DIABETES MELLITUS WITH OTHER CIRCULATORY COMPLICATIONS (HCC): ICD-10-CM

## 2024-11-06 LAB — HBA1C MFR BLD: 7.1 %

## 2024-11-06 RX ORDER — DOXYCYCLINE HYCLATE 100 MG
100 TABLET ORAL 2 TIMES DAILY
Qty: 14 TABLET | Refills: 0 | Status: SHIPPED | OUTPATIENT
Start: 2024-11-06 | End: 2024-11-13

## 2024-11-06 RX ORDER — CLOTRIMAZOLE AND BETAMETHASONE DIPROPIONATE 10; .5 MG/ML; MG/ML
LOTION TOPICAL
Qty: 30 ML | Refills: 0 | Status: SHIPPED | OUTPATIENT
Start: 2024-11-06

## 2024-11-06 RX ORDER — MUPIROCIN 20 MG/G
OINTMENT TOPICAL 3 TIMES DAILY
COMMUNITY

## 2024-11-06 NOTE — PROGRESS NOTES
Demetrio Hillman (:  1940) is a 84 y.o. male,Established patient, here for evaluation of the following chief complaint(s):  3 Month Follow-Up, Diabetes, and Rash (Wants refill of doxycycline )         Assessment & Plan  Fungal dermatitis   New, not at goal (unstable), changes made today:      Orders:    clotrimazole-betamethasone (LOTRISONE) 1-0.05 % lotion; Apply topically 2 times daily.    Type 2 diabetes mellitus with other circulatory complications (HCC)   Chronic, at goal (stable), continue current treatment plan    Orders:    POCT glycosylated hemoglobin (Hb A1C)    Hemoglobin A1C   Date Value Ref Range Status   2024 7.1 % Final       Essential hypertension, benign   Chronic, at goal (stable), continue current treatment plan         Dermatitis   New, not at goal (unstable), changes made today:      Orders:    doxycycline hyclate (VIBRA-TABS) 100 MG tablet; Take 1 tablet by mouth 2 times daily for 7 days      No follow-ups on file.       Subjective   Demetrio Hillman is a 84 y.o. male. Patient presents with:  3 Month Follow-Up  Diabetes  Rash: Wants refill of doxycycline     84-year-old male presents for follow-up of diabetes mellitus and recently had a folliculitis and a fungal dermatitis that had flared up.  Patient received doxycycline.  Is also having itchiness along his feet and this has been worsening.  Patient notes that this is caused some peeling and there has been some redness.  Patient notes that this is worsening slightly.  Patient also had slight redness that has ascended onto his shins.  Patient is improving at this time.  Patient's blood sugars have been well-controlled and he has noticed that he has been taking his medications regularly.  No other issues recently.  Patient has a history of hypertension and this has been well-controlled as well.    The patients PMH, surgical history, family history, medications, allergies were all reviewed and updated as appropriate

## 2024-11-07 ENCOUNTER — TELEPHONE (OUTPATIENT)
Dept: CARDIOLOGY CLINIC | Age: 84
End: 2024-11-07

## 2024-11-07 NOTE — TELEPHONE ENCOUNTER
Pt sts he has not been charging the monitor phone since it was placed. Told pt to charge phone and it needs to be charged everyday/ or at night. Pt sts he is due to change patch on Monday. Pt wants to know since phone was not charged is this going to effect when he changes his patch.Pt sts he didn't really get very good instruction on how this monitor works. Please advise.

## 2024-11-26 LAB — ECHO BSA: 2.19 M2

## 2024-11-26 PROCEDURE — 93228 REMOTE 30 DAY ECG REV/REPORT: CPT | Performed by: INTERNAL MEDICINE

## 2024-11-27 NOTE — PROGRESS NOTES
is no distension.   Musculoskeletal:         General: Normal range of motion.      Cervical back: Normal range of motion and neck supple.      Right lower leg: Edema (trace) present.      Left lower leg: Edema (trace) present.   Skin:     General: Skin is warm and dry.   Neurological:      General: No focal deficit present.      Mental Status: He is alert and oriented to person, place, and time.   Psychiatric:         Mood and Affect: Mood normal.         Behavior: Behavior normal.       ECG Interpretation:  (Date: 11/04/2024)  Rhythm: Sinus Bradycardia  Rate: 58 BPM  PAC's / PVC's present: None    ECG Interpretation:  (Date: 11/06/2023)  Rhythm: Sinus rhythm  Rate: 60 BPM  PAC's / PVC's present: PVC/PVC's  Conduction abnormalities: Normal AV conduction    Echocardiogram  (Date: 11/05/2024)    Image quality is adequate.    Left Ventricle: Normal left ventricular systolic function with a visually estimated EF of 55 - 60%. Left ventricle size is normal. Mildly increased wall thickness. Findings consistent with concentric remodeling. Normal wall motion. Normal diastolic function.    Right Ventricle: Right ventricle size is normal. Normal systolic function. TAPSE is normal.    Aortic Valve: Mild sclerosis of the aortic valve cusps.    Tricuspid Valve: Trace regurgitation. Unable to assess RVSP due to inadequate or insignificant tricuspid regurgitation.    Pulmonic Valve: Trace regurgitation.    Aorta: Normal sized aortic root. Mildly dilated ascending aorta. Ao ascending diameter is 3.9 cm.    Echocardiogram  (Date: 08/09/2022)  Summary  Technically difficult study due to body surface area and poor acoustic  window.  Normal left ventricular systolic function with ejection fraction of 55-60%.  No regional wall motion abnormalites are seen.  Left ventricular diastolic filling pressure is normal.  Compared to previous study left ventricle systolic function appears same as  echo on 9-.  No thrombus noted.    Stress

## 2024-12-02 DIAGNOSIS — E11.65 UNCONTROLLED TYPE 2 DIABETES MELLITUS WITH HYPERGLYCEMIA (HCC): ICD-10-CM

## 2024-12-02 RX ORDER — METFORMIN HYDROCHLORIDE 500 MG/1
TABLET, EXTENDED RELEASE ORAL
Qty: 360 TABLET | Refills: 1 | Status: SHIPPED | OUTPATIENT
Start: 2024-12-02

## 2024-12-02 NOTE — TELEPHONE ENCOUNTER
Last Office Visit  -  11/6/24  Next Office Visit  -  2/4/25    Last Filled  -  6/28/24  Last UDS -    Contract -

## 2024-12-03 ENCOUNTER — TELEPHONE (OUTPATIENT)
Dept: CARDIOLOGY CLINIC | Age: 84
End: 2024-12-03

## 2024-12-03 ENCOUNTER — OFFICE VISIT (OUTPATIENT)
Dept: CARDIOLOGY CLINIC | Age: 84
End: 2024-12-03
Payer: MEDICARE

## 2024-12-03 VITALS
BODY MASS INDEX: 34.4 KG/M2 | DIASTOLIC BLOOD PRESSURE: 60 MMHG | SYSTOLIC BLOOD PRESSURE: 112 MMHG | OXYGEN SATURATION: 76 % | WEIGHT: 219.2 LBS | HEIGHT: 67 IN

## 2024-12-03 DIAGNOSIS — I48.0 PAF (PAROXYSMAL ATRIAL FIBRILLATION) (HCC): ICD-10-CM

## 2024-12-03 DIAGNOSIS — G47.33 OSA (OBSTRUCTIVE SLEEP APNEA): ICD-10-CM

## 2024-12-03 DIAGNOSIS — E66.811 OBESITY (BMI 30.0-34.9): ICD-10-CM

## 2024-12-03 DIAGNOSIS — R00.1 SYMPTOMATIC BRADYCARDIA: Primary | ICD-10-CM

## 2024-12-03 DIAGNOSIS — I10 PRIMARY HYPERTENSION: ICD-10-CM

## 2024-12-03 PROCEDURE — G2211 COMPLEX E/M VISIT ADD ON: HCPCS | Performed by: INTERNAL MEDICINE

## 2024-12-03 PROCEDURE — 1123F ACP DISCUSS/DSCN MKR DOCD: CPT | Performed by: INTERNAL MEDICINE

## 2024-12-03 PROCEDURE — 3078F DIAST BP <80 MM HG: CPT | Performed by: INTERNAL MEDICINE

## 2024-12-03 PROCEDURE — 99214 OFFICE O/P EST MOD 30 MIN: CPT | Performed by: INTERNAL MEDICINE

## 2024-12-03 PROCEDURE — 3074F SYST BP LT 130 MM HG: CPT | Performed by: INTERNAL MEDICINE

## 2024-12-03 PROCEDURE — 1159F MED LIST DOCD IN RCRD: CPT | Performed by: INTERNAL MEDICINE

## 2024-12-03 ASSESSMENT — ENCOUNTER SYMPTOMS: SHORTNESS OF BREATH: 1

## 2024-12-03 NOTE — PATIENT INSTRUCTIONS
Plan:     Discussed the risks and benefits of a dual chamber pacemaker.   Medications to hold:    - Hold lasix, hyzaar, and metformin the morning of the procedure.   Continue taking current cardiac medications as prescribed.   Follow up with me after the procedure.

## 2024-12-03 NOTE — TELEPHONE ENCOUNTER
Plan:     Discussed the risks and benefits of a dual chamber pacemaker.   Medications to hold:    - Hold lasix, hyzaar, and metformin the morning of the procedure.       Case request placed.

## 2024-12-06 RX ORDER — FLUTICASONE FUROATE, UMECLIDINIUM BROMIDE AND VILANTEROL TRIFENATATE 100; 62.5; 25 UG/1; UG/1; UG/1
1 POWDER RESPIRATORY (INHALATION) DAILY
Qty: 60 EACH | Refills: 3 | Status: SHIPPED | OUTPATIENT
Start: 2024-12-06

## 2024-12-06 NOTE — TELEPHONE ENCOUNTER
[facility] is requesting a rx for     TRELEGY ELLIPTA 100-62.5-25 MCG/ACT AEPB inhaler     Last OV 11/6/2024      Next OV 2/5/2025

## 2024-12-09 PROBLEM — I49.5 SINUS NODE DYSFUNCTION (HCC): Status: ACTIVE | Noted: 2024-12-03

## 2024-12-09 NOTE — TELEPHONE ENCOUNTER
Procedure:  DC Pacemaker  Doctor:  Dr. Abernathy  Date:  12/30/24  Time:  1:30pm  Arrival:  12pm  Reps:  Biotronik  Anesthesia:  Yes      LM for return call. Please have patient arrive to the main entrance of [facility] ([address] [place] [address]) and check in with the registration desk.  They will be directed to the Cath Lab. Remind patient to be NPO after midnight (8 hours prior). Do not apply lotions/creams on skin the day of procedure.      Can send instructions thru email or mail if patient would like.

## 2024-12-26 ENCOUNTER — TELEPHONE (OUTPATIENT)
Dept: FAMILY MEDICINE CLINIC | Age: 84
End: 2024-12-26

## 2024-12-26 RX ORDER — KETOCONAZOLE 20 MG/G
CREAM TOPICAL
Qty: 30 G | Refills: 1 | Status: SHIPPED | OUTPATIENT
Start: 2024-12-26

## 2024-12-26 NOTE — TELEPHONE ENCOUNTER
Last Office Visit  -  11/6/2024  Next Office Visit  -  2/5/2025    Last Filled  -  Not in active meds   Last UDS -    Contract -

## 2024-12-26 NOTE — TELEPHONE ENCOUNTER
[facility] is requesting a rx for     ketoconazole (NIZORAL) 2 % cream     Last OV 11/6/2024      Next OV 2/5/2025

## 2024-12-30 ENCOUNTER — ANESTHESIA (OUTPATIENT)
Dept: CARDIAC CATH/INVASIVE PROCEDURES | Age: 84
End: 2024-12-30
Payer: MEDICARE

## 2024-12-30 ENCOUNTER — NURSE ONLY (OUTPATIENT)
Dept: CARDIOLOGY CLINIC | Age: 84
End: 2024-12-30

## 2024-12-30 ENCOUNTER — ANESTHESIA EVENT (OUTPATIENT)
Dept: CARDIAC CATH/INVASIVE PROCEDURES | Age: 84
End: 2024-12-30
Payer: MEDICARE

## 2024-12-30 ENCOUNTER — HOSPITAL ENCOUNTER (OUTPATIENT)
Age: 84
Setting detail: OBSERVATION
Discharge: HOME | End: 2024-12-31
Attending: INTERNAL MEDICINE | Admitting: INTERNAL MEDICINE
Payer: MEDICARE

## 2024-12-30 ENCOUNTER — APPOINTMENT (OUTPATIENT)
Dept: GENERAL RADIOLOGY | Age: 84
End: 2024-12-30
Attending: INTERNAL MEDICINE
Payer: MEDICARE

## 2024-12-30 DIAGNOSIS — E78.2 MIXED HYPERLIPIDEMIA: ICD-10-CM

## 2024-12-30 DIAGNOSIS — I49.5 SINUS NODE DYSFUNCTION (HCC): ICD-10-CM

## 2024-12-30 PROBLEM — Z95.0 PACEMAKER: Status: ACTIVE | Noted: 2024-12-30

## 2024-12-30 LAB
ANION GAP SERPL CALCULATED.3IONS-SCNC: 15 MMOL/L (ref 3–16)
BUN SERPL-MCNC: 17 MG/DL (ref 7–20)
CALCIUM SERPL-MCNC: 9.1 MG/DL (ref 8.3–10.6)
CHLORIDE SERPL-SCNC: 101 MMOL/L (ref 99–110)
CO2 SERPL-SCNC: 25 MMOL/L (ref 21–32)
CREAT SERPL-MCNC: 1.6 MG/DL (ref 0.8–1.3)
DEPRECATED RDW RBC AUTO: 18 % (ref 12.4–15.4)
EKG DIAGNOSIS: NORMAL
EKG DIAGNOSIS: NORMAL
EKG Q-T INTERVAL: 348 MS
EKG Q-T INTERVAL: 374 MS
EKG QRS DURATION: 94 MS
EKG QRS DURATION: 96 MS
EKG QTC CALCULATION (BAZETT): 446 MS
EKG QTC CALCULATION (BAZETT): 460 MS
EKG R AXIS: 18 DEGREES
EKG R AXIS: 40 DEGREES
EKG T AXIS: 26 DEGREES
EKG T AXIS: 34 DEGREES
EKG VENTRICULAR RATE: 91 BPM
EKG VENTRICULAR RATE: 99 BPM
GFR SERPLBLD CREATININE-BSD FMLA CKD-EPI: 42 ML/MIN/{1.73_M2}
GLUCOSE BLD-MCNC: 115 MG/DL (ref 70–99)
GLUCOSE SERPL-MCNC: 140 MG/DL (ref 70–99)
HCT VFR BLD AUTO: 36.3 % (ref 40.5–52.5)
HGB BLD-MCNC: 11.8 G/DL (ref 13.5–17.5)
MCH RBC QN AUTO: 26.7 PG (ref 26–34)
MCHC RBC AUTO-ENTMCNC: 32.6 G/DL (ref 31–36)
MCV RBC AUTO: 81.8 FL (ref 80–100)
PERFORMED ON: ABNORMAL
PLATELET # BLD AUTO: 406 K/UL (ref 135–450)
PMV BLD AUTO: 7.3 FL (ref 5–10.5)
POTASSIUM SERPL-SCNC: 3.7 MMOL/L (ref 3.5–5.1)
POTASSIUM SERPL-SCNC: ABNORMAL MMOL/L (ref 3.5–5.1)
RBC # BLD AUTO: 4.44 M/UL (ref 4.2–5.9)
SODIUM SERPL-SCNC: 141 MMOL/L (ref 136–145)
WBC # BLD AUTO: 10.1 K/UL (ref 4–11)

## 2024-12-30 PROCEDURE — 6360000002 HC RX W HCPCS: Performed by: INTERNAL MEDICINE

## 2024-12-30 PROCEDURE — 2500000003 HC RX 250 WO HCPCS: Performed by: INTERNAL MEDICINE

## 2024-12-30 PROCEDURE — 85027 COMPLETE CBC AUTOMATED: CPT

## 2024-12-30 PROCEDURE — 2580000003 HC RX 258: Performed by: NURSE ANESTHETIST, CERTIFIED REGISTERED

## 2024-12-30 PROCEDURE — 71045 X-RAY EXAM CHEST 1 VIEW: CPT

## 2024-12-30 PROCEDURE — 94761 N-INVAS EAR/PLS OXIMETRY MLT: CPT

## 2024-12-30 PROCEDURE — 2700000000 HC OXYGEN THERAPY PER DAY

## 2024-12-30 PROCEDURE — C1898 LEAD, PMKR, OTHER THAN TRANS: HCPCS | Performed by: INTERNAL MEDICINE

## 2024-12-30 PROCEDURE — 93010 ELECTROCARDIOGRAM REPORT: CPT | Performed by: INTERNAL MEDICINE

## 2024-12-30 PROCEDURE — 2709999900 HC NON-CHARGEABLE SUPPLY: Performed by: INTERNAL MEDICINE

## 2024-12-30 PROCEDURE — G0378 HOSPITAL OBSERVATION PER HR: HCPCS

## 2024-12-30 PROCEDURE — 80048 BASIC METABOLIC PNL TOTAL CA: CPT

## 2024-12-30 PROCEDURE — 7100000000 HC PACU RECOVERY - FIRST 15 MIN: Performed by: INTERNAL MEDICINE

## 2024-12-30 PROCEDURE — 33208 INSRT HEART PM ATRIAL & VENT: CPT | Performed by: INTERNAL MEDICINE

## 2024-12-30 PROCEDURE — 6360000002 HC RX W HCPCS: Performed by: NURSE ANESTHETIST, CERTIFIED REGISTERED

## 2024-12-30 PROCEDURE — 93005 ELECTROCARDIOGRAM TRACING: CPT | Performed by: INTERNAL MEDICINE

## 2024-12-30 PROCEDURE — 3700000000 HC ANESTHESIA ATTENDED CARE: Performed by: INTERNAL MEDICINE

## 2024-12-30 PROCEDURE — 2580000003 HC RX 258: Performed by: INTERNAL MEDICINE

## 2024-12-30 PROCEDURE — C1785 PMKR, DUAL, RATE-RESP: HCPCS | Performed by: INTERNAL MEDICINE

## 2024-12-30 PROCEDURE — C1894 INTRO/SHEATH, NON-LASER: HCPCS | Performed by: INTERNAL MEDICINE

## 2024-12-30 PROCEDURE — 7100000011 HC PHASE II RECOVERY - ADDTL 15 MIN: Performed by: INTERNAL MEDICINE

## 2024-12-30 PROCEDURE — 6370000000 HC RX 637 (ALT 250 FOR IP): Performed by: INTERNAL MEDICINE

## 2024-12-30 PROCEDURE — 3700000001 HC ADD 15 MINUTES (ANESTHESIA): Performed by: INTERNAL MEDICINE

## 2024-12-30 PROCEDURE — 84132 ASSAY OF SERUM POTASSIUM: CPT

## 2024-12-30 PROCEDURE — 7100000010 HC PHASE II RECOVERY - FIRST 15 MIN: Performed by: INTERNAL MEDICINE

## 2024-12-30 PROCEDURE — C1892 INTRO/SHEATH,FIXED,PEEL-AWAY: HCPCS | Performed by: INTERNAL MEDICINE

## 2024-12-30 DEVICE — IMPLANTABLE DEVICE
Type: IMPLANTABLE DEVICE | Site: HEART | Status: FUNCTIONAL
Brand: SOLIA S 60

## 2024-12-30 DEVICE — IMPLANTABLE DEVICE
Type: IMPLANTABLE DEVICE | Site: HEART | Status: FUNCTIONAL
Brand: SOLIA

## 2024-12-30 DEVICE — IMPLANTABLE DEVICE
Type: IMPLANTABLE DEVICE | Site: HEART | Status: FUNCTIONAL
Brand: AMVIA EDGE DR-T

## 2024-12-30 RX ORDER — LOSARTAN POTASSIUM AND HYDROCHLOROTHIAZIDE 25; 100 MG/1; MG/1
1 TABLET ORAL DAILY
Status: DISCONTINUED | OUTPATIENT
Start: 2024-12-30 | End: 2024-12-30 | Stop reason: SDUPTHER

## 2024-12-30 RX ORDER — MIDAZOLAM HYDROCHLORIDE 1 MG/ML
2 INJECTION, SOLUTION INTRAMUSCULAR; INTRAVENOUS
Status: DISCONTINUED | OUTPATIENT
Start: 2024-12-30 | End: 2024-12-30 | Stop reason: HOSPADM

## 2024-12-30 RX ORDER — ATORVASTATIN CALCIUM 40 MG/1
40 TABLET, FILM COATED ORAL NIGHTLY
Status: DISCONTINUED | OUTPATIENT
Start: 2024-12-30 | End: 2024-12-31 | Stop reason: HOSPADM

## 2024-12-30 RX ORDER — SODIUM CHLORIDE 9 MG/ML
INJECTION, SOLUTION INTRAVENOUS
Status: DISCONTINUED | OUTPATIENT
Start: 2024-12-30 | End: 2024-12-30 | Stop reason: SDUPTHER

## 2024-12-30 RX ORDER — SODIUM CHLORIDE 9 MG/ML
INJECTION, SOLUTION INTRAVENOUS PRN
Status: DISCONTINUED | OUTPATIENT
Start: 2024-12-30 | End: 2024-12-31 | Stop reason: HOSPADM

## 2024-12-30 RX ORDER — DIPHENHYDRAMINE HYDROCHLORIDE 50 MG/ML
6.25 INJECTION INTRAMUSCULAR; INTRAVENOUS
Status: DISCONTINUED | OUTPATIENT
Start: 2024-12-30 | End: 2024-12-30 | Stop reason: HOSPADM

## 2024-12-30 RX ORDER — METFORMIN HYDROCHLORIDE 500 MG/1
1000 TABLET, EXTENDED RELEASE ORAL 2 TIMES DAILY WITH MEALS
Status: DISCONTINUED | OUTPATIENT
Start: 2024-12-30 | End: 2024-12-31 | Stop reason: HOSPADM

## 2024-12-30 RX ORDER — SODIUM CHLORIDE 9 MG/ML
INJECTION, SOLUTION INTRAVENOUS PRN
Status: DISCONTINUED | OUTPATIENT
Start: 2024-12-30 | End: 2024-12-30 | Stop reason: HOSPADM

## 2024-12-30 RX ORDER — FUROSEMIDE 20 MG/1
20 TABLET ORAL DAILY
Status: DISCONTINUED | OUTPATIENT
Start: 2024-12-30 | End: 2024-12-31 | Stop reason: HOSPADM

## 2024-12-30 RX ORDER — NALOXONE HYDROCHLORIDE 0.4 MG/ML
INJECTION, SOLUTION INTRAMUSCULAR; INTRAVENOUS; SUBCUTANEOUS PRN
Status: DISCONTINUED | OUTPATIENT
Start: 2024-12-30 | End: 2024-12-30 | Stop reason: HOSPADM

## 2024-12-30 RX ORDER — OXYCODONE HYDROCHLORIDE 5 MG/1
5 TABLET ORAL PRN
Status: DISCONTINUED | OUTPATIENT
Start: 2024-12-30 | End: 2024-12-30 | Stop reason: HOSPADM

## 2024-12-30 RX ORDER — SODIUM CHLORIDE 0.9 % (FLUSH) 0.9 %
5-40 SYRINGE (ML) INJECTION PRN
Status: DISCONTINUED | OUTPATIENT
Start: 2024-12-30 | End: 2024-12-31 | Stop reason: HOSPADM

## 2024-12-30 RX ORDER — LIDOCAINE HYDROCHLORIDE 20 MG/ML
INJECTION, SOLUTION EPIDURAL; INFILTRATION; INTRACAUDAL; PERINEURAL PRN
Status: DISCONTINUED | OUTPATIENT
Start: 2024-12-30 | End: 2024-12-30 | Stop reason: HOSPADM

## 2024-12-30 RX ORDER — OXYCODONE HYDROCHLORIDE 5 MG/1
10 TABLET ORAL PRN
Status: DISCONTINUED | OUTPATIENT
Start: 2024-12-30 | End: 2024-12-30 | Stop reason: HOSPADM

## 2024-12-30 RX ORDER — LIDOCAINE HYDROCHLORIDE 20 MG/ML
INJECTION, SOLUTION EPIDURAL; INFILTRATION; INTRACAUDAL; PERINEURAL
Status: DISCONTINUED | OUTPATIENT
Start: 2024-12-30 | End: 2024-12-30 | Stop reason: SDUPTHER

## 2024-12-30 RX ORDER — METOPROLOL SUCCINATE 50 MG/1
50 TABLET, EXTENDED RELEASE ORAL 2 TIMES DAILY
Status: DISCONTINUED | OUTPATIENT
Start: 2024-12-30 | End: 2024-12-31 | Stop reason: HOSPADM

## 2024-12-30 RX ORDER — PROPOFOL 10 MG/ML
INJECTION, EMULSION INTRAVENOUS
Status: DISCONTINUED | OUTPATIENT
Start: 2024-12-30 | End: 2024-12-30 | Stop reason: SDUPTHER

## 2024-12-30 RX ORDER — FENTANYL CITRATE 50 UG/ML
INJECTION, SOLUTION INTRAMUSCULAR; INTRAVENOUS
Status: DISCONTINUED | OUTPATIENT
Start: 2024-12-30 | End: 2024-12-30 | Stop reason: SDUPTHER

## 2024-12-30 RX ORDER — SODIUM CHLORIDE 0.9 % (FLUSH) 0.9 %
5-40 SYRINGE (ML) INJECTION EVERY 12 HOURS SCHEDULED
Status: DISCONTINUED | OUTPATIENT
Start: 2024-12-30 | End: 2024-12-30 | Stop reason: HOSPADM

## 2024-12-30 RX ORDER — SODIUM CHLORIDE 0.9 % (FLUSH) 0.9 %
5-40 SYRINGE (ML) INJECTION EVERY 12 HOURS SCHEDULED
Status: DISCONTINUED | OUTPATIENT
Start: 2024-12-30 | End: 2024-12-31 | Stop reason: HOSPADM

## 2024-12-30 RX ORDER — BUPIVACAINE HYDROCHLORIDE 5 MG/ML
INJECTION, SOLUTION EPIDURAL; INTRACAUDAL PRN
Status: DISCONTINUED | OUTPATIENT
Start: 2024-12-30 | End: 2024-12-30 | Stop reason: HOSPADM

## 2024-12-30 RX ORDER — HYDROCHLOROTHIAZIDE 25 MG/1
25 TABLET ORAL DAILY
Status: DISCONTINUED | OUTPATIENT
Start: 2024-12-30 | End: 2024-12-31 | Stop reason: HOSPADM

## 2024-12-30 RX ORDER — IODIXANOL 270 MG/ML
INJECTION, SOLUTION INTRAVASCULAR PRN
Status: DISCONTINUED | OUTPATIENT
Start: 2024-12-30 | End: 2024-12-30 | Stop reason: HOSPADM

## 2024-12-30 RX ORDER — SODIUM CHLORIDE 0.9 % (FLUSH) 0.9 %
5-40 SYRINGE (ML) INJECTION PRN
Status: DISCONTINUED | OUTPATIENT
Start: 2024-12-30 | End: 2024-12-30 | Stop reason: HOSPADM

## 2024-12-30 RX ORDER — PANTOPRAZOLE SODIUM 40 MG/1
40 TABLET, DELAYED RELEASE ORAL DAILY
Status: DISCONTINUED | OUTPATIENT
Start: 2024-12-30 | End: 2024-12-31 | Stop reason: HOSPADM

## 2024-12-30 RX ORDER — LOSARTAN POTASSIUM 100 MG/1
100 TABLET ORAL DAILY
Status: DISCONTINUED | OUTPATIENT
Start: 2024-12-30 | End: 2024-12-31 | Stop reason: HOSPADM

## 2024-12-30 RX ORDER — PHENYLEPHRINE HCL IN 0.9% NACL 1 MG/10 ML
SYRINGE (ML) INTRAVENOUS
Status: DISCONTINUED | OUTPATIENT
Start: 2024-12-30 | End: 2024-12-30 | Stop reason: SDUPTHER

## 2024-12-30 RX ORDER — ONDANSETRON 2 MG/ML
4 INJECTION INTRAMUSCULAR; INTRAVENOUS ONCE
Status: DISCONTINUED | OUTPATIENT
Start: 2024-12-30 | End: 2024-12-30 | Stop reason: HOSPADM

## 2024-12-30 RX ADMIN — PROPOFOL 40 MG: 10 INJECTION, EMULSION INTRAVENOUS at 13:41

## 2024-12-30 RX ADMIN — APIXABAN 5 MG: 5 TABLET, FILM COATED ORAL at 20:11

## 2024-12-30 RX ADMIN — Medication 10 ML: at 20:12

## 2024-12-30 RX ADMIN — METFORMIN HYDROCHLORIDE 1000 MG: 500 TABLET, EXTENDED RELEASE ORAL at 20:11

## 2024-12-30 RX ADMIN — FENTANYL CITRATE 50 MCG: 50 INJECTION, SOLUTION INTRAMUSCULAR; INTRAVENOUS at 13:40

## 2024-12-30 RX ADMIN — METOPROLOL SUCCINATE 50 MG: 50 TABLET, EXTENDED RELEASE ORAL at 20:11

## 2024-12-30 RX ADMIN — Medication 100 MCG: at 14:17

## 2024-12-30 RX ADMIN — ATORVASTATIN CALCIUM 40 MG: 40 TABLET, FILM COATED ORAL at 20:11

## 2024-12-30 RX ADMIN — LIDOCAINE HYDROCHLORIDE 50 MG: 20 INJECTION, SOLUTION EPIDURAL; INFILTRATION; INTRACAUDAL; PERINEURAL at 13:40

## 2024-12-30 RX ADMIN — SODIUM CHLORIDE: 9 INJECTION, SOLUTION INTRAVENOUS at 13:13

## 2024-12-30 RX ADMIN — PROPOFOL 100 MG: 10 INJECTION, EMULSION INTRAVENOUS at 13:40

## 2024-12-30 ASSESSMENT — PAIN SCALES - GENERAL
PAINLEVEL_OUTOF10: 0

## 2024-12-30 ASSESSMENT — ENCOUNTER SYMPTOMS: SHORTNESS OF BREATH: 1

## 2024-12-30 NOTE — ANESTHESIA POSTPROCEDURE EVALUATION
Department of Anesthesiology  Postprocedure Note    Patient: Demetrio Hillman  MRN: [id number]  YOB: 1940  Date of evaluation: 12/30/2024    Procedure Summary       Date: 12/30/24 Room / Location: [facility] CATH/EP LAB  3 / [facility] CARDIAC CATH LAB    Anesthesia Start: 1333 Anesthesia Stop: 1504    Procedure: Insert PPM dual Diagnosis:       Sinus node dysfunction (HCC)      (Sinus node dysfunction (HCC) [I49.5])    Providers: Malou Abernathy MD Responsible Provider: Yasmany Cedeno MD    Anesthesia Type: general ASA Status: 3            Anesthesia Type: No value filed.    Orestes Phase I: Orestes Score: 9    Orestes Phase II:      Anesthesia Post Evaluation    Patient location during evaluation: PACU  Patient participation: complete - patient participated  Level of consciousness: awake and alert  Airway patency: patent  Nausea & Vomiting: no vomiting and no nausea  Cardiovascular status: hemodynamically stable and blood pressure returned to baseline  Respiratory status: acceptable  Hydration status: euvolemic  Comments: --------------------            12/30/24               1547     --------------------   BP:       130/89     Pulse:      80       Resp:                Temp:                SpO2:      98%      --------------------    Pain management: adequate    There were no known notable events for this encounter.

## 2024-12-30 NOTE — H&P
I have reviewed seen, interviewed and examined the patient. There has been no change in the findings since our last office visit dated 12/3/2024.

## 2024-12-30 NOTE — PROGRESS NOTES
Report given to patients nurse. Pt transferred to room 435. CMU called and monitor is on and verified.

## 2024-12-30 NOTE — PROGRESS NOTES
Patient admitted to room 435 from the cath lab. A & O x 4, VSS on room air. Instructed on use of call light, bed rest until 9pm, how to use room phone to order meals. Son at the bedside. Vital signs cycling. Telemetry on, NSR rate 98. L arm in sling, incision unremarkable. Night shift RN to complete full admission.

## 2024-12-31 ENCOUNTER — TELEPHONE (OUTPATIENT)
Dept: CARDIOLOGY CLINIC | Age: 84
End: 2024-12-31

## 2024-12-31 ENCOUNTER — APPOINTMENT (OUTPATIENT)
Dept: GENERAL RADIOLOGY | Age: 84
End: 2024-12-31
Attending: INTERNAL MEDICINE
Payer: MEDICARE

## 2024-12-31 VITALS
SYSTOLIC BLOOD PRESSURE: 122 MMHG | TEMPERATURE: 97.8 F | OXYGEN SATURATION: 95 % | DIASTOLIC BLOOD PRESSURE: 81 MMHG | RESPIRATION RATE: 18 BRPM | BODY MASS INDEX: 33.8 KG/M2 | HEART RATE: 83 BPM | WEIGHT: 215.83 LBS

## 2024-12-31 PROBLEM — I48.0 PAF (PAROXYSMAL ATRIAL FIBRILLATION) (HCC): Status: ACTIVE | Noted: 2024-12-31

## 2024-12-31 LAB
ANION GAP SERPL CALCULATED.3IONS-SCNC: 10 MMOL/L (ref 3–16)
BUN SERPL-MCNC: 17 MG/DL (ref 7–20)
CALCIUM SERPL-MCNC: 8.4 MG/DL (ref 8.3–10.6)
CHLORIDE SERPL-SCNC: 105 MMOL/L (ref 99–110)
CO2 SERPL-SCNC: 26 MMOL/L (ref 21–32)
CREAT SERPL-MCNC: 1.4 MG/DL (ref 0.8–1.3)
DEPRECATED RDW RBC AUTO: 18 % (ref 12.4–15.4)
EKG DIAGNOSIS: NORMAL
EKG Q-T INTERVAL: 380 MS
EKG QRS DURATION: 94 MS
EKG QTC CALCULATION (BAZETT): 452 MS
EKG R AXIS: 40 DEGREES
EKG T AXIS: 32 DEGREES
EKG VENTRICULAR RATE: 85 BPM
GFR SERPLBLD CREATININE-BSD FMLA CKD-EPI: 50 ML/MIN/{1.73_M2}
GLUCOSE SERPL-MCNC: 106 MG/DL (ref 70–99)
HCT VFR BLD AUTO: 34.3 % (ref 40.5–52.5)
HGB BLD-MCNC: 11.2 G/DL (ref 13.5–17.5)
MCH RBC QN AUTO: 26.9 PG (ref 26–34)
MCHC RBC AUTO-ENTMCNC: 32.7 G/DL (ref 31–36)
MCV RBC AUTO: 82.4 FL (ref 80–100)
PLATELET # BLD AUTO: 378 K/UL (ref 135–450)
PMV BLD AUTO: 7.5 FL (ref 5–10.5)
POTASSIUM SERPL-SCNC: 4 MMOL/L (ref 3.5–5.1)
RBC # BLD AUTO: 4.16 M/UL (ref 4.2–5.9)
SODIUM SERPL-SCNC: 141 MMOL/L (ref 136–145)
WBC # BLD AUTO: 10.7 K/UL (ref 4–11)

## 2024-12-31 PROCEDURE — G0378 HOSPITAL OBSERVATION PER HR: HCPCS

## 2024-12-31 PROCEDURE — 99239 HOSP IP/OBS DSCHRG MGMT >30: CPT | Performed by: NURSE PRACTITIONER

## 2024-12-31 PROCEDURE — 36415 COLL VENOUS BLD VENIPUNCTURE: CPT

## 2024-12-31 PROCEDURE — 80048 BASIC METABOLIC PNL TOTAL CA: CPT

## 2024-12-31 PROCEDURE — 85027 COMPLETE CBC AUTOMATED: CPT

## 2024-12-31 PROCEDURE — 93005 ELECTROCARDIOGRAM TRACING: CPT | Performed by: INTERNAL MEDICINE

## 2024-12-31 PROCEDURE — 93010 ELECTROCARDIOGRAM REPORT: CPT | Performed by: INTERNAL MEDICINE

## 2024-12-31 PROCEDURE — 6370000000 HC RX 637 (ALT 250 FOR IP): Performed by: NURSE PRACTITIONER

## 2024-12-31 PROCEDURE — 71046 X-RAY EXAM CHEST 2 VIEWS: CPT

## 2024-12-31 PROCEDURE — 6370000000 HC RX 637 (ALT 250 FOR IP): Performed by: INTERNAL MEDICINE

## 2024-12-31 RX ORDER — AMIODARONE HYDROCHLORIDE 200 MG/1
200 TABLET ORAL DAILY
Status: DISCONTINUED | OUTPATIENT
Start: 2024-12-31 | End: 2024-12-31 | Stop reason: HOSPADM

## 2024-12-31 RX ORDER — AMIODARONE HYDROCHLORIDE 200 MG/1
200 TABLET ORAL DAILY
Qty: 30 TABLET | Refills: 3 | Status: SHIPPED | OUTPATIENT
Start: 2024-12-31

## 2024-12-31 RX ADMIN — PANTOPRAZOLE SODIUM 40 MG: 40 TABLET, DELAYED RELEASE ORAL at 08:21

## 2024-12-31 RX ADMIN — LOSARTAN POTASSIUM 100 MG: 100 TABLET, FILM COATED ORAL at 08:21

## 2024-12-31 RX ADMIN — FUROSEMIDE 20 MG: 20 TABLET ORAL at 08:21

## 2024-12-31 RX ADMIN — APIXABAN 5 MG: 5 TABLET, FILM COATED ORAL at 08:21

## 2024-12-31 RX ADMIN — HYDROCHLOROTHIAZIDE 25 MG: 25 TABLET ORAL at 08:21

## 2024-12-31 RX ADMIN — METFORMIN HYDROCHLORIDE 1000 MG: 500 TABLET, EXTENDED RELEASE ORAL at 08:21

## 2024-12-31 RX ADMIN — AMIODARONE HYDROCHLORIDE 200 MG: 200 TABLET ORAL at 15:01

## 2024-12-31 RX ADMIN — METOPROLOL SUCCINATE 50 MG: 50 TABLET, EXTENDED RELEASE ORAL at 08:22

## 2024-12-31 ASSESSMENT — PAIN SCALES - GENERAL: PAINLEVEL_OUTOF10: 0

## 2024-12-31 NOTE — PLAN OF CARE
Problem: Pain  Goal: Verbalizes/displays adequate comfort level or baseline comfort level  12/31/2024 1430 by Bunny Ayoub RN  Outcome: Completed  12/31/2024 0152 by Magda Cornelius RN  Outcome: Progressing     Problem: Chronic Conditions and Co-morbidities  Goal: Patient's chronic conditions and co-morbidity symptoms are monitored and maintained or improved  12/31/2024 1430 by Bunny Ayoub RN  Outcome: Completed  12/31/2024 0152 by Magda Cornelius RN  Outcome: Progressing     Problem: Discharge Planning  Goal: Discharge to home or other facility with appropriate resources  12/31/2024 1430 by Bunny Ayoub RN  Outcome: Completed  12/31/2024 0152 by Magda Cornelius RN  Outcome: Progressing     Problem: Safety - Adult  Goal: Free from fall injury  12/31/2024 1430 by Bunny Ayoub RN  Outcome: Completed  12/31/2024 0152 by Magda Cornelius RN  Outcome: Progressing

## 2024-12-31 NOTE — TELEPHONE ENCOUNTER
----- Message from LUZ Bran CNP sent at 12/31/2024  1:14 PM EST -----  Hello!    Mr. Hillman will be coming in next week for a device check.  Please update Dr. Abernathy or myself if he is still in atrial fibrillation as we will need to plan for a cardioversion. Thank!    Karen

## 2024-12-31 NOTE — PROGRESS NOTES
Clarissa WILLIS ( S/N: [id number] PID: [id number] )    Lead model Serial number  Type Implantation [place] DUVALL 53 [id number] Biotronik BIPL 12/30/2024 RAVEN GUIDRY 60 [id number] Biotronik BIPL 12/30/2024 RV

## 2024-12-31 NOTE — DISCHARGE SUMMARY
Patient ID:  Demetrio Hillman  [id number]  84 y.o.  1940    Admit date: 12/30/2024    Discharge date and time: 12/31/2024    Admitting Physician: Malou Abernathy MD     Discharge NP: Karen Franklin CNP    Admission Diagnoses: Sinus node dysfunction (HCC) [I49.5]    Discharge Diagnoses: SAME    Admission Condition: fair    Discharged Condition: good    Hospital Course:  Demetrio Hillman was admitted on 12/30/2024 and had dual chamber pacemaker implanted. Device check was normal and CXR was normal from a device standpoint. Rhythm has been AF. Denies chest pain, palpitations, shortness of breath, and dizziness.       Assessment:   Sinus node dysfunction: ongoing    -s/p dual chamber pacemaker implant on 12/30/2024    -device check and CXR reviewed   Paroxysmal atrial fibrillation: ongoing    -[id number] score 4 (age, HTN, DM)  HTN: controlled  DM  COPD  Sleep apnea s/p Inspire implant      Plan:   Continue Eliquis, Lipitor, Lasix, hydrochlorothiazide, losartan and Toprol  Start amiodarone 200 mg PO daily for atrial fibrillation.  Post procedure instructions reviewed   Device check in 1 week.  If patient remains in atrial fibrillation at that time, can plan for cardioversion approximately 1 month post pacemaker implant.  Follow up in office in three months     Patient was seen outside of global device window for atrial fibrillation       Discharge Exam:  /81   Pulse 83   Temp 97.8 °F (36.6 °C) (Oral)   Resp 18   Wt 97.9 kg (215 lb 13.3 oz)   SpO2 95%   BMI 33.80 kg/m²     General Appearance:    Alert, cooperative, no distress, appears stated age   Head:    Normocephalic, without obvious abnormality, atraumatic   Eyes:    PERRL, conjunctiva/corneas clear      Ears:    deferred   Nose:   Nares normal, septum midline, mucosa normal, no drainage  or sinus tenderness   Throat:   Lips, mucosa, and tongue normal; teeth and gums normal   Neck:   Supple, symmetrical, trachea midline, no adenopathy;        thyroid:   XL  Take 1 tablet by mouth 2 times daily     pantoprazole 40 MG tablet  Commonly known as: PROTONIX  TAKE 1 TABLET BY MOUTH DAILY     Trelegy Ellipta 100-62.5-25 MCG/ACT Aepb inhaler  Generic drug: fluticasone-umeclidin-vilant  Inhale 1 puff into the lungs daily           * This list has 2 medication(s) that are the same as other medications prescribed for you. Read the directions carefully, and ask your doctor or other care provider to review them with you.                ASK your doctor about these medications      amLODIPine 5 MG tablet  Commonly known as: NORVASC     clotrimazole-betamethasone 1-0.05 % lotion  Commonly known as: LOTRISONE  Apply topically 2 times daily.     mupirocin 2 % ointment  Commonly known as: BACTROBAN               Where to Get Your Medications        These medications were sent to [facility] [id number] [facility] [address] [facility] - P [phone] - F [phone] [address] [facility] [address]      Phone: [phone]   amiodarone 200 MG tablet         Post device instructions given  Activity: as tolerated  Diet: cardiac diet    LUZ Bran-CNP  [facility]  [phone]     Time spent on discharge of patient: 35  minutes, including plan of care, patient education, and care coordination.

## 2025-01-02 ENCOUNTER — PROCEDURE VISIT (OUTPATIENT)
Dept: CARDIOLOGY CLINIC | Age: 85
End: 2025-01-02

## 2025-01-02 ENCOUNTER — CARE COORDINATION (OUTPATIENT)
Dept: CASE MANAGEMENT | Age: 85
End: 2025-01-02

## 2025-01-02 DIAGNOSIS — R00.1 SYMPTOMATIC BRADYCARDIA: ICD-10-CM

## 2025-01-02 DIAGNOSIS — Z95.0 PACEMAKER: Primary | ICD-10-CM

## 2025-01-02 NOTE — CARE COORDINATION
Care Transitions Note    Initial Call - Call within 2 business days of discharge: Yes    Attempted to reach patient for transitions of care follow up. Unable to reach patient.    Outreach Attempts:   HIPAA compliant voicemail left for patient.     Patient: Demetrio Hillman    Patient : 1940   MRN: [id number]    Reason for Admission: sinus node dysfunction  Discharge Date: 24  RURS: No data recorded  Last Discharge Facility       Date Complaint Diagnosis Description Type Department Provider    24  Sinus node dysfunction (HCC) ... Admission (Discharged) Malou Rodney MD            Future Appointments         Provider Specialty Dept Phone    2025 11:30 AM SCHEDULE, JANNET DEVICE CHECK Cardiology [phone]    2025 2:00 PM Dorothy Silver MD Family Medicine [phone]    3/20/2025 1:30 PM SCHEDULE, JANNET DEVICE CHECK Cardiology [phone]    3/20/2025 1:30 PM Karen Franklin, APRN - CNP Cardiology [phone]            Plan for follow-up on next business day.      Coleen Jimenez RN

## 2025-01-03 ENCOUNTER — CARE COORDINATION (OUTPATIENT)
Dept: CASE MANAGEMENT | Age: 85
End: 2025-01-03

## 2025-01-03 NOTE — CARE COORDINATION
Care Transitions Note    Initial Call - Call within 2 business days of discharge: Yes    Patient Current Location:  Home: [address] [place] [address] [place] [address]    Care Transition Nurse contacted the patient by telephone to perform post hospital discharge assessment, verified name and  as identifiers. Provided introduction to self, and explanation of the Care Transition Nurse role.     Patient: Demetrio Hillman    Patient : 1940   MRN: [id number]    Reason for Admission: sinus node dysfunction s/p PPM placement  Discharge Date: 24  RURS: No data recorded    Last Discharge Facility       Date Complaint Diagnosis Description Type Department Provider    24  Sinus node dysfunction (HCC) ... Admission (Discharged) Malou Rodney MD            Was this an external facility discharge? No    Additional needs identified to be addressed with provider   No needs identified         Method of communication with provider: none.    Patients top risk factors for readmission: functional physical ability    Interventions to address risk factors:   Review of patient management of conditions/medications: sinus node dysfunction    Care Summary Note: VM received from patient and verified . Patient pleasant and agreeable to transition call. Doing really well since discharge. Discussed recent hospitalization. Incision is healing well and denied any redness, swelling, or excessive pain. Having some soreness, but following post procedure instructions. Taking all medications as directed. Follow up with cardiology noted for next week. Denied any acute needs at present time.  Agreeable to f/u calls.  Educated on the use of urgent care or physician’s 24 hr access line if assistance is needed after hours.    Care Transition Nurse reviewed discharge instructions, medical action plan, and red flags with patient. The patient was given an opportunity to ask questions; all questions answered at this time.. The patient

## 2025-01-07 ENCOUNTER — CARE COORDINATION (OUTPATIENT)
Dept: CASE MANAGEMENT | Age: 85
End: 2025-01-07

## 2025-01-07 NOTE — CARE COORDINATION
Care Transitions Note    Follow Up Call     Attempted to reach patient for transitions of care follow up.  Unable to reach patient.      Outreach Attempts:   HIPAA compliant voicemail left for patient.         Follow Up Appointment:   Future Appointments         Provider Specialty Dept Phone    1/8/2025 11:30 AM SCHEDULE, JANNET DEVICE CHECK Cardiology [phone]    2/5/2025 2:00 PM Dorothy Silver MD Family Medicine [phone]    3/20/2025 1:30 PM SCHEDULE, JANNET DEVICE CHECK Cardiology [phone]    3/20/2025 1:30 PM Karen Franklin, APRN - CNP Cardiology [phone]          Coleen Jimenez RN

## 2025-01-08 ENCOUNTER — NURSE ONLY (OUTPATIENT)
Dept: CARDIOLOGY CLINIC | Age: 85
End: 2025-01-08

## 2025-01-08 ENCOUNTER — TELEPHONE (OUTPATIENT)
Dept: CARDIOLOGY CLINIC | Age: 85
End: 2025-01-08

## 2025-01-08 DIAGNOSIS — I49.5 SINUS NODE DYSFUNCTION (HCC): ICD-10-CM

## 2025-01-08 DIAGNOSIS — Z95.0 PACEMAKER: Primary | ICD-10-CM

## 2025-01-08 NOTE — TELEPHONE ENCOUNTER
Patient seen today in the Device Clinic or one week post op appointment. Patient currently in AT/AF - see Murj for interrogation.  AT/AF burden 44%  KXA/NPAM FYI, was requested I notify you to schedule potential DCC?

## 2025-01-08 NOTE — PATIENT INSTRUCTIONS
New Cardiac Device Implant (Pacemaker and/or Defibrillator) Post Op Instructions  Bathe with water indirectly hitting the incision site. Water and soap may run over the incision site. Do not scrub. Pat dry with a clean towel after bathing.   Leave incision open to the air; do not apply any dressings, ointments, or bandages to the area. Do not apply lotion, perfume/cologne, or powders to the area until it is completely healed.   Any scabbing or skin glue that is noted will fall off within 1-2 weeks after the post op appointment.   If any oozing, bleeding, or pus drainage occurs, please call the office immediately at [phone].       Patient has movement restrictions in place until 4 weeks post op (to the day of implant) unless otherwise instructed by physician.   Patient may not lift the device side arm above shoulder height.   Do not far reach or stretch across body or behind body with effected side.   Do not use this arm for pushing, pulling, or lifting body.   Do not use cane on the effected side.   Patient may not lift anything heavier than a gallon of milk with the associated arm.     Appointments to expect going forward:  Post operatively the patient will have had a 1-week post op check and a 3 month follow up with NP/MD and the device clinic.       Remote Monitoring Instructions    Within 2-3 weeks of your device being implanted, you will receive a call from the  of your device. Please answer this call as it is to set up remote monitoring for your device. Once you receive your in-home monitor, please follow the instructions provided to sync the home monitor to your implanted device. Once you have paired your home monitor to your implanted device, keep your monitor plugged in within 6 feet of where you sleep. Your monitor will routinely check in with your device during sleep hours and transmit any urgent events to the Device Clinic for review.     Please do not send additional routine

## 2025-01-09 ENCOUNTER — CARE COORDINATION (OUTPATIENT)
Dept: CASE MANAGEMENT | Age: 85
End: 2025-01-09

## 2025-01-09 NOTE — CARE COORDINATION
Care Transitions Note    Follow Up Call     Attempted to reach patient for transitions of care follow up.  Unable to reach patient.      Outreach Attempts:   Multiple attempts to contact patient at phone numbers on file.     Care Summary Note:     Follow Up Appointment:   Future Appointments         Provider Specialty Dept Phone    2/5/2025 2:00 PM Dorothy Silver MD Family Medicine [phone]    3/20/2025 1:30 PM JANNET GOLDBERG DEVICE CHECK Cardiology [phone]    3/20/2025 1:30 PM Karen Franklin, APRN - CNP Cardiology [phone]          Coleen Jimenez RN

## 2025-01-13 ENCOUNTER — TELEPHONE (OUTPATIENT)
Dept: CARDIOLOGY CLINIC | Age: 85
End: 2025-01-13

## 2025-01-13 NOTE — TELEPHONE ENCOUNTER
Patient had dual PPM implanted on 12/30/2024. Recent device interrogation in media. Please advise.

## 2025-01-13 NOTE — TELEPHONE ENCOUNTER
Malou Abernathy MD  [facility] Ep; Karen Franklin, APRN - CNP5 days ago       Please schedule patient to see me or NPAM in about 5-6 weeks to determine if cardioversion is needed for his AF.

## 2025-01-13 NOTE — TELEPHONE ENCOUNTER
Per NPAM/KXA--please call patient to schedule appt in requested time frame (5-6 weeks per KXA message) to check back in on rhythm status and see if need cardioversion if in AF still.     Per NPAM-okay to overbook her if needed

## 2025-01-13 NOTE — TELEPHONE ENCOUNTER
Pt called stating he is having difficult sleeping and having SOB ever since his procedure. Pt wants to know if this is normal or something to be concerned of. Please advise.

## 2025-01-14 DIAGNOSIS — I48.19 PERSISTENT ATRIAL FIBRILLATION (HCC): ICD-10-CM

## 2025-01-14 NOTE — TELEPHONE ENCOUNTER
Last Office Visit  -  11/6/24  Next Office Visit  -  2/5/25    Last Filled  -  11/6/23  Last UDS -    Contract -

## 2025-01-14 NOTE — TELEPHONE ENCOUNTER
Medication Refill    Medication needing refilled: apixaban (ELIQUIS) 5 MG TABS tablet [[id number]]        Dosage of the medication: 5mg    How are you taking this medication (QD, BID, TID, QID, PRN):   Sig: TAKE ONE TABLET BY MOUTH TWICE A DAY              30 or 90 day supply called in: 90    When will you run out of your medication:    Which Pharmacy are we sending the medication to?:   [facility] [id number] [facility] [address] - P [phone] - F [phone] [address] [facility] [address]  Phone: [phone]  Fax: [phone]

## 2025-01-14 NOTE — TELEPHONE ENCOUNTER
Spoke w/ [employer] (AJ). Nightly remote uploaded and sent via Murj. Patient appears to demonstrate paroxysmal AF w/ burden currently measuring 13% overall with 50% burden over the past 24 hours. See Murj for interrogation.

## 2025-01-15 DIAGNOSIS — I10 ESSENTIAL HYPERTENSION, BENIGN: ICD-10-CM

## 2025-01-15 NOTE — TELEPHONE ENCOUNTER
Attempted to call pt, no answer. LMOVM for pt to return call back. Pt needs f/u scheduled with SMM.       Last Office Visit: 6/4/24 Provider: DONA  **Is provider OOT? Yes    Next Office Visit: no upcoming OV Provider: DONA  **If no OV, when does pt need to be seen? 6-9 months from last OV date  **Has patient already had 30 day supply? No    Lab orders needed? no   Encounter provider correct? Yes If not, change provider  Script changes since last refill? no    LAST LABS:   BMP:   Lab Results   Component Value Date/Time     12/31/2024 05:38 AM    K 4.0 12/31/2024 05:38 AM     12/31/2024 05:38 AM    CO2 26 12/31/2024 05:38 AM    BUN 17 12/31/2024 05:38 AM    CREATININE 1.4 12/31/2024 05:38 AM    GLUCOSE 106 12/31/2024 05:38 AM    CALCIUM 8.4 12/31/2024 05:38 AM    LABGLOM 50 12/31/2024 05:38 AM    LABGLOM 50 12/21/2023 03:36 PM     **Care Everywhere? N/A

## 2025-01-17 RX ORDER — FUROSEMIDE 20 MG/1
20 TABLET ORAL DAILY
Qty: 90 TABLET | Refills: 1 | Status: SHIPPED | OUTPATIENT
Start: 2025-01-17

## 2025-01-17 NOTE — TELEPHONE ENCOUNTER
Called and spoke with pt and relayed kxa message for pt to increase amio to BID pt v/u and informed him we will be calling him in 10 days to see how he is doing.

## 2025-01-20 DIAGNOSIS — I10 ESSENTIAL HYPERTENSION, BENIGN: ICD-10-CM

## 2025-01-20 DIAGNOSIS — I48.19 PERSISTENT ATRIAL FIBRILLATION (HCC): ICD-10-CM

## 2025-01-20 RX ORDER — METOPROLOL SUCCINATE 50 MG/1
50 TABLET, EXTENDED RELEASE ORAL 2 TIMES DAILY
Qty: 180 TABLET | Refills: 3 | Status: SHIPPED | OUTPATIENT
Start: 2025-01-20

## 2025-01-27 DIAGNOSIS — L30.9 DERMATITIS: ICD-10-CM

## 2025-01-27 DIAGNOSIS — B36.9 FUNGAL DERMATITIS: ICD-10-CM

## 2025-01-27 RX ORDER — CLOTRIMAZOLE AND BETAMETHASONE DIPROPIONATE 10; .5 MG/ML; MG/ML
LOTION TOPICAL
Qty: 30 ML | Refills: 0 | Status: SHIPPED | OUTPATIENT
Start: 2025-01-27

## 2025-01-27 RX ORDER — DOXYCYCLINE HYCLATE 100 MG
100 TABLET ORAL 2 TIMES DAILY
Qty: 14 TABLET | Refills: 0 | Status: SHIPPED | OUTPATIENT
Start: 2025-01-27 | End: 2025-02-03

## 2025-01-27 NOTE — TELEPHONE ENCOUNTER
Patient is requesting a rx for   clotrimazole-betamethasone (LOTRISONE) 1-0.05 % lotion      doxycycline hyclate (VIBRA-TABS) 100 MG tablet     Pt has rash and requesting refill      Last seen 11/06/2024  Next visit 02/05/2025

## 2025-02-05 ENCOUNTER — OFFICE VISIT (OUTPATIENT)
Dept: FAMILY MEDICINE CLINIC | Age: 85
End: 2025-02-05
Payer: MEDICARE

## 2025-02-05 VITALS
HEIGHT: 67 IN | HEART RATE: 64 BPM | WEIGHT: 215.2 LBS | BODY MASS INDEX: 33.78 KG/M2 | DIASTOLIC BLOOD PRESSURE: 60 MMHG | OXYGEN SATURATION: 96 % | SYSTOLIC BLOOD PRESSURE: 134 MMHG | TEMPERATURE: 97.7 F

## 2025-02-05 DIAGNOSIS — I48.0 PAF (PAROXYSMAL ATRIAL FIBRILLATION) (HCC): ICD-10-CM

## 2025-02-05 DIAGNOSIS — E11.65 TYPE 2 DIABETES MELLITUS WITH HYPERGLYCEMIA, WITHOUT LONG-TERM CURRENT USE OF INSULIN (HCC): Primary | ICD-10-CM

## 2025-02-05 DIAGNOSIS — L98.9 SKIN LESION: ICD-10-CM

## 2025-02-05 LAB — HBA1C MFR BLD: 6.8 %

## 2025-02-05 PROCEDURE — 83036 HEMOGLOBIN GLYCOSYLATED A1C: CPT | Performed by: FAMILY MEDICINE

## 2025-02-05 PROCEDURE — 3044F HG A1C LEVEL LT 7.0%: CPT | Performed by: FAMILY MEDICINE

## 2025-02-05 PROCEDURE — 3075F SYST BP GE 130 - 139MM HG: CPT | Performed by: FAMILY MEDICINE

## 2025-02-05 PROCEDURE — 1159F MED LIST DOCD IN RCRD: CPT | Performed by: FAMILY MEDICINE

## 2025-02-05 PROCEDURE — 3078F DIAST BP <80 MM HG: CPT | Performed by: FAMILY MEDICINE

## 2025-02-05 PROCEDURE — 1123F ACP DISCUSS/DSCN MKR DOCD: CPT | Performed by: FAMILY MEDICINE

## 2025-02-05 PROCEDURE — 99214 OFFICE O/P EST MOD 30 MIN: CPT | Performed by: FAMILY MEDICINE

## 2025-02-05 RX ORDER — CLOBETASOL PROPIONATE 0.5 MG/G
OINTMENT TOPICAL
Qty: 30 G | Refills: 0 | Status: SHIPPED | OUTPATIENT
Start: 2025-02-05 | End: 2025-03-06

## 2025-02-05 SDOH — ECONOMIC STABILITY: FOOD INSECURITY: WITHIN THE PAST 12 MONTHS, THE FOOD YOU BOUGHT JUST DIDN'T LAST AND YOU DIDN'T HAVE MONEY TO GET MORE.: NEVER TRUE

## 2025-02-05 SDOH — ECONOMIC STABILITY: FOOD INSECURITY: WITHIN THE PAST 12 MONTHS, YOU WORRIED THAT YOUR FOOD WOULD RUN OUT BEFORE YOU GOT MONEY TO BUY MORE.: NEVER TRUE

## 2025-02-05 ASSESSMENT — PATIENT HEALTH QUESTIONNAIRE - PHQ9
SUM OF ALL RESPONSES TO PHQ QUESTIONS 1-9: 0
SUM OF ALL RESPONSES TO PHQ9 QUESTIONS 1 & 2: 0
2. FEELING DOWN, DEPRESSED OR HOPELESS: NOT AT ALL
SUM OF ALL RESPONSES TO PHQ QUESTIONS 1-9: 0
SUM OF ALL RESPONSES TO PHQ QUESTIONS 1-9: 0
1. LITTLE INTEREST OR PLEASURE IN DOING THINGS: NOT AT ALL
SUM OF ALL RESPONSES TO PHQ QUESTIONS 1-9: 0

## 2025-02-05 NOTE — PROGRESS NOTES
extended release tablet Take 1 tablet by mouth 2 times daily 180 tablet 3    furosemide (LASIX) 20 MG tablet TAKE 1 TABLET BY MOUTH DAILY 90 tablet 1    apixaban (ELIQUIS) 5 MG TABS tablet TAKE ONE TABLET BY MOUTH TWICE A  tablet 3    ketoconazole (NIZORAL) 2 % cream Apply topically daily. 30 g 1    fluticasone-umeclidin-vilant (TRELEGY ELLIPTA) 100-62.5-25 MCG/ACT AEPB inhaler Inhale 1 puff into the lungs daily 60 each 3    metFORMIN (GLUCOPHAGE-XR) 500 MG extended release tablet TAKE TWO TABLETS BY MOUTH TWICE A DAY WITH A MEAL 360 tablet 1    mupirocin (BACTROBAN) 2 % ointment Apply topically 3 times daily Apply topically 3 times daily.      losartan-hydroCHLOROthiazide (HYZAAR) 100-25 MG per tablet Take 1 tablet by mouth daily 90 tablet 1    pantoprazole (PROTONIX) 40 MG tablet TAKE 1 TABLET BY MOUTH DAILY 90 tablet 1    atorvastatin (LIPITOR) 80 MG tablet TAKE 1 TABLET BY MOUTH AT BEDTIME (Patient taking differently: Take 0.5 tablets by mouth nightly at bedtime.) 90 tablet 3    amLODIPine (NORVASC) 5 MG tablet       ketoconazole (NIZORAL) 2 % cream APPLY TOPICALLY DAILY 30 g 3    Handicap Placard MISC by Does not apply route 1 each 0     No current facility-administered medications for this visit.       Patient's past medical history,surgical history, family history, medications,  and allergies  were all reviewed and updated as appropriate today.    Review of Systems  Comprehensive ROS normal, except positives in HPI      Physical Exam  Constitutional:       General: He is not in acute distress.     Appearance: Normal appearance. He is well-developed. He is not ill-appearing.   HENT:      Head: Normocephalic and atraumatic.      Right Ear: External ear normal.      Left Ear: Ear canal and external ear normal.      Mouth/Throat:      Pharynx: Oropharynx is clear. No oropharyngeal exudate.   Eyes:      General: No scleral icterus.        Right eye: No discharge.         Left eye: No discharge.

## 2025-02-10 ENCOUNTER — OFFICE VISIT (OUTPATIENT)
Dept: CARDIOLOGY CLINIC | Age: 85
End: 2025-02-10
Payer: MEDICARE

## 2025-02-10 VITALS
HEART RATE: 71 BPM | OXYGEN SATURATION: 96 % | HEIGHT: 67 IN | WEIGHT: 216 LBS | BODY MASS INDEX: 33.9 KG/M2 | DIASTOLIC BLOOD PRESSURE: 60 MMHG | SYSTOLIC BLOOD PRESSURE: 122 MMHG

## 2025-02-10 DIAGNOSIS — I71.40 ABDOMINAL AORTIC ANEURYSM (AAA) WITHOUT RUPTURE, UNSPECIFIED PART (HCC): ICD-10-CM

## 2025-02-10 DIAGNOSIS — E78.2 MIXED HYPERLIPIDEMIA: ICD-10-CM

## 2025-02-10 DIAGNOSIS — Z79.899 MEDICATION MANAGEMENT: ICD-10-CM

## 2025-02-10 DIAGNOSIS — Z87.891 HISTORY OF TOBACCO ABUSE: ICD-10-CM

## 2025-02-10 DIAGNOSIS — I10 ESSENTIAL HYPERTENSION: Primary | ICD-10-CM

## 2025-02-10 DIAGNOSIS — I48.91 ATRIAL FIBRILLATION WITH RVR (HCC): ICD-10-CM

## 2025-02-10 PROCEDURE — 99214 OFFICE O/P EST MOD 30 MIN: CPT | Performed by: INTERNAL MEDICINE

## 2025-02-10 PROCEDURE — 1123F ACP DISCUSS/DSCN MKR DOCD: CPT | Performed by: INTERNAL MEDICINE

## 2025-02-10 PROCEDURE — 3074F SYST BP LT 130 MM HG: CPT | Performed by: INTERNAL MEDICINE

## 2025-02-10 PROCEDURE — 1159F MED LIST DOCD IN RCRD: CPT | Performed by: INTERNAL MEDICINE

## 2025-02-10 PROCEDURE — 3078F DIAST BP <80 MM HG: CPT | Performed by: INTERNAL MEDICINE

## 2025-02-10 RX ORDER — ATORVASTATIN CALCIUM 40 MG/1
40 TABLET, FILM COATED ORAL NIGHTLY
Qty: 90 TABLET | Refills: 3 | Status: SHIPPED | OUTPATIENT
Start: 2025-02-10

## 2025-02-10 RX ORDER — AMIODARONE HYDROCHLORIDE 200 MG/1
200 TABLET ORAL DAILY
Qty: 90 TABLET | Refills: 3 | Status: SHIPPED | OUTPATIENT
Start: 2025-02-10

## 2025-02-10 NOTE — PROGRESS NOTES
Right coronary artery has mild to moderate atherosclerotic disease. It was moderate in size and was the dominant artery.     5. Left ventriculogram showed normal LVEF at 55-60%. Wall motion was normal . There was no significant mitral valve or aortic valve disease noted. LVEDP was normal. There was no gradient noted across the aortic valve during pullback of the catheter.     CONCLUSIONS:     1. Mild to moderate coronary artery disease with preserved LVEF     Lab Results   Component Value Date     12/31/2024    K 4.0 12/31/2024     12/31/2024    CO2 26 12/31/2024    BUN 17 12/31/2024    CREATININE 1.4 (H) 12/31/2024    GLUCOSE 106 (H) 12/31/2024    CALCIUM 8.4 12/31/2024    BILITOT 0.4 08/06/2024    ALKPHOS 100 08/06/2024    AST 9 (L) 08/06/2024    ALT 6 (L) 08/06/2024    LABGLOM 50 (A) 12/31/2024    GFRAA >60 08/15/2022    AGRATIO 1.5 08/06/2024    GLOB 2.7 10/01/2021       Lab Results   Component Value Date    WBC 10.7 12/31/2024    HGB 11.2 (L) 12/31/2024    HCT 34.3 (L) 12/31/2024    MCV 82.4 12/31/2024     12/31/2024        Lab Results   Component Value Date    CHOL 133 02/14/2023    CHOL 139 01/19/2023    CHOL 128 01/11/2023     Lab Results   Component Value Date    TRIG 79 02/14/2023    TRIG 183 (H) 01/19/2023    TRIG 241 (H) 01/11/2023     Lab Results   Component Value Date    HDL 42 08/06/2024    HDL 37 (L) 12/21/2023    HDL 39 (L) 02/14/2023     No components found for: \"LDLCHOLESTEROL\", \"LDLCALC\"    Lab Results   Component Value Date    VLDL 27 08/06/2024    VLDL 26 12/21/2023    VLDL 16 02/14/2023      Latest Reference Range & Units 12/21/23 15:36   Cholesterol, Fasting 0 - 199 mg/dL 132   HDL Cholesterol 40 - 60 mg/dL 37 (L)   LDL Cholesterol <100 mg/dL 69   VLDL Not Established mg/dL 26   Triglyceride, Fasting 0 - 150 mg/dL 131         [id number] Score for Atrial Fibrillation Stroke Risk   Risk   Factors  Component Value   C CHF No 0   H HTN Yes 1   A2 Age >= 75 Yes,  (84 y.o.) 2 
  VLDL Not Established mg/dL 26   Triglyceride, Fasting 0 - 150 mg/dL 131         [id number] Score for Atrial Fibrillation Stroke Risk   Risk   Factors  Component Value   C CHF No 0   H HTN Yes 1   A2 Age >= 75 Yes,  (84 y.o.) 2   D DM Yes 1   S2 Prior Stroke/TIA No 0   V Vascular Disease No 0   A Age 65-74 No,  (84 y.o.) 0   Sc Sex male 0    [id number]  Score  4   Score last updated 10/12/21 9:12 AM EDT    Assessment:     1.  HTN: Well controlled and will continue current medical regimen of hyzaar 100/25mg qd, norvasc 5mg qd, lasix 20mg qd, and Toprol XL 50mg BID.     2. CAD:  Mild-moderate NOCAD. LHC 2/04/21 mild to moderate CAD without need for PCI. Gissell nuc 09/30/21 negative ischemia.  I have offered long-acting nitrate (imdur) in past but he declined and has not needed SL NTG. There are no concerning symptoms for angina currently. Continue eliquis only for AC and will not add baby asa given elderly age and higher bleeding risk.        3.      HLD: I personally reviewed most recent lipids from 12/21/23 in Epic and d/w Mr. Hillman (see above). LDL at goal and will continue lipitor 80mg qd. Prior we have discussed Repatha or Leqvio which he does not want if I would ever recommend them.    4.       DM: per PCP    5.       PAF: Diagnosed 9/21. Admit 8/22 with rapid afib. Asymptomatic. Continue metoprolol XL 50mg BID regimen to help HR control and eliquis 5mg BID for AC.     6.        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 Aubrey Beckham      Plan:  No change in medications today.  Continue current regimen   2.   When you have your labwork drawn with Dr. CABEZAS'Rosana call our office.    3.   No cardiac testing warranted at this time  4.   See if any imaging for AAA needed per Dr. Beckham at next OV to check size.   5.   Follow up in 6-9 months     ***      ***    Thank you for allowing me to participate in the care of this individual.    Cost of

## 2025-02-10 NOTE — PATIENT INSTRUCTIONS
Your provider has ordered testing for further evaluation.  An order/prescription has been included in your paper work.   To schedule outpatient testing, contact Central Scheduling by calling everyArt ([phone]).      Plan:  Labs reviewed in epic and discussed with patient.  Medications reviewed in office. Medications refilled as warranted  Okay to take amiodarone 200 mg daily and Lipitor 40 mg daily   Let me know if your medication becomes too expensive  3. Labs now: Fasting lipids, TSH, CBC, CMP  4. Follow up in 6 months

## 2025-02-19 ENCOUNTER — TELEPHONE (OUTPATIENT)
Dept: CARDIOLOGY CLINIC | Age: 85
End: 2025-02-19

## 2025-02-19 ENCOUNTER — HOSPITAL ENCOUNTER (OUTPATIENT)
Dept: ULTRASOUND IMAGING | Age: 85
Discharge: HOME | End: 2025-02-19
Payer: MEDICARE

## 2025-02-19 ENCOUNTER — HOSPITAL ENCOUNTER (OUTPATIENT)
Age: 85
Discharge: HOME | End: 2025-02-19
Payer: MEDICARE

## 2025-02-19 DIAGNOSIS — E78.2 MIXED HYPERLIPIDEMIA: ICD-10-CM

## 2025-02-19 DIAGNOSIS — Z79.899 MEDICATION MANAGEMENT: ICD-10-CM

## 2025-02-19 DIAGNOSIS — I71.40 ABDOMINAL AORTIC ANEURYSM (AAA) WITHOUT RUPTURE, UNSPECIFIED PART: ICD-10-CM

## 2025-02-19 DIAGNOSIS — I71.40 ABDOMINAL AORTIC ANEURYSM (AAA) WITHOUT RUPTURE, UNSPECIFIED PART: Primary | ICD-10-CM

## 2025-02-19 LAB
ALBUMIN SERPL-MCNC: 4 G/DL (ref 3.4–5)
ALBUMIN/GLOB SERPL: 1.4 {RATIO} (ref 1.1–2.2)
ALP SERPL-CCNC: 84 U/L (ref 40–129)
ALT SERPL-CCNC: 11 U/L (ref 10–40)
ANION GAP SERPL CALCULATED.3IONS-SCNC: 11 MMOL/L (ref 3–16)
AST SERPL-CCNC: 16 U/L (ref 15–37)
BASOPHILS # BLD: 0.1 K/UL (ref 0–0.2)
BASOPHILS NFR BLD: 0.8 %
BILIRUB SERPL-MCNC: 0.5 MG/DL (ref 0–1)
BUN SERPL-MCNC: 19 MG/DL (ref 7–20)
CALCIUM SERPL-MCNC: 8.8 MG/DL (ref 8.3–10.6)
CHLORIDE SERPL-SCNC: 101 MMOL/L (ref 99–110)
CHOLEST SERPL-MCNC: 127 MG/DL (ref 0–199)
CO2 SERPL-SCNC: 28 MMOL/L (ref 21–32)
CREAT SERPL-MCNC: 1.3 MG/DL (ref 0.8–1.3)
DEPRECATED RDW RBC AUTO: 17.9 % (ref 12.4–15.4)
EOSINOPHIL # BLD: 0.3 K/UL (ref 0–0.6)
EOSINOPHIL NFR BLD: 2.8 %
GFR SERPLBLD CREATININE-BSD FMLA CKD-EPI: 54 ML/MIN/{1.73_M2}
GLUCOSE SERPL-MCNC: 106 MG/DL (ref 70–99)
HCT VFR BLD AUTO: 37.6 % (ref 40.5–52.5)
HDLC SERPL-MCNC: 41 MG/DL (ref 40–60)
HGB BLD-MCNC: 12.1 G/DL (ref 13.5–17.5)
LDLC SERPL CALC-MCNC: 68 MG/DL
LYMPHOCYTES # BLD: 2.6 K/UL (ref 1–5.1)
LYMPHOCYTES NFR BLD: 26.3 %
MCH RBC QN AUTO: 26.5 PG (ref 26–34)
MCHC RBC AUTO-ENTMCNC: 32.1 G/DL (ref 31–36)
MCV RBC AUTO: 82.5 FL (ref 80–100)
MONOCYTES # BLD: 0.9 K/UL (ref 0–1.3)
MONOCYTES NFR BLD: 9 %
NEUTROPHILS # BLD: 6 K/UL (ref 1.7–7.7)
NEUTROPHILS NFR BLD: 61.1 %
PLATELET # BLD AUTO: 365 K/UL (ref 135–450)
PMV BLD AUTO: 8.2 FL (ref 5–10.5)
POTASSIUM SERPL-SCNC: 3.8 MMOL/L (ref 3.5–5.1)
PROT SERPL-MCNC: 6.8 G/DL (ref 6.4–8.2)
RBC # BLD AUTO: 4.55 M/UL (ref 4.2–5.9)
SODIUM SERPL-SCNC: 140 MMOL/L (ref 136–145)
TRIGL SERPL-MCNC: 92 MG/DL (ref 0–150)
TSH SERPL DL<=0.005 MIU/L-ACNC: 1.69 UIU/ML (ref 0.27–4.2)
VLDLC SERPL CALC-MCNC: 18 MG/DL
WBC # BLD AUTO: 9.8 K/UL (ref 4–11)

## 2025-02-19 PROCEDURE — 80053 COMPREHEN METABOLIC PANEL: CPT

## 2025-02-19 PROCEDURE — 85025 COMPLETE CBC W/AUTO DIFF WBC: CPT

## 2025-02-19 PROCEDURE — 80061 LIPID PANEL: CPT

## 2025-02-19 PROCEDURE — 36415 COLL VENOUS BLD VENIPUNCTURE: CPT

## 2025-02-19 PROCEDURE — 84443 ASSAY THYROID STIM HORMONE: CPT

## 2025-02-19 PROCEDURE — 76775 US EXAM ABDO BACK WALL LIM: CPT

## 2025-02-19 NOTE — TELEPHONE ENCOUNTER
Central scheduling asking for reorder for ultrasound due to it saying     Reordered and new order has no problems.

## 2025-02-20 ENCOUNTER — TELEPHONE (OUTPATIENT)
Dept: VASCULAR SURGERY | Age: 85
End: 2025-02-20

## 2025-02-20 ENCOUNTER — TELEPHONE (OUTPATIENT)
Dept: CARDIOLOGY CLINIC | Age: 85
End: 2025-02-20

## 2025-02-20 DIAGNOSIS — I71.43 INFRARENAL ABDOMINAL AORTIC ANEURYSM (AAA) WITHOUT RUPTURE: Primary | ICD-10-CM

## 2025-02-20 DIAGNOSIS — I71.40 ABDOMINAL AORTIC ANEURYSM (AAA) WITHOUT RUPTURE, UNSPECIFIED PART: Primary | ICD-10-CM

## 2025-02-20 NOTE — RESULT ENCOUNTER NOTE
Matthew Mendieta MD  P [facility] Staff  Blood test stable compared to before  Cholesterol numbers look good. Blood sugar borderline high. Borderline anemia but stable  Make sure patient is not having blood in stool  Colonoscopy as preventive if not done is recommended.   Seven perera

## 2025-02-20 NOTE — RESULT ENCOUNTER NOTE
Matthew Mendieta MD  P [facility] Cardio Practice Staff  I spoke to patient advised him that his Abdominal aneurysm has gotten larger and he should see Dr Viral Kumar for his advice. It probably needs fixed now. Please make a referral to Dr Kumar  for AAA and also call patient give phone number of Dr Kumar to call his office for appointment.    Seven perera

## 2025-02-20 NOTE — TELEPHONE ENCOUNTER
Called patient regarding appointment for cta abdomin/pelvis with contrast at [facility] for Friday 2/21/2025 at 2:00pm and arrive at 1:30pm. Npo 4 hrs prior. Pamlr

## 2025-02-20 NOTE — TELEPHONE ENCOUNTER
----- Message from Dr. Matthew Mendieta MD sent at 2/20/2025  9:29 AM EST -----  I spoke to patient advised him that his Abdominal aneurysm has gotten larger and he should see Dr Viral Kumar for his advice. It probably needs fixed now. Please make a referral to Dr Kumra  for AAA and also call patient give phone number of Dr Kumar to call his office for appointment.  ----- Message -----  From: Janice Esparza Incoming Orders Results To Radiant  Sent: 2/20/2025   8:57 AM EST  To: Andrew Raygoza MD

## 2025-02-21 ENCOUNTER — HOSPITAL ENCOUNTER (OUTPATIENT)
Dept: CT IMAGING | Age: 85
Discharge: HOME | End: 2025-02-21
Attending: SURGERY
Payer: MEDICARE

## 2025-02-21 DIAGNOSIS — I71.43 INFRARENAL ABDOMINAL AORTIC ANEURYSM (AAA) WITHOUT RUPTURE: ICD-10-CM

## 2025-02-21 PROCEDURE — 6360000004 HC RX CONTRAST MEDICATION: Performed by: SURGERY

## 2025-02-21 PROCEDURE — 74174 CTA ABD&PLVS W/CONTRAST: CPT

## 2025-02-21 RX ORDER — IOPAMIDOL 755 MG/ML
75 INJECTION, SOLUTION INTRAVASCULAR
Status: COMPLETED | OUTPATIENT
Start: 2025-02-21 | End: 2025-02-21

## 2025-02-21 RX ADMIN — IOPAMIDOL 75 ML: 755 INJECTION, SOLUTION INTRAVENOUS at 14:19

## 2025-02-24 ENCOUNTER — TELEPHONE (OUTPATIENT)
Dept: FAMILY MEDICINE CLINIC | Age: 85
End: 2025-02-24

## 2025-02-24 ENCOUNTER — TELEPHONE (OUTPATIENT)
Dept: VASCULAR SURGERY | Age: 85
End: 2025-02-24

## 2025-02-24 DIAGNOSIS — I71.43 INFRARENAL ABDOMINAL AORTIC ANEURYSM (AAA) WITHOUT RUPTURE: Primary | ICD-10-CM

## 2025-02-24 DIAGNOSIS — L98.9 SKIN LESION: Primary | ICD-10-CM

## 2025-02-24 NOTE — TELEPHONE ENCOUNTER
Dr. Kumar's office called to make Dr. Restrepo aware of the results of the CTA abdominal scan that was performed on 02.21.25. Please see the result impression for possible additional diagnosis.

## 2025-02-24 NOTE — TELEPHONE ENCOUNTER
Called patient with results of cta per Dr Kumar. AAA does not need fixed yet due to size. Repeat cta in 6 months. 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 Spoke with Dr Anna office and spoke with Magdalena and she is going to make sure Dr Anna sees this and makes any further recommendations. Pammichael

## 2025-02-25 ENCOUNTER — TELEPHONE (OUTPATIENT)
Dept: FAMILY MEDICINE CLINIC | Age: 85
End: 2025-02-25

## 2025-02-25 NOTE — TELEPHONE ENCOUNTER
Pt called to say that he called the dermatologist that he was referred to. They cannot get him in to be seen until May 6th. Pt stated he did not take that appt as he wants to be seen sooner. What should he do?

## 2025-02-28 ENCOUNTER — OFFICE VISIT (OUTPATIENT)
Dept: PRIMARY CARE CLINIC | Age: 85
End: 2025-02-28

## 2025-02-28 VITALS
OXYGEN SATURATION: 93 % | HEIGHT: 67 IN | BODY MASS INDEX: 33.74 KG/M2 | DIASTOLIC BLOOD PRESSURE: 58 MMHG | HEART RATE: 62 BPM | WEIGHT: 215 LBS | SYSTOLIC BLOOD PRESSURE: 130 MMHG

## 2025-02-28 DIAGNOSIS — N28.9 KIDNEY LESION, NATIVE, RIGHT: ICD-10-CM

## 2025-02-28 DIAGNOSIS — B36.9 FUNGAL DERMATITIS: ICD-10-CM

## 2025-02-28 DIAGNOSIS — K86.2 PANCREATIC CYST: Primary | ICD-10-CM

## 2025-02-28 DIAGNOSIS — F41.9 ANXIETY: ICD-10-CM

## 2025-02-28 RX ORDER — CLOTRIMAZOLE AND BETAMETHASONE DIPROPIONATE 10; .5 MG/ML; MG/ML
LOTION TOPICAL
Qty: 30 ML | Refills: 0 | Status: SHIPPED | OUTPATIENT
Start: 2025-02-28

## 2025-02-28 RX ORDER — DOXYCYCLINE HYCLATE 100 MG
100 TABLET ORAL 2 TIMES DAILY
Qty: 14 TABLET | Refills: 0 | Status: SHIPPED | OUTPATIENT
Start: 2025-02-28 | End: 2025-03-07

## 2025-02-28 RX ORDER — LORAZEPAM 0.5 MG/1
0.5 TABLET ORAL ONCE
Qty: 1 TABLET | Refills: 0 | Status: SHIPPED | OUTPATIENT
Start: 2025-02-28 | End: 2025-02-28

## 2025-03-03 ENCOUNTER — TELEPHONE (OUTPATIENT)
Dept: FAMILY MEDICINE CLINIC | Age: 85
End: 2025-03-03

## 2025-03-03 DIAGNOSIS — N28.9 KIDNEY LESION: Primary | ICD-10-CM

## 2025-03-03 DIAGNOSIS — K86.9 PANCREATIC LESION: ICD-10-CM

## 2025-03-03 NOTE — TELEPHONE ENCOUNTER
Alfred from Central scheduling needs the Mri order to say with contrast and W/O  (P) [phone] ext [phone] (f) [phone]

## 2025-03-06 ENCOUNTER — TELEPHONE (OUTPATIENT)
Dept: FAMILY MEDICINE CLINIC | Age: 85
End: 2025-03-06

## 2025-03-06 DIAGNOSIS — K86.9 PANCREATIC LESION: ICD-10-CM

## 2025-03-06 DIAGNOSIS — N28.9 KIDNEY LESION: Primary | ICD-10-CM

## 2025-03-06 NOTE — TELEPHONE ENCOUNTER
KINGSI....[facility] scheduling called to advise pt will not be able to have MRI due to pacemaker and inspire being too close together.

## 2025-03-10 ENCOUNTER — TELEPHONE (OUTPATIENT)
Dept: FAMILY MEDICINE CLINIC | Age: 85
End: 2025-03-10

## 2025-03-10 RX ORDER — MUPIROCIN 20 MG/G
OINTMENT TOPICAL
Qty: 30 G | Refills: 0 | Status: SHIPPED | OUTPATIENT
Start: 2025-03-10 | End: 2025-03-17

## 2025-03-10 NOTE — TELEPHONE ENCOUNTER
Patient is calling for an alternative for Clotrimazole-Betamethasone, he states that it is not working the rash on his bottom has gotten worse.    [phone] ([place])

## 2025-03-11 ENCOUNTER — TELEPHONE (OUTPATIENT)
Dept: FAMILY MEDICINE CLINIC | Age: 85
End: 2025-03-11

## 2025-03-11 DIAGNOSIS — N28.9 KIDNEY LESION: Primary | ICD-10-CM

## 2025-03-11 DIAGNOSIS — K86.9 PANCREATIC LESION: ICD-10-CM

## 2025-03-11 NOTE — TELEPHONE ENCOUNTER
Spoke to Mathew Burns, radiologist at [facility] and he advised a CT of abdomen with pancreatic protocol. It is pended

## 2025-03-11 NOTE — TELEPHONE ENCOUNTER
Dr Burns called, wants new order for CT of abdomen/pelvis with and without contract (pancreatic and renal protocol)

## 2025-03-11 NOTE — TELEPHONE ENCOUNTER
Is there a way to message or call radiology department to clarify the next best solution for imaging the pancreas and the kidney which both have lesions and the recommendation was for an MRI?

## 2025-03-12 ENCOUNTER — TELEPHONE (OUTPATIENT)
Dept: FAMILY MEDICINE CLINIC | Age: 85
End: 2025-03-12

## 2025-03-12 NOTE — TELEPHONE ENCOUNTER
Patient just had a CTA on 2/21, central scheduling is calling to make sure you are still wanting this and that it is not too soon and if you want later could you please let them know when. Patient would like to be looped in as well.    [phone] (home)

## 2025-03-12 NOTE — TELEPHONE ENCOUNTER
LM for pt to call back - this is a necessary test (see previous message) Radiologist is waiting for pt to schedule

## 2025-03-12 NOTE — TELEPHONE ENCOUNTER
Pt called, adv pt that the CT is important and that he needs to call and get it scheduled. Gave pt number to scheduling and he is calling to schedule now

## 2025-03-17 ENCOUNTER — TELEPHONE (OUTPATIENT)
Dept: FAMILY MEDICINE CLINIC | Age: 85
End: 2025-03-17

## 2025-03-17 RX ORDER — ANORECTAL OINTMENT 15.7; .44; 24; 20.6 G/100G; G/100G; G/100G; G/100G
OINTMENT TOPICAL 2 TIMES DAILY
Qty: 100 G | Refills: 0 | Status: SHIPPED | OUTPATIENT
Start: 2025-03-17 | End: 2025-03-24

## 2025-03-17 NOTE — TELEPHONE ENCOUNTER
Patient is requesting a rx for   mupirocin (BACTROBAN) 2 % ointment    Last seen 2/5/2025    Next visit 5/7/2025   Pt states that it is not working that good if KEILY call in  something else

## 2025-03-18 NOTE — PROGRESS NOTES
[facility]   Electrophysiology Outpatient Note              Date:  March 20, 2025  Patient name: Demetrio Hillman  YOB: 1940    Primary Care physician: Tu Restrepo MD    HISTORY OF PRESENT ILLNESS: The patient is a 84 y.o.  male with a history of AF, sinus node dysfunction, HTN, DM, COPD and sleep apnea.   In 8/2022, patient was diagnosed with rapid atrial fibrillation.  Echo showed an EF of 55 to 60% and no wall motion abnormality.  In 12/2024, patient had a dual-chamber pacemaker implanted for sinus node dysfunction.  Post implant, amiodarone was started for the suppression of atrial fibrillation.    Today he is being seen for AF and sinus node dysfunction. EKG shows AP VS with a HR of 60. Patient reports that he is not taking amiodarone.  I believe it was discontinued erroneously based on chart review.  He has been feeling well.  Denies chest pain, palpitations, or dizziness.  He does have some shortness of breath when he wakes up in the morning.    Device check today shows:   Brand: [employer]  Mode: DDD  Normal function   50% AP  1%     Arrhythmias: AF 1.4%  Battery life 11 years 7 months        Past Medical History:   has a past medical history of Chronic atrial fibrillation (HCC), COPD (chronic obstructive pulmonary disease) (HCC), Decreased hearing of both ears, Diabetes mellitus (HCC), GERD (gastroesophageal reflux disease), Histoplasmosis, Hyperlipidemia, Hypertension, and Primary malignant neoplasm of skin of trunk.    Past Surgical History:   has a past surgical history that includes Lung removal, partial (2007); Vasectomy; TURP (2002); Upper gastrointestinal endoscopy (01/26/2015); Upper gastrointestinal endoscopy (02/2015); Inguinal hernia repair (Bilateral, 06/22/2015); Colonoscopy (05/22/2012); Colonoscopy (07/21/2015); Upper gastrointestinal endoscopy (07/21/2017); Upper gastrointestinal endoscopy (08/23/2017); Cystoscopy (N/A, 10/24/2019); laryngoscopy (N/A,

## 2025-03-20 ENCOUNTER — OFFICE VISIT (OUTPATIENT)
Dept: CARDIOLOGY CLINIC | Age: 85
End: 2025-03-20

## 2025-03-20 ENCOUNTER — CLINICAL SUPPORT (OUTPATIENT)
Dept: CARDIOLOGY CLINIC | Age: 85
End: 2025-03-20

## 2025-03-20 VITALS
OXYGEN SATURATION: 96 % | HEIGHT: 67 IN | WEIGHT: 214.07 LBS | BODY MASS INDEX: 33.6 KG/M2 | SYSTOLIC BLOOD PRESSURE: 110 MMHG | HEART RATE: 60 BPM | DIASTOLIC BLOOD PRESSURE: 78 MMHG

## 2025-03-20 DIAGNOSIS — Z95.0 PACEMAKER: Primary | ICD-10-CM

## 2025-03-20 DIAGNOSIS — I49.5 SINUS NODE DYSFUNCTION (HCC): ICD-10-CM

## 2025-03-20 DIAGNOSIS — I48.0 PAF (PAROXYSMAL ATRIAL FIBRILLATION) (HCC): ICD-10-CM

## 2025-03-20 DIAGNOSIS — I49.5 SINUS NODE DYSFUNCTION (HCC): Primary | ICD-10-CM

## 2025-03-20 DIAGNOSIS — G47.33 OSA (OBSTRUCTIVE SLEEP APNEA): ICD-10-CM

## 2025-03-20 DIAGNOSIS — I10 ESSENTIAL HYPERTENSION: ICD-10-CM

## 2025-03-20 RX ORDER — AMIODARONE HYDROCHLORIDE 200 MG/1
200 TABLET ORAL DAILY
Qty: 90 TABLET | Refills: 3 | Status: SHIPPED | OUTPATIENT
Start: 2025-03-20

## 2025-03-20 NOTE — PATIENT INSTRUCTIONS
RESUME amiodarone 200 mg once a day     Remote device transmissions every three months     Monitor BP at home and call if consistently out of goal ranges    Follow up in 6 months

## 2025-03-21 ENCOUNTER — HOSPITAL ENCOUNTER (OUTPATIENT)
Dept: CT IMAGING | Age: 85
Discharge: HOME | End: 2025-03-21
Attending: FAMILY MEDICINE
Payer: MEDICARE

## 2025-03-21 DIAGNOSIS — N28.9 KIDNEY LESION: ICD-10-CM

## 2025-03-21 DIAGNOSIS — K86.9 PANCREATIC LESION: ICD-10-CM

## 2025-03-21 PROCEDURE — 6360000004 HC RX CONTRAST MEDICATION: Performed by: FAMILY MEDICINE

## 2025-03-21 PROCEDURE — 74178 CT ABD&PLV WO CNTR FLWD CNTR: CPT

## 2025-03-21 RX ORDER — IOPAMIDOL 755 MG/ML
100 INJECTION, SOLUTION INTRAVASCULAR
Status: COMPLETED | OUTPATIENT
Start: 2025-03-21 | End: 2025-03-21

## 2025-03-21 RX ADMIN — IOPAMIDOL 100 ML: 755 INJECTION, SOLUTION INTRAVENOUS at 17:03

## 2025-03-24 ENCOUNTER — RESULTS FOLLOW-UP (OUTPATIENT)
Dept: CT IMAGING | Age: 85
End: 2025-03-24

## 2025-03-31 PROBLEM — N18.31 STAGE 3A CHRONIC KIDNEY DISEASE (CKD) (HCC): Chronic | Status: ACTIVE | Noted: 2022-08-29

## 2025-03-31 NOTE — PROGRESS NOTES
Demetrio Hillman is a 85 y.o. who comes in today for Follow-up, COPD, and new to provider   He is a former patient of Dr. Burt's with significant medical history of DM, HTN, diastolic dysfunction , afib RIZWANA and COPD.  He has Inspire for his RIZWANA and is followed by Dr. Valdez/Bentley.  He was last seen in office 2/2023 for his COPD.  Seen 4/2024 for his RIZWANA.  He is a former smoker, quit 1/1/2003. He had dual chamber pacemaker implanted on 12/30/24 for his ongoing sinus node dysfunction. He is followed by cardiology.   He continues Trelegy and  not using albuterol. States his breathing has been doing well.  States in the morning he has some SOB, however after taking his Trelegy he is good.  Denies cough, increased SOB, or wheezing.           Past Medical History:   Diagnosis Date    Chronic atrial fibrillation (HCC)     COPD (chronic obstructive pulmonary disease) (HCC)     Decreased hearing of both ears     Diabetes mellitus (HCC)     GERD (gastroesophageal reflux disease)     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    Hyperlipidemia     Hypertension     Primary malignant neoplasm of skin of trunk 04/21/2009        Past Surgical History:   Procedure Laterality Date    COLONOSCOPY  05/22/2012    polyps    COLONOSCOPY  07/21/2015    polyps    CYSTOSCOPY N/A 10/24/2019    CYSTOSCOPY AND CLOT EVACUATION performed by Wiliam Gonsalves MD at [facility] OR    EP DEVICE PROCEDURE N/A 12/30/2024    Insert PPM dual performed by Malou Abernathy MD at [facility] CARDIAC CATH LAB    INGUINAL HERNIA REPAIR Bilateral 06/22/2015    laparoscopic    LARYNGOSCOPY N/A 12/20/2022    DRUG INDUCED SLEEP ENDOSCOPY performed by Shanel Leger DO at [facility] OR    LUNG REMOVAL, PARTIAL  2007    middle lobe R lung/histoplasmosis    STIMULATOR SURGERY N/A 1/23/2023    INSPIRE DEVICE PLACEMENT performed by Shanel Leger DO at [facility] OR    TURP  2002    UPPER GASTROINTESTINAL ENDOSCOPY  01/26/2015    UPPER GASTROINTESTINAL ENDOSCOPY  02/2015    dilated    UPPER

## 2025-04-01 ENCOUNTER — OFFICE VISIT (OUTPATIENT)
Dept: PULMONOLOGY | Age: 85
End: 2025-04-01
Payer: MEDICARE

## 2025-04-01 VITALS
TEMPERATURE: 97.6 F | OXYGEN SATURATION: 94 % | HEART RATE: 60 BPM | RESPIRATION RATE: 16 BRPM | BODY MASS INDEX: 33.27 KG/M2 | SYSTOLIC BLOOD PRESSURE: 134 MMHG | WEIGHT: 212 LBS | HEIGHT: 67 IN | DIASTOLIC BLOOD PRESSURE: 74 MMHG

## 2025-04-01 DIAGNOSIS — Z87.891 FORMER SMOKER, STOPPED SMOKING IN DISTANT PAST: ICD-10-CM

## 2025-04-01 DIAGNOSIS — E66.09 CLASS 1 OBESITY DUE TO EXCESS CALORIES WITH SERIOUS COMORBIDITY AND BODY MASS INDEX (BMI) OF 33.0 TO 33.9 IN ADULT: ICD-10-CM

## 2025-04-01 DIAGNOSIS — G47.33 OSA (OBSTRUCTIVE SLEEP APNEA): ICD-10-CM

## 2025-04-01 DIAGNOSIS — E66.811 CLASS 1 OBESITY DUE TO EXCESS CALORIES WITH SERIOUS COMORBIDITY AND BODY MASS INDEX (BMI) OF 33.0 TO 33.9 IN ADULT: ICD-10-CM

## 2025-04-01 DIAGNOSIS — J44.9 COPD, MODERATE (HCC): Primary | ICD-10-CM

## 2025-04-01 DIAGNOSIS — I10 ESSENTIAL HYPERTENSION: ICD-10-CM

## 2025-04-01 DIAGNOSIS — Z86.19 H/O HISTOPLASMOSIS: ICD-10-CM

## 2025-04-01 PROBLEM — J41.0 SIMPLE CHRONIC BRONCHITIS (HCC): Status: RESOLVED | Noted: 2018-07-27 | Resolved: 2025-04-01

## 2025-04-01 PROCEDURE — 99214 OFFICE O/P EST MOD 30 MIN: CPT | Performed by: NURSE PRACTITIONER

## 2025-04-01 PROCEDURE — 3078F DIAST BP <80 MM HG: CPT | Performed by: NURSE PRACTITIONER

## 2025-04-01 PROCEDURE — 1159F MED LIST DOCD IN RCRD: CPT | Performed by: NURSE PRACTITIONER

## 2025-04-01 PROCEDURE — 3075F SYST BP GE 130 - 139MM HG: CPT | Performed by: NURSE PRACTITIONER

## 2025-04-01 PROCEDURE — 1123F ACP DISCUSS/DSCN MKR DOCD: CPT | Performed by: NURSE PRACTITIONER

## 2025-04-01 RX ORDER — ALBUTEROL SULFATE 90 UG/1
2 INHALANT RESPIRATORY (INHALATION) 4 TIMES DAILY PRN
Qty: 18 G | Refills: 1 | Status: SHIPPED | OUTPATIENT
Start: 2025-04-01

## 2025-04-01 ASSESSMENT — ENCOUNTER SYMPTOMS
CHEST TIGHTNESS: 0
RHINORRHEA: 0
ABDOMINAL PAIN: 0
SHORTNESS OF BREATH: 0
EYE PAIN: 0
COUGH: 0
SORE THROAT: 0
WHEEZING: 0

## 2025-04-01 NOTE — PROGRESS NOTES
[facility] Communication:  The following orders are received by verbal communication from Makayla Lipscomb CNP    Orders include:    6 month f/u

## 2025-04-01 NOTE — PATIENT INSTRUCTIONS
Call with worsening symptoms such as increased shortness of breath, productive cough, wheezing or symptoms not responding to treatment plan.     Your current pulmonary medications are controlling/treating your Trelegy.  Stay compliant.  Reviewed present pulmonary medications and side effects.  Reviewed proper inhaler usage.    Follow up with Dr. Hagan as scheduled for Inspire follow up.   Advised to avoid driving when too sleepy to function safely. Discussed the risks of untreated apnea such as accidents, cognitive impairment, mood impairment, high blood pressure, various cardiac diseases and stroke.     Regarding weight, recommend trying formal program and increase physical activity by adding a 30 minute walk to  daily routine.Weight loss was encouraged as a long term approach to treatment of RIZWANA. Explained the correlation between obesity and apnea and the causative role it can play.     Blood pressure today is controlled, continue current medication regimen per cardiology.     Return in 6 months     In the next few weeks, you will be receiving a survey from [facility] regarding your visit today.  We would greatly appreciate it if you would take just a few minutes to fill that out.  It is very important to us that our patients receive top notch care and our surveys help keep us accountable. However, if your experience was not a good one, we want to hear about that as well. This is a key way we can keep track of problems and strive to correct any for future visits.    Again, we appreciate your time and thank you for choosing [facility]!    AUGUSTO Ratliff

## 2025-04-03 DIAGNOSIS — K21.9 GASTROESOPHAGEAL REFLUX DISEASE WITHOUT ESOPHAGITIS: ICD-10-CM

## 2025-04-03 RX ORDER — PANTOPRAZOLE SODIUM 40 MG/1
40 TABLET, DELAYED RELEASE ORAL DAILY
Qty: 90 TABLET | Refills: 1 | Status: SHIPPED | OUTPATIENT
Start: 2025-04-03

## 2025-04-03 NOTE — TELEPHONE ENCOUNTER
Chief Complaint  He shouldn't reports that even though he at times hears noises that or perceptual disturbance as well as poor sleep for the most part he is functioning very well. Activated works at to his ability little bit but primarily his way of coping is stool learn to play guitar, reaching out to his ability to other people and has a goal to reduce weight. He is keeping himself clean and turning again to Mosque and spiritual teachings for achieving emotional balance.  He feels that coming to our office is reassuring and supportive to the patient and keeps him going. Medications denies side effects except that Seroquel makes him groggy if he takes it later in the night. Today we've reflected with the patient were he comes from and his past mental health issues including substance use disorders and behavioral challenges but also discussed how far patient went ever since into positive direction Medication Management      History of Present Illness    Chief Complaint  intrusive thoughts, auditory hallucinations, residual symptoms, chronic course Medication Management   [place] utilizing CBT technique. Able to reason with himself when hallucinating and able to now reason with not loosing touch with reality even in midst of hearing voices. Pt feels medications are very helpful even regularly having hallucinations.       Patient presents for follow-up . Reasonably at his baseline schizophrenia remnants of symptoms even on best possibly adjusted meds.     Attends clinic since 1/24/2006 after IP Psych Hosp.      Psychotic Disorder: Schizophrenia   Several IP psychiatric hospitalizations due to overt psychosis but last one years ago Last 2006      Allergies  1. Penicillins    Physical Exam    Orientation: Oriented x3.   Memory: short term memory intact.   Attention/Concentration: the attention span was normal.   Observed mood and affect: anxious and was appropriate, but not depressed.   Observed appearance/grooming:  Last visit 2/5/25  Future 5/7/25   neat Much improved since admition.   The patient's psychomotor tone exhibited normal psychomotor tone   The patient's speech exhibited a normal rate   Thought processes: The patient exhibited. Hallucinations persist.   Associations: Evaluation of thought association showed no deficiency.   Thought Content: The patient exhibited delusions, exhibited hallucinations, admitted to obsessions, denied preoccupation with violent thoughts, denied suicidal ideation, denied suicidal intent, denied suicidal plans and denied homicidal ideation.   Judgment/Insight:The patient demonstrated intact judgment and intact insight.     INSTRUCTIONS: Patient should be observed while they are seated, and then standing while engaged in neutral conversation (for a minimum of two minutes in each position). Symptoms observerd in other situations, for example while engaged in activity on the song, may also be rated. Subsequently, the subjective phenomena should be elicited by direct questioning. INSTRUCTIONS: Patient should be observed while they are seated, and then standing while engaged in neutral conversation (for a minimum of two minutes in each position). Symptoms observed in other situations, for example while engaged in activity on the song, may also be rated. Subsequently, the subjective phenomena should be elicited by direct questioning.   Objective:   0 - Normal, occasional fidgety movements of the limbs.   Subjective: Awareness of restlessness   1 - Non-specific sense of inner restlessness.   Global Clinical Assessment of Akathisia:   0 - Absens. No evidence of awareness of restlessness. Observation of characteristic movements of akathisia in the absence of a subjective report of inner restlessness or compulsive desire to move the legs should be classified as pseudoakathisia.      Assessment  1. Schizophrenia (295.90)   reasonably stable on Meds, with some residual symptoms even on medication    Plan   Â·: Abilify 2 MG Oral Tablet;  TAKE 0.5 TABLET BEDTIME     Â· Invega 6 MG Oral Tablet Extended Release 24 Hour; TAKE 1 TABLET DAILY      Â· Modafinil 100 MG Oral Tablet; TAKE 1 TABLET DAILY     Â· QUEtiapine Fumarate 300 MG Oral Tablet (Seroquel 300 MG Oral Tablet); TAKE 1  TABLET AT BEDTIME      MEDICATION: Maintenance.   PSYCHOTHERAPY STRATEGIES AND TECHNIQUES USED:   [employer] Work .   Cognitive Restructuring .   Goal Setting .   Psychoeducation .   Good response.           Time    Total time of encounter was 25 min minutes.   F/U in 3 months          Allergies   1. Penicillins    Screening  PHQ-9 Depression Screening:   Over the past 2 weeks, how often have you been bothered by the following problems?   1.) Little interest or pleasure in doing things? Not at all.   2.) Feeling down, depressed or hopeless? Not at all.   3.) Trouble falling asleep or sleeping too much? Half the days or more.   4.) Feeling tired or having little energy? Not at all.   5.) Poor appetite or overeating? Not at all.   6.) Feeling bad about yourself, or that you are a failure, or have let yourself or your family down? Not at all.   7.) Trouble concentrating on things, such as reading a newspaper or watching television? Not at all.   8.) Moving or speaking so slowly that other people could have noticed, or the opposite, moving or speaking faster than usual? Not at all.   9.) Thoughts that you would be better off dead or of hurting yourself in some way? Not at all.      Physical Exam    ABNORMAL INVOLUNTARY MOVEMENT SCALE (AIMS)    Code:   0 = None   1 = Minimal, May be extreme normal   2 = Mild   3 = Moderate   4 = Severe    Facial and Oral Movements   1. Muscles of Facial Expression e.g. movements of forehead, eyebrows, periorbital area, cheeks, including frowning, blinking smiling, grimacing. Movement ratin   Extremity Movements   Trunk Movements   7. Neck, shoulders, hips e.g., rocking, twisting, squirming, pelvic gyrations Movement ratin   Global Judgements    8. Severity of abnormal movements overall Movement ratin   Dental Status       Objective:   0 - Normal, occasional fidgety movements of the limbs.   Subjective: Awareness of restlessness   0 - Absence of inner restlessness.   Subjective: Distress related to restlessness   0 - No distress.   Global Clinical Assessment of Akathisia:   0 - Absens. No evidence of awareness of restlessness. Observation of characteristic movements of akathisia in the absence of a subjective report of inner restlessness or compulsive desire to move the legs should be classified as pseudoakathisia.     Psychiatric:. the patient's orientation, memory, attention, language and fund of knowledge were normal. mood and affect were appropriate. the speech was normal. the patient's thought processes were normal. associations were intact with no tangential or circumstantial thinking. no hallucinations or delusions and no homicidal or suicidal thoughts . insight and judgment were intact.      Assessment   1. Schizoaffective disorder, unspecified type (295.70) (F25.9)   · Assessed By: IRENE SIFUENTES (Behavioral Health); Last Assessed: 14 Aug 2017   2. 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       Hypothyroidism, hx of surgery  Overweight, 265 lbs  HTN  High Cholesterol  Sleep apnea.        Social environment problems.              Plan   · From  Melatonin 5 MG Oral Tablet TAKE AS DIRECTED To Melatonin 10 MG  Oral Tablet TAKE 1 TABLET BEDTIME   Rx By: IRENE SIFUENTES; Dispense: 1 Days ; #:1 Tablet; Refill: 0; ROSI = N; Record    MEDICATION: Increased.   Alternative treatment options discussed.     PSYCHOTHERAPY STRATEGIES AND TECHNIQUES USED:   Goal Setting .    Problem Solving .    Psychoeducation .                    14  Abilify 2.5 mg q AM  Invega 6 mg  Seroquel 300 q HS  Provigil 100 mg q AM  14  250 lbs,  that is reduction by 8 lbs since last appointment   Abilify 2.5 mg q AM  Invega 6 mg  Seroquel 300 q HS  Provigil 100 mg q AM  Appointment with PCP 12/03/14 , blood work pending then  Patient is seeing new psychotherapist Jean-Pierre Schilling PhD  [phone] phone [phone] fax  Patient is to follow up with me in 3 months  Other Diovan, Zetia, Simvastatin, Flonase,pantoprazole due to GERD, Vit D, Niacin, Multi, Vit C    2/16/15  Abilify 2.5 mg q AM  Invega 6 mg  Seroquel 300 q HS  Provigil 100 mg q AM  restart Lovaza 1 gm q D  Discussed with Jean-Pierre Schilling PhD  [phone] phone in [facility]   [address] donation  F/U with me in 3 months   5/11/2015  Abilify 2.5 mg q AM  Invega 6 mg  Seroquel 300 q HS  Provigil 100 mg q AM  Advance Lovaza 2 gm q D  Discussed with Jean-Pierre Schilling PhD  [phone] phone in [facility]   8/31/15  Abilify 2.5 mg q AM  Invega 6 mg  Seroquel 300 q HS  Provigil 100 mg q AM  Advance Lovaza 2 gm q D  F/U with me in 3 months  12/10/15  start Effexor XR 37.5 , monitor for activation SE, or disturbance.  Abilify 2.5 mg q AM  Invega 6 mg  Seroquel 300 q HS  Provigil 100 mg q AM  Lovaza 2 gm q D  uses multivitamins   F/U in 3 weeks  1/5/16  Effexor XR 37.5 mg   f/u in 3 months  3/29/16  Modafinil per PCP  Effexor XR 37.5  Abilify 2.5 mg q AM  Invega 6 mg  Seroquel 300 q HS, last blood work several weeks ago, all was fine , weight is steady 258 lbs   Lovaza 2 gm q D  New PCP Dr Cardenas due to Dr Walton moved [facility] to [place] and patient adjusting to new Doctor, all good  f/u in 3 months  4/20/16  Medication we did not change today despite of symptoms anxiety, some perceptual changes and insomnia.  Today we had behavioral modification. Restart vitamins. Deemed bright screen TV.  Start melatonin 5 mg daily at bedtime.  Follow-up with me in 4 days   4/25/16  Dr Stringer [phone] discussed of labs to be sent from March, 2016 and Thyroid biopsy , awaiting  results  Patient now takes multivitamins, mood to daytime. He also can take magnesium 250 mg at night and melatonin 5 mg at night.  Do not watch TV 2 hours prior to going to bed.  Follow-up with me in 1 month  5/31/16  Utilizes Melatonin 5 mg with Magnesium 250 mg, and multivitamins earlier in te afternoon.  Effexor XR 37.5  Abilify 2.5 mg q AM  Invega 6 mg  Seroquel 300 q HS, last blood work several weeks ago, all was fine , weight is steady 258 lbs   Lovaza 2 gm q D   Emphasized structure for the week including Sabbath observance no changing routine !!  f/u in 3 - 4 months  9/12/16  Effexor XR 37.5  Abilify 2.5 mg q AM  Invega 6 mg, when patient was changed per pharmacy 1 months ago , had exacerbation of psychiatric sx, change back to Brand name   Seroquel 300 q HS  Lovaza 2 gm q D  Melatonin 5 mg with Magnesium 250 mg,  f/u in 3 months  12/12/16  as above  Form for jury duty prior  Now has a SSDI reevaluation  Improved activity level since gave up Alevism observation  Hygiene still not daily , but sufficient, is getting buy. Clean close but not all shower  This time due to risk factors so for winter cabin fever and depression will see patient in 2 months but if stable will change back to 3 months   2/20/17  Paliperidone ER 6 mg, generic , not Invega , : note The pharmacy in regards to the patient's make sure takes the same  generic medication as has been already given. The patient seemed to be responding to this particular generic medication but not in the past so I have a note to the pharmacy with the consistency of the   f/u in 2 months  4/24/17  Paliperidone ER 6 mg  Effexor XR 37.5  Abilify 2.5 mg q AM  Seroquel 300 q HS  Lovaza 2 gm q D  Melatonin 5 mg with Magnesium 250 mg,  5/30/17  medications as above   does do Yoga Lex[employer], $18/mo unlimited access to email  seeing nutritionist , 1800 tamanna /day keeps up  Patient does not see a therapist for the past 6 months due to he does not feel a  need.  He is also now interested in Worship Adventism that serves his function and philosophy and brings him calm i  Follow-up with me every 2 months  8/14/17  Paliperidone ER 6 mg  Effexor XR 37.5  Abilify 2.5 mg q AM  Seroquel 300 q HS, now weight 260 down from 288 lbs , now ar his baseline over the years   Lovaza 2 gm q D  Advance Melatonin 10 mg with Magnesium 250 mg,                    Time    total time of encounter was 35 minutes and 30 minutes was spent counseling.      Discussion/Summary  Patient positive outlook.      Signatures   Electronically signed by : IRENE SIFUENTES M.D.; Aug 14 2017  3:19PM CST

## 2025-04-05 ASSESSMENT — SLEEP AND FATIGUE QUESTIONNAIRES
HOW LIKELY ARE YOU TO NOD OFF OR FALL ASLEEP IN A CAR, WHILE STOPPED FOR A FEW MINUTES IN TRAFFIC: WOULD NEVER DOZE
HOW LIKELY ARE YOU TO NOD OFF OR FALL ASLEEP WHILE SITTING AND TALKING TO SOMEONE: WOULD NEVER DOZE
HOW LIKELY ARE YOU TO NOD OFF OR FALL ASLEEP IN A CAR, WHILE STOPPED FOR A FEW MINUTES IN TRAFFIC: WOULD NEVER DOZE
HOW LIKELY ARE YOU TO NOD OFF OR FALL ASLEEP WHILE SITTING AND TALKING TO SOMEONE: WOULD NEVER DOZE
HOW LIKELY ARE YOU TO NOD OFF OR FALL ASLEEP WHILE WATCHING TV: MODERATE CHANCE OF DOZING
HOW LIKELY ARE YOU TO NOD OFF OR FALL ASLEEP WHILE SITTING AND READING: HIGH CHANCE OF DOZING
HOW LIKELY ARE YOU TO NOD OFF OR FALL ASLEEP WHILE SITTING INACTIVE IN A PUBLIC PLACE: MODERATE CHANCE OF DOZING
HOW LIKELY ARE YOU TO NOD OFF OR FALL ASLEEP WHILE SITTING QUIETLY AFTER LUNCH WITHOUT ALCOHOL: SLIGHT CHANCE OF DOZING
HOW LIKELY ARE YOU TO NOD OFF OR FALL ASLEEP WHEN YOU ARE A PASSENGER IN A CAR FOR AN HOUR WITHOUT A BREAK: MODERATE CHANCE OF DOZING
HOW LIKELY ARE YOU TO NOD OFF OR FALL ASLEEP WHILE LYING DOWN TO REST IN THE AFTERNOON WHEN CIRCUMSTANCES PERMIT: MODERATE CHANCE OF DOZING
HOW LIKELY ARE YOU TO NOD OFF OR FALL ASLEEP WHILE SITTING AND READING: HIGH CHANCE OF DOZING
HOW LIKELY ARE YOU TO NOD OFF OR FALL ASLEEP WHILE LYING DOWN TO REST IN THE AFTERNOON WHEN CIRCUMSTANCES PERMIT: MODERATE CHANCE OF DOZING
ESS TOTAL SCORE: 12
HOW LIKELY ARE YOU TO NOD OFF OR FALL ASLEEP WHILE SITTING INACTIVE IN A PUBLIC PLACE: MODERATE CHANCE OF DOZING
HOW LIKELY ARE YOU TO NOD OFF OR FALL ASLEEP WHILE SITTING QUIETLY AFTER LUNCH WITHOUT ALCOHOL: SLIGHT CHANCE OF DOZING
HOW LIKELY ARE YOU TO NOD OFF OR FALL ASLEEP WHILE WATCHING TV: MODERATE CHANCE OF DOZING
HOW LIKELY ARE YOU TO NOD OFF OR FALL ASLEEP WHEN YOU ARE A PASSENGER IN A CAR FOR AN HOUR WITHOUT A BREAK: MODERATE CHANCE OF DOZING

## 2025-04-08 ENCOUNTER — OFFICE VISIT (OUTPATIENT)
Dept: PULMONOLOGY | Age: 85
End: 2025-04-08
Payer: MEDICARE

## 2025-04-08 VITALS
HEART RATE: 61 BPM | BODY MASS INDEX: 31.98 KG/M2 | TEMPERATURE: 97.7 F | SYSTOLIC BLOOD PRESSURE: 144 MMHG | OXYGEN SATURATION: 93 % | HEIGHT: 68 IN | DIASTOLIC BLOOD PRESSURE: 74 MMHG | RESPIRATION RATE: 18 BRPM | WEIGHT: 211 LBS

## 2025-04-08 DIAGNOSIS — G47.33 OSA (OBSTRUCTIVE SLEEP APNEA): ICD-10-CM

## 2025-04-08 DIAGNOSIS — Z45.42 ENCOUNTER FOR ADJUSTMENT AND MANAGEMENT OF NEUROSTIMULATOR: Primary | ICD-10-CM

## 2025-04-08 PROCEDURE — 3078F DIAST BP <80 MM HG: CPT | Performed by: INTERNAL MEDICINE

## 2025-04-08 PROCEDURE — 99213 OFFICE O/P EST LOW 20 MIN: CPT | Performed by: INTERNAL MEDICINE

## 2025-04-08 PROCEDURE — 3077F SYST BP >= 140 MM HG: CPT | Performed by: INTERNAL MEDICINE

## 2025-04-08 PROCEDURE — 1159F MED LIST DOCD IN RCRD: CPT | Performed by: INTERNAL MEDICINE

## 2025-04-08 PROCEDURE — 1123F ACP DISCUSS/DSCN MKR DOCD: CPT | Performed by: INTERNAL MEDICINE

## 2025-04-08 NOTE — PATIENT INSTRUCTIONS
Please remember to bring a list of medications and any CPAP or BiPAP machines to your next appointment with the office.     Out of respect for other patients and providers, we ask that you arrive no later than your scheduled appointment time.  If you arrive later than your appointment time, you may be asked to reschedule your appointment.     Late cancellations result in other patients being unable to utilize the appointment slot to see their physician.  Please avoid cancelling your appointment less than 1 week prior to the appointment date.  Patients with multiple missed appointments within 2 years may be dismissed from the practice.     In the next few weeks, you will be receiving a survey from [employer] regarding your visit today.  Your input is valuable to us & surveys are regularly reviewed by [facility] leadership.  It is very important to us that our patients receive excellent care.  If your experience was excellent, please let us know!  If your experience was not a good one, please tell us so we can make needed corrections. We hope you are comfortable recommending us to others for their healthcare needs.     We thank you for choosing [employer]!

## 2025-04-08 NOTE — PROGRESS NOTES
MA Communication:  The following orders are received by verbal communication from Julián Hagan MD        Orders include:        INSPIRE 1 YEAR FOLLOW UP   
afternoon when circumstances permit 2 2 0 0 2 0 3   Sitting and talking to someone 0 0 0 0 0 0 0   Sitting quietly after a lunch without alcohol 1 2 1 1 2 1 3   In a car, while stopped for a few minutes in traffic 0 0 0 0 0 0 0   [place] Sleepiness Score 12  11 7 6 6 7 15   Neck (Inches)     17.5 17 15.5       Patient-reported       Physical Exam:  Blood pressure (!) 144/74, pulse 61, temperature 97.7 °F (36.5 °C), temperature source Temporal, resp. rate 18, height 1.727 m (5' 8\"), weight 95.7 kg (211 lb), SpO2 93%.   Constitutional:  No acute distress.   HENT:  Oropharynx is clear and moist.  Tongue protrudes anteriorly without deviation to left or to right.  Tongue strength appears intact.    Eyes: Pupils equal, round, and reactive to light. No scleral icterus.   Neck: No tracheal deviation present.   Cardiovascular: Normal heart sounds.  No lower extremity edema.  Pulmonary/Chest: No wheezes. No rhonchi. No rales. No decreased breath sounds.  No accessory muscle usage or stridor.   Musculoskeletal: No cyanosis. No clubbing.  Skin: Skin is warm and dry.  The surgical sites for Inspire implantation are clean, dry and intact.    Psychiatric: Normal mood and affect.     Data:   HST 8/31/21 AHI 43    Titration Inspire 6/19/23 looked best @ 1.3 volts        Assessment:  Obstructive Sleep Apnea   Inspire implantation on 1/23/23 [place]  Inspire activation on 3/2/23    Plan:    Neurostimulator reprogramming; outgoing is changed - starting amplitude to 1.8  Starting amplitude: L5 1.3 volts in  [+ - +] electrode configuration.  Range 0.9-1.5 volts  Rate: Freq 33 hz; Pulse width 90    Start delay 30 min; Pause delay 15 min; Sleep time 8 h  Annual f/u

## 2025-04-16 DIAGNOSIS — I10 ESSENTIAL HYPERTENSION, BENIGN: ICD-10-CM

## 2025-04-16 RX ORDER — LOSARTAN POTASSIUM AND HYDROCHLOROTHIAZIDE 25; 100 MG/1; MG/1
1 TABLET ORAL DAILY
Qty: 90 TABLET | Refills: 1 | Status: SHIPPED | OUTPATIENT
Start: 2025-04-16

## 2025-04-22 RX ORDER — FLUTICASONE FUROATE, UMECLIDINIUM BROMIDE AND VILANTEROL TRIFENATATE 100; 62.5; 25 UG/1; UG/1; UG/1
POWDER RESPIRATORY (INHALATION)
Qty: 1 EACH | Refills: 5 | Status: SHIPPED | OUTPATIENT
Start: 2025-04-22

## 2025-04-22 NOTE — TELEPHONE ENCOUNTER
Last Office Visit  -  2/28/25  Next Office Visit  -  5/7/25    Last Filled  -    Last UDS -    Contract -

## 2025-05-07 ENCOUNTER — OFFICE VISIT (OUTPATIENT)
Dept: FAMILY MEDICINE CLINIC | Age: 85
End: 2025-05-07
Payer: MEDICARE

## 2025-05-07 VITALS
HEART RATE: 61 BPM | HEIGHT: 68 IN | OXYGEN SATURATION: 92 % | SYSTOLIC BLOOD PRESSURE: 128 MMHG | BODY MASS INDEX: 31.83 KG/M2 | WEIGHT: 210 LBS | DIASTOLIC BLOOD PRESSURE: 68 MMHG

## 2025-05-07 DIAGNOSIS — I10 ESSENTIAL HYPERTENSION, BENIGN: ICD-10-CM

## 2025-05-07 DIAGNOSIS — E11.59 TYPE 2 DIABETES MELLITUS WITH OTHER CIRCULATORY COMPLICATIONS (HCC): Primary | ICD-10-CM

## 2025-05-07 DIAGNOSIS — K21.9 GASTROESOPHAGEAL REFLUX DISEASE WITHOUT ESOPHAGITIS: ICD-10-CM

## 2025-05-07 LAB — HBA1C MFR BLD: 6.6 %

## 2025-05-07 PROCEDURE — 83036 HEMOGLOBIN GLYCOSYLATED A1C: CPT | Performed by: FAMILY MEDICINE

## 2025-05-07 PROCEDURE — 1123F ACP DISCUSS/DSCN MKR DOCD: CPT | Performed by: FAMILY MEDICINE

## 2025-05-07 PROCEDURE — 99214 OFFICE O/P EST MOD 30 MIN: CPT | Performed by: FAMILY MEDICINE

## 2025-05-07 PROCEDURE — 3078F DIAST BP <80 MM HG: CPT | Performed by: FAMILY MEDICINE

## 2025-05-07 PROCEDURE — 1159F MED LIST DOCD IN RCRD: CPT | Performed by: FAMILY MEDICINE

## 2025-05-07 PROCEDURE — 3044F HG A1C LEVEL LT 7.0%: CPT | Performed by: FAMILY MEDICINE

## 2025-05-07 PROCEDURE — 3074F SYST BP LT 130 MM HG: CPT | Performed by: FAMILY MEDICINE

## 2025-05-09 DIAGNOSIS — E11.59 TYPE 2 DIABETES MELLITUS WITH OTHER CIRCULATORY COMPLICATIONS (HCC): ICD-10-CM

## 2025-05-10 LAB
CREAT UR-MCNC: 46.4 MG/DL (ref 39–259)
MICROALBUMIN UR DL<=1MG/L-MCNC: <1.2 MG/DL
MICROALBUMIN/CREAT UR: NORMAL MG/G (ref 0–30)

## 2025-05-11 ENCOUNTER — RESULTS FOLLOW-UP (OUTPATIENT)
Dept: FAMILY MEDICINE CLINIC | Age: 85
End: 2025-05-11

## 2025-05-19 DIAGNOSIS — L98.9 SKIN LESION: ICD-10-CM

## 2025-05-19 RX ORDER — CLOBETASOL PROPIONATE 0.5 MG/G
OINTMENT TOPICAL
Qty: 30 G | Refills: 0 | Status: SHIPPED | OUTPATIENT
Start: 2025-05-19

## 2025-06-02 DIAGNOSIS — E11.65 UNCONTROLLED TYPE 2 DIABETES MELLITUS WITH HYPERGLYCEMIA (HCC): ICD-10-CM

## 2025-06-02 RX ORDER — METFORMIN HYDROCHLORIDE 500 MG/1
TABLET, EXTENDED RELEASE ORAL
Qty: 360 TABLET | Refills: 1 | Status: SHIPPED | OUTPATIENT
Start: 2025-06-02

## 2025-06-07 NOTE — TELEPHONE ENCOUNTER
I ended up sending both prescriptions, will cancel the previous ones.
abdominal pain since yesterday

## 2025-08-08 RX ORDER — KETOCONAZOLE 20 MG/G
CREAM TOPICAL
Qty: 30 G | Refills: 1 | Status: SHIPPED | OUTPATIENT
Start: 2025-08-08

## 2025-08-12 ENCOUNTER — OFFICE VISIT (OUTPATIENT)
Dept: FAMILY MEDICINE CLINIC | Age: 85
End: 2025-08-12
Payer: MEDICARE

## 2025-08-12 VITALS
OXYGEN SATURATION: 93 % | DIASTOLIC BLOOD PRESSURE: 62 MMHG | HEIGHT: 68 IN | HEART RATE: 61 BPM | WEIGHT: 209 LBS | SYSTOLIC BLOOD PRESSURE: 126 MMHG | BODY MASS INDEX: 31.67 KG/M2

## 2025-08-12 DIAGNOSIS — K86.9 PANCREATIC LESION: ICD-10-CM

## 2025-08-12 DIAGNOSIS — N18.31 STAGE 3A CHRONIC KIDNEY DISEASE (CKD) (HCC): ICD-10-CM

## 2025-08-12 DIAGNOSIS — D33.2 BENIGN NEOPLASM OF BRAIN, UNSPECIFIED BRAIN REGION (HCC): ICD-10-CM

## 2025-08-12 DIAGNOSIS — K21.9 GASTROESOPHAGEAL REFLUX DISEASE WITHOUT ESOPHAGITIS: ICD-10-CM

## 2025-08-12 DIAGNOSIS — L57.0 AK (ACTINIC KERATOSIS): ICD-10-CM

## 2025-08-12 DIAGNOSIS — I10 ESSENTIAL HYPERTENSION, BENIGN: ICD-10-CM

## 2025-08-12 DIAGNOSIS — E11.59 TYPE 2 DIABETES MELLITUS WITH OTHER CIRCULATORY COMPLICATIONS (HCC): Primary | ICD-10-CM

## 2025-08-12 LAB — HBA1C MFR BLD: 6.6 %

## 2025-08-12 PROCEDURE — 3074F SYST BP LT 130 MM HG: CPT | Performed by: FAMILY MEDICINE

## 2025-08-12 PROCEDURE — 83036 HEMOGLOBIN GLYCOSYLATED A1C: CPT | Performed by: FAMILY MEDICINE

## 2025-08-12 PROCEDURE — 1123F ACP DISCUSS/DSCN MKR DOCD: CPT | Performed by: FAMILY MEDICINE

## 2025-08-12 PROCEDURE — 1159F MED LIST DOCD IN RCRD: CPT | Performed by: FAMILY MEDICINE

## 2025-08-12 PROCEDURE — 3044F HG A1C LEVEL LT 7.0%: CPT | Performed by: FAMILY MEDICINE

## 2025-08-12 PROCEDURE — 99214 OFFICE O/P EST MOD 30 MIN: CPT | Performed by: FAMILY MEDICINE

## 2025-08-12 PROCEDURE — 17000 DESTRUCT PREMALG LESION: CPT | Performed by: FAMILY MEDICINE

## 2025-08-12 PROCEDURE — 3078F DIAST BP <80 MM HG: CPT | Performed by: FAMILY MEDICINE

## 2025-08-12 PROCEDURE — 17003 DESTRUCT PREMALG LES 2-14: CPT | Performed by: FAMILY MEDICINE

## 2025-08-22 RX ORDER — MUPIROCIN 2 %
OINTMENT (GRAM) TOPICAL
Qty: 22 G | Refills: 0 | Status: SHIPPED | OUTPATIENT
Start: 2025-08-22

## 2025-08-25 DIAGNOSIS — I10 ESSENTIAL HYPERTENSION, BENIGN: ICD-10-CM

## 2025-08-25 RX ORDER — FUROSEMIDE 20 MG/1
20 TABLET ORAL DAILY
Qty: 90 TABLET | Refills: 1 | Status: SHIPPED | OUTPATIENT
Start: 2025-08-25

## 2025-09-03 ENCOUNTER — OFFICE VISIT (OUTPATIENT)
Dept: CARDIOLOGY CLINIC | Age: 85
End: 2025-09-03

## 2025-09-03 VITALS — BODY MASS INDEX: 31.79 KG/M2 | HEIGHT: 68 IN

## 2025-09-03 DIAGNOSIS — I25.10 MILD CAD: Primary | ICD-10-CM

## (undated) DEVICE — SUTURE MCRYL + SZ 4-0 L18IN ABSRB UD L19MM PS-2 3/8 CIR MCP496G

## (undated) DEVICE — BLADE, TONGUE, 6", STERILE: Brand: MEDLINE

## (undated) DEVICE — CODMAN® SURGICAL PATTIES 1/2" X 3" (1.27CM X 7.62CM): Brand: CODMAN®

## (undated) DEVICE — SHEARS ENDOSCP L9CM CRV HARM FOCS +

## (undated) DEVICE — SYRINGE IRRIG 60ML SFT PLIABLE BLB EZ TO GRP 1 HND USE W/

## (undated) DEVICE — GLOVE SURG SZ 6 THK91MIL LTX FREE SYN POLYISOPRENE ANTI

## (undated) DEVICE — SYRINGE MED 10ML LUERLOCK TIP W/O SFTY DISP

## (undated) DEVICE — SOLUTION IV IRRIG WATER 1000ML POUR BRL 2F7114

## (undated) DEVICE — PACEMAKER PACK: Brand: MEDLINE INDUSTRIES, INC.

## (undated) DEVICE — NEPTUNE E-SEP SMOKE EVACUATION PENCIL, COATED, 70MM BLADE, PUSH BUTTON SWITCH: Brand: NEPTUNE E-SEP

## (undated) DEVICE — MINOR SET UP PACK: Brand: MEDLINE INDUSTRIES, INC.

## (undated) DEVICE — PROBE 8225401 5PK SD-SD BIPOL STIM ROHS

## (undated) DEVICE — ELECTRODE 8227304 5PK PRASS PR 18MM ROHS

## (undated) DEVICE — PROVE COVER: Brand: UNBRANDED

## (undated) DEVICE — LOOP VES RADIOPAQUE MAXI 406X1 MM SIL BLU STRL STERION LF

## (undated) DEVICE — CONTAINER,SPECIMEN,OR STERILE,4OZ: Brand: MEDLINE

## (undated) DEVICE — 3M™ IOBAN™ 2 ANTIMICROBIAL INCISE DRAPE 6650EZ: Brand: IOBAN™ 2

## (undated) DEVICE — SPONGE,DISSECTOR,K,XRAY,9/16"X1/4",STRL: Brand: MEDLINE

## (undated) DEVICE — SUTURE PERMAHAND SZ 2-0 L18IN NONABSORBABLE BLK L26MM SH C012D

## (undated) DEVICE — CYSTO: Brand: MEDLINE INDUSTRIES, INC.

## (undated) DEVICE — SKIN MARKER,REGULAR TIP WITH RULER AND LABELS: Brand: DEVON

## (undated) DEVICE — SUTURE VCRL + SZ 3-0 L18IN ABSRB UD SH 1/2 CIR TAPERCUT NDL VCP864D

## (undated) DEVICE — TUBING, SUCTION, 3/16" X 12', STRAIGHT: Brand: MEDLINE

## (undated) DEVICE — 3M™ STERI-STRIP™ REINFORCED ADHESIVE SKIN CLOSURES, R1547, 1/2 IN X 4 IN (12 MM X 100 MM), 6 STRIPS/ENVELOPE: Brand: 3M™ STERI-STRIP™

## (undated) DEVICE — SUTURE NONABSORBABLE MONOFILAMENT 5-0 PS-2 18 IN BLK ETHILON 1666H

## (undated) DEVICE — HEAD AND NECK PACK: Brand: CONVERTORS

## (undated) DEVICE — SUTURE PERMA-HAND SZ 3-0 L18IN NONABSORBABLE BLK RB-1 L17MM C053D

## (undated) DEVICE — Device

## (undated) DEVICE — ADHESIVE SKIN CLSR 0.7ML TOP DERMBND ADV

## (undated) DEVICE — ELECTRODE PT RET AD L9FT HI MOIST COND ADH HYDRGEL CORDED

## (undated) DEVICE — BAG DRNGE COMB PK

## (undated) DEVICE — CATHETER IV 20 GAX1 IN N WNG KINK RESIST INSYT AUTOGRD

## (undated) DEVICE — CORD ES L12FT BPLR FRCP

## (undated) DEVICE — MEDIA CONTRAST ISOVUE 370 100ML

## (undated) DEVICE — INTRODUCER SHTH L13CM OD7FR SH ORNG HUB SEAMLESS SAFSHTH

## (undated) DEVICE — APPLICATOR MEDICATED 26 CC SOLUTION HI LT ORNG CHLORAPREP

## (undated) DEVICE — TOWEL,OR,DSP,ST,BLUE,STD,8/PK,10PK/CS: Brand: MEDLINE

## (undated) DEVICE — EVACUATOR URO BLDR W/ ADPT UROVAC

## (undated) DEVICE — GAUZE,SPONGE,4"X4",16PLY,XRAY,STRL,LF: Brand: MEDLINE

## (undated) DEVICE — BLADE ES ELASTOMERIC COAT INSUL DURABLE BEND UPTO 90DEG

## (undated) DEVICE — MAGNETIC DRAPE: Brand: DEVON

## (undated) DEVICE — STANDARD HYPODERMIC NEEDLE,POLYPROPYLENE HUB: Brand: MONOJECT

## (undated) DEVICE — 3M™ STERI-DRAPE™ FLUOROSCOPE DRAPE, 10 PER CARTON / 4 CARTONS PER CASE, 1012: Brand: STERI-DRAPE™

## (undated) DEVICE — 3M™ STERI-DRAPE™ INSTRUMENT POUCH 1018: Brand: STERI-DRAPE™

## (undated) DEVICE — ELECTRODE ES BPLR WIRE DISP SUPERLOOP

## (undated) DEVICE — REMOTE SLEEP PATIENT CONTROL

## (undated) DEVICE — SOLUTION IRRIG 500ML 0.9% SOD CHL USP POUR PLAS BTL

## (undated) DEVICE — SOLUTION IRRIG 2000ML 0.9% SOD CHL USP UROMATIC PLAS CONT

## (undated) DEVICE — INTENDED TO BE USED TO OCCLUDE, RETRACT AND IDENTIFY ARTERIES, VEINS, TENDONS AND NERVES IN SURGICAL PROCEDURES: Brand: STERION®  VESSEL LOOP

## (undated) DEVICE — GLOVE ORANGE PI 7   MSG9070

## (undated) DEVICE — TOWEL,OR,DSP,ST,WHITE,DLX,4/PK,20PK/CS: Brand: MEDLINE

## (undated) DEVICE — NEEDLE HYPO 25GA L1.5IN BLU POLYPR HUB S STL REG BVL STR

## (undated) DEVICE — NEEDLE FLTR 19GA L1.5IN WALL THK5UM BRN POLYPR HUB S STL

## (undated) DEVICE — PENCIL ES CRD L10FT HND SWCHING ROCK SWCH W/ EDGE COAT BLDE